# Patient Record
Sex: MALE | Race: WHITE | NOT HISPANIC OR LATINO | Employment: OTHER | URBAN - METROPOLITAN AREA
[De-identification: names, ages, dates, MRNs, and addresses within clinical notes are randomized per-mention and may not be internally consistent; named-entity substitution may affect disease eponyms.]

---

## 2017-01-08 ENCOUNTER — RESULTS ENCOUNTER (OUTPATIENT)
Dept: FAMILY MEDICINE CLINIC | Facility: CLINIC | Age: 82
End: 2017-01-08

## 2017-01-08 DIAGNOSIS — D50.9 IRON DEFICIENCY ANEMIA, UNSPECIFIED IRON DEFICIENCY ANEMIA TYPE: ICD-10-CM

## 2017-01-10 ENCOUNTER — TRANSCRIBE ORDERS (OUTPATIENT)
Dept: ADMINISTRATIVE | Facility: HOSPITAL | Age: 82
End: 2017-01-10

## 2017-01-10 ENCOUNTER — LAB (OUTPATIENT)
Dept: LAB | Facility: HOSPITAL | Age: 82
End: 2017-01-10

## 2017-01-10 DIAGNOSIS — E11.9 TYPE 2 DIABETES MELLITUS WITHOUT COMPLICATION, WITHOUT LONG-TERM CURRENT USE OF INSULIN (HCC): ICD-10-CM

## 2017-01-10 DIAGNOSIS — M1A.00X0 IDIOPATHIC CHRONIC GOUT WITHOUT TOPHUS, UNSPECIFIED SITE: ICD-10-CM

## 2017-01-10 DIAGNOSIS — I11.9 HYPERTENSIVE HEART DISEASE WITHOUT HEART FAILURE: ICD-10-CM

## 2017-01-10 LAB
ALBUMIN SERPL-MCNC: 4 G/DL (ref 3.5–5.2)
ALBUMIN UR-MCNC: 2.5 MG/L
ALBUMIN/GLOB SERPL: 1.3 G/DL
ALP SERPL-CCNC: 81 U/L (ref 39–117)
ALT SERPL W P-5'-P-CCNC: 61 U/L (ref 1–41)
ANION GAP SERPL CALCULATED.3IONS-SCNC: 12.8 MMOL/L
AST SERPL-CCNC: 47 U/L (ref 1–40)
BASOPHILS # BLD AUTO: 0.02 10*3/MM3 (ref 0–0.2)
BASOPHILS NFR BLD AUTO: 0.3 % (ref 0–1.5)
BILIRUB SERPL-MCNC: 0.5 MG/DL (ref 0.1–1.2)
BUN BLD-MCNC: 33 MG/DL (ref 8–23)
BUN/CREAT SERPL: 25.4 (ref 7–25)
CALCIUM SPEC-SCNC: 10 MG/DL (ref 8.6–10.5)
CHLORIDE SERPL-SCNC: 101 MMOL/L (ref 98–107)
CHOLEST SERPL-MCNC: 154 MG/DL (ref 0–200)
CO2 SERPL-SCNC: 28.2 MMOL/L (ref 22–29)
CREAT BLD-MCNC: 1.3 MG/DL (ref 0.76–1.27)
CREAT UR-MCNC: 87.5 MG/DL
DEPRECATED RDW RBC AUTO: 52.4 FL (ref 37–54)
EOSINOPHIL # BLD AUTO: 0.13 10*3/MM3 (ref 0–0.7)
EOSINOPHIL NFR BLD AUTO: 1.6 % (ref 0.3–6.2)
ERYTHROCYTE [DISTWIDTH] IN BLOOD BY AUTOMATED COUNT: 15.9 % (ref 11.5–14.5)
FERRITIN SERPL-MCNC: 90.94 NG/ML (ref 30–400)
GFR SERPL CREATININE-BSD FRML MDRD: 53 ML/MIN/1.73
GLOBULIN UR ELPH-MCNC: 3.2 GM/DL
GLUCOSE BLD-MCNC: 136 MG/DL (ref 65–99)
HBA1C MFR BLD: 6.57 % (ref 4.8–5.6)
HCT VFR BLD AUTO: 42.1 % (ref 40.4–52.2)
HDLC SERPL-MCNC: 45 MG/DL (ref 40–60)
HGB BLD-MCNC: 12.2 G/DL (ref 13.7–17.6)
IMM GRANULOCYTES # BLD: 0.02 10*3/MM3 (ref 0–0.03)
IMM GRANULOCYTES NFR BLD: 0.3 % (ref 0–0.5)
IRON 24H UR-MRATE: 63 MCG/DL (ref 59–158)
IRON SATN MFR SERPL: 13 % (ref 20–50)
LDLC SERPL CALC-MCNC: 75 MG/DL (ref 0–100)
LDLC/HDLC SERPL: 1.66 {RATIO}
LYMPHOCYTES # BLD AUTO: 1.46 10*3/MM3 (ref 0.9–4.8)
LYMPHOCYTES NFR BLD AUTO: 18.3 % (ref 19.6–45.3)
MCH RBC QN AUTO: 26.2 PG (ref 27–32.7)
MCHC RBC AUTO-ENTMCNC: 29 G/DL (ref 32.6–36.4)
MCV RBC AUTO: 90.5 FL (ref 79.8–96.2)
MICROALBUMIN/CREAT UR: 28.6 MG/G
MONOCYTES # BLD AUTO: 0.56 10*3/MM3 (ref 0.2–1.2)
MONOCYTES NFR BLD AUTO: 7 % (ref 5–12)
NEUTROPHILS # BLD AUTO: 5.81 10*3/MM3 (ref 1.9–8.1)
NEUTROPHILS NFR BLD AUTO: 72.5 % (ref 42.7–76)
PLATELET # BLD AUTO: 212 10*3/MM3 (ref 140–500)
PMV BLD AUTO: 11.3 FL (ref 6–12)
POTASSIUM BLD-SCNC: 4.9 MMOL/L (ref 3.5–5.2)
PROT SERPL-MCNC: 7.2 G/DL (ref 6–8.5)
RBC # BLD AUTO: 4.65 10*6/MM3 (ref 4.6–6)
SODIUM BLD-SCNC: 142 MMOL/L (ref 136–145)
TIBC SERPL-MCNC: 489 MCG/DL (ref 298–536)
TRANSFERRIN SERPL-MCNC: 328 MG/DL (ref 200–360)
TRIGL SERPL-MCNC: 172 MG/DL (ref 0–150)
TSH SERPL DL<=0.05 MIU/L-ACNC: 2.42 MIU/ML (ref 0.27–4.2)
URATE SERPL-MCNC: 7.3 MG/DL (ref 3.4–7)
VLDLC SERPL-MCNC: 34.4 MG/DL (ref 5–40)
WBC NRBC COR # BLD: 8 10*3/MM3 (ref 4.5–10.7)

## 2017-01-10 PROCEDURE — 83036 HEMOGLOBIN GLYCOSYLATED A1C: CPT

## 2017-01-10 PROCEDURE — 84466 ASSAY OF TRANSFERRIN: CPT | Performed by: INTERNAL MEDICINE

## 2017-01-10 PROCEDURE — 82570 ASSAY OF URINE CREATININE: CPT

## 2017-01-10 PROCEDURE — 36415 COLL VENOUS BLD VENIPUNCTURE: CPT

## 2017-01-10 PROCEDURE — 82728 ASSAY OF FERRITIN: CPT | Performed by: INTERNAL MEDICINE

## 2017-01-10 PROCEDURE — 84550 ASSAY OF BLOOD/URIC ACID: CPT

## 2017-01-10 PROCEDURE — 82043 UR ALBUMIN QUANTITATIVE: CPT

## 2017-01-10 PROCEDURE — 80053 COMPREHEN METABOLIC PANEL: CPT

## 2017-01-10 PROCEDURE — 80061 LIPID PANEL: CPT

## 2017-01-10 PROCEDURE — 85025 COMPLETE CBC W/AUTO DIFF WBC: CPT | Performed by: INTERNAL MEDICINE

## 2017-01-10 PROCEDURE — 83540 ASSAY OF IRON: CPT | Performed by: INTERNAL MEDICINE

## 2017-01-10 PROCEDURE — 84443 ASSAY THYROID STIM HORMONE: CPT

## 2017-01-19 ENCOUNTER — OFFICE VISIT (OUTPATIENT)
Dept: FAMILY MEDICINE CLINIC | Facility: CLINIC | Age: 82
End: 2017-01-19

## 2017-01-19 VITALS
WEIGHT: 235 LBS | SYSTOLIC BLOOD PRESSURE: 120 MMHG | OXYGEN SATURATION: 98 % | BODY MASS INDEX: 32.9 KG/M2 | HEIGHT: 71 IN | DIASTOLIC BLOOD PRESSURE: 70 MMHG | HEART RATE: 72 BPM

## 2017-01-19 DIAGNOSIS — E11.9 TYPE 2 DIABETES MELLITUS WITHOUT COMPLICATION, WITHOUT LONG-TERM CURRENT USE OF INSULIN (HCC): ICD-10-CM

## 2017-01-19 DIAGNOSIS — D50.9 IRON DEFICIENCY ANEMIA, UNSPECIFIED IRON DEFICIENCY ANEMIA TYPE: Primary | ICD-10-CM

## 2017-01-19 DIAGNOSIS — I11.9 HYPERTENSIVE HEART DISEASE WITHOUT HEART FAILURE: ICD-10-CM

## 2017-01-19 PROCEDURE — 99213 OFFICE O/P EST LOW 20 MIN: CPT | Performed by: INTERNAL MEDICINE

## 2017-01-19 RX ORDER — FERROUS SULFATE 325(65) MG
325 TABLET ORAL 2 TIMES DAILY
COMMUNITY
End: 2017-05-19

## 2017-01-19 RX ORDER — CARVEDILOL 3.12 MG/1
3.12 TABLET ORAL 2 TIMES DAILY
Qty: 180 TABLET | Refills: 1 | Status: SHIPPED | OUTPATIENT
Start: 2017-01-19 | End: 2017-05-22 | Stop reason: SDUPTHER

## 2017-01-19 RX ORDER — PRAVASTATIN SODIUM 40 MG
40 TABLET ORAL DAILY
Qty: 90 TABLET | Refills: 1 | Status: SHIPPED | OUTPATIENT
Start: 2017-01-19 | End: 2017-05-22 | Stop reason: SDUPTHER

## 2017-01-19 RX ORDER — RAMIPRIL 10 MG/1
10 CAPSULE ORAL DAILY
Qty: 90 CAPSULE | Refills: 1 | Status: SHIPPED | OUTPATIENT
Start: 2017-01-19 | End: 2017-05-22 | Stop reason: SDUPTHER

## 2017-01-19 RX ORDER — SPIRONOLACTONE 25 MG/1
25 TABLET ORAL DAILY
Qty: 90 TABLET | Refills: 1 | Status: SHIPPED | OUTPATIENT
Start: 2017-01-19 | End: 2017-05-22 | Stop reason: SDUPTHER

## 2017-01-19 NOTE — PROGRESS NOTES
Subjective   Goran August is a 82 y.o. male who presents today for:    Diabetes (7 week f/u); Hyperlipidemia; and Hypertension    History of Present Illness   He presents today in follow-up for his anemia.  This was discovered within the past year, and in iron saturation of 9% was also discovered.  Additional evaluation was negative.  Hemoccult cards were negative.  No GI endoscopic evaluation has been pursued as of yet.    He was started on ferrous gluconate plus vitamin C twice a day which he tolerates without side effect.  He returns today to recheck his labs.    Gemfibrozil that was being taken with his pravastatin was discontinued and fenofibrate was started.  He has tolerated this medication change without side effect.    He has type 2 diabetes mellitus going back several years.  This is been controlled with lifestyle changes (diet and regular exercise).  His last few A1c's have been below 7%, obviating the need for medication at this point.  His preference is to not take any more medication than he is currently on.    Mr. August  reports that he has quit smoking. His smoking use included Cigarettes. He has a 12.50 pack-year smoking history. He has never used smokeless tobacco. He reports that he drinks alcohol. He reports that he does not use illicit drugs.         Current Outpatient Prescriptions:   •  Ascorbic Acid (VITAMIN C PO), Take 1 tablet by mouth 2 (Two) Times a Day., Disp: , Rfl:   •  aspirin 81 MG EC tablet, Take 81 mg by mouth Daily., Disp: , Rfl:   •  carvedilol (COREG) 3.125 MG tablet, Take 1 tablet by mouth 2 (two) times a day., Disp: , Rfl:   •  esomeprazole (NexIUM) 40 MG capsule, Take 1 capsule by mouth daily., Disp: , Rfl:   •  fenofibrate (TRICOR) 145 MG tablet, Take 1 tablet by mouth daily., Disp: 90 tablet, Rfl: 3  •  ferrous sulfate 325 (65 FE) MG tablet, Take 325 mg by mouth 2 (Two) Times a Day., Disp: , Rfl:   •  fluticasone (FLONASE) 50 MCG/ACT nasal spray, 2 sprays into each  "nostril daily. Administer 2 sprays in each nostril once a day, Disp: 1 bottle, Rfl: 0  •  Multiple Vitamin (MULTI-VITAMIN DAILY PO), Take  by mouth., Disp: , Rfl:   •  pravastatin (PRAVACHOL) 40 MG tablet, Take 1 tablet by mouth daily., Disp: , Rfl:   •  ramipril (ALTACE) 10 MG capsule, Take 10 mg by mouth daily., Disp: , Rfl:   •  spironolactone (ALDACTONE) 25 MG tablet, Take 1 tablet by mouth daily., Disp: , Rfl:       The following portions of the patient's history were reviewed and updated as appropriate: allergies, current medications, past social history and problem list.    Review of Systems   Constitutional: Negative for diaphoresis and fatigue.   Eyes: Negative for visual disturbance.   Respiratory: Negative for cough and chest tightness.    Cardiovascular: Negative for chest pain, palpitations and leg swelling.   Gastrointestinal: Negative for abdominal pain, blood in stool and diarrhea.   Endocrine: Negative for polydipsia and polyuria.   Musculoskeletal: Negative for myalgias.   Skin: Negative for wound.   Neurological: Negative for dizziness, syncope, numbness and headaches.   Hematological: Does not bruise/bleed easily.         Objective   Vitals:    01/19/17 1450   BP: 120/70   BP Location: Left arm   Patient Position: Sitting   Cuff Size: Adult   Pulse: 72   SpO2: 98%   Weight: 235 lb (107 kg)   Height: 71\" (180.3 cm)     Physical Exam   Constitutional: He is oriented to person, place, and time. He appears well-developed and well-nourished. No distress.   Eyes: Conjunctivae are normal. No scleral icterus.   Cardiovascular: Normal rate and regular rhythm.    Abdominal: There is no tenderness.   Neurological: He is alert and oriented to person, place, and time. Coordination normal.   Psychiatric: He has a normal mood and affect. His behavior is normal.       Assessment/Plan   Goran was seen today for diabetes, hyperlipidemia and hypertension.    Diagnoses and all orders for this visit:    Iron " deficiency anemia, unspecified iron deficiency anemia type  -     CBC & Differential; Future  -     Ferritin; Future  -     Iron Profile; Future    Type 2 diabetes mellitus without complication, without long-term current use of insulin  -     Hemoglobin A1c; Future  -     Comprehensive Metabolic Panel; Future    Hypertensive heart disease without heart failure    Other orders  -     spironolactone (ALDACTONE) 25 MG tablet; Take 1 tablet by mouth Daily.  -     carvedilol (COREG) 3.125 MG tablet; Take 1 tablet by mouth 2 (Two) Times a Day.  -     ramipril (ALTACE) 10 MG capsule; Take 1 capsule by mouth Daily.  -     pravastatin (PRAVACHOL) 40 MG tablet; Take 1 tablet by mouth Daily.    Iron levels have started to respond to the iron supplement.  His transferrin saturation is now 13%, up from 9%.  Hemoglobin remained steady at 12.2.  He should continue the iron plus vitamin C twice a day.  We will recheck anemia labs in approximately 4 months, prior to his follow-up in May.    While his A1c has risen slightly, it is still well controlled with diet and exercise.  At 6.57, there is no need to add any further medications.    Recent echocardiogram to the [facility] office showed an ejection fraction of 51%.  This result was reviewed with the patient today.  There is still mild concentric LVH.  He will stay on the same regimen since it has his blood pressure with good control.    There is no evidence of microalbuminuria, but there's been a slight bump in his creatinine since starting him on fenofibrate.  We will recheck his liver enzymes as well since those also bumped.  Otherwise, we will make no changes medications until we see those labs in about 4 months.  He knows to contact us prior to that follow-up visit if there are any further problems.

## 2017-01-19 NOTE — MR AVS SNAPSHOT
Goran August   1/19/2017 2:40 PM   Office Visit    Dept Phone:  [phone]   Encounter #:  [id number]    Provider:  Manny Brice MD   Department:  [facility] INTERNAL MEDICINE                Your Full Care Plan              Where to Get Your Medications      These medications were sent to [facility] - [place], [place] - [address] AT [address] [facility] - [phone] [facility] [phone]   [address] [facility] [address]     Phone:  [phone]     carvedilol 3.125 MG tablet    pravastatin 40 MG tablet    ramipril 10 MG capsule    spironolactone 25 MG tablet            Your Updated Medication List          This list is accurate as of: 1/19/17  3:45 PM.  Always use your most recent med list.                aspirin 81 MG EC tablet       carvedilol 3.125 MG tablet   Commonly known as:  COREG   Take 1 tablet by mouth 2 (Two) Times a Day.       esomeprazole 40 MG capsule   Commonly known as:  nexIUM       fenofibrate 145 MG tablet   Commonly known as:  TRICOR   Take 1 tablet by mouth daily.       ferrous sulfate 325 (65 FE) MG tablet       fluticasone 50 MCG/ACT nasal spray   Commonly known as:  FLONASE   2 sprays into each nostril daily. Administer 2 sprays in each nostril once a day       MULTI-VITAMIN DAILY PO       pravastatin 40 MG tablet   Commonly known as:  PRAVACHOL   Take 1 tablet by mouth Daily.       ramipril 10 MG capsule   Commonly known as:  ALTACE   Take 1 capsule by mouth Daily.       spironolactone 25 MG tablet   Commonly known as:  ALDACTONE   Take 1 tablet by mouth Daily.       VITAMIN C PO               You Were Diagnosed With        Codes Comments    Iron deficiency anemia, unspecified iron deficiency anemia type    -  Primary ICD-10-CM: D50.9  ICD-9-CM: 280.9     Type 2 diabetes mellitus without complication, without long-term current use of insulin     ICD-10-CM: E11.9  ICD-9-CM: 250.00     "Hypertensive heart disease without heart failure     ICD-10-CM: I11.9  ICD-9-CM: 402.90       Instructions     None    Patient Instructions History      Upcoming Appointments     Visit Type Date Time Department    OFFICE VISIT 1/19/2017  2:40 PM [facility]    [facility] 5/12/2017 11:40 AM [facility]    OFFICE VISIT 5/19/2017 11:20 AM [facility]      MyChart Signup     Our records indicate that you have declined [facility] signup. If you would like to sign up for [employer], please email [facility] [email or web address] or call [phone] to obtain an activation code.             Other Info from Your Visit           Your Appointments     May 12, 2017 11:40 AM EDT   [facility] with KIMBERLY GRULLON   [facility] INTERNAL MEDICINE (--)    [address] [place], [place] [address]   [phone]            May 19, 2017 11:20 AM EDT   Office Visit with Manny Brice MD   [facility] INTERNAL MEDICINE (--)    [address] [place], [place] [address]   [phone]           Arrive 15 minutes prior to appointment.              Allergies     Hctz [Hydrochlorothiazide] Allergy Hives      Reason for Visit     Diabetes 7 week f/u    Hyperlipidemia     Hypertension           Vital Signs     Blood Pressure Pulse Height Weight Oxygen Saturation Body Mass Index    120/70 (BP Location: Left arm, Patient Position: Sitting, Cuff Size: Adult) 72 71\" (180.3 cm) 235 lb (107 kg) 98% 32.78 kg/m2    Smoking Status                   Former Smoker           Problems and Diagnoses Noted     Anemia    Heart disease due to high blood pressure    Type 2 diabetes mellitus without complication        "

## 2017-01-20 DIAGNOSIS — H65.03 BILATERAL ACUTE SEROUS OTITIS MEDIA, RECURRENCE NOT SPECIFIED: ICD-10-CM

## 2017-01-20 RX ORDER — FLUTICASONE PROPIONATE 50 MCG
2 SPRAY, SUSPENSION (ML) NASAL DAILY
Qty: 1 BOTTLE | Refills: 12 | Status: SHIPPED | OUTPATIENT
Start: 2017-01-20 | End: 2019-05-01 | Stop reason: SDUPTHER

## 2017-05-07 ENCOUNTER — RESULTS ENCOUNTER (OUTPATIENT)
Dept: FAMILY MEDICINE CLINIC | Facility: CLINIC | Age: 82
End: 2017-05-07

## 2017-05-07 DIAGNOSIS — D50.9 IRON DEFICIENCY ANEMIA, UNSPECIFIED IRON DEFICIENCY ANEMIA TYPE: ICD-10-CM

## 2017-05-07 DIAGNOSIS — E11.9 TYPE 2 DIABETES MELLITUS WITHOUT COMPLICATION, WITHOUT LONG-TERM CURRENT USE OF INSULIN (HCC): ICD-10-CM

## 2017-05-12 ENCOUNTER — APPOINTMENT (OUTPATIENT)
Dept: LAB | Facility: HOSPITAL | Age: 82
End: 2017-05-12

## 2017-05-12 LAB
ALBUMIN SERPL-MCNC: 4 G/DL (ref 3.5–5.2)
ALBUMIN/GLOB SERPL: 1.3 G/DL
ALP SERPL-CCNC: 58 U/L (ref 39–117)
ALT SERPL W P-5'-P-CCNC: 32 U/L (ref 1–41)
ANION GAP SERPL CALCULATED.3IONS-SCNC: 11.6 MMOL/L
AST SERPL-CCNC: 36 U/L (ref 1–40)
BASOPHILS # BLD AUTO: 0.02 10*3/MM3 (ref 0–0.2)
BASOPHILS NFR BLD AUTO: 0.3 % (ref 0–1.5)
BILIRUB SERPL-MCNC: 0.4 MG/DL (ref 0.1–1.2)
BUN BLD-MCNC: 26 MG/DL (ref 8–23)
BUN/CREAT SERPL: 21.8 (ref 7–25)
CALCIUM SPEC-SCNC: 9.7 MG/DL (ref 8.6–10.5)
CHLORIDE SERPL-SCNC: 101 MMOL/L (ref 98–107)
CO2 SERPL-SCNC: 27.4 MMOL/L (ref 22–29)
CREAT BLD-MCNC: 1.19 MG/DL (ref 0.76–1.27)
DEPRECATED RDW RBC AUTO: 49.2 FL (ref 37–54)
EOSINOPHIL # BLD AUTO: 0.11 10*3/MM3 (ref 0–0.7)
EOSINOPHIL NFR BLD AUTO: 1.6 % (ref 0.3–6.2)
ERYTHROCYTE [DISTWIDTH] IN BLOOD BY AUTOMATED COUNT: 14.7 % (ref 11.5–14.5)
FERRITIN SERPL-MCNC: 156.9 NG/ML (ref 30–400)
GFR SERPL CREATININE-BSD FRML MDRD: 59 ML/MIN/1.73
GLOBULIN UR ELPH-MCNC: 3.2 GM/DL
GLUCOSE BLD-MCNC: 96 MG/DL (ref 65–99)
HBA1C MFR BLD: 5.93 % (ref 4.8–5.6)
HCT VFR BLD AUTO: 39.8 % (ref 40.4–52.2)
HGB BLD-MCNC: 12.2 G/DL (ref 13.7–17.6)
IMM GRANULOCYTES # BLD: 0 10*3/MM3 (ref 0–0.03)
IMM GRANULOCYTES NFR BLD: 0 % (ref 0–0.5)
IRON 24H UR-MRATE: 51 MCG/DL (ref 59–158)
IRON SATN MFR SERPL: 12 % (ref 20–50)
LYMPHOCYTES # BLD AUTO: 1.31 10*3/MM3 (ref 0.9–4.8)
LYMPHOCYTES NFR BLD AUTO: 19.2 % (ref 19.6–45.3)
MCH RBC QN AUTO: 27.8 PG (ref 27–32.7)
MCHC RBC AUTO-ENTMCNC: 30.7 G/DL (ref 32.6–36.4)
MCV RBC AUTO: 90.7 FL (ref 79.8–96.2)
MONOCYTES # BLD AUTO: 0.41 10*3/MM3 (ref 0.2–1.2)
MONOCYTES NFR BLD AUTO: 6 % (ref 5–12)
NEUTROPHILS # BLD AUTO: 4.97 10*3/MM3 (ref 1.9–8.1)
NEUTROPHILS NFR BLD AUTO: 72.9 % (ref 42.7–76)
PLATELET # BLD AUTO: 207 10*3/MM3 (ref 140–500)
PMV BLD AUTO: 11.5 FL (ref 6–12)
POTASSIUM BLD-SCNC: 4.5 MMOL/L (ref 3.5–5.2)
PROT SERPL-MCNC: 7.2 G/DL (ref 6–8.5)
RBC # BLD AUTO: 4.39 10*6/MM3 (ref 4.6–6)
SODIUM BLD-SCNC: 140 MMOL/L (ref 136–145)
TIBC SERPL-MCNC: 411 MCG/DL (ref 298–536)
TRANSFERRIN SERPL-MCNC: 276 MG/DL (ref 200–360)
WBC NRBC COR # BLD: 6.82 10*3/MM3 (ref 4.5–10.7)

## 2017-05-12 PROCEDURE — 82728 ASSAY OF FERRITIN: CPT | Performed by: INTERNAL MEDICINE

## 2017-05-12 PROCEDURE — 80053 COMPREHEN METABOLIC PANEL: CPT | Performed by: INTERNAL MEDICINE

## 2017-05-12 PROCEDURE — 85025 COMPLETE CBC W/AUTO DIFF WBC: CPT | Performed by: INTERNAL MEDICINE

## 2017-05-12 PROCEDURE — 83036 HEMOGLOBIN GLYCOSYLATED A1C: CPT | Performed by: INTERNAL MEDICINE

## 2017-05-12 PROCEDURE — 83540 ASSAY OF IRON: CPT | Performed by: INTERNAL MEDICINE

## 2017-05-12 PROCEDURE — 36415 COLL VENOUS BLD VENIPUNCTURE: CPT | Performed by: INTERNAL MEDICINE

## 2017-05-12 PROCEDURE — 84466 ASSAY OF TRANSFERRIN: CPT | Performed by: INTERNAL MEDICINE

## 2017-05-19 ENCOUNTER — OFFICE VISIT (OUTPATIENT)
Dept: FAMILY MEDICINE CLINIC | Facility: CLINIC | Age: 82
End: 2017-05-19

## 2017-05-19 VITALS
HEIGHT: 71 IN | BODY MASS INDEX: 30.51 KG/M2 | HEART RATE: 56 BPM | OXYGEN SATURATION: 94 % | SYSTOLIC BLOOD PRESSURE: 108 MMHG | DIASTOLIC BLOOD PRESSURE: 68 MMHG | WEIGHT: 217.9 LBS

## 2017-05-19 DIAGNOSIS — I11.9 HYPERTENSIVE HEART DISEASE WITHOUT HEART FAILURE: ICD-10-CM

## 2017-05-19 DIAGNOSIS — E11.9 TYPE 2 DIABETES MELLITUS WITHOUT COMPLICATION, WITHOUT LONG-TERM CURRENT USE OF INSULIN (HCC): Primary | ICD-10-CM

## 2017-05-19 DIAGNOSIS — D50.9 IRON DEFICIENCY ANEMIA, UNSPECIFIED IRON DEFICIENCY ANEMIA TYPE: ICD-10-CM

## 2017-05-19 DIAGNOSIS — L20.9 ATOPIC DERMATITIS, UNSPECIFIED TYPE: ICD-10-CM

## 2017-05-19 PROCEDURE — 99214 OFFICE O/P EST MOD 30 MIN: CPT | Performed by: INTERNAL MEDICINE

## 2017-05-19 RX ORDER — QUINIDINE GLUCONATE 324 MG
240 TABLET, EXTENDED RELEASE ORAL 2 TIMES DAILY WITH MEALS
COMMUNITY
End: 2017-05-26 | Stop reason: DRUGHIGH

## 2017-05-19 RX ORDER — ESOMEPRAZOLE MAGNESIUM 40 MG/1
40 CAPSULE, DELAYED RELEASE ORAL DAILY
Qty: 30 CAPSULE | Refills: 6 | Status: CANCELLED | OUTPATIENT
Start: 2017-05-19

## 2017-05-19 RX ORDER — TRIAMCINOLONE ACETONIDE 1 MG/G
CREAM TOPICAL 2 TIMES DAILY
Qty: 454 G | Refills: 0 | Status: SHIPPED | OUTPATIENT
Start: 2017-05-19 | End: 2018-04-02

## 2017-05-22 DIAGNOSIS — E78.5 HYPERLIPIDEMIA, UNSPECIFIED HYPERLIPIDEMIA TYPE: ICD-10-CM

## 2017-05-22 RX ORDER — FENOFIBRATE 145 MG/1
145 TABLET, COATED ORAL DAILY
Qty: 90 TABLET | Refills: 1 | Status: SHIPPED | OUTPATIENT
Start: 2017-05-22 | End: 2017-05-26 | Stop reason: SDUPTHER

## 2017-05-22 RX ORDER — ESOMEPRAZOLE MAGNESIUM 40 MG/1
40 CAPSULE, DELAYED RELEASE ORAL DAILY
Qty: 90 CAPSULE | Refills: 3 | Status: SHIPPED | OUTPATIENT
Start: 2017-05-22 | End: 2017-05-26 | Stop reason: SDUPTHER

## 2017-05-22 RX ORDER — PRAVASTATIN SODIUM 40 MG
40 TABLET ORAL DAILY
Qty: 90 TABLET | Refills: 1 | Status: SHIPPED | OUTPATIENT
Start: 2017-05-22 | End: 2017-05-26 | Stop reason: SDUPTHER

## 2017-05-22 RX ORDER — RAMIPRIL 10 MG/1
10 CAPSULE ORAL DAILY
Qty: 90 CAPSULE | Refills: 1 | Status: SHIPPED | OUTPATIENT
Start: 2017-05-22 | End: 2017-05-26 | Stop reason: SDUPTHER

## 2017-05-22 RX ORDER — SPIRONOLACTONE 25 MG/1
25 TABLET ORAL DAILY
Qty: 90 TABLET | Refills: 1 | Status: SHIPPED | OUTPATIENT
Start: 2017-05-22 | End: 2017-05-26 | Stop reason: SDUPTHER

## 2017-05-22 RX ORDER — CARVEDILOL 3.12 MG/1
3.12 TABLET ORAL 2 TIMES DAILY
Qty: 180 TABLET | Refills: 1 | Status: SHIPPED | OUTPATIENT
Start: 2017-05-22 | End: 2017-05-26 | Stop reason: SDUPTHER

## 2017-05-26 DIAGNOSIS — E78.5 HYPERLIPIDEMIA, UNSPECIFIED HYPERLIPIDEMIA TYPE: ICD-10-CM

## 2017-05-26 RX ORDER — ESOMEPRAZOLE MAGNESIUM 40 MG/1
40 CAPSULE, DELAYED RELEASE ORAL DAILY
Qty: 90 CAPSULE | Refills: 3 | Status: SHIPPED | OUTPATIENT
Start: 2017-05-26 | End: 2018-05-29 | Stop reason: SDUPTHER

## 2017-05-26 RX ORDER — PRAVASTATIN SODIUM 40 MG
40 TABLET ORAL DAILY
Qty: 90 TABLET | Refills: 1 | Status: SHIPPED | OUTPATIENT
Start: 2017-05-26 | End: 2017-11-21 | Stop reason: SDUPTHER

## 2017-05-26 RX ORDER — FENOFIBRATE 145 MG/1
145 TABLET, COATED ORAL DAILY
Qty: 90 TABLET | Refills: 1 | Status: SHIPPED | OUTPATIENT
Start: 2017-05-26 | End: 2018-05-10 | Stop reason: SDUPTHER

## 2017-05-26 RX ORDER — DOXYCYCLINE HYCLATE 50 MG/1
324 CAPSULE, GELATIN COATED ORAL 2 TIMES DAILY
Qty: 100 TABLET | Refills: 0 | Status: SHIPPED | OUTPATIENT
Start: 2017-05-26 | End: 2017-07-24 | Stop reason: SDUPTHER

## 2017-05-26 RX ORDER — RAMIPRIL 10 MG/1
10 CAPSULE ORAL DAILY
Qty: 90 CAPSULE | Refills: 1 | Status: SHIPPED | OUTPATIENT
Start: 2017-05-26 | End: 2017-11-21 | Stop reason: SDUPTHER

## 2017-05-26 RX ORDER — CARVEDILOL 3.12 MG/1
3.12 TABLET ORAL 2 TIMES DAILY
Qty: 180 TABLET | Refills: 1 | Status: SHIPPED | OUTPATIENT
Start: 2017-05-26 | End: 2017-11-21 | Stop reason: SDUPTHER

## 2017-05-26 RX ORDER — SPIRONOLACTONE 25 MG/1
25 TABLET ORAL DAILY
Qty: 90 TABLET | Refills: 1 | Status: SHIPPED | OUTPATIENT
Start: 2017-05-26 | End: 2017-11-21 | Stop reason: SDUPTHER

## 2017-07-25 RX ORDER — DOXYCYCLINE HYCLATE 50 MG/1
CAPSULE, GELATIN COATED ORAL
Qty: 100 TABLET | Refills: 3 | Status: SHIPPED | OUTPATIENT
Start: 2017-07-25 | End: 2017-08-18

## 2017-08-01 DIAGNOSIS — D50.9 IRON DEFICIENCY ANEMIA, UNSPECIFIED IRON DEFICIENCY ANEMIA TYPE: Primary | ICD-10-CM

## 2017-08-01 DIAGNOSIS — E78.5 HYPERLIPIDEMIA, UNSPECIFIED HYPERLIPIDEMIA TYPE: ICD-10-CM

## 2017-08-01 DIAGNOSIS — M1A.00X0 IDIOPATHIC CHRONIC GOUT WITHOUT TOPHUS, UNSPECIFIED SITE: ICD-10-CM

## 2017-08-01 DIAGNOSIS — E11.9 TYPE 2 DIABETES MELLITUS WITHOUT COMPLICATION, WITHOUT LONG-TERM CURRENT USE OF INSULIN (HCC): ICD-10-CM

## 2017-08-06 ENCOUNTER — RESULTS ENCOUNTER (OUTPATIENT)
Dept: FAMILY MEDICINE CLINIC | Facility: CLINIC | Age: 82
End: 2017-08-06

## 2017-08-06 DIAGNOSIS — E11.9 TYPE 2 DIABETES MELLITUS WITHOUT COMPLICATION, WITHOUT LONG-TERM CURRENT USE OF INSULIN (HCC): ICD-10-CM

## 2017-08-06 DIAGNOSIS — M1A.00X0 IDIOPATHIC CHRONIC GOUT WITHOUT TOPHUS, UNSPECIFIED SITE: ICD-10-CM

## 2017-08-06 DIAGNOSIS — D50.9 IRON DEFICIENCY ANEMIA, UNSPECIFIED IRON DEFICIENCY ANEMIA TYPE: ICD-10-CM

## 2017-08-06 DIAGNOSIS — E78.5 HYPERLIPIDEMIA, UNSPECIFIED HYPERLIPIDEMIA TYPE: ICD-10-CM

## 2017-08-11 ENCOUNTER — APPOINTMENT (OUTPATIENT)
Dept: LAB | Facility: HOSPITAL | Age: 82
End: 2017-08-11

## 2017-08-11 LAB
ALBUMIN SERPL-MCNC: 3.8 G/DL (ref 3.5–5.2)
ALBUMIN UR-MCNC: <1.2 MG/L
ALBUMIN/GLOB SERPL: 1.2 G/DL
ALP SERPL-CCNC: 49 U/L (ref 39–117)
ALT SERPL W P-5'-P-CCNC: 26 U/L (ref 1–41)
ANION GAP SERPL CALCULATED.3IONS-SCNC: 10.4 MMOL/L
AST SERPL-CCNC: 27 U/L (ref 1–40)
BILIRUB SERPL-MCNC: 0.2 MG/DL (ref 0.1–1.2)
BUN BLD-MCNC: 33 MG/DL (ref 8–23)
BUN/CREAT SERPL: 29.7 (ref 7–25)
CALCIUM SPEC-SCNC: 9.3 MG/DL (ref 8.6–10.5)
CHLORIDE SERPL-SCNC: 101 MMOL/L (ref 98–107)
CO2 SERPL-SCNC: 28.6 MMOL/L (ref 22–29)
CREAT BLD-MCNC: 1.11 MG/DL (ref 0.76–1.27)
CREAT UR-MCNC: 85.2 MG/DL
DEPRECATED RDW RBC AUTO: 48.8 FL (ref 37–54)
ERYTHROCYTE [DISTWIDTH] IN BLOOD BY AUTOMATED COUNT: 14.5 % (ref 11.5–14.5)
GFR SERPL CREATININE-BSD FRML MDRD: 63 ML/MIN/1.73
GLOBULIN UR ELPH-MCNC: 3.3 GM/DL
GLUCOSE BLD-MCNC: 108 MG/DL (ref 65–99)
HBA1C MFR BLD: 5.82 % (ref 4.8–5.6)
HCT VFR BLD AUTO: 39.6 % (ref 40.4–52.2)
HGB BLD-MCNC: 12.2 G/DL (ref 13.7–17.6)
IRON 24H UR-MRATE: 45 MCG/DL (ref 59–158)
IRON SATN MFR SERPL: 11 % (ref 20–50)
MCH RBC QN AUTO: 28.7 PG (ref 27–32.7)
MCHC RBC AUTO-ENTMCNC: 30.8 G/DL (ref 32.6–36.4)
MCV RBC AUTO: 93.2 FL (ref 79.8–96.2)
MICROALBUMIN/CREAT UR: NORMAL MG/G
PLATELET # BLD AUTO: 219 10*3/MM3 (ref 140–500)
PMV BLD AUTO: 11.7 FL (ref 6–12)
POTASSIUM BLD-SCNC: 4.4 MMOL/L (ref 3.5–5.2)
PROT SERPL-MCNC: 7.1 G/DL (ref 6–8.5)
RBC # BLD AUTO: 4.25 10*6/MM3 (ref 4.6–6)
SODIUM BLD-SCNC: 140 MMOL/L (ref 136–145)
TIBC SERPL-MCNC: 422 MCG/DL
TRANSFERRIN SERPL-MCNC: 283 MG/DL (ref 200–360)
URATE SERPL-MCNC: 7.2 MG/DL (ref 3.4–7)
WBC NRBC COR # BLD: 7.34 10*3/MM3 (ref 4.5–10.7)

## 2017-08-11 PROCEDURE — 36415 COLL VENOUS BLD VENIPUNCTURE: CPT

## 2017-08-11 PROCEDURE — 85027 COMPLETE CBC AUTOMATED: CPT | Performed by: INTERNAL MEDICINE

## 2017-08-11 PROCEDURE — 84550 ASSAY OF BLOOD/URIC ACID: CPT | Performed by: INTERNAL MEDICINE

## 2017-08-11 PROCEDURE — 83540 ASSAY OF IRON: CPT | Performed by: INTERNAL MEDICINE

## 2017-08-11 PROCEDURE — 82570 ASSAY OF URINE CREATININE: CPT | Performed by: INTERNAL MEDICINE

## 2017-08-11 PROCEDURE — 80053 COMPREHEN METABOLIC PANEL: CPT | Performed by: INTERNAL MEDICINE

## 2017-08-11 PROCEDURE — 83036 HEMOGLOBIN GLYCOSYLATED A1C: CPT | Performed by: INTERNAL MEDICINE

## 2017-08-11 PROCEDURE — 84466 ASSAY OF TRANSFERRIN: CPT | Performed by: INTERNAL MEDICINE

## 2017-08-11 PROCEDURE — 82043 UR ALBUMIN QUANTITATIVE: CPT | Performed by: INTERNAL MEDICINE

## 2017-08-18 ENCOUNTER — OFFICE VISIT (OUTPATIENT)
Dept: FAMILY MEDICINE CLINIC | Facility: CLINIC | Age: 82
End: 2017-08-18

## 2017-08-18 VITALS
SYSTOLIC BLOOD PRESSURE: 122 MMHG | BODY MASS INDEX: 30.77 KG/M2 | HEART RATE: 53 BPM | WEIGHT: 219.8 LBS | OXYGEN SATURATION: 97 % | HEIGHT: 71 IN | DIASTOLIC BLOOD PRESSURE: 68 MMHG

## 2017-08-18 DIAGNOSIS — D64.9 ANEMIA, UNSPECIFIED TYPE: Primary | ICD-10-CM

## 2017-08-18 PROCEDURE — 99213 OFFICE O/P EST LOW 20 MIN: CPT | Performed by: INTERNAL MEDICINE

## 2017-08-18 RX ORDER — FERROUS SULFATE 325(65) MG
325 TABLET ORAL 2 TIMES DAILY
Qty: 60 TABLET | Refills: 6 | Status: SHIPPED | OUTPATIENT
Start: 2017-08-18 | End: 2018-03-22 | Stop reason: SDUPTHER

## 2017-08-18 NOTE — PROGRESS NOTES
Subjective   Goran August is a 83 y.o. male who presents today for:    Anemia (f/u and review labs)    History of Present Illness   He presents today in follow-up for his anemia. This was discovered within the past year, and in iron saturation of 9% was also discovered. Additional evaluation was negative. Hemoccult cards were negative. No GI endoscopic evaluation has been pursued as of yet.      He was started on ferrous gluconate plus vitamin C twice a day which he tolerates without side effect.  Iron levels have begun to respond; interestingly, there is only marginal improvement in his iron levels, and there was a hemoglobin drop from 13.2 to 12.2.       Mr. August  reports that he has quit smoking. His smoking use included Cigarettes. He has a 12.50 pack-year smoking history. He has never used smokeless tobacco. He reports that he drinks alcohol. He reports that he does not use illicit drugs.     Allergies   Allergen Reactions   • Hctz [Hydrochlorothiazide] Hives       Current Outpatient Prescriptions:   •  Ascorbic Acid (VITAMIN C PO), Take 1,000 mg by mouth 2 (Two) Times a Day., Disp: , Rfl:   •  aspirin 81 MG EC tablet, Take 81 mg by mouth Daily., Disp: , Rfl:   •  carvedilol (COREG) 3.125 MG tablet, Take 1 tablet by mouth 2 (Two) Times a Day., Disp: 180 tablet, Rfl: 1  •  esomeprazole (nexIUM) 40 MG capsule, Take 1 capsule by mouth Daily., Disp: 90 capsule, Rfl: 3  •  fenofibrate (TRICOR) 145 MG tablet, Take 1 tablet by mouth Daily., Disp: 90 tablet, Rfl: 1  •  ferrous gluconate (FERGON) 324 MG tablet, TAKE 1 TABLET BY MOUTH 2 (TWO) TIMES A DAY., Disp: 100 tablet, Rfl: 3  •  fluticasone (FLONASE) 50 MCG/ACT nasal spray, 2 sprays into each nostril Daily. Administer 2 sprays in each nostril once a day, Disp: 1 bottle, Rfl: 12  •  Multiple Vitamin (MULTI-VITAMIN DAILY PO), Take  by mouth., Disp: , Rfl:   •  pravastatin (PRAVACHOL) 40 MG tablet, Take 1 tablet by mouth Daily., Disp: 90 tablet, Rfl: 1  •   "ramipril (ALTACE) 10 MG capsule, Take 1 capsule by mouth Daily., Disp: 90 capsule, Rfl: 1  •  spironolactone (ALDACTONE) 25 MG tablet, Take 1 tablet by mouth Daily., Disp: 90 tablet, Rfl: 1  •  triamcinolone (KENALOG) 0.1 % cream, Apply  topically 2 (Two) Times a Day., Disp: 454 g, Rfl: 0      Review of Systems   Constitutional: Negative for diaphoresis and fatigue.   Eyes: Negative for visual disturbance.   Respiratory: Negative for cough and chest tightness.    Cardiovascular: Negative for chest pain, palpitations and leg swelling.   Gastrointestinal: Negative for abdominal pain, blood in stool and diarrhea.   Endocrine: Negative for polydipsia and polyuria.   Musculoskeletal: Negative for myalgias.   Skin: Negative for wound.   Neurological: Negative for dizziness, syncope, numbness and headaches.   Hematological: Does not bruise/bleed easily.       Objective   Vitals:    08/18/17 1139   BP: 122/68   BP Location: Left arm   Patient Position: Sitting   Cuff Size: Adult   Pulse: 53   SpO2: 97%   Weight: 219 lb 12.8 oz (99.7 kg)   Height: 71\" (180.3 cm)     Physical Exam   Constitutional: He appears well-developed and well-nourished. No distress.   Eyes: Conjunctivae are normal. Pupils are equal, round, and reactive to light. No scleral icterus.   Cardiovascular: Normal rate and regular rhythm.    Pulmonary/Chest: Effort normal.     Murmur (early systolic, at the LLSB) heard.    Assessment/Plan   Goran was seen today for anemia.    Diagnoses and all orders for this visit:    Anemia, unspecified type  -     CBC & Differential  -     Erythropoietin  -     Reticulocytes  -     Methylmalonic Acid, Serum    Other orders  -     ferrous sulfate 325 (65 FE) MG tablet; Take 1 tablet by mouth 2 (Two) Times a Day.    Interestingly, despite being on ferrous gluconate twice a day, his iron levels have not risen.  His hemoglobin remained stable at 12.2.  We will do some additional lab tests today, increase his iron to ferrous " sulfate twice a day, and go from there.    Depending on the results of the labs, we may refer him to the blood disorders doctors for additional evaluation and recommendations.  With a hemoglobin of 12.2, I feel that watchful waiting may be the best course for him.

## 2017-08-24 LAB
BASOPHILS # BLD AUTO: 0.02 10*3/MM3 (ref 0–0.2)
BASOPHILS NFR BLD AUTO: 0.2 % (ref 0–1.5)
EOSINOPHIL # BLD AUTO: 0.1 10*3/MM3 (ref 0–0.7)
EOSINOPHIL NFR BLD AUTO: 1.2 % (ref 0.3–6.2)
EPO SERPL-ACNC: 18.1 MIU/ML (ref 2.6–18.5)
ERYTHROCYTE [DISTWIDTH] IN BLOOD BY AUTOMATED COUNT: 14.7 % (ref 11.5–14.5)
HCT VFR BLD AUTO: 41.3 % (ref 40.4–52.2)
HGB BLD-MCNC: 12.5 G/DL (ref 13.7–17.6)
IMM GRANULOCYTES # BLD: 0.02 10*3/MM3 (ref 0–0.03)
IMM GRANULOCYTES NFR BLD: 0.2 % (ref 0–0.5)
LYMPHOCYTES # BLD AUTO: 1.42 10*3/MM3 (ref 0.9–4.8)
LYMPHOCYTES NFR BLD AUTO: 16.9 % (ref 19.6–45.3)
MCH RBC QN AUTO: 28.7 PG (ref 27–32.7)
MCHC RBC AUTO-ENTMCNC: 30.3 G/DL (ref 32.6–36.4)
MCV RBC AUTO: 94.9 FL (ref 79.8–96.2)
METHYLMALONATE SERPL-SCNC: 337 NMOL/L (ref 0–378)
MONOCYTES # BLD AUTO: 0.59 10*3/MM3 (ref 0.2–1.2)
MONOCYTES NFR BLD AUTO: 7 % (ref 5–12)
NEUTROPHILS # BLD AUTO: 6.27 10*3/MM3 (ref 1.9–8.1)
NEUTROPHILS NFR BLD AUTO: 74.5 % (ref 42.7–76)
PLATELET # BLD AUTO: 226 10*3/MM3 (ref 140–500)
RBC # BLD AUTO: 4.35 10*6/MM3 (ref 4.6–6)
RETICS/RBC NFR AUTO: 1.83 % (ref 0.5–1.5)
WBC # BLD AUTO: 8.42 10*3/MM3 (ref 4.5–10.7)

## 2017-09-01 ENCOUNTER — TELEPHONE (OUTPATIENT)
Dept: FAMILY MEDICINE CLINIC | Facility: CLINIC | Age: 82
End: 2017-09-01

## 2017-09-01 DIAGNOSIS — D64.9 ANEMIA, UNSPECIFIED TYPE: Primary | ICD-10-CM

## 2017-09-01 NOTE — TELEPHONE ENCOUNTER
S/w patient, he voiced understanding and will go to [facility] one week prior for labs- labs entered in for sign off and routed.       ----- Message from Manny Brice MD sent at 8/31/2017  6:03 PM EDT -----  Regarding: RE: LAB RESULTS  Contact: [phone]  Labs show his body is trying to make red cells like it should  I recommend continuing the iron at the bid dose and RTO in 3 months with labs prior.  CBC w/ diff; Iron/TIBC; Ferritin; retic count; and erythropoietin level for anemia.  Thanks,   TAS  ----- Message -----     From: Kim Bridges MA     Sent: 8/31/2017  10:47 AM       To: Manny Brice MD  Subject: FW: LAB RESULTS                                  Please advise    ----- Message -----     From: Jennie Abhi     Sent: 8/31/2017  10:09 AM       To: Kim Bridges MA  Subject: LAB RESULTS                                      Patient would like lab results. Patient called stating Dr. Brice told him that he needed to wait to see what results where and go from there. Please advise,  Thank you.

## 2017-11-14 ENCOUNTER — LAB (OUTPATIENT)
Dept: LAB | Facility: HOSPITAL | Age: 82
End: 2017-11-14

## 2017-11-14 DIAGNOSIS — D64.9 ANEMIA, UNSPECIFIED TYPE: ICD-10-CM

## 2017-11-14 LAB
BASOPHILS # BLD AUTO: 0.02 10*3/MM3 (ref 0–0.2)
BASOPHILS NFR BLD AUTO: 0.2 % (ref 0–1.5)
DEPRECATED RDW RBC AUTO: 48.4 FL (ref 37–54)
EOSINOPHIL # BLD AUTO: 0.11 10*3/MM3 (ref 0–0.7)
EOSINOPHIL NFR BLD AUTO: 1.3 % (ref 0.3–6.2)
ERYTHROCYTE [DISTWIDTH] IN BLOOD BY AUTOMATED COUNT: 14.1 % (ref 11.5–14.5)
FERRITIN SERPL-MCNC: 317.2 NG/ML (ref 30–400)
HCT VFR BLD AUTO: 40.6 % (ref 40.4–52.2)
HGB BLD-MCNC: 12.7 G/DL (ref 13.7–17.6)
IMM GRANULOCYTES # BLD: 0.02 10*3/MM3 (ref 0–0.03)
IMM GRANULOCYTES NFR BLD: 0.2 % (ref 0–0.5)
IRON 24H UR-MRATE: 101 MCG/DL (ref 59–158)
IRON SATN MFR SERPL: 23 % (ref 20–50)
LYMPHOCYTES # BLD AUTO: 1.66 10*3/MM3 (ref 0.9–4.8)
LYMPHOCYTES NFR BLD AUTO: 20.2 % (ref 19.6–45.3)
MCH RBC QN AUTO: 29.2 PG (ref 27–32.7)
MCHC RBC AUTO-ENTMCNC: 31.3 G/DL (ref 32.6–36.4)
MCV RBC AUTO: 93.3 FL (ref 79.8–96.2)
MONOCYTES # BLD AUTO: 0.54 10*3/MM3 (ref 0.2–1.2)
MONOCYTES NFR BLD AUTO: 6.6 % (ref 5–12)
NEUTROPHILS # BLD AUTO: 5.86 10*3/MM3 (ref 1.9–8.1)
NEUTROPHILS NFR BLD AUTO: 71.5 % (ref 42.7–76)
PLATELET # BLD AUTO: 217 10*3/MM3 (ref 140–500)
PMV BLD AUTO: 11.3 FL (ref 6–12)
RBC # BLD AUTO: 4.35 10*6/MM3 (ref 4.6–6)
RETICS/RBC NFR AUTO: 1.35 % (ref 0.5–1.5)
TIBC SERPL-MCNC: 443 MCG/DL
TRANSFERRIN SERPL-MCNC: 297 MG/DL (ref 200–360)
WBC NRBC COR # BLD: 8.21 10*3/MM3 (ref 4.5–10.7)

## 2017-11-14 PROCEDURE — 85025 COMPLETE CBC W/AUTO DIFF WBC: CPT

## 2017-11-14 PROCEDURE — 82728 ASSAY OF FERRITIN: CPT

## 2017-11-14 PROCEDURE — 85045 AUTOMATED RETICULOCYTE COUNT: CPT

## 2017-11-14 PROCEDURE — 82668 ASSAY OF ERYTHROPOIETIN: CPT

## 2017-11-14 PROCEDURE — 84466 ASSAY OF TRANSFERRIN: CPT

## 2017-11-14 PROCEDURE — 83540 ASSAY OF IRON: CPT

## 2017-11-14 PROCEDURE — 36415 COLL VENOUS BLD VENIPUNCTURE: CPT

## 2017-11-15 LAB — ETHNIC BACKGROUND STATED: 10.6 MIU/ML (ref 2.6–18.5)

## 2017-11-21 ENCOUNTER — OFFICE VISIT (OUTPATIENT)
Dept: FAMILY MEDICINE CLINIC | Facility: CLINIC | Age: 82
End: 2017-11-21

## 2017-11-21 VITALS
SYSTOLIC BLOOD PRESSURE: 122 MMHG | DIASTOLIC BLOOD PRESSURE: 70 MMHG | HEIGHT: 71 IN | WEIGHT: 218.8 LBS | OXYGEN SATURATION: 98 % | BODY MASS INDEX: 30.63 KG/M2 | HEART RATE: 56 BPM

## 2017-11-21 DIAGNOSIS — E11.9 TYPE 2 DIABETES MELLITUS WITHOUT COMPLICATION, WITHOUT LONG-TERM CURRENT USE OF INSULIN (HCC): Primary | ICD-10-CM

## 2017-11-21 DIAGNOSIS — D50.9 IRON DEFICIENCY ANEMIA, UNSPECIFIED IRON DEFICIENCY ANEMIA TYPE: ICD-10-CM

## 2017-11-21 LAB
EXPIRATION DATE: ABNORMAL
HBA1C MFR BLD: 5.7 % (ref 4.8–5.6)
Lab: ABNORMAL

## 2017-11-21 PROCEDURE — 99213 OFFICE O/P EST LOW 20 MIN: CPT | Performed by: INTERNAL MEDICINE

## 2017-11-21 PROCEDURE — 36416 COLLJ CAPILLARY BLOOD SPEC: CPT | Performed by: INTERNAL MEDICINE

## 2017-11-21 PROCEDURE — 83036 HEMOGLOBIN GLYCOSYLATED A1C: CPT | Performed by: INTERNAL MEDICINE

## 2017-11-21 RX ORDER — RAMIPRIL 10 MG/1
10 CAPSULE ORAL DAILY
Qty: 90 CAPSULE | Refills: 3 | Status: SHIPPED | OUTPATIENT
Start: 2017-11-21 | End: 2018-06-26 | Stop reason: SDUPTHER

## 2017-11-21 RX ORDER — CARVEDILOL 3.12 MG/1
3.12 TABLET ORAL 2 TIMES DAILY
Qty: 180 TABLET | Refills: 3 | Status: SHIPPED | OUTPATIENT
Start: 2017-11-21 | End: 2018-11-02 | Stop reason: SDUPTHER

## 2017-11-21 RX ORDER — SPIRONOLACTONE 25 MG/1
25 TABLET ORAL DAILY
Qty: 90 TABLET | Refills: 3 | Status: SHIPPED | OUTPATIENT
Start: 2017-11-21 | End: 2018-06-26 | Stop reason: SDUPTHER

## 2017-11-21 RX ORDER — PRAVASTATIN SODIUM 40 MG
40 TABLET ORAL DAILY
Qty: 90 TABLET | Refills: 3 | Status: SHIPPED | OUTPATIENT
Start: 2017-11-21 | End: 2018-06-26 | Stop reason: SDUPTHER

## 2017-11-22 DIAGNOSIS — D50.9 IRON DEFICIENCY ANEMIA, UNSPECIFIED IRON DEFICIENCY ANEMIA TYPE: ICD-10-CM

## 2017-11-22 DIAGNOSIS — E11.9 TYPE 2 DIABETES MELLITUS WITHOUT COMPLICATION, WITHOUT LONG-TERM CURRENT USE OF INSULIN (HCC): ICD-10-CM

## 2017-11-22 DIAGNOSIS — I11.9 HYPERTENSIVE HEART DISEASE WITHOUT HEART FAILURE: Primary | ICD-10-CM

## 2017-11-22 DIAGNOSIS — E78.5 HYPERLIPIDEMIA, UNSPECIFIED HYPERLIPIDEMIA TYPE: ICD-10-CM

## 2018-03-25 ENCOUNTER — RESULTS ENCOUNTER (OUTPATIENT)
Dept: FAMILY MEDICINE CLINIC | Facility: CLINIC | Age: 83
End: 2018-03-25

## 2018-03-25 DIAGNOSIS — I11.9 HYPERTENSIVE HEART DISEASE WITHOUT HEART FAILURE: ICD-10-CM

## 2018-03-25 DIAGNOSIS — E78.5 HYPERLIPIDEMIA, UNSPECIFIED HYPERLIPIDEMIA TYPE: ICD-10-CM

## 2018-03-25 DIAGNOSIS — D50.9 IRON DEFICIENCY ANEMIA, UNSPECIFIED IRON DEFICIENCY ANEMIA TYPE: ICD-10-CM

## 2018-03-25 DIAGNOSIS — E11.9 TYPE 2 DIABETES MELLITUS WITHOUT COMPLICATION, WITHOUT LONG-TERM CURRENT USE OF INSULIN (HCC): ICD-10-CM

## 2018-03-25 RX ORDER — FERROUS SULFATE 325(65) MG
325 TABLET ORAL
Qty: 60 TABLET | Refills: 6 | Status: SHIPPED | OUTPATIENT
Start: 2018-03-25 | End: 2019-01-07 | Stop reason: SDUPTHER

## 2018-04-02 ENCOUNTER — OFFICE VISIT (OUTPATIENT)
Dept: FAMILY MEDICINE CLINIC | Facility: CLINIC | Age: 83
End: 2018-04-02

## 2018-04-02 VITALS
DIASTOLIC BLOOD PRESSURE: 72 MMHG | SYSTOLIC BLOOD PRESSURE: 138 MMHG | OXYGEN SATURATION: 97 % | HEART RATE: 57 BPM | BODY MASS INDEX: 32.16 KG/M2 | WEIGHT: 229.7 LBS | HEIGHT: 71 IN

## 2018-04-02 DIAGNOSIS — D50.9 IRON DEFICIENCY ANEMIA, UNSPECIFIED IRON DEFICIENCY ANEMIA TYPE: ICD-10-CM

## 2018-04-02 DIAGNOSIS — E78.5 HYPERLIPIDEMIA, UNSPECIFIED HYPERLIPIDEMIA TYPE: ICD-10-CM

## 2018-04-02 DIAGNOSIS — I11.9 HYPERTENSIVE HEART DISEASE WITHOUT HEART FAILURE: ICD-10-CM

## 2018-04-02 DIAGNOSIS — E11.9 TYPE 2 DIABETES MELLITUS WITHOUT COMPLICATION, WITHOUT LONG-TERM CURRENT USE OF INSULIN (HCC): Primary | ICD-10-CM

## 2018-04-02 LAB
ALBUMIN SERPL-MCNC: 4.4 G/DL (ref 3.5–5.2)
ALBUMIN/GLOB SERPL: 1.6 G/DL
ALP SERPL-CCNC: 46 U/L (ref 39–117)
ALT SERPL-CCNC: 27 U/L (ref 1–41)
AST SERPL-CCNC: 28 U/L (ref 1–40)
BILIRUB SERPL-MCNC: 0.4 MG/DL (ref 0.1–1.2)
BUN SERPL-MCNC: 37 MG/DL (ref 8–23)
BUN/CREAT SERPL: 30.1 (ref 7–25)
CALCIUM SERPL-MCNC: 10 MG/DL (ref 8.6–10.5)
CHLORIDE SERPL-SCNC: 99 MMOL/L (ref 98–107)
CHOLEST SERPL-MCNC: 157 MG/DL (ref 0–200)
CO2 SERPL-SCNC: 29 MMOL/L (ref 22–29)
CREAT SERPL-MCNC: 1.23 MG/DL (ref 0.76–1.27)
ERYTHROCYTE [DISTWIDTH] IN BLOOD BY AUTOMATED COUNT: 14.1 % (ref 11.5–14.5)
FERRITIN SERPL-MCNC: 485.6 NG/ML (ref 30–400)
GFR SERPLBLD CREATININE-BSD FMLA CKD-EPI: 56 ML/MIN/1.73
GFR SERPLBLD CREATININE-BSD FMLA CKD-EPI: 68 ML/MIN/1.73
GLOBULIN SER CALC-MCNC: 2.7 GM/DL
GLUCOSE SERPL-MCNC: 96 MG/DL (ref 65–99)
HBA1C MFR BLD: 6.44 % (ref 4.8–5.6)
HCT VFR BLD AUTO: 41.3 % (ref 40.4–52.2)
HDLC SERPL-MCNC: 42 MG/DL (ref 40–60)
HGB BLD-MCNC: 12.9 G/DL (ref 13.7–17.6)
IRON SATN MFR SERPL: 26 % (ref 20–50)
IRON SERPL-MCNC: 111 MCG/DL (ref 59–158)
LDLC SERPL CALC-MCNC: 84 MG/DL (ref 0–100)
MCH RBC QN AUTO: 29.7 PG (ref 27–32.7)
MCHC RBC AUTO-ENTMCNC: 31.2 G/DL (ref 32.6–36.4)
MCV RBC AUTO: 94.9 FL (ref 79.8–96.2)
PLATELET # BLD AUTO: 210 10*3/MM3 (ref 140–500)
POTASSIUM SERPL-SCNC: 5.3 MMOL/L (ref 3.5–5.2)
PROT SERPL-MCNC: 7.1 G/DL (ref 6–8.5)
RBC # BLD AUTO: 4.35 10*6/MM3 (ref 4.6–6)
SODIUM SERPL-SCNC: 140 MMOL/L (ref 136–145)
TIBC SERPL-MCNC: 435 MCG/DL
TRIGL SERPL-MCNC: 155 MG/DL (ref 0–150)
UIBC SERPL-MCNC: 324 MCG/DL
VLDLC SERPL CALC-MCNC: 31 MG/DL (ref 5–40)
WBC # BLD AUTO: 8.78 10*3/MM3 (ref 4.5–10.7)

## 2018-04-02 PROCEDURE — 99214 OFFICE O/P EST MOD 30 MIN: CPT | Performed by: INTERNAL MEDICINE

## 2018-04-02 RX ORDER — ASCORBIC ACID 500 MG
500 TABLET ORAL
COMMUNITY
End: 2018-11-02 | Stop reason: SDUPTHER

## 2018-04-02 RX ORDER — DOXYCYCLINE HYCLATE 50 MG/1
324 CAPSULE, GELATIN COATED ORAL DAILY
COMMUNITY
End: 2018-11-02 | Stop reason: SDUPTHER

## 2018-04-02 NOTE — PROGRESS NOTES
Subjective   Goran August is a 83 y.o. male who presents today for:    Anemia (4 month f/up); Hypertension; Hyperlipidemia; and Diabetes Mellitus    History of Present Illness     We uncovered an iron deficiency anemia in mid 2016.  Hemoccult testing was negative.  The patient preferred to hold off doing a GI evaluation at that time.  Iron supplements were started and his counts have risen from the 9% we initially saw; his most recent transferrin saturation was 23% in 11/2017.  His hemoglobin has remained in the 12-13 range through this time period.  He has remained on the iron at the twice a day dose.    He also has chronic, type 2 diabetes mellitus treated with lifestyle changes.  With his diet and exercise regimen, we have been able to keep him off medication.  His A1c's have consistently been below 7% and was below 6% in 11/2017.    He also has chronic hypertension treated with medications listed below.  He has hypertensive heart disease that is stable.    He has dyslipidemia treated with pravastatin and fenofibrate; he had been on gemfibrozil with his pravastatin but that was stopped.  He tolerates this pravastatin/fenofibrate regimen without side effect.  He denies any cardiovascular symptoms.    Mr. August  reports that he has quit smoking. His smoking use included Cigarettes. He has a 12.50 pack-year smoking history. He has never used smokeless tobacco. He reports that he drinks alcohol. He reports that he does not use drugs.     Allergies   Allergen Reactions   • Hctz [Hydrochlorothiazide] Hives       Current Outpatient Prescriptions:   •  Ascorbic Acid (VITAMIN C PO), Take 500 mg by mouth 2 (Two) Times a Day., Disp: , Rfl:   •  aspirin 81 MG EC tablet, Take 81 mg by mouth Daily., Disp: , Rfl:   •  carvedilol (COREG) 3.125 MG tablet, Take 1 tablet by mouth 2 (Two) Times a Day., Disp: 180 tablet, Rfl: 3  •  esomeprazole (nexIUM) 40 MG capsule, Take 1 capsule by mouth Daily., Disp: 90 capsule, Rfl: 3  •   "fenofibrate (TRICOR) 145 MG tablet, Take 1 tablet by mouth Daily., Disp: 90 tablet, Rfl: 1  •  ferrous gluconate (FERGON) 324 MG tablet, Take 324 mg by mouth., Disp: , Rfl:   •  ferrous sulfate 325 (65 FE) MG tablet, TAKE 1 TABLET BY MOUTH 2 (TWO) TIMES A DAY., Disp: 60 tablet, Rfl: 6  •  fluticasone (FLONASE) 50 MCG/ACT nasal spray, 2 sprays into each nostril Daily. Administer 2 sprays in each nostril once a day, Disp: 1 bottle, Rfl: 12  •  Multiple Vitamin (MULTI-VITAMIN DAILY PO), Take  by mouth., Disp: , Rfl:   •  pravastatin (PRAVACHOL) 40 MG tablet, Take 1 tablet by mouth Daily., Disp: 90 tablet, Rfl: 3  •  ramipril (ALTACE) 10 MG capsule, Take 1 capsule by mouth Daily., Disp: 90 capsule, Rfl: 3  •  spironolactone (ALDACTONE) 25 MG tablet, Take 1 tablet by mouth Daily., Disp: 90 tablet, Rfl: 3  •  vitamin C (ASCORBIC ACID) 500 MG tablet, Take 500 mg by mouth., Disp: , Rfl:       Review of Systems   Constitutional: Negative for diaphoresis and fatigue.   Eyes: Negative for visual disturbance.   Respiratory: Negative for cough and chest tightness.    Cardiovascular: Negative for chest pain, palpitations and leg swelling.   Gastrointestinal: Negative for abdominal pain, blood in stool and diarrhea.   Endocrine: Negative for polydipsia and polyuria.   Musculoskeletal: Negative for myalgias.   Skin: Negative for wound.   Neurological: Negative for dizziness, syncope, numbness and headaches.   Hematological: Does not bruise/bleed easily.     Since starting the iron, he has had an increase in stool frequency.  He denies overt diarrhea.  He denies dyspepsia, dysphagia, and odynophagia.  He takes the PPI to control his heartburn with good benefit and no breakthrough symptoms.    Objective   Vitals:    04/02/18 1046   BP: 138/72   Pulse: 57   SpO2: 97%   Weight: 104 kg (229 lb 11.2 oz)   Height: 180.3 cm (71\")     Physical Exam    Overweight male in no acute distress.  Upbeat; affect is appropriate.  Sclerae are " anicteric and the conjunctivae are pink.  No cervical or supraclavicular lymphadenopathy.  No thyromegaly or mass.  No thyroid tenderness.  No carotid bruit.  Regular rate and rhythm.  There is a 1/6 systolic murmur appreciated at left sternal border.  S1 and S2 normal.  No rub noted.  No lower extremity edema and pedal pulses are full bilaterally.  Station, gait, and coordination are normal.  Monofilament test was deferred.          Goran was seen today for anemia, hypertension, hyperlipidemia and diabetes mellitus.    Diagnoses and all orders for this visit:    Type 2 diabetes mellitus without complication, without long-term current use of insulin    Iron deficiency anemia, unspecified iron deficiency anemia type    Hypertensive heart disease without heart failure    Hyperlipidemia, unspecified hyperlipidemia type    He seems to be doing well in every regard.  We will make no change in his medications until we see the results of the labs that will be drawn today.    If his iron levels are normal, we will cut back to once daily dosing.  Further recommendations will follow after we see those results.    We will make no change in his blood pressure medication unless indicated by the labs.    We will also check his cholesterol today and make additional recommendations thereafter.    If all are in order, we will recheck his labs in 6 months.  He will contact us in the interim if there are any further problems.

## 2018-05-10 DIAGNOSIS — E78.5 HYPERLIPIDEMIA, UNSPECIFIED HYPERLIPIDEMIA TYPE: ICD-10-CM

## 2018-05-10 RX ORDER — FENOFIBRATE 145 MG/1
145 TABLET, COATED ORAL DAILY
Qty: 90 TABLET | Refills: 3 | Status: SHIPPED | OUTPATIENT
Start: 2018-05-10 | End: 2018-06-26 | Stop reason: SDUPTHER

## 2018-05-29 RX ORDER — ESOMEPRAZOLE MAGNESIUM 40 MG/1
CAPSULE, DELAYED RELEASE ORAL
Qty: 90 CAPSULE | Refills: 3 | Status: SHIPPED | OUTPATIENT
Start: 2018-05-29 | End: 2019-07-08 | Stop reason: SDUPTHER

## 2018-06-23 DIAGNOSIS — E78.5 HYPERLIPIDEMIA, UNSPECIFIED HYPERLIPIDEMIA TYPE: ICD-10-CM

## 2018-06-26 RX ORDER — FENOFIBRATE 145 MG/1
TABLET, COATED ORAL
Qty: 90 TABLET | Refills: 1 | Status: SHIPPED | OUTPATIENT
Start: 2018-06-26 | End: 2019-08-07 | Stop reason: SDUPTHER

## 2018-06-26 RX ORDER — SPIRONOLACTONE 25 MG/1
TABLET ORAL
Qty: 90 TABLET | Refills: 1 | Status: SHIPPED | OUTPATIENT
Start: 2018-06-26 | End: 2019-02-04

## 2018-06-26 RX ORDER — PRAVASTATIN SODIUM 40 MG
TABLET ORAL
Qty: 90 TABLET | Refills: 1 | Status: SHIPPED | OUTPATIENT
Start: 2018-06-26 | End: 2019-02-04

## 2018-06-26 RX ORDER — CARVEDILOL 3.12 MG/1
TABLET ORAL
Qty: 180 TABLET | Refills: 1 | Status: SHIPPED | OUTPATIENT
Start: 2018-06-26 | End: 2019-05-01

## 2018-06-26 RX ORDER — RAMIPRIL 10 MG/1
CAPSULE ORAL
Qty: 90 CAPSULE | Refills: 1 | Status: SHIPPED | OUTPATIENT
Start: 2018-06-26 | End: 2019-02-04

## 2018-10-19 DIAGNOSIS — E11.9 TYPE 2 DIABETES MELLITUS WITHOUT COMPLICATION, WITHOUT LONG-TERM CURRENT USE OF INSULIN (HCC): ICD-10-CM

## 2018-10-19 DIAGNOSIS — I11.9 HYPERTENSIVE HEART DISEASE WITHOUT HEART FAILURE: Primary | ICD-10-CM

## 2018-10-19 DIAGNOSIS — E78.5 HYPERLIPIDEMIA, UNSPECIFIED HYPERLIPIDEMIA TYPE: ICD-10-CM

## 2018-10-19 DIAGNOSIS — D50.9 IRON DEFICIENCY ANEMIA, UNSPECIFIED IRON DEFICIENCY ANEMIA TYPE: ICD-10-CM

## 2018-10-21 ENCOUNTER — RESULTS ENCOUNTER (OUTPATIENT)
Dept: FAMILY MEDICINE CLINIC | Facility: CLINIC | Age: 83
End: 2018-10-21

## 2018-10-21 DIAGNOSIS — E11.9 TYPE 2 DIABETES MELLITUS WITHOUT COMPLICATION, WITHOUT LONG-TERM CURRENT USE OF INSULIN (HCC): ICD-10-CM

## 2018-10-21 DIAGNOSIS — I11.9 HYPERTENSIVE HEART DISEASE WITHOUT HEART FAILURE: ICD-10-CM

## 2018-10-21 DIAGNOSIS — D50.9 IRON DEFICIENCY ANEMIA, UNSPECIFIED IRON DEFICIENCY ANEMIA TYPE: ICD-10-CM

## 2018-10-21 DIAGNOSIS — E78.5 HYPERLIPIDEMIA, UNSPECIFIED HYPERLIPIDEMIA TYPE: ICD-10-CM

## 2018-10-26 ENCOUNTER — APPOINTMENT (OUTPATIENT)
Dept: LAB | Facility: HOSPITAL | Age: 83
End: 2018-10-26

## 2018-10-26 LAB
ALBUMIN SERPL-MCNC: 4.1 G/DL (ref 3.5–5.2)
ALBUMIN UR-MCNC: <1.2 MG/L
ALBUMIN/GLOB SERPL: 1.3 G/DL
ALP SERPL-CCNC: 45 U/L (ref 39–117)
ALT SERPL W P-5'-P-CCNC: 32 U/L (ref 1–41)
ANION GAP SERPL CALCULATED.3IONS-SCNC: 12.7 MMOL/L
AST SERPL-CCNC: 28 U/L (ref 1–40)
BILIRUB SERPL-MCNC: 0.4 MG/DL (ref 0.1–1.2)
BUN BLD-MCNC: 35 MG/DL (ref 8–23)
BUN/CREAT SERPL: 28.5 (ref 7–25)
CALCIUM SPEC-SCNC: 9.6 MG/DL (ref 8.6–10.5)
CHLORIDE SERPL-SCNC: 99 MMOL/L (ref 98–107)
CHOLEST SERPL-MCNC: 153 MG/DL (ref 0–200)
CO2 SERPL-SCNC: 27.3 MMOL/L (ref 22–29)
CREAT BLD-MCNC: 1.23 MG/DL (ref 0.76–1.27)
CREAT UR-MCNC: 82.9 MG/DL
DEPRECATED RDW RBC AUTO: 47.1 FL (ref 37–54)
ERYTHROCYTE [DISTWIDTH] IN BLOOD BY AUTOMATED COUNT: 13.8 % (ref 11.5–14.5)
GFR SERPL CREATININE-BSD FRML MDRD: 56 ML/MIN/1.73
GLOBULIN UR ELPH-MCNC: 3.2 GM/DL
GLUCOSE BLD-MCNC: 129 MG/DL (ref 65–99)
HBA1C MFR BLD: 6.56 % (ref 4.8–5.6)
HCT VFR BLD AUTO: 40.3 % (ref 40.4–52.2)
HDLC SERPL-MCNC: 42 MG/DL (ref 40–60)
HGB BLD-MCNC: 12.6 G/DL (ref 13.7–17.6)
IRON 24H UR-MRATE: 83 MCG/DL (ref 59–158)
IRON SATN MFR SERPL: 21 % (ref 20–50)
LDLC SERPL CALC-MCNC: 65 MG/DL (ref 0–100)
LDLC/HDLC SERPL: 1.56 {RATIO}
MCH RBC QN AUTO: 29.1 PG (ref 27–32.7)
MCHC RBC AUTO-ENTMCNC: 31.3 G/DL (ref 32.6–36.4)
MCV RBC AUTO: 93.1 FL (ref 79.8–96.2)
MICROALBUMIN/CREAT UR: NORMAL MG/G
PLATELET # BLD AUTO: 201 10*3/MM3 (ref 140–500)
PMV BLD AUTO: 11.3 FL (ref 6–12)
POTASSIUM BLD-SCNC: 4.8 MMOL/L (ref 3.5–5.2)
PROT SERPL-MCNC: 7.3 G/DL (ref 6–8.5)
RBC # BLD AUTO: 4.33 10*6/MM3 (ref 4.6–6)
SODIUM BLD-SCNC: 139 MMOL/L (ref 136–145)
TIBC SERPL-MCNC: 390 MCG/DL (ref 298–536)
TRANSFERRIN SERPL-MCNC: 262 MG/DL (ref 200–360)
TRIGL SERPL-MCNC: 228 MG/DL (ref 0–150)
VLDLC SERPL-MCNC: 45.6 MG/DL (ref 5–40)
WBC NRBC COR # BLD: 7.61 10*3/MM3 (ref 4.5–10.7)

## 2018-10-26 PROCEDURE — 80061 LIPID PANEL: CPT | Performed by: INTERNAL MEDICINE

## 2018-10-26 PROCEDURE — 82043 UR ALBUMIN QUANTITATIVE: CPT | Performed by: INTERNAL MEDICINE

## 2018-10-26 PROCEDURE — 85027 COMPLETE CBC AUTOMATED: CPT | Performed by: INTERNAL MEDICINE

## 2018-10-26 PROCEDURE — 80053 COMPREHEN METABOLIC PANEL: CPT | Performed by: INTERNAL MEDICINE

## 2018-10-26 PROCEDURE — 83036 HEMOGLOBIN GLYCOSYLATED A1C: CPT | Performed by: INTERNAL MEDICINE

## 2018-10-26 PROCEDURE — 84466 ASSAY OF TRANSFERRIN: CPT | Performed by: INTERNAL MEDICINE

## 2018-10-26 PROCEDURE — 83540 ASSAY OF IRON: CPT | Performed by: INTERNAL MEDICINE

## 2018-10-26 PROCEDURE — 36415 COLL VENOUS BLD VENIPUNCTURE: CPT

## 2018-10-26 PROCEDURE — 82570 ASSAY OF URINE CREATININE: CPT | Performed by: INTERNAL MEDICINE

## 2018-11-02 ENCOUNTER — OFFICE VISIT (OUTPATIENT)
Dept: FAMILY MEDICINE CLINIC | Facility: CLINIC | Age: 83
End: 2018-11-02

## 2018-11-02 VITALS
WEIGHT: 240.3 LBS | BODY MASS INDEX: 33.64 KG/M2 | SYSTOLIC BLOOD PRESSURE: 132 MMHG | HEART RATE: 61 BPM | DIASTOLIC BLOOD PRESSURE: 64 MMHG | HEIGHT: 71 IN | OXYGEN SATURATION: 98 %

## 2018-11-02 DIAGNOSIS — N18.30 TYPE 2 DIABETES MELLITUS WITH STAGE 3 CHRONIC KIDNEY DISEASE, WITHOUT LONG-TERM CURRENT USE OF INSULIN (HCC): Primary | ICD-10-CM

## 2018-11-02 DIAGNOSIS — D50.9 IRON DEFICIENCY ANEMIA, UNSPECIFIED IRON DEFICIENCY ANEMIA TYPE: ICD-10-CM

## 2018-11-02 DIAGNOSIS — N18.30 STAGE 3 CHRONIC KIDNEY DISEASE (HCC): ICD-10-CM

## 2018-11-02 DIAGNOSIS — E11.22 TYPE 2 DIABETES MELLITUS WITH STAGE 3 CHRONIC KIDNEY DISEASE, WITHOUT LONG-TERM CURRENT USE OF INSULIN (HCC): Primary | ICD-10-CM

## 2018-11-02 PROCEDURE — 99214 OFFICE O/P EST MOD 30 MIN: CPT | Performed by: INTERNAL MEDICINE

## 2018-11-02 NOTE — PROGRESS NOTES
Subjective   Goran August is a 84 y.o. male who presents today for:    Diabetes (6 month f/u & review labs); Hypertension; and Hyperlipidemia    History of Present Illness     He has chronic, type 2 diabetes that he has treated with diet and exercise exclusively.  He preferred to avoid taking medication because of the other meds when necessary he takes for his other chronic problems.  A1c's have consistently been below 7%; it was below 6% one year ago.  Reports having had an eye exam this summer by Dr. Munson, and that there was no evidence of retinopathy.    He has chronic hypertension treated with medications noted below.  He has hypertensive heart disease as well, LVH and a decreased ejection fraction according to an echocardiogram done in January, 2017 (results reviewed).    We discovered an iron deficiency anemia in mid 2016.  Hemoccult testing was negative, and because of his comorbidities noted above, he opted to defer GI evaluation.  Initial transferrin saturation was 9%, but that has recovered to levels above 20% with iron supplementation.  Hemoglobin has remained in the 12-13 range as well.  He is now maintained on iron once daily.    Mr. August  reports that he has quit smoking. His smoking use included Cigarettes. He has a 12.50 pack-year smoking history. He has never used smokeless tobacco. He reports that he drinks alcohol. He reports that he does not use drugs.     Allergies   Allergen Reactions   • Hctz [Hydrochlorothiazide] Hives       Current Outpatient Prescriptions:   •  Ascorbic Acid (VITAMIN C PO), Take 500 mg by mouth Daily., Disp: , Rfl:   •  aspirin 81 MG EC tablet, Take 81 mg by mouth Daily., Disp: , Rfl:   •  carvedilol (COREG) 3.125 MG tablet, TAKE 1 TABLET TWICE A DAY, Disp: 180 tablet, Rfl: 1  •  esomeprazole (nexIUM) 40 MG capsule, TAKE 1 CAPSULE DAILY, Disp: 90 capsule, Rfl: 3  •  fenofibrate (TRICOR) 145 MG tablet, TAKE 1 TABLET DAILY, Disp: 90 tablet, Rfl: 1  •  ferrous sulfate  "325 (65 FE) MG tablet, TAKE 1 TABLET BY MOUTH 2 (TWO) TIMES A DAY. (Patient taking differently: Take 325 mg by mouth Daily With Breakfast.), Disp: 60 tablet, Rfl: 6  •  fluticasone (FLONASE) 50 MCG/ACT nasal spray, 2 sprays into each nostril Daily. Administer 2 sprays in each nostril once a day, Disp: 1 bottle, Rfl: 12  •  Multiple Vitamin (MULTI-VITAMIN DAILY PO), Take  by mouth., Disp: , Rfl:   •  pravastatin (PRAVACHOL) 40 MG tablet, TAKE 1 TABLET DAILY, Disp: 90 tablet, Rfl: 1  •  ramipril (ALTACE) 10 MG capsule, TAKE 1 CAPSULE DAILY, Disp: 90 capsule, Rfl: 1  •  spironolactone (ALDACTONE) 25 MG tablet, TAKE 1 TABLET DAILY, Disp: 90 tablet, Rfl: 1      Review of Systems   Constitutional: Negative for diaphoresis and fatigue.   Eyes: Negative for visual disturbance.   Respiratory: Negative for cough and chest tightness.    Cardiovascular: Negative for chest pain, palpitations and leg swelling.   Gastrointestinal: Negative for abdominal pain, blood in stool and diarrhea.   Endocrine: Negative for polydipsia and polyuria.   Musculoskeletal: Negative for myalgias.   Skin: Negative for wound.   Neurological: Negative for dizziness, syncope, numbness and headaches.   Hematological: Does not bruise/bleed easily.         Objective   Vitals:    11/02/18 1036 11/02/18 1056   BP: 148/70 132/64   BP Location: Left arm Right arm   Patient Position: Sitting    Cuff Size: Large Adult Large Adult   Pulse: 61    SpO2: 98%    Weight: 109 kg (240 lb 4.8 oz)    Height: 180.3 cm (71\")      Physical Exam   Constitutional: He is oriented to person, place, and time. No distress.   Obese   Eyes: Conjunctivae are normal.   Neck: Carotid bruit is not present. No thyroid mass and no thyromegaly present.   Cardiovascular: Normal rate, regular rhythm, S1 normal and S2 normal.    Murmur heard.   Systolic (early, RUSB) murmur is present with a grade of 1/6   Pulmonary/Chest: Effort normal and breath sounds normal.   Abdominal: Soft. Bowel " sounds are normal. There is no tenderness.   Neurological: He is alert and oriented to person, place, and time. Coordination normal.             Goran was seen today for diabetes, hypertension and hyperlipidemia.    Diagnoses and all orders for this visit:    Type 2 diabetes mellitus with stage 3 chronic kidney disease, without long-term current use of insulin (CMS/[facility])    Iron deficiency anemia, unspecified iron deficiency anemia type    Stage 3 chronic kidney disease (CMS/[facility])    Labs done prior to today's office visit were reviewed with him.  Again, transferrin saturation remains above 20%, and his hemoglobin remained stable.  continue the iron once daily.  Recheck levels in 6 months.    Blood pressure is adequately controlled on the current regimen.  We will make no changes there.    Triglycerides levels are slightly elevated, and his A1c is trending up.  We discussed the need for diet changes and continued regular exercise.  We will not make any changes in his cholesterol medications.  We will not start diabetes medicine at this time either.    He will continue to follow-up with his cardiologist as scheduled, planned for January.  He knows to contact our office prior to his follow-up in 6 months for any new problems.

## 2019-01-07 RX ORDER — FERROUS SULFATE 325(65) MG
325 TABLET ORAL
Qty: 60 TABLET | Refills: 6 | Status: SHIPPED | OUTPATIENT
Start: 2019-01-07 | End: 2019-06-02

## 2019-02-04 RX ORDER — SPIRONOLACTONE 25 MG/1
TABLET ORAL
Qty: 90 TABLET | Refills: 3 | Status: SHIPPED | OUTPATIENT
Start: 2019-02-04 | End: 2020-06-15 | Stop reason: SDUPTHER

## 2019-02-04 RX ORDER — PRAVASTATIN SODIUM 40 MG
TABLET ORAL
Qty: 90 TABLET | Refills: 3 | Status: SHIPPED | OUTPATIENT
Start: 2019-02-04 | End: 2020-12-01

## 2019-02-04 RX ORDER — RAMIPRIL 10 MG/1
CAPSULE ORAL
Qty: 90 CAPSULE | Refills: 3 | Status: SHIPPED | OUTPATIENT
Start: 2019-02-04 | End: 2020-06-15 | Stop reason: SDUPTHER

## 2019-02-04 RX ORDER — CARVEDILOL 3.12 MG/1
TABLET ORAL
Qty: 180 TABLET | Refills: 3 | Status: SHIPPED | OUTPATIENT
Start: 2019-02-04 | End: 2020-04-09 | Stop reason: SDUPTHER

## 2019-05-01 ENCOUNTER — OFFICE VISIT (OUTPATIENT)
Dept: FAMILY MEDICINE CLINIC | Facility: CLINIC | Age: 84
End: 2019-05-01

## 2019-05-01 VITALS
DIASTOLIC BLOOD PRESSURE: 68 MMHG | OXYGEN SATURATION: 98 % | BODY MASS INDEX: 32.2 KG/M2 | TEMPERATURE: 98.4 F | SYSTOLIC BLOOD PRESSURE: 124 MMHG | WEIGHT: 230 LBS | HEIGHT: 71 IN | HEART RATE: 53 BPM

## 2019-05-01 DIAGNOSIS — E11.22 TYPE 2 DIABETES MELLITUS WITH STAGE 3 CHRONIC KIDNEY DISEASE, WITHOUT LONG-TERM CURRENT USE OF INSULIN (HCC): Primary | ICD-10-CM

## 2019-05-01 DIAGNOSIS — H65.03 BILATERAL ACUTE SEROUS OTITIS MEDIA, RECURRENCE NOT SPECIFIED: ICD-10-CM

## 2019-05-01 DIAGNOSIS — Z91.09 ENVIRONMENTAL ALLERGIES: ICD-10-CM

## 2019-05-01 DIAGNOSIS — N18.30 TYPE 2 DIABETES MELLITUS WITH STAGE 3 CHRONIC KIDNEY DISEASE, WITHOUT LONG-TERM CURRENT USE OF INSULIN (HCC): Primary | ICD-10-CM

## 2019-05-01 DIAGNOSIS — D64.9 ANEMIA, UNSPECIFIED TYPE: ICD-10-CM

## 2019-05-01 PROCEDURE — 99214 OFFICE O/P EST MOD 30 MIN: CPT | Performed by: NURSE PRACTITIONER

## 2019-05-01 RX ORDER — FLUTICASONE PROPIONATE 50 MCG
2 SPRAY, SUSPENSION (ML) NASAL DAILY
Qty: 3 BOTTLE | Refills: 3 | Status: SHIPPED | OUTPATIENT
Start: 2019-05-01 | End: 2020-12-01 | Stop reason: SDUPTHER

## 2019-05-01 NOTE — PROGRESS NOTES
"Subjective   Goran August is a 84 y.o. male.     History of Present Illness   Patient presented to the office today as a new patient to me.  He is transferring to Dr. Combs and states that he is only seeing me this one time to get a refill of his Flonase.  I will go ahead and check his A1c level today because it has not been checked since October 2018.  He is not currently on any medication for this and controls it with diet.      Also has a history of anemia and taking iron daily.  This was last checked in Oct as well.  I will go ahead and run that test today too. Not feeling tired and tolerating iron well.     Otherwise he is doing well and only needing a refill of the Flonase. Which he takes once a day for allergies and is well controlled without side effects.   Well controlled today otherwise when all other medications.    The following portions of the patient's history were reviewed and updated as appropriate: allergies, current medications, past social history and problem list.    Review of Systems   All other systems reviewed and are negative.      Objective   /68 (BP Location: Left arm, Patient Position: Sitting)   Pulse 53   Temp 98.4 °F (36.9 °C)   Ht 180.3 cm (70.98\")   Wt 104 kg (230 lb)   SpO2 98%   BMI 32.09 kg/m²   Physical Exam   Constitutional: He is oriented to person, place, and time. Vital signs are normal. He appears well-developed and well-nourished. No distress.   HENT:   Head: Normocephalic.   Cardiovascular: Normal rate, regular rhythm and normal heart sounds.   Pulmonary/Chest: Effort normal and breath sounds normal.   Neurological: He is alert and oriented to person, place, and time. Gait normal.   Psychiatric: He has a normal mood and affect. His behavior is normal. Judgment and thought content normal.   Vitals reviewed.      Assessment/Plan      Diagnosis Plan   1. Type 2 diabetes mellitus with stage 3 chronic kidney disease, without long-term current use of insulin " Leader rounding was completed on this patient: expectations of the visit were discussed, the plan of care was addressed, and there was validation that the assigned nurse met his needs. All questions\concerns that he had were addressed and the nurse was notified.    (CMS/[facility])  Hemoglobin A1c   2. Environmental allergies     3. Bilateral acute serous otitis media, recurrence not specified  fluticasone (FLONASE) 50 MCG/ACT nasal spray   4. Anemia, unspecified type  CBC & Differential    Iron Profile     Follow up after neil Rios, APRN  5/1/2019

## 2019-05-02 LAB
BASOPHILS # BLD AUTO: (no result) 10*3/UL
BASOPHILS # BLD MANUAL: 0 10*3/MM3 (ref 0–0.2)
BASOPHILS NFR BLD MANUAL: 0 % (ref 0–1.5)
DIFFERENTIAL COMMENT: ABNORMAL
EOSINOPHIL # BLD AUTO: (no result) 10*3/UL
EOSINOPHIL # BLD MANUAL: 0.3 10*3/MM3 (ref 0–0.4)
EOSINOPHIL NFR BLD AUTO: (no result) %
EOSINOPHIL NFR BLD MANUAL: 4 % (ref 0.3–6.2)
ERYTHROCYTE [DISTWIDTH] IN BLOOD BY AUTOMATED COUNT: 13.9 % (ref 12.3–15.4)
FERRITIN SERPL-MCNC: 814 NG/ML (ref 30–400)
HBA1C MFR BLD: NORMAL %
HCT VFR BLD AUTO: 45.7 % (ref 37.5–51)
HGB BLD-MCNC: 13.6 G/DL (ref 13–17.7)
IRON SATN MFR SERPL: 27 % (ref 20–50)
IRON SERPL-MCNC: 113 MCG/DL (ref 59–158)
LYMPHOCYTES # BLD AUTO: (no result) 10*3/UL
LYMPHOCYTES # BLD MANUAL: 1.21 10*3/MM3 (ref 0.7–3.1)
LYMPHOCYTES NFR BLD AUTO: (no result) %
LYMPHOCYTES NFR BLD MANUAL: 16 % (ref 19.6–45.3)
MCH RBC QN AUTO: 29.2 PG (ref 26.6–33)
MCHC RBC AUTO-ENTMCNC: 29.8 G/DL (ref 31.5–35.7)
MCV RBC AUTO: 98.1 FL (ref 79–97)
MONOCYTES # BLD MANUAL: 0.3 10*3/MM3 (ref 0.1–0.9)
MONOCYTES NFR BLD AUTO: (no result) %
MONOCYTES NFR BLD MANUAL: 4 % (ref 5–12)
NEUTROPHILS # BLD MANUAL: 5.76 10*3/MM3 (ref 1.7–7)
NEUTROPHILS NFR BLD AUTO: (no result) %
NEUTROPHILS NFR BLD MANUAL: 76 % (ref 42.7–76)
PLATELET # BLD AUTO: 182 10*3/MM3 (ref 140–450)
PLATELET BLD QL SMEAR: ABNORMAL
RBC # BLD AUTO: 4.66 10*6/MM3 (ref 4.14–5.8)
RBC MORPH BLD: ABNORMAL
SPECIMEN STATUS: NORMAL
TIBC SERPL-MCNC: 416 MCG/DL
UIBC SERPL-MCNC: 303 MCG/DL
WBC # BLD AUTO: 7.58 10*3/MM3 (ref 3.4–10.8)

## 2019-07-08 ENCOUNTER — TELEPHONE (OUTPATIENT)
Dept: FAMILY MEDICINE CLINIC | Facility: CLINIC | Age: 84
End: 2019-07-08

## 2019-07-08 RX ORDER — ESOMEPRAZOLE MAGNESIUM 40 MG/1
40 CAPSULE, DELAYED RELEASE ORAL DAILY
Qty: 90 CAPSULE | Refills: 3 | Status: SHIPPED | OUTPATIENT
Start: 2019-07-08 | End: 2020-06-15 | Stop reason: SDUPTHER

## 2019-07-08 NOTE — TELEPHONE ENCOUNTER
Pt requesting ninety day refill of esomeprazole (nexIUM) 40 MG capsule to [facility] please

## 2019-08-07 DIAGNOSIS — E78.5 HYPERLIPIDEMIA, UNSPECIFIED HYPERLIPIDEMIA TYPE: ICD-10-CM

## 2019-08-07 RX ORDER — FENOFIBRATE 145 MG/1
145 TABLET, COATED ORAL DAILY
Qty: 90 TABLET | Refills: 1 | Status: SHIPPED | OUTPATIENT
Start: 2019-08-07 | End: 2019-08-07 | Stop reason: SDUPTHER

## 2019-08-07 RX ORDER — FENOFIBRATE 145 MG/1
145 TABLET, COATED ORAL DAILY
Qty: 90 TABLET | Refills: 1 | Status: SHIPPED | OUTPATIENT
Start: 2019-08-07 | End: 2019-08-08 | Stop reason: SDUPTHER

## 2019-08-08 ENCOUNTER — TELEPHONE (OUTPATIENT)
Dept: FAMILY MEDICINE CLINIC | Facility: CLINIC | Age: 84
End: 2019-08-08

## 2019-08-08 DIAGNOSIS — E78.5 HYPERLIPIDEMIA, UNSPECIFIED HYPERLIPIDEMIA TYPE: ICD-10-CM

## 2019-08-08 RX ORDER — FENOFIBRATE 145 MG/1
145 TABLET, COATED ORAL DAILY
Qty: 90 TABLET | Refills: 1 | Status: SHIPPED | OUTPATIENT
Start: 2019-08-08 | End: 2020-02-10 | Stop reason: SDUPTHER

## 2019-08-08 NOTE — TELEPHONE ENCOUNTER
Pt left voicemail for Dora stating that he was mistaken yesterday and he is actually needing the fenofibrate sent to [facility], not [facility]. Please resend.

## 2019-11-05 ENCOUNTER — OFFICE VISIT (OUTPATIENT)
Dept: FAMILY MEDICINE CLINIC | Facility: CLINIC | Age: 84
End: 2019-11-05

## 2019-11-05 VITALS
SYSTOLIC BLOOD PRESSURE: 158 MMHG | HEART RATE: 52 BPM | WEIGHT: 236.4 LBS | TEMPERATURE: 97.6 F | HEIGHT: 71 IN | BODY MASS INDEX: 33.1 KG/M2 | OXYGEN SATURATION: 98 % | DIASTOLIC BLOOD PRESSURE: 72 MMHG

## 2019-11-05 DIAGNOSIS — I11.9 HYPERTENSIVE HEART DISEASE WITHOUT HEART FAILURE: Primary | ICD-10-CM

## 2019-11-05 DIAGNOSIS — Z91.09 ENVIRONMENTAL ALLERGIES: ICD-10-CM

## 2019-11-05 DIAGNOSIS — E11.22 TYPE 2 DIABETES MELLITUS WITH STAGE 3 CHRONIC KIDNEY DISEASE, WITHOUT LONG-TERM CURRENT USE OF INSULIN (HCC): ICD-10-CM

## 2019-11-05 DIAGNOSIS — K44.9 HIATAL HERNIA WITH GASTROESOPHAGEAL REFLUX: ICD-10-CM

## 2019-11-05 DIAGNOSIS — N18.30 TYPE 2 DIABETES MELLITUS WITH STAGE 3 CHRONIC KIDNEY DISEASE, WITHOUT LONG-TERM CURRENT USE OF INSULIN (HCC): ICD-10-CM

## 2019-11-05 DIAGNOSIS — E78.5 HYPERLIPIDEMIA, UNSPECIFIED HYPERLIPIDEMIA TYPE: ICD-10-CM

## 2019-11-05 DIAGNOSIS — N18.30 STAGE 3 CHRONIC KIDNEY DISEASE (HCC): ICD-10-CM

## 2019-11-05 DIAGNOSIS — D50.9 IRON DEFICIENCY ANEMIA, UNSPECIFIED IRON DEFICIENCY ANEMIA TYPE: ICD-10-CM

## 2019-11-05 DIAGNOSIS — K21.9 HIATAL HERNIA WITH GASTROESOPHAGEAL REFLUX: ICD-10-CM

## 2019-11-05 PROBLEM — I10 BENIGN ESSENTIAL HTN: Status: ACTIVE | Noted: 2019-11-05

## 2019-11-05 PROBLEM — I44.7 LEFT BUNDLE BRANCH BLOCK: Status: ACTIVE | Noted: 2019-11-05

## 2019-11-05 PROCEDURE — 99214 OFFICE O/P EST MOD 30 MIN: CPT | Performed by: FAMILY MEDICINE

## 2019-11-05 NOTE — PROGRESS NOTES
Goran August is a 85 y.o. male.     Chief Complaint   Patient presents with   • Establish Care     patient needs to establish a care   • Hypertension     pt needs to monitor his bp        HPI     Patient presents the office today for the first time to discuss issues that are all new to me.  Patient has hypertension, hyperlipidemia, reflux secondary to hiatal hernia, diabetes, stage III chronic kidney disease, iron deficiency anemia, and environmental allergies.  Taking and tolerating combination of albuterol inhaler as needed, carvedilol, esomeprazole, fenofibrate, Flonase, pravastatin, ramipril, and spironolactone.  Tolerating these medications well.  Does have a history of smoking.  Has not smoked for roughly 30 years.  Does not adhere to proper diet or exercise.  Cysts significant family history for cardiovascular disease and diabetes.    The following portions of the patient's history were reviewed and updated as appropriate: allergies, current medications, past family history, past medical history, past social history, past surgical history and problem list.    Review of Systems   Constitutional: Negative for activity change.   Respiratory: Negative for shortness of breath.    Cardiovascular: Negative for palpitations.   Endocrine: Negative for cold intolerance.   Allergic/Immunologic: Positive for environmental allergies.   All other systems reviewed and are negative.    I have reviewed the ROS as documented by the MA. Daniel Combs MD    Objective  Vitals:    11/05/19 1235   BP: 158/72   Pulse: 52   Temp: 97.6 °F (36.4 °C)   SpO2: 98%     Body mass index is 32.99 kg/m².    Physical Exam   Constitutional: He is oriented to person, place, and time. He appears well-developed and well-nourished. No distress.   HENT:   Head: Normocephalic and atraumatic.   Right Ear: External ear normal.   Left Ear: External ear normal.   Nose: Nose normal.   Mouth/Throat: Oropharynx is clear and moist.   Eyes:  Conjunctivae and EOM are normal. Pupils are equal, round, and reactive to light. Right eye exhibits no discharge. Left eye exhibits no discharge. No scleral icterus.   Neck: Normal range of motion. Neck supple.   Cardiovascular: Normal rate, regular rhythm and normal heart sounds. Exam reveals no friction rub.   No murmur heard.  Pulmonary/Chest: Effort normal and breath sounds normal. No respiratory distress. He has no wheezes. He has no rales.   Abdominal: Soft. Bowel sounds are normal. He exhibits no distension. There is no tenderness. There is no rebound and no guarding.   Lymphadenopathy:     He has no cervical adenopathy.   Neurological: He is alert and oriented to person, place, and time.   Skin: Skin is warm and dry. He is not diaphoretic.   Nursing note and vitals reviewed.        Current Outpatient Medications:   •  albuterol sulfate  (90 Base) MCG/ACT inhaler, Inhale 2 puffs Every 6 (Six) Hours As Needed for Wheezing or Shortness of Air., Disp: 1 inhaler, Rfl: 0  •  aspirin 81 MG EC tablet, Take 81 mg by mouth Daily., Disp: , Rfl:   •  carvedilol (COREG) 3.125 MG tablet, TAKE 1 TABLET TWICE A DAY, Disp: 180 tablet, Rfl: 3  •  esomeprazole (nexIUM) 40 MG capsule, Take 1 capsule by mouth Daily., Disp: 90 capsule, Rfl: 3  •  fenofibrate (TRICOR) 145 MG tablet, Take 1 tablet by mouth Daily., Disp: 90 tablet, Rfl: 1  •  fluticasone (FLONASE) 50 MCG/ACT nasal spray, 2 sprays into the nostril(s) as directed by provider Daily. Administer 2 sprays in each nostril once a day, Disp: 3 bottle, Rfl: 3  •  Multiple Vitamin (MULTI-VITAMIN DAILY PO), Take  by mouth., Disp: , Rfl:   •  pravastatin (PRAVACHOL) 40 MG tablet, TAKE 1 TABLET DAILY, Disp: 90 tablet, Rfl: 3  •  ramipril (ALTACE) 10 MG capsule, TAKE 1 CAPSULE DAILY, Disp: 90 capsule, Rfl: 3  •  spironolactone (ALDACTONE) 25 MG tablet, TAKE 1 TABLET DAILY, Disp: 90 tablet, Rfl: 3  Current outpatient and discharge medications have been reconciled for the  patient.  Reviewed by: Daniel Combs MD      Procedures    Lab Results (most recent)     None                  Goran was seen today for establish care and hypertension.    Diagnoses and all orders for this visit:    Hypertensive heart disease without heart failure  -     Comprehensive Metabolic Panel  -     Hemoglobin A1c  -     Lipid Panel    Hyperlipidemia, unspecified hyperlipidemia type  -     Comprehensive Metabolic Panel  -     Hemoglobin A1c  -     Lipid Panel    Hiatal hernia with gastroesophageal reflux  -     Comprehensive Metabolic Panel  -     Hemoglobin A1c  -     Lipid Panel    Type 2 diabetes mellitus with stage 3 chronic kidney disease, without long-term current use of insulin (CMS/[facility])  -     Comprehensive Metabolic Panel  -     Hemoglobin A1c  -     Lipid Panel    Stage 3 chronic kidney disease (CMS/HCC)  -     Comprehensive Metabolic Panel  -     Hemoglobin A1c  -     Lipid Panel    Iron deficiency anemia, unspecified iron deficiency anemia type  -     Comprehensive Metabolic Panel  -     Hemoglobin A1c  -     Lipid Panel    Environmental allergies  -     Comprehensive Metabolic Panel  -     Hemoglobin A1c  -     Lipid Panel      Extensive conversation had with the patient regarding his past medical history.  Will get labs as above to evaluate the status of these chronic issues.  Will adjust treatment plan accordingly.  Advised continued use of medications as prescribed for now.  Advised on ways to improve both diet and exercise.    Return in about 6 months (around 5/5/2020) for Recheck.      Daniel Combs MD

## 2019-11-06 LAB
ALBUMIN SERPL-MCNC: 4.4 G/DL (ref 3.5–5.2)
ALBUMIN/GLOB SERPL: 1.9 G/DL
ALP SERPL-CCNC: 36 U/L (ref 39–117)
ALT SERPL-CCNC: 26 U/L (ref 1–41)
AST SERPL-CCNC: 25 U/L (ref 1–40)
BILIRUB SERPL-MCNC: 0.5 MG/DL (ref 0.2–1.2)
BUN SERPL-MCNC: 31 MG/DL (ref 8–23)
BUN/CREAT SERPL: 28.7 (ref 7–25)
CALCIUM SERPL-MCNC: 9.4 MG/DL (ref 8.6–10.5)
CHLORIDE SERPL-SCNC: 100 MMOL/L (ref 98–107)
CHOLEST SERPL-MCNC: 137 MG/DL (ref 0–200)
CO2 SERPL-SCNC: 29.3 MMOL/L (ref 22–29)
CREAT SERPL-MCNC: 1.08 MG/DL (ref 0.76–1.27)
GLOBULIN SER CALC-MCNC: 2.3 GM/DL
GLUCOSE SERPL-MCNC: 87 MG/DL (ref 65–99)
HBA1C MFR BLD: 6.7 % (ref 4.8–5.6)
HDLC SERPL-MCNC: 39 MG/DL (ref 40–60)
LDLC SERPL CALC-MCNC: 64 MG/DL (ref 0–100)
POTASSIUM SERPL-SCNC: 4.9 MMOL/L (ref 3.5–5.2)
PROT SERPL-MCNC: 6.7 G/DL (ref 6–8.5)
SODIUM SERPL-SCNC: 141 MMOL/L (ref 136–145)
TRIGL SERPL-MCNC: 168 MG/DL (ref 0–150)
VLDLC SERPL CALC-MCNC: 33.6 MG/DL

## 2019-11-18 ENCOUNTER — TELEPHONE (OUTPATIENT)
Dept: FAMILY MEDICINE CLINIC | Facility: CLINIC | Age: 84
End: 2019-11-18

## 2019-11-18 NOTE — TELEPHONE ENCOUNTER
Pt called earlier to get his lab results. Called pt back and informed that results are in waiting on Dr figueroa to review.Please advise pt.

## 2020-02-10 DIAGNOSIS — E78.5 HYPERLIPIDEMIA, UNSPECIFIED HYPERLIPIDEMIA TYPE: ICD-10-CM

## 2020-02-10 RX ORDER — FENOFIBRATE 145 MG/1
145 TABLET, COATED ORAL DAILY
Qty: 90 TABLET | Refills: 1 | Status: SHIPPED | OUTPATIENT
Start: 2020-02-10 | End: 2020-07-13 | Stop reason: SDUPTHER

## 2020-04-09 RX ORDER — CARVEDILOL 3.12 MG/1
3.12 TABLET ORAL 2 TIMES DAILY
Qty: 180 TABLET | Refills: 3 | Status: SHIPPED | OUTPATIENT
Start: 2020-04-09 | End: 2021-04-01 | Stop reason: SDUPTHER

## 2020-06-01 ENCOUNTER — OFFICE VISIT (OUTPATIENT)
Dept: FAMILY MEDICINE CLINIC | Facility: CLINIC | Age: 85
End: 2020-06-01

## 2020-06-01 VITALS
DIASTOLIC BLOOD PRESSURE: 70 MMHG | HEART RATE: 76 BPM | SYSTOLIC BLOOD PRESSURE: 138 MMHG | HEIGHT: 71 IN | BODY MASS INDEX: 32.87 KG/M2 | WEIGHT: 234.8 LBS | OXYGEN SATURATION: 98 % | TEMPERATURE: 98.3 F

## 2020-06-01 DIAGNOSIS — L50.9 URTICARIA: ICD-10-CM

## 2020-06-01 DIAGNOSIS — N18.30 TYPE 2 DIABETES MELLITUS WITH STAGE 3 CHRONIC KIDNEY DISEASE, WITHOUT LONG-TERM CURRENT USE OF INSULIN (HCC): Primary | ICD-10-CM

## 2020-06-01 DIAGNOSIS — E11.22 TYPE 2 DIABETES MELLITUS WITH STAGE 3 CHRONIC KIDNEY DISEASE, WITHOUT LONG-TERM CURRENT USE OF INSULIN (HCC): Primary | ICD-10-CM

## 2020-06-01 LAB — HBA1C MFR BLD: 6.8 %

## 2020-06-01 PROCEDURE — 99213 OFFICE O/P EST LOW 20 MIN: CPT | Performed by: FAMILY MEDICINE

## 2020-06-01 PROCEDURE — 83036 HEMOGLOBIN GLYCOSYLATED A1C: CPT | Performed by: FAMILY MEDICINE

## 2020-06-01 RX ORDER — BESIFLOXACIN 6 MG/ML
SUSPENSION OPHTHALMIC
COMMUNITY
Start: 2020-03-13 | End: 2020-12-01

## 2020-06-01 RX ORDER — MOXIFLOXACIN 5 MG/ML
SOLUTION/ DROPS OPHTHALMIC
COMMUNITY
Start: 2020-05-29 | End: 2020-12-01

## 2020-06-01 RX ORDER — DICLOFENAC SODIUM 1 MG/ML
SOLUTION/ DROPS OPHTHALMIC
COMMUNITY
Start: 2020-05-29 | End: 2020-12-01

## 2020-06-01 RX ORDER — PREDNISOLONE ACETATE 10 MG/ML
SUSPENSION/ DROPS OPHTHALMIC
COMMUNITY
Start: 2020-05-29 | End: 2020-12-01

## 2020-06-01 RX ORDER — CHLORAL HYDRATE 500 MG
CAPSULE ORAL
COMMUNITY
End: 2021-08-10

## 2020-06-01 NOTE — PROGRESS NOTES
Goran August is a 85 y.o. male.     Chief Complaint   Patient presents with   • Diabetes     pt is here for 6 month diabetes follow up    • Itching     pt notices that he has bilateral lower arm itching after taking his statin        HPI     Patient presents to the office today to follow-up on diabetes.  Last hemoglobin A1c was 6.7.  Patient is concerned that since quarantine began he has been not quite as adherent to proper diet and exercise as he should be.  Patient also has been experiencing some itching in his lower forearms.  Also suffers from dry skin.    The following portions of the patient's history were reviewed and updated as appropriate: allergies, current medications, past family history, past medical history, past social history, past surgical history and problem list.    Review of Systems   Constitutional: Negative.    HENT: Negative.    Eyes: Negative.    Respiratory: Negative.    Cardiovascular: Negative.    Gastrointestinal: Negative.    Endocrine: Negative.    Genitourinary: Negative.    Musculoskeletal: Negative.    Skin: Positive for rash.   Allergic/Immunologic: Negative.    Neurological: Negative.    Hematological: Negative.    Psychiatric/Behavioral: Negative.    All other systems reviewed and are negative.    I have reviewed the ROS as documented by the MA. Daniel Combs MD    Objective  Vitals:    06/01/20 0958   BP: 138/70   Pulse: 76   Temp: 98.3 °F (36.8 °C)   SpO2: 98%     Body mass index is 32.75 kg/m².    Physical Exam   Constitutional: He is oriented to person, place, and time. He appears well-developed and well-nourished. No distress.   Neurological: He is alert and oriented to person, place, and time.   Skin: He is not diaphoretic.   Psychiatric: He has a normal mood and affect. His behavior is normal.   Nursing note and vitals reviewed.        Current Outpatient Medications:   •  albuterol sulfate  (90 Base) MCG/ACT inhaler, Inhale 2 puffs Every 6 (Six) Hours As  Needed for Wheezing or Shortness of Air., Disp: 1 inhaler, Rfl: 0  •  aspirin 81 MG EC tablet, Take 81 mg by mouth Daily., Disp: , Rfl:   •  carvedilol (COREG) 3.125 MG tablet, Take 1 tablet by mouth 2 (Two) Times a Day., Disp: 180 tablet, Rfl: 3  •  esomeprazole (nexIUM) 40 MG capsule, Take 1 capsule by mouth Daily., Disp: 90 capsule, Rfl: 3  •  fluticasone (FLONASE) 50 MCG/ACT nasal spray, 2 sprays into the nostril(s) as directed by provider Daily. Administer 2 sprays in each nostril once a day, Disp: 3 bottle, Rfl: 3  •  Multiple Vitamin (MULTI-VITAMIN DAILY PO), Take  by mouth., Disp: , Rfl:   •  Omega-3 1000 MG capsule, Take  by mouth., Disp: , Rfl:   •  pravastatin (PRAVACHOL) 40 MG tablet, TAKE 1 TABLET DAILY, Disp: 90 tablet, Rfl: 3  •  ramipril (ALTACE) 10 MG capsule, TAKE 1 CAPSULE DAILY, Disp: 90 capsule, Rfl: 3  •  spironolactone (ALDACTONE) 25 MG tablet, TAKE 1 TABLET DAILY, Disp: 90 tablet, Rfl: 3  •  BESIVANCE 0.6 % suspension ophthalmic suspension, , Disp: , Rfl:   •  diclofenac (VOLTAREN) 0.1 % ophthalmic solution, , Disp: , Rfl:   •  fenofibrate (TRICOR) 145 MG tablet, Take 1 tablet by mouth Daily., Disp: 90 tablet, Rfl: 1  •  moxifloxacin (VIGAMOX) 0.5 % ophthalmic solution, , Disp: , Rfl:   •  prednisoLONE acetate (PRED FORTE) 1 % ophthalmic suspension, , Disp: , Rfl:   Current outpatient and discharge medications have been reconciled for the patient.  Reviewed by: Daniel Combs MD      Procedures    Lab Results (most recent)     None                  Goran was seen today for diabetes and itching.    Diagnoses and all orders for this visit:    Type 2 diabetes mellitus with stage 3 chronic kidney disease, without long-term current use of insulin (CMS/[facility])  -     POC Glycosylated Hemoglobin (Hb A1C)    Urticaria      Hemoglobin A1c today 6.8.  This is stable.  Recommend the use of AmLactin for his dry skin on his upper extremities.    Return in about 6 months (around 12/1/2020) for  Analilia.      Daniel Combs MD

## 2020-06-15 NOTE — TELEPHONE ENCOUNTER
PATIENT CALLED FOR MED REFILL    ramipril (ALTACE) 10 MG capsule    spironolactone (ALDACTONE) 25 MG tablet    esomeprazole (nexIUM) 40 MG capsule    90 DAY SUPPLY ON ALL REFILLS    [facility] - [address] - [phone] [facility] [phone]   [phone]    PATIENT CALL BACK NUMBER [phone]

## 2020-06-16 RX ORDER — SPIRONOLACTONE 25 MG/1
25 TABLET ORAL DAILY
Qty: 90 TABLET | Refills: 3 | Status: SHIPPED | OUTPATIENT
Start: 2020-06-16 | End: 2021-05-24 | Stop reason: SDUPTHER

## 2020-06-16 RX ORDER — ESOMEPRAZOLE MAGNESIUM 40 MG/1
40 CAPSULE, DELAYED RELEASE ORAL DAILY
Qty: 90 CAPSULE | Refills: 3 | Status: SHIPPED | OUTPATIENT
Start: 2020-06-16 | End: 2021-07-26 | Stop reason: SDUPTHER

## 2020-06-16 RX ORDER — RAMIPRIL 10 MG/1
10 CAPSULE ORAL DAILY
Qty: 90 CAPSULE | Refills: 3 | Status: SHIPPED | OUTPATIENT
Start: 2020-06-16 | End: 2021-05-24 | Stop reason: SDUPTHER

## 2020-07-13 DIAGNOSIS — E78.5 HYPERLIPIDEMIA, UNSPECIFIED HYPERLIPIDEMIA TYPE: ICD-10-CM

## 2020-07-13 RX ORDER — FENOFIBRATE 145 MG/1
145 TABLET, COATED ORAL DAILY
Qty: 90 TABLET | Refills: 1 | Status: SHIPPED | OUTPATIENT
Start: 2020-07-13 | End: 2021-03-30

## 2020-12-01 ENCOUNTER — OFFICE VISIT (OUTPATIENT)
Dept: FAMILY MEDICINE CLINIC | Facility: CLINIC | Age: 85
End: 2020-12-01

## 2020-12-01 VITALS
BODY MASS INDEX: 32.9 KG/M2 | OXYGEN SATURATION: 98 % | DIASTOLIC BLOOD PRESSURE: 74 MMHG | SYSTOLIC BLOOD PRESSURE: 128 MMHG | HEIGHT: 71 IN | HEART RATE: 69 BPM | WEIGHT: 235 LBS | TEMPERATURE: 96.9 F

## 2020-12-01 DIAGNOSIS — E88.81 INSULIN RESISTANCE: Primary | ICD-10-CM

## 2020-12-01 DIAGNOSIS — N18.31 STAGE 3A CHRONIC KIDNEY DISEASE (HCC): ICD-10-CM

## 2020-12-01 DIAGNOSIS — J31.0 CHRONIC RHINITIS: ICD-10-CM

## 2020-12-01 DIAGNOSIS — I10 BENIGN ESSENTIAL HTN: ICD-10-CM

## 2020-12-01 DIAGNOSIS — E78.5 HYPERLIPIDEMIA, UNSPECIFIED HYPERLIPIDEMIA TYPE: ICD-10-CM

## 2020-12-01 LAB — HBA1C MFR BLD: 6.4 %

## 2020-12-01 PROCEDURE — 83036 HEMOGLOBIN GLYCOSYLATED A1C: CPT | Performed by: FAMILY MEDICINE

## 2020-12-01 PROCEDURE — 99214 OFFICE O/P EST MOD 30 MIN: CPT | Performed by: FAMILY MEDICINE

## 2020-12-01 RX ORDER — FLUTICASONE PROPIONATE 50 MCG
2 SPRAY, SUSPENSION (ML) NASAL DAILY
Qty: 3 BOTTLE | Refills: 3 | Status: SHIPPED | OUTPATIENT
Start: 2020-12-01

## 2020-12-01 NOTE — PROGRESS NOTES
Goran August is a 86 y.o. male.     Chief Complaint   Patient presents with   • Establish Care     pt is here to establish care   • Diabetes     pt is due for a1c     Masks/face shield/appropriate PPE were worn for the entirety of the visit by the patient, MA, and provider.     HPI     Pt is a pleasant 86 y.o. YO male here for prediabetes and A1c check.  He is a new patient to me and is here today to establish care.  His other chronic medical problems include hypertension, nonischemic cardiomyopathy, CKD, and hyperlipidemia.    Pre-diabetes/Insulin Resistance - Diagnosed several years ago, was on metformin for a time but has been off of it for a couple of years, diet controlled. A1C has never been 7 or above, highest has been ~ 6.8. Given his age I think this represents insulin distant/prediabetes rather than true diabetes.    Hypertension -chronic, blood pressure normal in the office today.  Blood pressure well controlled on a combination of carvedilol, ramipril, and spironolactone which she takes for his blood pressure as well for cardiomyopathy.     Hyperlipidemia - chronic, patient was on pravastatin up until couple months ago.  He had a itchy bilateral rash on both arms and it was thought to possibly be due to this and so he stopped it.  The rash has persisted since then and is also on his lower legs, he has an appointment with dermatology for later this month for evaluation.  There is been no change in the rash since stopping the pravastatin.  He also is on fenofibrate daily and baby aspirin.    Chronic Kidney Disease - has had mildly decreased GFR to 53     Nonischemic cardiomyopathy -patient not really sure if history with this, he follows with [facility] annually, Dr. Lockwood. Last echo was done in 2017, EF was 45-50% at that time.    The following portions of the patient's history were reviewed by me and updated as appropriate: allergies, current medications, past family history, past medical  history, past social history, past surgical history, and problem list.     Review of Systems   Constitutional: Negative.    HENT: Negative.    Eyes: Negative.    Respiratory: Negative.    Cardiovascular: Negative.  Negative for chest pain, palpitations and leg swelling.   Gastrointestinal: Negative.    Endocrine: Negative.    Genitourinary: Negative.    Musculoskeletal: Negative.    Skin: Negative.         Pt is itchy on bilateral lower legs and arms - seeing derm later this month   Allergic/Immunologic: Negative.    Neurological: Negative.    Hematological: Negative.    Psychiatric/Behavioral: Negative.    I have reviewed and confirmed the accuracy of the ROS as documented by the MA/LPN/RN Meka Monroy MD    Objective  Vitals:    12/01/20 1007   BP: 128/74   Pulse: 69   Temp: 96.9 °F (36.1 °C)   SpO2: 98%     Body mass index is 32.78 kg/m².       Physical Exam  Vitals signs reviewed.   Constitutional:       General: He is not in acute distress.     Appearance: He is well-developed.   HENT:      Head: Normocephalic and atraumatic.   Eyes:      General: No scleral icterus.        Right eye: No discharge.         Left eye: No discharge.      Conjunctiva/sclera: Conjunctivae normal.   Pulmonary:      Effort: Pulmonary effort is normal. No respiratory distress.   Skin:     Coloration: Skin is not pale.      Findings: No erythema.   Neurological:      Mental Status: He is alert and oriented to person, place, and time.   Psychiatric:         Behavior: Behavior normal.         Thought Content: Thought content normal.         Judgment: Judgment normal.           Current Outpatient Medications:   •  carvedilol (COREG) 3.125 MG tablet, Take 1 tablet by mouth 2 (Two) Times a Day., Disp: 180 tablet, Rfl: 3  •  esomeprazole (nexIUM) 40 MG capsule, Take 1 capsule by mouth Daily., Disp: 90 capsule, Rfl: 3  •  fenofibrate (TRICOR) 145 MG tablet, Take 1 tablet by mouth Daily., Disp: 90 tablet, Rfl: 1  •  fluticasone (FLONASE)  50 MCG/ACT nasal spray, 2 sprays into the nostril(s) as directed by provider Daily. Administer 2 sprays in each nostril once a day, Disp: 3 bottle, Rfl: 3  •  Multiple Vitamin (MULTI-VITAMIN DAILY PO), Take  by mouth., Disp: , Rfl:   •  Omega-3 1000 MG capsule, Take  by mouth., Disp: , Rfl:   •  ramipril (ALTACE) 10 MG capsule, Take 1 capsule by mouth Daily., Disp: 90 capsule, Rfl: 3  •  spironolactone (ALDACTONE) 25 MG tablet, Take 1 tablet by mouth Daily., Disp: 90 tablet, Rfl: 3    Procedures      Office Visit on 12/01/2020   Component Date Value Ref Range Status   • Hemoglobin A1C 12/01/2020 6.4  % Final             Diagnoses and all orders for this visit:    1. Insulin resistance (Primary)  Chronic, A1c today of 6.4 which is quite good for him.  We will continue to monitor regularly.  -     POC Glycosylated Hemoglobin (Hb A1C)    2. Benign essential HTN  Chronic, well-controlled on current regimen with carvedilol, ramipril, and spironolactone daily.  Continue same.  -     Comprehensive Metabolic Panel    3. Hyperlipidemia, unspecified hyperlipidemia type  Reportedly chronic, discussed with patient that I would recommend he stop the fenofibrate, to my understanding he has not shown to have significant cardiovascular benefit.  Also recommended that he stop the aspirin given that he has no known cardiovascular disease or CVA history.  We will check a lipid panel today and also repeat a fasting lipid panel in about 3 months.  At that time we will determine if he needs to restart statin medication or not.  -     Lipid Panel    4. Stage 3a chronic kidney disease  Chronic, has fluctuated between stage II and stage IIIa.  Will check kidney function today.  -     CBC & Differential  -     Comprehensive Metabolic Panel    5. Chronic rhinitis  -     fluticasone (FLONASE) 50 MCG/ACT nasal spray; 2 sprays into the nostril(s) as directed by provider Daily. Administer 2 sprays in each nostril once a day  Dispense: 3 bottle;  Refill: 3          Return in about 3 months (around 3/1/2021) for Medicare Wellness.      Meka Monroy MD

## 2020-12-02 LAB
ALBUMIN SERPL-MCNC: 4.3 G/DL (ref 3.5–5.2)
ALBUMIN/GLOB SERPL: 1.8 G/DL
ALP SERPL-CCNC: 42 U/L (ref 39–117)
ALT SERPL-CCNC: 24 U/L (ref 1–41)
AST SERPL-CCNC: 27 U/L (ref 1–40)
BASOPHILS # BLD AUTO: 0.04 10*3/MM3 (ref 0–0.2)
BASOPHILS NFR BLD AUTO: 0.5 % (ref 0–1.5)
BILIRUB SERPL-MCNC: 0.4 MG/DL (ref 0–1.2)
BUN SERPL-MCNC: 36 MG/DL (ref 8–23)
BUN/CREAT SERPL: 28.6 (ref 7–25)
CALCIUM SERPL-MCNC: 9.6 MG/DL (ref 8.6–10.5)
CHLORIDE SERPL-SCNC: 101 MMOL/L (ref 98–107)
CHOLEST SERPL-MCNC: 180 MG/DL (ref 0–200)
CO2 SERPL-SCNC: 28.3 MMOL/L (ref 22–29)
CREAT SERPL-MCNC: 1.26 MG/DL (ref 0.76–1.27)
EOSINOPHIL # BLD AUTO: 0.17 10*3/MM3 (ref 0–0.4)
EOSINOPHIL NFR BLD AUTO: 2.1 % (ref 0.3–6.2)
ERYTHROCYTE [DISTWIDTH] IN BLOOD BY AUTOMATED COUNT: 12.9 % (ref 12.3–15.4)
GLOBULIN SER CALC-MCNC: 2.4 GM/DL
GLUCOSE SERPL-MCNC: 97 MG/DL (ref 65–99)
HCT VFR BLD AUTO: 40.5 % (ref 37.5–51)
HDLC SERPL-MCNC: 43 MG/DL (ref 40–60)
HGB BLD-MCNC: 13.1 G/DL (ref 13–17.7)
IMM GRANULOCYTES # BLD AUTO: 0.03 10*3/MM3 (ref 0–0.05)
IMM GRANULOCYTES NFR BLD AUTO: 0.4 % (ref 0–0.5)
LDLC SERPL CALC-MCNC: 102 MG/DL (ref 0–100)
LYMPHOCYTES # BLD AUTO: 1.42 10*3/MM3 (ref 0.7–3.1)
LYMPHOCYTES NFR BLD AUTO: 17.8 % (ref 19.6–45.3)
MCH RBC QN AUTO: 27.8 PG (ref 26.6–33)
MCHC RBC AUTO-ENTMCNC: 32.3 G/DL (ref 31.5–35.7)
MCV RBC AUTO: 86 FL (ref 79–97)
MONOCYTES # BLD AUTO: 0.56 10*3/MM3 (ref 0.1–0.9)
MONOCYTES NFR BLD AUTO: 7 % (ref 5–12)
NEUTROPHILS # BLD AUTO: 5.74 10*3/MM3 (ref 1.7–7)
NEUTROPHILS NFR BLD AUTO: 72.2 % (ref 42.7–76)
NRBC BLD AUTO-RTO: 0 /100 WBC (ref 0–0.2)
PLATELET # BLD AUTO: 216 10*3/MM3 (ref 140–450)
POTASSIUM SERPL-SCNC: 5.3 MMOL/L (ref 3.5–5.2)
PROT SERPL-MCNC: 6.7 G/DL (ref 6–8.5)
RBC # BLD AUTO: 4.71 10*6/MM3 (ref 4.14–5.8)
SODIUM SERPL-SCNC: 141 MMOL/L (ref 136–145)
TRIGL SERPL-MCNC: 205 MG/DL (ref 0–150)
VLDLC SERPL CALC-MCNC: 35 MG/DL (ref 5–40)
WBC # BLD AUTO: 7.96 10*3/MM3 (ref 3.4–10.8)

## 2020-12-03 ENCOUNTER — TELEPHONE (OUTPATIENT)
Dept: FAMILY MEDICINE CLINIC | Facility: CLINIC | Age: 85
End: 2020-12-03

## 2020-12-03 NOTE — TELEPHONE ENCOUNTER
THE PATIENT CALLED IN AND STATED THAT fluticasone (FLONASE) 50 MCG/ACT nasal spray WAS SUPPOSED TO BE CALLED INTO THE PHARMACY BUT HAS NOT BEEN SENT TO THE PHARMACY.    [id number] - [address] AT [place] [phone] [place] [phone]      CALLBACK NUMBER NUMBER [id number]

## 2020-12-07 RX ORDER — PRAVASTATIN SODIUM 10 MG
20 TABLET ORAL DAILY
Qty: 90 TABLET | Refills: 1 | Status: SHIPPED | OUTPATIENT
Start: 2020-12-07 | End: 2021-02-17

## 2020-12-11 ENCOUNTER — TELEPHONE (OUTPATIENT)
Dept: FAMILY MEDICINE CLINIC | Facility: CLINIC | Age: 85
End: 2020-12-11

## 2020-12-11 NOTE — TELEPHONE ENCOUNTER
PT CALLED STATING HE ONLY RECEIVED THE COVID RESULTS IN THE MAIL, HE WANTS HIS RECENT LABS AS WELL.

## 2020-12-11 NOTE — TELEPHONE ENCOUNTER
Informed pt that [facility] is a separate entity - they mailed covid antibody results separately. I mailed his labs on 12/7/20 informed pt that our mail is routed through the hospital and often takes 1-2 weeks to arrive

## 2021-01-31 ENCOUNTER — IMMUNIZATION (OUTPATIENT)
Dept: VACCINE CLINIC | Facility: HOSPITAL | Age: 86
End: 2021-01-31

## 2021-01-31 PROCEDURE — 91300 HC SARSCOV02 VAC 30MCG/0.3ML IM: CPT | Performed by: INTERNAL MEDICINE

## 2021-01-31 PROCEDURE — 0001A: CPT | Performed by: INTERNAL MEDICINE

## 2021-02-17 RX ORDER — PRAVASTATIN SODIUM 20 MG
20 TABLET ORAL DAILY
Qty: 90 TABLET | Refills: 1 | Status: SHIPPED | OUTPATIENT
Start: 2021-02-17 | End: 2021-07-30 | Stop reason: SDUPTHER

## 2021-02-21 ENCOUNTER — IMMUNIZATION (OUTPATIENT)
Dept: VACCINE CLINIC | Facility: HOSPITAL | Age: 86
End: 2021-02-21

## 2021-02-21 PROCEDURE — 91300 HC SARSCOV02 VAC 30MCG/0.3ML IM: CPT | Performed by: INTERNAL MEDICINE

## 2021-02-21 PROCEDURE — 0002A: CPT | Performed by: INTERNAL MEDICINE

## 2021-03-30 ENCOUNTER — OFFICE VISIT (OUTPATIENT)
Dept: FAMILY MEDICINE CLINIC | Facility: CLINIC | Age: 86
End: 2021-03-30

## 2021-03-30 VITALS
HEART RATE: 79 BPM | HEIGHT: 71 IN | OXYGEN SATURATION: 98 % | SYSTOLIC BLOOD PRESSURE: 122 MMHG | DIASTOLIC BLOOD PRESSURE: 76 MMHG | TEMPERATURE: 96.9 F | BODY MASS INDEX: 32.62 KG/M2 | WEIGHT: 233 LBS

## 2021-03-30 DIAGNOSIS — N18.31 STAGE 3A CHRONIC KIDNEY DISEASE (HCC): ICD-10-CM

## 2021-03-30 DIAGNOSIS — Z00.00 MEDICARE ANNUAL WELLNESS VISIT, SUBSEQUENT: Primary | ICD-10-CM

## 2021-03-30 DIAGNOSIS — Z13.0 SCREENING FOR DEFICIENCY ANEMIA: ICD-10-CM

## 2021-03-30 DIAGNOSIS — R73.03 PREDIABETES: ICD-10-CM

## 2021-03-30 DIAGNOSIS — E78.2 MIXED HYPERLIPIDEMIA: ICD-10-CM

## 2021-03-30 PROBLEM — H35.3221 EXUDATIVE AGE-RELATED MACULAR DEGENERATION, LEFT EYE, WITH ACTIVE CHOROIDAL NEOVASCULARIZATION: Status: ACTIVE | Noted: 2021-03-30

## 2021-03-30 PROBLEM — I73.9 PERIPHERAL VASCULAR DISEASE, UNSPECIFIED (HCC): Status: ACTIVE | Noted: 2021-03-30

## 2021-03-30 PROCEDURE — G0439 PPPS, SUBSEQ VISIT: HCPCS | Performed by: FAMILY MEDICINE

## 2021-03-30 RX ORDER — TRIAMCINOLONE ACETONIDE 1 MG/G
CREAM TOPICAL
COMMUNITY
Start: 2021-02-09 | End: 2021-03-30 | Stop reason: SDUPTHER

## 2021-03-30 NOTE — PROGRESS NOTES
The ABCs of the Annual Wellness Visit  Subsequent Medicare Wellness Visit    Chief Complaint   Patient presents with   • Medicare Wellness-subsequent     SMW - fasting     Masks/face shield/appropriate PPE were worn for the entirety of the visit by the patient, MA, and provider.     Subjective   History of Present Illness:  Goran August is a 86 y.o. male who presents for a Subsequent Medicare Wellness Visit.  His chronic medical problems include hypertension, hyperlipidemia, CKD, prediabetes, and nonischemic cardiomyopathy.    Has been having some increased left knee pain, seeing ortho and getting gel shots which seems to be helping.    He has also been having intermittent diffuse skin itching.  He does see dermatology and was prescribed some triamcinolone.  He would like to know if it could be something in his diet or if there is another thing that he needs to change for this problem.  -I discussed with him that I think it is very unlikely to be caused by food allergy.  Would recommend he take a look at the different products used in the household including dryer sheets and detergents and try to eliminate fragrance/fragrance free options to try.  Also discussed possibly trialing a discontinuation of the statin.  This was restarted a couple months ago around the same time the itching began.  If these things do not help may consider patch testing in the future.    HEALTH RISK ASSESSMENT    Recent Hospitalizations:  No hospitalization(s) within the last year.    Current Medical Providers:  Patient Care Team:  Meka Tamayo MD as PCP - General (Family Medicine)    Smoking Status:  Social History     Tobacco Use   Smoking Status Former Smoker   • Packs/day: 0.50   • Years: 25.00   • Pack years: 12.50   • Types: Cigarettes   Smokeless Tobacco Former User       Alcohol Consumption:  Social History     Substance and Sexual Activity   Alcohol Use Yes    Comment: one drink monthly       Depression Screen:    PHQ-2/PHQ-9 Depression Screening 3/30/2021   Little interest or pleasure in doing things 0   Feeling down, depressed, or hopeless 0   Total Score 0       Fall Risk Screen:  SETH Fall Risk Assessment has not been completed.    Health Habits and Functional and Cognitive Screening:  Functional & Cognitive Status 3/30/2021   Do you have difficulty preparing food and eating? No   Do you have difficulty bathing yourself, getting dressed or grooming yourself? No   Do you have difficulty using the toilet? No   Do you have difficulty moving around from place to place? No   Do you have trouble with steps or getting out of a bed or a chair? No   Current Diet Well Balanced Diet   Dental Exam Up to date   Eye Exam Up to date   Exercise (times per week) 1 times per week   Current Exercise Activities Include Walking   Do you need help using the phone?  No   Are you deaf or do you have serious difficulty hearing?  No   Do you need help with transportation? No   Do you need help shopping? No   Do you need help preparing meals?  No   Do you need help with housework?  No   Do you need help with laundry? No   Do you need help taking your medications? No   Do you need help managing money? No   Do you ever drive or ride in a car without wearing a seat belt? No   Have you felt unusual stress, anger or loneliness in the last month? No   Who do you live with? Spouse   If you need help, do you have trouble finding someone available to you? Yes   Have you been bothered in the last four weeks by sexual problems? No   Do you have difficulty concentrating, remembering or making decisions? No         Does the patient have evidence of cognitive impairment? No    Asprin use counseling:Does not need ASA (and currently is not on it)    Age-appropriate Screening Schedule:  Refer to the list below for future screening recommendations based on patient's age, sex and/or medical conditions. Orders for these recommended tests are listed in the plan  section. The patient has been provided with a written plan.    Health Maintenance   Topic Date Due   • TDAP/TD VACCINES (1 - Tdap) Never done   • URINE MICROALBUMIN  10/26/2019   • HEMOGLOBIN A1C  06/01/2021   • DIABETIC EYE EXAM  08/28/2021   • LIPID PANEL  12/01/2021   • INFLUENZA VACCINE  Completed   • ZOSTER VACCINE  Completed          The following portions of the patient's history were reviewed and updated as appropriate: allergies, current medications, past family history, past medical history, past social history, past surgical history and problem list.    Outpatient Medications Prior to Visit   Medication Sig Dispense Refill   • carvedilol (COREG) 3.125 MG tablet Take 1 tablet by mouth 2 (Two) Times a Day. 180 tablet 3   • esomeprazole (nexIUM) 40 MG capsule Take 1 capsule by mouth Daily. 90 capsule 3   • fluticasone (FLONASE) 50 MCG/ACT nasal spray 2 sprays into the nostril(s) as directed by provider Daily. Administer 2 sprays in each nostril once a day 3 bottle 3   • Multiple Vitamin (MULTI-VITAMIN DAILY PO) Take  by mouth.     • pravastatin (PRAVACHOL) 20 MG tablet Take 1 tablet by mouth Daily. 90 tablet 1   • ramipril (ALTACE) 10 MG capsule Take 1 capsule by mouth Daily. 90 capsule 3   • spironolactone (ALDACTONE) 25 MG tablet Take 1 tablet by mouth Daily. 90 tablet 3   • triamcinolone (KENALOG) 0.1 % cream APPLY SPARINGLY TO THE AFFECTED AREA 3 TIMES EACH WEEK     • Omega-3 1000 MG capsule Take  by mouth.     • fenofibrate (TRICOR) 145 MG tablet Take 1 tablet by mouth Daily. 90 tablet 1     No facility-administered medications prior to visit.       Patient Active Problem List   Diagnosis   • Insulin resistance   • Hypertensive heart disease without heart failure   • Hyperlipidemia   • Iron-deficiency anemia   • Idiopathic chronic gout without tophus   • Hiatal hernia with gastroesophageal reflux   • Stage 3 chronic kidney disease (CMS/HCC)   • Environmental allergies   • Benign essential HTN   • Left  "bundle branch block   • Nonischemic cardiomyopathy (CMS/HCC)   • Exudative age-related macular degeneration, left eye, with active choroidal neovascularization (CMS/HCC)   • Peripheral vascular disease, unspecified (CMS/HCC)       Advanced Care Planning:  ACP discussion was held with the patient during this visit. Patient has an advance directive in EMR which is still valid.     Review of Systems   Constitutional: Negative.    HENT: Negative.    Eyes: Negative.    Respiratory: Negative.    Cardiovascular: Negative.    Gastrointestinal: Negative.    Endocrine: Negative.    Genitourinary: Negative.    Musculoskeletal: Positive for arthralgias and joint swelling.   Skin: Negative.    Allergic/Immunologic: Negative.    Neurological: Negative.    Hematological: Negative.    Psychiatric/Behavioral: Negative.        Compared to one year ago, the patient feels his physical health is the same.  Compared to one year ago, the patient feels his mental health is the same.    Reviewed chart for potential of high risk medication in the elderly: yes  Reviewed chart for potential of harmful drug interactions in the elderly:yes    Objective         Vitals:    03/30/21 0900   BP: 122/76   BP Location: Right arm   Patient Position: Sitting   Pulse: 79   Temp: 96.9 °F (36.1 °C)   SpO2: 98%   Weight: 106 kg (233 lb)   Height: 180.3 cm (71\")       Body mass index is 32.5 kg/m².  Discussed the patient's BMI with him. The BMI is above average; no BMI management plan is appropriate..    Physical Exam  Vitals reviewed.   Constitutional:       General: He is not in acute distress.     Appearance: He is well-developed.   HENT:      Head: Normocephalic and atraumatic.      Right Ear: Tympanic membrane, ear canal and external ear normal.      Left Ear: Tympanic membrane, ear canal and external ear normal.      Nose: Nose normal.   Eyes:      General: No scleral icterus.        Right eye: No discharge.         Left eye: No discharge.      " Conjunctiva/sclera: Conjunctivae normal.   Neck:      Thyroid: No thyromegaly.      Vascular: No carotid bruit.   Cardiovascular:      Rate and Rhythm: Normal rate and regular rhythm.      Heart sounds: No murmur heard.   No friction rub. No gallop.    Pulmonary:      Effort: Pulmonary effort is normal. No respiratory distress.      Breath sounds: Normal breath sounds. No wheezing or rales.   Abdominal:      General: Abdomen is flat. Bowel sounds are normal. There is no distension.      Palpations: Abdomen is soft. There is no mass.   Musculoskeletal:      Cervical back: Neck supple.      Right lower leg: No edema.      Left lower leg: No edema.   Lymphadenopathy:      Cervical: No cervical adenopathy.   Skin:     General: Skin is warm and dry.   Neurological:      Mental Status: He is alert and oriented to person, place, and time.   Psychiatric:         Behavior: Behavior normal.         Thought Content: Thought content normal.         Judgment: Judgment normal.               Assessment/Plan   Medicare Risks and Personalized Health Plan  CMS Preventative Services Quick Reference  Advance Directive Discussion -patient has an advance care directive, scanned under media in chart.  Colon Cancer Screening -we discussed colon cancer screening, I discussed that I do not feel it is indicated at his age unless he has symptoms develop such as change in bowel movements or blood in stool, patient is in agreement.  Immunizations Discussed/Encouraged (specific immunizations; Td ) - holding off for the time being, recommended to come in for it if injury occurs   Prostate Cancer Screening -discussed prostate cancer screening, patient and I are in agreement to not pursue.  We discussed that the majority of prostate cancers are slow-growing and often do not lead to problems or death whereas biopsy and interventions can be more harmful than the cancer itself.  Patient is not having any concerning symptoms.    The above risks/problems  have been discussed with the patient.  Pertinent information has been shared with the patient in the After Visit Summary.  Follow up plans and orders are seen below in the Assessment/Plan Section.    Diagnoses and all orders for this visit:    1. Medicare annual wellness visit, subsequent (Primary)  Very pleasant 86-year-old man here today for Medicare wellness visit, see discussed topics above.  We will perform routine blood work today to assess his kidney function, cholesterol levels, blood cell counts, and his hemoglobin A1c which has been in the prediabetic range in the past.  No medication changes indicated at this time.    2. Mixed hyperlipidemia  -     Lipid Panel    3. Prediabetes  -     Hemoglobin A1c    4. Stage 3a chronic kidney disease (CMS/HCC)  -     Comprehensive Metabolic Panel    5. Screening for deficiency anemia  -     CBC & Differential    Follow Up:  Return in about 6 months (around 9/30/2021) for Medication Checkup.     An After Visit Summary and PPPS were given to the patient.

## 2021-03-31 LAB
ALBUMIN SERPL-MCNC: 4.4 G/DL (ref 3.5–5.2)
ALBUMIN/GLOB SERPL: 1.6 G/DL
ALP SERPL-CCNC: 71 U/L (ref 39–117)
ALT SERPL-CCNC: 19 U/L (ref 1–41)
AST SERPL-CCNC: 20 U/L (ref 1–40)
BASOPHILS # BLD AUTO: 0.03 10*3/MM3 (ref 0–0.2)
BASOPHILS NFR BLD AUTO: 0.4 % (ref 0–1.5)
BILIRUB SERPL-MCNC: 0.7 MG/DL (ref 0–1.2)
BUN SERPL-MCNC: 24 MG/DL (ref 8–23)
BUN/CREAT SERPL: 25.8 (ref 7–25)
CALCIUM SERPL-MCNC: 10 MG/DL (ref 8.6–10.5)
CHLORIDE SERPL-SCNC: 98 MMOL/L (ref 98–107)
CHOLEST SERPL-MCNC: 169 MG/DL (ref 0–200)
CO2 SERPL-SCNC: 32.4 MMOL/L (ref 22–29)
CREAT SERPL-MCNC: 0.93 MG/DL (ref 0.76–1.27)
EOSINOPHIL # BLD AUTO: 0.1 10*3/MM3 (ref 0–0.4)
EOSINOPHIL NFR BLD AUTO: 1.2 % (ref 0.3–6.2)
ERYTHROCYTE [DISTWIDTH] IN BLOOD BY AUTOMATED COUNT: 13.5 % (ref 12.3–15.4)
GLOBULIN SER CALC-MCNC: 2.7 GM/DL
GLUCOSE SERPL-MCNC: 110 MG/DL (ref 65–99)
HBA1C MFR BLD: 6.6 % (ref 4.8–5.6)
HCT VFR BLD AUTO: 43.5 % (ref 37.5–51)
HDLC SERPL-MCNC: 35 MG/DL (ref 40–60)
HGB BLD-MCNC: 14.1 G/DL (ref 13–17.7)
IMM GRANULOCYTES # BLD AUTO: 0.03 10*3/MM3 (ref 0–0.05)
IMM GRANULOCYTES NFR BLD AUTO: 0.4 % (ref 0–0.5)
LDLC SERPL CALC-MCNC: 81 MG/DL (ref 0–100)
LYMPHOCYTES # BLD AUTO: 1.16 10*3/MM3 (ref 0.7–3.1)
LYMPHOCYTES NFR BLD AUTO: 13.6 % (ref 19.6–45.3)
MCH RBC QN AUTO: 28.2 PG (ref 26.6–33)
MCHC RBC AUTO-ENTMCNC: 32.4 G/DL (ref 31.5–35.7)
MCV RBC AUTO: 87 FL (ref 79–97)
MONOCYTES # BLD AUTO: 0.52 10*3/MM3 (ref 0.1–0.9)
MONOCYTES NFR BLD AUTO: 6.1 % (ref 5–12)
NEUTROPHILS # BLD AUTO: 6.68 10*3/MM3 (ref 1.7–7)
NEUTROPHILS NFR BLD AUTO: 78.3 % (ref 42.7–76)
NRBC BLD AUTO-RTO: 0 /100 WBC (ref 0–0.2)
PLATELET # BLD AUTO: 211 10*3/MM3 (ref 140–450)
POTASSIUM SERPL-SCNC: 5.1 MMOL/L (ref 3.5–5.2)
PROT SERPL-MCNC: 7.1 G/DL (ref 6–8.5)
RBC # BLD AUTO: 5 10*6/MM3 (ref 4.14–5.8)
SODIUM SERPL-SCNC: 139 MMOL/L (ref 136–145)
TRIGL SERPL-MCNC: 325 MG/DL (ref 0–150)
VLDLC SERPL CALC-MCNC: 53 MG/DL (ref 5–40)
WBC # BLD AUTO: 8.52 10*3/MM3 (ref 3.4–10.8)

## 2021-04-01 RX ORDER — TRIAMCINOLONE ACETONIDE 1 MG/G
CREAM TOPICAL 2 TIMES DAILY
Qty: 45 G | Refills: 1 | Status: SHIPPED | OUTPATIENT
Start: 2021-04-01

## 2021-04-01 RX ORDER — CARVEDILOL 3.12 MG/1
3.12 TABLET ORAL 2 TIMES DAILY
Qty: 180 TABLET | Refills: 1 | Status: SHIPPED | OUTPATIENT
Start: 2021-04-01 | End: 2021-09-15 | Stop reason: SDUPTHER

## 2021-04-01 NOTE — TELEPHONE ENCOUNTER
Caller: Goran August    Relationship: Self    Best call back number: [phone]    Medication needed:   Requested Prescriptions     Pending Prescriptions Disp Refills   • carvedilol (COREG) 3.125 MG tablet 180 tablet 3     Sig: Take 1 tablet by mouth 2 (Two) Times a Day.       When do you need the refill by: SOON    Does the patient have less than a 3 day supply:  [] Yes  [x] No    What is the patient's preferred pharmacy: [facility] - [address] [phone] [facility] [phone]

## 2021-05-24 NOTE — TELEPHONE ENCOUNTER
Caller: Goran August    Relationship: Self    Best call back number: [phone]    Medication needed:   Requested Prescriptions     Pending Prescriptions Disp Refills   • spironolactone (ALDACTONE) 25 MG tablet 90 tablet 3     Sig: Take 1 tablet by mouth Daily.   • ramipril (ALTACE) 10 MG capsule 90 capsule 3     Sig: Take 1 capsule by mouth Daily.       When do you need the refill by: 5/24/21    Does the patient have less than a 3 day supply:  [] Yes  [x] No    What is the patient's preferred pharmacy: [facility] - [address] [phone] [facility] [phone]

## 2021-05-25 RX ORDER — RAMIPRIL 10 MG/1
10 CAPSULE ORAL DAILY
Qty: 90 CAPSULE | Refills: 0 | Status: SHIPPED | OUTPATIENT
Start: 2021-05-25 | End: 2021-05-26 | Stop reason: SDUPTHER

## 2021-05-25 RX ORDER — SPIRONOLACTONE 25 MG/1
25 TABLET ORAL DAILY
Qty: 90 TABLET | Refills: 0 | Status: SHIPPED | OUTPATIENT
Start: 2021-05-25 | End: 2021-05-26 | Stop reason: SDUPTHER

## 2021-05-26 ENCOUNTER — TELEPHONE (OUTPATIENT)
Dept: FAMILY MEDICINE CLINIC | Facility: CLINIC | Age: 86
End: 2021-05-26

## 2021-05-26 RX ORDER — SPIRONOLACTONE 25 MG/1
25 TABLET ORAL DAILY
Qty: 90 TABLET | Refills: 3 | Status: SHIPPED | OUTPATIENT
Start: 2021-05-26 | End: 2022-08-15 | Stop reason: SDUPTHER

## 2021-05-26 RX ORDER — RAMIPRIL 10 MG/1
10 CAPSULE ORAL DAILY
Qty: 90 CAPSULE | Refills: 3 | Status: SHIPPED | OUTPATIENT
Start: 2021-05-26 | End: 2022-08-15 | Stop reason: SDUPTHER

## 2021-07-26 RX ORDER — ESOMEPRAZOLE MAGNESIUM 40 MG/1
40 CAPSULE, DELAYED RELEASE ORAL DAILY
Qty: 90 CAPSULE | Refills: 0 | Status: SHIPPED | OUTPATIENT
Start: 2021-07-26 | End: 2021-10-06

## 2021-07-30 RX ORDER — PRAVASTATIN SODIUM 20 MG
20 TABLET ORAL DAILY
Qty: 90 TABLET | Refills: 1 | Status: SHIPPED | OUTPATIENT
Start: 2021-07-30 | End: 2022-01-24

## 2021-08-10 ENCOUNTER — OFFICE VISIT (OUTPATIENT)
Dept: FAMILY MEDICINE CLINIC | Facility: CLINIC | Age: 86
End: 2021-08-10

## 2021-08-10 VITALS
DIASTOLIC BLOOD PRESSURE: 68 MMHG | BODY MASS INDEX: 31.78 KG/M2 | SYSTOLIC BLOOD PRESSURE: 124 MMHG | WEIGHT: 227 LBS | HEIGHT: 71 IN | OXYGEN SATURATION: 99 % | HEART RATE: 53 BPM | TEMPERATURE: 98.1 F
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
## 2021-08-10 DIAGNOSIS — I10 BENIGN ESSENTIAL HTN: ICD-10-CM

## 2021-08-10 DIAGNOSIS — E11.22 TYPE 2 DIABETES MELLITUS WITH STAGE 3A CHRONIC KIDNEY DISEASE, WITHOUT LONG-TERM CURRENT USE OF INSULIN (HCC): Primary | ICD-10-CM

## 2021-08-10 DIAGNOSIS — N18.31 TYPE 2 DIABETES MELLITUS WITH STAGE 3A CHRONIC KIDNEY DISEASE, WITHOUT LONG-TERM CURRENT USE OF INSULIN (HCC): Primary | ICD-10-CM

## 2021-08-10 LAB — HBA1C MFR BLD: 6.3 %

## 2021-08-10 PROCEDURE — 3044F HG A1C LEVEL LT 7.0%: CPT | Performed by: FAMILY MEDICINE

## 2021-08-10 PROCEDURE — 83036 HEMOGLOBIN GLYCOSYLATED A1C: CPT | Performed by: FAMILY MEDICINE

## 2021-08-10 PROCEDURE — 99214 OFFICE O/P EST MOD 30 MIN: CPT | Performed by: FAMILY MEDICINE

## 2021-08-10 NOTE — PROGRESS NOTES
"Chief Complaint  Establish Care (new pt establishing today with dr henry ) and Diabetes Mellitus (follow up no complains doing well )    Subjective          Goran August presents to [facility]  History of Present Illness  Pleasant 87-year-old male here for diabetes which is well controlled. Hemoglobin A1c at last appointment 6.6 and today is 6.3. He currently is not on any medications, lifestyle managed. He does have history of nonischemic cardiomyopathy on ramipril and spironolactone. Borderline bradycardia, carvediolol with heart rate 53 today.  No symptoms-sees Dr Donnell vines cardiology.  He is also on pravastatin for hyperlipidemia that is well controlled, chronic kidney disease that is stable.    He fell at home missed a step, he did hit his back and head and alternated heat and ice, doing well, no pain or issues todya.     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 Finished that about a week ago. He does have a pool and walks every day for 50 min.     Objective   Vital Signs:   /68   Pulse 53   Temp 98.1 °F (36.7 °C)   Ht 180.3 cm (71\")   Wt 103 kg (227 lb)   SpO2 99%   BMI 31.66 kg/m²     Physical Exam  Vitals and nursing note reviewed.   Constitutional:       General: He is not in acute distress.     Appearance: He is well-developed.   HENT:      Head: Normocephalic.      Nose: Nose normal.   Cardiovascular:      Rate and Rhythm: Normal rate and regular rhythm.      Heart sounds: Normal heart sounds. No murmur heard.     Pulmonary:      Effort: Pulmonary effort is normal. No respiratory distress.      Breath sounds: Normal breath sounds.   Musculoskeletal:         General: Normal range of motion.   Skin:     General: Skin is warm and dry.      Findings: No rash.      Comments: Small 2 cm bruise on right forearm, otherwise no injuries.   Neurological:      Mental Status: He " is alert and oriented to person, place, and time.   Psychiatric:         Behavior: Behavior normal.         Thought Content: Thought content normal.         Judgment: Judgment normal.        Result Review :   The following data was reviewed by: Tanya Dhaliwal MD on 08/10/2021:  CMP    CMP 12/1/20 3/30/21   Glucose 97 110 (A)   BUN 36 (A) 24 (A)   Creatinine 1.26 0.93   eGFR Non  Am 54 (A) 77   eGFR  Am 66 93   Sodium 141 139   Potassium 5.3 (A) 5.1   Chloride 101 98   Calcium 9.6 10.0   Total Protein 6.7 7.1   Albumin 4.30 4.40   Globulin 2.4 2.7   Total Bilirubin 0.4 0.7   Alkaline Phosphatase 42 71   AST (SGOT) 27 20   ALT (SGPT) 24 19   (A) Abnormal value       Comments are available for some flowsheets but are not being displayed.           CBC    CBC 12/1/20 3/30/21   WBC 7.96 8.52   RBC 4.71 5.00   Hemoglobin 13.1 14.1   Hematocrit 40.5 43.5   MCV 86.0 87.0   MCH 27.8 28.2   MCHC 32.3 32.4   RDW 12.9 13.5   Platelets 216 211           Lipid Panel    Lipid Panel 12/1/20 3/30/21   Total Cholesterol 180 169   Triglycerides 205 (A) 325 (A)   HDL Cholesterol 43 35 (A)   VLDL Cholesterol 35 53 (A)   LDL Cholesterol  102 (A) 81   (A) Abnormal value       Comments are available for some flowsheets but are not being displayed.           A1C Last 3 Results    HGBA1C Last 3 Results 12/1/20 3/30/21 8/10/21   Hemoglobin A1C 6.4 6.60 (A) 6.3   (A) Abnormal value       Comments are available for some flowsheets but are not being displayed.                     Assessment and Plan    Diagnoses and all orders for this visit:    1. Type 2 diabetes mellitus with stage 3a chronic kidney disease, without long-term current use of insulin (CMS/[facility]) (Primary)  -     POC Glycosylated Hemoglobin (Hb A1C)  -     Comprehensive Metabolic Panel  -     CBC & Differential  -     Lipid Panel  -     TSH Rfx On Abnormal To Free T4  -     Microalbumin / Creatinine Urine Ratio - Urine, Clean Catch    2. Benign essential HTN  -      Comprehensive Metabolic Panel  -     CBC & Differential  -     TSH Rfx On Abnormal To Free T4    3. Fall (on) (from) other stairs and steps, initial encounter    Pleasant 87-year-old male here for diabetes and hypertension follow-up which are both well controlled.  He does have known left bundle branch block which has been stable, followed by Dr. Hollingsworth cardiology.  He does have some bilateral knee OA, does pool walking daily which I think is helping maintain his strength substantially.  He does have some difficulty with it from a seated position.  He did do physical therapy without substantial difference.  I do think maintaining his activity really will be essential to his quality of life.  He did have an accidental fall yesterday when missing a step, no cardiac symptoms at that time, I think likely related to bilateral knee OA.  Continue follow-up with Ortho.  As he has done physical therapy and that last week, I would recommend he continue with pool walking, good healthy protein intake and regular sleep.      Follow Up   Return in 6 months (on 2/10/2022), or if symptoms worsen or fail to improve, for Annual Exam with fasting labs prior (after March 30).  Patient was given instructions and counseling regarding his condition or for health maintenance advice. Please see specific information pulled into the AVS if appropriate. Medical assistant and I wore mask and eyewear protection during entire encounter.  Patient wore mask.    Tanya Dhaliwal MD

## 2021-08-11 LAB
ALBUMIN SERPL-MCNC: 4.2 G/DL (ref 3.5–5.2)
ALBUMIN/CREAT UR: 132 MG/G CREAT (ref 0–29)
ALBUMIN/GLOB SERPL: 1.6 G/DL
ALP SERPL-CCNC: 81 U/L (ref 39–117)
ALT SERPL-CCNC: 19 U/L (ref 1–41)
AST SERPL-CCNC: 21 U/L (ref 1–40)
BASOPHILS # BLD AUTO: 0.04 10*3/MM3 (ref 0–0.2)
BASOPHILS NFR BLD AUTO: 0.5 % (ref 0–1.5)
BILIRUB SERPL-MCNC: 0.6 MG/DL (ref 0–1.2)
BUN SERPL-MCNC: 18 MG/DL (ref 8–23)
BUN/CREAT SERPL: 22.2 (ref 7–25)
CALCIUM SERPL-MCNC: 9.9 MG/DL (ref 8.6–10.5)
CHLORIDE SERPL-SCNC: 98 MMOL/L (ref 98–107)
CHOLEST SERPL-MCNC: 193 MG/DL (ref 0–200)
CO2 SERPL-SCNC: 30.6 MMOL/L (ref 22–29)
CREAT SERPL-MCNC: 0.81 MG/DL (ref 0.76–1.27)
CREAT UR-MCNC: 54.8 MG/DL
EOSINOPHIL # BLD AUTO: 0.14 10*3/MM3 (ref 0–0.4)
EOSINOPHIL NFR BLD AUTO: 1.8 % (ref 0.3–6.2)
ERYTHROCYTE [DISTWIDTH] IN BLOOD BY AUTOMATED COUNT: 13.8 % (ref 12.3–15.4)
GLOBULIN SER CALC-MCNC: 2.7 GM/DL
GLUCOSE SERPL-MCNC: 85 MG/DL (ref 65–99)
HCT VFR BLD AUTO: 41.9 % (ref 37.5–51)
HDLC SERPL-MCNC: 34 MG/DL (ref 40–60)
HGB BLD-MCNC: 13.4 G/DL (ref 13–17.7)
IMM GRANULOCYTES # BLD AUTO: 0.03 10*3/MM3 (ref 0–0.05)
IMM GRANULOCYTES NFR BLD AUTO: 0.4 % (ref 0–0.5)
LDLC SERPL CALC-MCNC: 86 MG/DL (ref 0–100)
LYMPHOCYTES # BLD AUTO: 1.44 10*3/MM3 (ref 0.7–3.1)
LYMPHOCYTES NFR BLD AUTO: 18.2 % (ref 19.6–45.3)
MCH RBC QN AUTO: 28.1 PG (ref 26.6–33)
MCHC RBC AUTO-ENTMCNC: 32 G/DL (ref 31.5–35.7)
MCV RBC AUTO: 87.8 FL (ref 79–97)
MICROALBUMIN UR-MCNC: 72.3 UG/ML
MONOCYTES # BLD AUTO: 0.5 10*3/MM3 (ref 0.1–0.9)
MONOCYTES NFR BLD AUTO: 6.3 % (ref 5–12)
NEUTROPHILS # BLD AUTO: 5.78 10*3/MM3 (ref 1.7–7)
NEUTROPHILS NFR BLD AUTO: 72.8 % (ref 42.7–76)
NRBC BLD AUTO-RTO: 0 /100 WBC (ref 0–0.2)
PLATELET # BLD AUTO: 195 10*3/MM3 (ref 140–450)
POTASSIUM SERPL-SCNC: 5.3 MMOL/L (ref 3.5–5.2)
PROT SERPL-MCNC: 6.9 G/DL (ref 6–8.5)
RBC # BLD AUTO: 4.77 10*6/MM3 (ref 4.14–5.8)
SODIUM SERPL-SCNC: 139 MMOL/L (ref 136–145)
TRIGL SERPL-MCNC: 448 MG/DL (ref 0–150)
TSH SERPL DL<=0.005 MIU/L-ACNC: 2.33 UIU/ML (ref 0.27–4.2)
VLDLC SERPL CALC-MCNC: 73 MG/DL (ref 5–40)
WBC # BLD AUTO: 7.93 10*3/MM3 (ref 3.4–10.8)

## 2021-08-23 ENCOUNTER — TELEPHONE (OUTPATIENT)
Dept: FAMILY MEDICINE CLINIC | Facility: CLINIC | Age: 86
End: 2021-08-23

## 2021-08-23 NOTE — TELEPHONE ENCOUNTER
Patient recently was evaluated by a chiropractor, which took x-ray images of patient's back. A spot was discovered on the xray film, and patient was advised to contact his primary care for evaluation and review. The chiropractor gave patient the actual films. An appointment is probably required? Can we schedule patient for tomorrow?

## 2021-08-24 ENCOUNTER — OFFICE VISIT (OUTPATIENT)
Dept: FAMILY MEDICINE CLINIC | Facility: CLINIC | Age: 86
End: 2021-08-24

## 2021-08-24 VITALS
TEMPERATURE: 98 F | HEIGHT: 71 IN | BODY MASS INDEX: 31.5 KG/M2 | WEIGHT: 225 LBS | OXYGEN SATURATION: 97 % | DIASTOLIC BLOOD PRESSURE: 68 MMHG | HEART RATE: 74 BPM | SYSTOLIC BLOOD PRESSURE: 110 MMHG

## 2021-08-24 DIAGNOSIS — R19.03 RIGHT LOWER QUADRANT ABDOMINAL MASS: Primary | ICD-10-CM

## 2021-08-24 PROCEDURE — 99213 OFFICE O/P EST LOW 20 MIN: CPT | Performed by: FAMILY MEDICINE

## 2021-08-24 NOTE — PROGRESS NOTES
"Chief Complaint  Abnormal Chest X-ray (pt fell 8/9/21  and went to chiropractor they took xray  and he want to follow up with dr henry )    Subjective          Goran August presents to [facility]  History of Present Illness  Eliseo 87-year-old male here for findings of a right sided mass that is slightly above the pelvic brim almost in line with the umbilicus seen on x-ray.  He went to his chiropractor after a fall for back pain.  X-ray was performed showing a new mass that was not present for years ago.  He has not noticed any changes to his bowels.  No blood.  He has not noticed any abdominal pain except for some back pain after the fall, especially when turning in bed.    Objective   Vital Signs:   /68   Pulse 74   Temp 98 °F (36.7 °C)   Ht 180.3 cm (71\")   Wt 102 kg (225 lb)   SpO2 97%   BMI 31.38 kg/m²     Physical Exam  Vitals and nursing note reviewed.   Constitutional:       General: He is not in acute distress.     Appearance: He is well-developed.   HENT:      Head: Normocephalic.      Nose: Nose normal.   Cardiovascular:      Heart sounds: No murmur heard.     Pulmonary:      Effort: Pulmonary effort is normal. No respiratory distress.   Abdominal:      Comments: No palpable mass on exam, no mid pain reproduced.  Patient with morbid obesity.   Musculoskeletal:         General: Normal range of motion.   Skin:     General: Skin is warm and dry.      Findings: No rash.   Neurological:      Mental Status: He is alert and oriented to person, place, and time.   Psychiatric:         Behavior: Behavior normal.         Thought Content: Thought content normal.         Judgment: Judgment normal.        Result Review :                 Assessment and Plan    Diagnoses and all orders for this visit:    1. Right lower quadrant abdominal mass (Primary)  -     CT Abdomen Pelvis With & Without Contrast; Future    Eliseo 87-year-old male here for an abnormal x-ray with a 3 " cm mass that is calcified and circular on the right side.  He provided hard copies of x-ray but can send them electronically if needed.  Exam is normal, no pain.  However due to the size and newness of the mass (not seen on x-rays 4 years ago) I do think further evaluation with CT abdomen is recommended.      Follow Up   Return if symptoms worsen or fail to improve.  Patient was given instructions and counseling regarding his condition or for health maintenance advice. Please see specific information pulled into the AVS if appropriate. Medical assistant and I wore mask and eyewear protection during entire encounter.  Patient wore mask.    Tanya Dhaliwal MD

## 2021-08-24 NOTE — PROGRESS NOTES
Please call patient back with results.  The labs has resulted as normal kidney and liver function, normal blood counts, cholesterol is higher when compared to the last blood work done 4 months ago, thyroid function is good.  There is also some protein spilling into the urine so I think we should continue with the ramipril.  In terms of cholesterol, decreasing saturated fats and simple sugars.  Please let us know if you have further questions.     Thank you

## 2021-09-02 ENCOUNTER — HOSPITAL ENCOUNTER (OUTPATIENT)
Dept: CT IMAGING | Facility: HOSPITAL | Age: 86
Discharge: HOME | End: 2021-09-02
Admitting: FAMILY MEDICINE

## 2021-09-02 DIAGNOSIS — R19.03 RIGHT LOWER QUADRANT ABDOMINAL MASS: ICD-10-CM

## 2021-09-02 LAB — CREAT BLDA-MCNC: 1 MG/DL (ref 0.6–1.3)

## 2021-09-02 PROCEDURE — 74177 CT ABD & PELVIS W/CONTRAST: CPT

## 2021-09-02 PROCEDURE — 25010000002 IOPAMIDOL 61 % SOLUTION: Performed by: FAMILY MEDICINE

## 2021-09-02 PROCEDURE — 0 DIATRIZOATE MEGLUMINE & SODIUM PER 1 ML: Performed by: FAMILY MEDICINE

## 2021-09-02 PROCEDURE — 82565 ASSAY OF CREATININE: CPT

## 2021-09-02 RX ADMIN — IOPAMIDOL 85 ML: 612 INJECTION, SOLUTION INTRAVENOUS at 10:47

## 2021-09-02 RX ADMIN — DIATRIZOATE MEGLUMINE AND DIATRIZOATE SODIUM 30 ML: 660; 100 LIQUID ORAL; RECTAL at 09:15

## 2021-09-15 RX ORDER — CARVEDILOL 3.12 MG/1
3.12 TABLET ORAL 2 TIMES DAILY
Qty: 180 TABLET | Refills: 1 | Status: SHIPPED | OUTPATIENT
Start: 2021-09-15 | End: 2022-03-09

## 2021-09-20 NOTE — PROGRESS NOTES
Please call patient back with results.  The CT scan of the abdomen has resulted as negative for any findings on the right side of the abdomen where the abnormal x-ray was.  There is a cyst on the left kidney, there also is some plaque buildup within the aorta but does not look like it would cause the x-ray findings.  My assumption is that it is a ball of fecal matter that was quite dense.  If you are asymptomatic I would not worry about further follow-up but if you are having other abdominal issues we could send you to a vascular doctor for other evaluation as it does seem that that was the most significant finding on CT scan.  Please let us know if you have further questions.     Thank you

## 2021-10-06 RX ORDER — ESOMEPRAZOLE MAGNESIUM 40 MG/1
CAPSULE, DELAYED RELEASE ORAL
Qty: 90 CAPSULE | Refills: 1 | Status: SHIPPED | OUTPATIENT
Start: 2021-10-06 | End: 2022-04-04

## 2022-01-24 RX ORDER — PRAVASTATIN SODIUM 20 MG
TABLET ORAL
Qty: 90 TABLET | Refills: 3 | Status: SHIPPED | OUTPATIENT
Start: 2022-01-24 | End: 2022-01-28

## 2022-01-27 ENCOUNTER — TELEPHONE (OUTPATIENT)
Dept: FAMILY MEDICINE CLINIC | Facility: CLINIC | Age: 87
End: 2022-01-27

## 2022-01-27 NOTE — TELEPHONE ENCOUNTER
PT CALLED TO GET ANOTHER AUTHORIZATION FOR MEDICATION IT LOOKS LIKE   THE TRANSMISSION FAILED FOR  pravastatin (PRAVACHOL) 20 MG tablet

## 2022-01-28 RX ORDER — PRAVASTATIN SODIUM 20 MG
TABLET ORAL
Qty: 90 TABLET | Refills: 3 | Status: SHIPPED | OUTPATIENT
Start: 2022-01-28 | End: 2023-01-18

## 2022-03-09 RX ORDER — CARVEDILOL 3.12 MG/1
TABLET ORAL
Qty: 180 TABLET | Refills: 1 | Status: SHIPPED | OUTPATIENT
Start: 2022-03-09 | End: 2022-05-13 | Stop reason: HOSPADM

## 2022-03-16 DIAGNOSIS — N18.31 STAGE 3A CHRONIC KIDNEY DISEASE: ICD-10-CM

## 2022-03-16 DIAGNOSIS — E78.5 HYPERLIPIDEMIA, UNSPECIFIED HYPERLIPIDEMIA TYPE: ICD-10-CM

## 2022-03-16 DIAGNOSIS — E78.2 MIXED HYPERLIPIDEMIA: ICD-10-CM

## 2022-03-16 DIAGNOSIS — E55.9 AVITAMINOSIS D: ICD-10-CM

## 2022-03-16 DIAGNOSIS — E11.22 TYPE 2 DIABETES MELLITUS WITH STAGE 3A CHRONIC KIDNEY DISEASE, WITHOUT LONG-TERM CURRENT USE OF INSULIN: Primary | ICD-10-CM

## 2022-03-16 DIAGNOSIS — Z13.0 SCREENING FOR DEFICIENCY ANEMIA: ICD-10-CM

## 2022-03-16 DIAGNOSIS — I10 BENIGN ESSENTIAL HTN: ICD-10-CM

## 2022-03-16 DIAGNOSIS — N18.31 TYPE 2 DIABETES MELLITUS WITH STAGE 3A CHRONIC KIDNEY DISEASE, WITHOUT LONG-TERM CURRENT USE OF INSULIN: Primary | ICD-10-CM

## 2022-03-16 DIAGNOSIS — Z00.00 MEDICARE ANNUAL WELLNESS VISIT, SUBSEQUENT: ICD-10-CM

## 2022-03-26 LAB
25(OH)D3+25(OH)D2 SERPL-MCNC: 28.4 NG/ML (ref 30–100)
ALBUMIN SERPL-MCNC: 4.1 G/DL (ref 3.6–4.6)
ALBUMIN/GLOB SERPL: 1.4 {RATIO} (ref 1.2–2.2)
ALP SERPL-CCNC: 68 IU/L (ref 44–121)
ALT SERPL-CCNC: 14 IU/L (ref 0–44)
AST SERPL-CCNC: 18 IU/L (ref 0–40)
BASOPHILS # BLD AUTO: 0 X10E3/UL (ref 0–0.2)
BASOPHILS NFR BLD AUTO: 0 %
BILIRUB SERPL-MCNC: 0.7 MG/DL (ref 0–1.2)
BUN SERPL-MCNC: 23 MG/DL (ref 8–27)
BUN/CREAT SERPL: 23 (ref 10–24)
CALCIUM SERPL-MCNC: 9.9 MG/DL (ref 8.6–10.2)
CHLORIDE SERPL-SCNC: 99 MMOL/L (ref 96–106)
CHOLEST SERPL-MCNC: 160 MG/DL (ref 100–199)
CO2 SERPL-SCNC: 26 MMOL/L (ref 20–29)
CREAT SERPL-MCNC: 1.02 MG/DL (ref 0.76–1.27)
EGFRCR SERPLBLD CKD-EPI 2021: 71 ML/MIN/1.73
EOSINOPHIL # BLD AUTO: 0.1 X10E3/UL (ref 0–0.4)
EOSINOPHIL NFR BLD AUTO: 1 %
ERYTHROCYTE [DISTWIDTH] IN BLOOD BY AUTOMATED COUNT: 13.8 % (ref 11.6–15.4)
FT4I SERPL CALC-MCNC: 2.2 (ref 1.2–4.9)
GLOBULIN SER CALC-MCNC: 2.9 G/DL (ref 1.5–4.5)
GLUCOSE SERPL-MCNC: 107 MG/DL (ref 65–99)
HBA1C MFR BLD: 6.7 % (ref 4.8–5.6)
HCT VFR BLD AUTO: 42.5 % (ref 37.5–51)
HDLC SERPL-MCNC: 35 MG/DL
HGB BLD-MCNC: 13.4 G/DL (ref 13–17.7)
IMM GRANULOCYTES # BLD AUTO: 0 X10E3/UL (ref 0–0.1)
IMM GRANULOCYTES NFR BLD AUTO: 0 %
LDLC SERPL CALC-MCNC: 83 MG/DL (ref 0–99)
LDLC/HDLC SERPL: 2.4 RATIO (ref 0–3.6)
LYMPHOCYTES # BLD AUTO: 1.3 X10E3/UL (ref 0.7–3.1)
LYMPHOCYTES NFR BLD AUTO: 17 %
MCH RBC QN AUTO: 27.2 PG (ref 26.6–33)
MCHC RBC AUTO-ENTMCNC: 31.5 G/DL (ref 31.5–35.7)
MCV RBC AUTO: 86 FL (ref 79–97)
MONOCYTES # BLD AUTO: 0.5 X10E3/UL (ref 0.1–0.9)
MONOCYTES NFR BLD AUTO: 8 %
NEUTROPHILS # BLD AUTO: 5.3 X10E3/UL (ref 1.4–7)
NEUTROPHILS NFR BLD AUTO: 74 %
PLATELET # BLD AUTO: 207 X10E3/UL (ref 150–450)
POTASSIUM SERPL-SCNC: 5.3 MMOL/L (ref 3.5–5.2)
PROT SERPL-MCNC: 7 G/DL (ref 6–8.5)
RBC # BLD AUTO: 4.92 X10E6/UL (ref 4.14–5.8)
SODIUM SERPL-SCNC: 141 MMOL/L (ref 134–144)
T3RU NFR SERPL: 27 % (ref 24–39)
T4 SERPL-MCNC: 8 UG/DL (ref 4.5–12)
TRIGL SERPL-MCNC: 256 MG/DL (ref 0–149)
TSH SERPL DL<=0.005 MIU/L-ACNC: 2.47 UIU/ML (ref 0.45–4.5)
VLDLC SERPL CALC-MCNC: 42 MG/DL (ref 5–40)
WBC # BLD AUTO: 7.2 X10E3/UL (ref 3.4–10.8)

## 2022-04-01 ENCOUNTER — OFFICE VISIT (OUTPATIENT)
Dept: FAMILY MEDICINE CLINIC | Facility: CLINIC | Age: 87
End: 2022-04-01

## 2022-04-01 VITALS
SYSTOLIC BLOOD PRESSURE: 124 MMHG | BODY MASS INDEX: 31.5 KG/M2 | OXYGEN SATURATION: 98 % | DIASTOLIC BLOOD PRESSURE: 70 MMHG | TEMPERATURE: 98.6 F | WEIGHT: 225 LBS | HEIGHT: 71 IN | HEART RATE: 78 BPM

## 2022-04-01 DIAGNOSIS — H61.23 CERUMEN DEBRIS ON TYMPANIC MEMBRANE OF BOTH EARS: ICD-10-CM

## 2022-04-01 DIAGNOSIS — Z00.00 MEDICARE ANNUAL WELLNESS VISIT, SUBSEQUENT: Primary | ICD-10-CM

## 2022-04-01 PROCEDURE — 1170F FXNL STATUS ASSESSED: CPT | Performed by: FAMILY MEDICINE

## 2022-04-01 PROCEDURE — G0439 PPPS, SUBSEQ VISIT: HCPCS | Performed by: FAMILY MEDICINE

## 2022-04-01 PROCEDURE — 1159F MED LIST DOCD IN RCRD: CPT | Performed by: FAMILY MEDICINE

## 2022-04-01 PROCEDURE — 69210 REMOVE IMPACTED EAR WAX UNI: CPT | Performed by: FAMILY MEDICINE

## 2022-04-01 PROCEDURE — 1126F AMNT PAIN NOTED NONE PRSNT: CPT | Performed by: FAMILY MEDICINE

## 2022-04-01 NOTE — PROGRESS NOTES
The ABCs of the Annual Wellness Visit  Subsequent Medicare Wellness Visit    Chief Complaint   Patient presents with   • Medicare Wellness-subsequent     No complains doing well ,want to check about silver snikers       Subjective    History of Present Illness:  Goran August is a 87 y.o. male who presents for a Subsequent Medicare Wellness Visit.    The following portions of the patient's history were reviewed and   updated as appropriate: allergies, current medications, past family history, past medical history, past social history, past surgical history and problem list.    Compared to one year ago, the patient feels his physical   health is the same.    Compared to one year ago, the patient feels his mental   health is the same.    Recent Hospitalizations:  He was not admitted to the hospital during the last year.       Current Medical Providers:  Patient Care Team:  Tanya Dhaliwal MD as PCP - General (Family Medicine)    Outpatient Medications Prior to Visit   Medication Sig Dispense Refill   • carvedilol (COREG) 3.125 MG tablet TAKE 1 TABLET TWICE A  tablet 1   • esomeprazole (nexIUM) 40 MG capsule TAKE 1 CAPSULE DAILY 90 capsule 1   • fluticasone (FLONASE) 50 MCG/ACT nasal spray 2 sprays into the nostril(s) as directed by provider Daily. Administer 2 sprays in each nostril once a day 3 bottle 3   • Multiple Vitamin (MULTI-VITAMIN DAILY PO) Take  by mouth.     • pravastatin (PRAVACHOL) 20 MG tablet TAKE 1 TABLET DAILY 90 tablet 3   • ramipril (ALTACE) 10 MG capsule Take 1 capsule by mouth Daily. 90 capsule 3   • spironolactone (ALDACTONE) 25 MG tablet Take 1 tablet by mouth Daily. 90 tablet 3   • triamcinolone (KENALOG) 0.1 % cream Apply  topically to the appropriate area as directed 2 (Two) Times a Day. 45 g 1     No facility-administered medications prior to visit.       No opioid medication identified on active medication list. I have reviewed chart for other potential  high risk medication/s and  "harmful drug interactions in the elderly.          Aspirin is not on active medication list.  Aspirin use is not indicated based on review of current medical condition/s. Risk of harm outweighs potential benefits.  .    Patient Active Problem List   Diagnosis   • Insulin resistance   • Hypertensive heart disease without heart failure   • Hyperlipidemia   • Iron-deficiency anemia   • Idiopathic chronic gout without tophus   • Hiatal hernia with gastroesophageal reflux   • Stage 3 chronic kidney disease (HCC)   • Environmental allergies   • Benign essential HTN   • Left bundle branch block   • Nonischemic cardiomyopathy (HCC)   • Exudative age-related macular degeneration, left eye, with active choroidal neovascularization (HCC)   • Peripheral vascular disease, unspecified (HCC)     Advance Care Planning  Advance Directive is on file.  ACP discussion was held with the patient during this visit. Patient has an advance directive in EMR which is still valid.           Objective    Vitals:    04/01/22 1031   BP: 124/70   Pulse: 78   Temp: 98.6 °F (37 °C)   SpO2: 98%   Weight: 102 kg (225 lb)   Height: 180.3 cm (71\")   PainSc: 0-No pain     BMI Readings from Last 1 Encounters:   04/01/22 31.38 kg/m²   BMI is above normal parameters. Recommendations include: exercise counseling and nutrition counseling    Does the patient have evidence of cognitive impairment? Yes, some slower name recall     Physical Exam  Vitals and nursing note reviewed.   Constitutional:       General: He is not in acute distress.     Appearance: He is well-developed.   HENT:      Head: Normocephalic.      Right Ear: Tympanic membrane and ear canal normal. There is impacted cerumen.      Left Ear: Tympanic membrane and ear canal normal. There is impacted cerumen.      Ears:      Comments: Impacted cerumen bilaterally.      Nose: Nose normal.   Cardiovascular:      Rate and Rhythm: Normal rate and regular rhythm.      Heart sounds: Normal heart sounds. " No murmur heard.  Pulmonary:      Effort: Pulmonary effort is normal. No respiratory distress.      Breath sounds: Normal breath sounds.   Musculoskeletal:         General: Normal range of motion.      Right lower leg: No edema.      Left lower leg: No edema.   Skin:     General: Skin is warm and dry.      Findings: No rash.   Neurological:      Mental Status: He is alert and oriented to person, place, and time.   Psychiatric:         Behavior: Behavior normal.         Thought Content: Thought content normal.         Judgment: Judgment normal.     Cerumen removal: Patient verbally agreed to procedure, cerumen removed with a curette by me.    Normal exam after removal of cerumen.  Patient tolerated procedure well.  No adverse effects.  No complications.      Lab Results   Component Value Date    CHLPL 160 2022    TRIG 256 (H) 2022    HDL 35 (L) 2022    LDL 83 2022    VLDL 42 (H) 2022    HGBA1C 6.7 (H) 2022            HEALTH RISK ASSESSMENT    Smoking Status:  Social History     Tobacco Use   Smoking Status Former Smoker   • Packs/day: 0.50   • Years: 25.00   • Pack years: 12.50   • Types: Cigarettes   • Quit date:    • Years since quittin.2   Smokeless Tobacco Former User     Alcohol Consumption:  Social History     Substance and Sexual Activity   Alcohol Use Yes    Comment: one drink monthly     Fall Risk Screen:    STEADI Fall Risk Assessment was completed, and patient is at MODERATE risk for falls. Assessment completed on:2022    Depression Screening:  PHQ-2/PHQ-9 Depression Screening 2022   Retired PHQ-9 Total Score -   Retired Total Score -   Little Interest or Pleasure in Doing Things 0-->not at all   Feeling Down, Depressed or Hopeless 0-->not at all   PHQ-9: Brief Depression Severity Measure Score 0       Health Habits and Functional and Cognitive Screening:  Functional & Cognitive Status 2022   Do you have difficulty preparing food and eating? No   Do  you have difficulty bathing yourself, getting dressed or grooming yourself? No   Do you have difficulty using the toilet? No   Do you have difficulty moving around from place to place? No   Do you have trouble with steps or getting out of a bed or a chair? No   Current Diet Well Balanced Diet   Dental Exam Up to date   Eye Exam Up to date   Exercise (times per week) 3 times per week   Current Exercises Include No Regular Exercise;Walking        Exercise Comment water walking    Current Exercise Activities Include -   Do you need help using the phone?  No   Are you deaf or do you have serious difficulty hearing?  No   Do you need help with transportation? No   Do you need help shopping? No   Do you need help preparing meals?  No   Do you need help with housework?  No   Do you need help with laundry? No   Do you need help taking your medications? No   Do you need help managing money? No   Do you ever drive or ride in a car without wearing a seat belt? No   Have you felt unusual stress, anger or loneliness in the last month? No   Who do you live with? Spouse   If you need help, do you have trouble finding someone available to you? No   Have you been bothered in the last four weeks by sexual problems? No   Do you have difficulty concentrating, remembering or making decisions? No       Age-appropriate Screening Schedule:  Refer to the list below for future screening recommendations based on patient's age, sex and/or medical conditions. Orders for these recommended tests are listed in the plan section. The patient has been provided with a written plan.    Health Maintenance   Topic Date Due   • TDAP/TD VACCINES (1 - Tdap) Never done   • INFLUENZA VACCINE  08/01/2022   • URINE MICROALBUMIN  08/10/2022   • HEMOGLOBIN A1C  09/25/2022   • DIABETIC EYE EXAM  01/25/2023   • LIPID PANEL  03/25/2023   • ZOSTER VACCINE  Completed              Assessment/Plan   CMS Preventative Services Quick Reference  Risk Factors Identified  During Encounter  Cardiovascular Disease  Obesity/Overweight   The above risks/problems have been discussed with the patient.  Follow up actions/plans if indicated are seen below in the Assessment/Plan Section.  Pertinent information has been shared with the patient in the After Visit Summary.    Diagnoses and all orders for this visit:    1. Medicare annual wellness visit, subsequent (Primary)    2. Cerumen debris on tympanic membrane of both ears    Pleasant 87-year-old male who is doing remarkably well here for Medicare wellness visit.  We discussed continuing with pool walking exercise, good water intake and vegetables.  He has no complaints today.  He does have bilateral cerumen gnosis.  Remove mainly by curette with no complications.  He is up-to-date with specialist follow-up, no shortness of breath or orthopnea, no pedal edema on exam today.    We discussed his recent labs, he has had an upward trend of the hemoglobin A1c, I would like to make sure he does not continue to worsen.  We will follow-up closely, discussed limiting carbohydrate intake and maintaining exercise as above.    Follow Up:   Return in about 3 months (around 7/1/2022), or if symptoms worsen or fail to improve, for Recheck Diabetes .     An After Visit Summary and PPPS were made available to the patient.  Medical assistant and I wore mask and eyewear protection during entire encounter.  Patient wore mask.    Tanya Dhaliwal MD

## 2022-04-04 RX ORDER — ESOMEPRAZOLE MAGNESIUM 40 MG/1
CAPSULE, DELAYED RELEASE ORAL
Qty: 90 CAPSULE | Refills: 1 | Status: SHIPPED | OUTPATIENT
Start: 2022-04-04 | End: 2022-10-03

## 2022-05-04 ENCOUNTER — TELEPHONE (OUTPATIENT)
Dept: FAMILY MEDICINE CLINIC | Facility: CLINIC | Age: 87
End: 2022-05-04

## 2022-05-04 NOTE — TELEPHONE ENCOUNTER
Pt called because he was seen at  on 4/29/22 for acute bronchitis. He has finished his ZPAK but he is still coughing. Coughing is worse when laying down and he has had to sleep upright the last few nights.  He has cough medication still but would any other recommendations about what he should do.

## 2022-05-05 RX ORDER — ALBUTEROL SULFATE 90 UG/1
2 AEROSOL, METERED RESPIRATORY (INHALATION) EVERY 4 HOURS PRN
Qty: 18 G | Refills: 6 | Status: SHIPPED | OUTPATIENT
Start: 2022-05-05 | End: 2022-07-11

## 2022-05-05 RX ORDER — FUROSEMIDE 20 MG/1
20 TABLET ORAL DAILY PRN
Qty: 30 TABLET | Refills: 2 | Status: SHIPPED | OUTPATIENT
Start: 2022-05-05 | End: 2022-05-13 | Stop reason: HOSPADM

## 2022-05-05 NOTE — TELEPHONE ENCOUNTER
Called pt, speaking in full sentences - bad cough pro when laying down. In the last day his ankles have swollen as well. Started Tuesday a week ago.  Thankfully he does sound stable currently, however ever as his wheezing and cough gets worse when he lays on his back I am concerned about orthopnea/fluid overload.  Prescription for albuterol and furosemide sent to [facility], recommendations to follow-up with us in the office tomorrow.      I am glad to work him in on to my schedule or to see one of our nurse practitioners.  I counseled him and his wife that if he has any acute worsening to go to the ER.

## 2022-05-05 NOTE — TELEPHONE ENCOUNTER
Does he have any symptoms of shortness of breath?  Does he have any fever?  Does he have difficulty breathing or wheezing?

## 2022-05-05 NOTE — TELEPHONE ENCOUNTER
Spoke to patient still having persistent cough, only when he is laying flat on his back. No SOB, fever, or difficulty breathing. He does have some wheezing. Is having some water retention both ankles are swollen

## 2022-05-06 ENCOUNTER — TELEPHONE (OUTPATIENT)
Dept: FAMILY MEDICINE CLINIC | Facility: CLINIC | Age: 87
End: 2022-05-06

## 2022-05-06 ENCOUNTER — OFFICE VISIT (OUTPATIENT)
Dept: FAMILY MEDICINE CLINIC | Facility: CLINIC | Age: 87
End: 2022-05-06

## 2022-05-06 VITALS
HEIGHT: 71 IN | DIASTOLIC BLOOD PRESSURE: 62 MMHG | BODY MASS INDEX: 31.36 KG/M2 | WEIGHT: 224 LBS | TEMPERATURE: 97.6 F | HEART RATE: 73 BPM | OXYGEN SATURATION: 95 % | SYSTOLIC BLOOD PRESSURE: 122 MMHG

## 2022-05-06 DIAGNOSIS — R06.01 ORTHOPNEA: ICD-10-CM

## 2022-05-06 DIAGNOSIS — J18.9 COMMUNITY ACQUIRED PNEUMONIA OF LEFT LOWER LOBE OF LUNG: ICD-10-CM

## 2022-05-06 DIAGNOSIS — R05.9 COUGH: Primary | ICD-10-CM

## 2022-05-06 DIAGNOSIS — J06.9 VIRAL UPPER RESPIRATORY TRACT INFECTION: ICD-10-CM

## 2022-05-06 PROCEDURE — 71046 X-RAY EXAM CHEST 2 VIEWS: CPT | Performed by: FAMILY MEDICINE

## 2022-05-06 PROCEDURE — 99214 OFFICE O/P EST MOD 30 MIN: CPT | Performed by: FAMILY MEDICINE

## 2022-05-06 RX ORDER — DOXYCYCLINE HYCLATE 100 MG/1
100 CAPSULE ORAL 2 TIMES DAILY
Qty: 20 CAPSULE | Refills: 0 | Status: SHIPPED | OUTPATIENT
Start: 2022-05-06 | End: 2022-05-13 | Stop reason: HOSPADM

## 2022-05-06 RX ORDER — AMOXICILLIN AND CLAVULANATE POTASSIUM 875; 125 MG/1; MG/1
1 TABLET, FILM COATED ORAL 2 TIMES DAILY
Qty: 20 TABLET | Refills: 0 | Status: SHIPPED | OUTPATIENT
Start: 2022-05-06 | End: 2022-05-13 | Stop reason: HOSPADM

## 2022-05-06 NOTE — TELEPHONE ENCOUNTER
Both APRNS are out until the end of the next week. You have 18 patients fri, Monday, and Tuesday. Would you like him worked in any of those days or would he be okay to wait until Thursday or Friday of next week?

## 2022-05-06 NOTE — TELEPHONE ENCOUNTER
Please see prior telephone message, I did approve him getting worked in today due to his age and some of his risk factors.  It seems that he is already been scheduled.  Thank you

## 2022-05-06 NOTE — TELEPHONE ENCOUNTER
Due to his age and symptoms I do think he needs to be seen today.  I am glad to recommend to my schedule.  Thank you

## 2022-05-06 NOTE — TELEPHONE ENCOUNTER
Patient called stating he was told to call this morning for a same day appointment with Dr Dhaliwal or an available NP. Advised patient there were no time slots available and that he should visit the Urgent Care.     Please advise.

## 2022-05-06 NOTE — PROGRESS NOTES
"Chief Complaint  Cough (Went to  twice with bronquitis and cough getting worse and at night when lay down is very bad  also feel the edema in legs are bad ,yesterday  [facility]  pharmacy was closed )    Subjective          Goran August presents to [facility]IMARY [facility]  History of Present Illness  Pleasant 87-year-old male here for 2 weeks of cough, especially when laying down in bed.  He was treated twice in the immediate care with started April 29- azithromycin and prednisone which he completed a couple days ago.  His symptoms are worst when laying down.  On Tuesday he started to have new symptoms of pedal edema which is not typical for him.  His cough was significant enough that he felt that he was going to vomit.  He did not start the albuterol inhaler or Lasix that was called in yesterday as there was a misunderstanding about which pharmacy prescriptions were sent to.    Objective   Vital Signs:  /62   Pulse 73   Temp 97.6 °F (36.4 °C)   Ht 180.3 cm (71\")   Wt 102 kg (224 lb)   SpO2 95%   BMI 31.24 kg/m²           Physical Exam  Vitals and nursing note reviewed.   Constitutional:       General: He is not in acute distress.     Appearance: He is well-developed.   HENT:      Head: Normocephalic.      Nose: Nose normal.   Cardiovascular:      Rate and Rhythm: Normal rate and regular rhythm.      Heart sounds: Normal heart sounds. No murmur heard.  Pulmonary:      Effort: Pulmonary effort is normal. No respiratory distress.      Breath sounds: Stridor present. Wheezing and rhonchi present.      Comments: Diffuse Rales and mild wheeze starting at the pulmonary bases up to about two thirds of the lung.  Significant congestion/fluid  Musculoskeletal:         General: Normal range of motion.   Skin:     General: Skin is warm and dry.      Findings: No rash.   Neurological:      Mental Status: He is alert and oriented to person, place, and time.   Psychiatric:         Behavior: " Behavior normal.         Thought Content: Thought content normal.         Judgment: Judgment normal.        Result Review :        A PA and lateral Chest X-Ray was ordered. My reading of this film is diffuse pulmonary markings but on the left lower lobe there is consolidation of scaring the border of the heart.  No pleural effusions, some scattered enlarged lymph nodes but no mass. (No comparison films available: pending review by Radiologist.)              Assessment and Plan    Diagnoses and all orders for this visit:    1. Cough (Primary)  -     XR Chest PA & Lateral  -     Comprehensive Metabolic Panel  -     CBC & Differential  -     COVID-19,[facility] ROUTINE, NP/OP SWAB IN TRANSPORT MEDIA OR ESWAB 72 HR TAT - Swab, Nasopharynx    2. Orthopnea  -     BNP    3. Viral upper respiratory tract infection  -     COVID-19,[facility] ROUTINE, NP/OP SWAB IN TRANSPORT MEDIA OR ESWAB 72 HR TAT - Swab, Nasopharynx    4. Community acquired pneumonia of left lower lobe of lung  -     amoxicillin-clavulanate (AUGMENTIN) 875-125 MG per tablet; Take 1 tablet by mouth 2 (Two) Times a Day for 10 days.  Dispense: 20 tablet; Refill: 0  -     doxycycline (VIBRAMYCIN) 100 MG capsule; Take 1 capsule by mouth 2 (Two) Times a Day for 10 days.  Dispense: 20 capsule; Refill: 0    Pleasant 87-year-old male here for 2 weeks of URI symptoms, already treated with prednisone and azithromycin.  About 3 days ago he started having some pedal edema.  I am very concerned about his symptoms, his lungs sound quite congested.  I am most concerned that his symptoms are worse when laying down on his back, his lungs and pedal edema would support third spacing with some volume overload although his weight is stable with no change since his Medicare wellness visit at April.  Chest x-ray performed today showing pneumonia, I do think he was likely resistant to azithromycin.  As he is older in age and already not responded to an antibiotic dual treatment as  above.  I did warn him that if his symptoms worsen to go to the emergency room.       Follow Up   Return in 1 year (on 5/6/2023), or if symptoms worsen or fail to improve, for Recheck pneumonia .  Patient was given instructions and counseling regarding his condition or for health maintenance advice. Please see specific information pulled into the AVS if appropriate.Medical assistant and I wore mask and eyewear protection during entire encounter.  Patient wore mask.    Tanya Dhaliwal MD

## 2022-05-07 LAB
ALBUMIN SERPL-MCNC: 4.1 G/DL (ref 3.6–4.6)
ALBUMIN/GLOB SERPL: 1.3 {RATIO} (ref 1.2–2.2)
ALP SERPL-CCNC: 85 IU/L (ref 44–121)
ALT SERPL-CCNC: 18 IU/L (ref 0–44)
AST SERPL-CCNC: 23 IU/L (ref 0–40)
BASOPHILS # BLD AUTO: 0 X10E3/UL (ref 0–0.2)
BASOPHILS NFR BLD AUTO: 0 %
BILIRUB SERPL-MCNC: 0.7 MG/DL (ref 0–1.2)
BNP SERPL-MCNC: 113 PG/ML (ref 0–100)
BUN SERPL-MCNC: 24 MG/DL (ref 8–27)
BUN/CREAT SERPL: 25 (ref 10–24)
CALCIUM SERPL-MCNC: 9.7 MG/DL (ref 8.6–10.2)
CHLORIDE SERPL-SCNC: 96 MMOL/L (ref 96–106)
CO2 SERPL-SCNC: 25 MMOL/L (ref 20–29)
CREAT SERPL-MCNC: 0.96 MG/DL (ref 0.76–1.27)
EGFRCR SERPLBLD CKD-EPI 2021: 77 ML/MIN/1.73
EOSINOPHIL # BLD AUTO: 0 X10E3/UL (ref 0–0.4)
EOSINOPHIL NFR BLD AUTO: 0 %
ERYTHROCYTE [DISTWIDTH] IN BLOOD BY AUTOMATED COUNT: 13.7 % (ref 11.6–15.4)
GLOBULIN SER CALC-MCNC: 3.1 G/DL (ref 1.5–4.5)
GLUCOSE SERPL-MCNC: 137 MG/DL (ref 65–99)
HCT VFR BLD AUTO: 42 % (ref 37.5–51)
HGB BLD-MCNC: 13.2 G/DL (ref 13–17.7)
IMM GRANULOCYTES # BLD AUTO: 0 X10E3/UL (ref 0–0.1)
IMM GRANULOCYTES NFR BLD AUTO: 0 %
LABCORP SARS-COV-2, NAA 2 DAY TAT: NORMAL
LYMPHOCYTES # BLD AUTO: 0.8 X10E3/UL (ref 0.7–3.1)
LYMPHOCYTES NFR BLD AUTO: 6 %
MCH RBC QN AUTO: 26.7 PG (ref 26.6–33)
MCHC RBC AUTO-ENTMCNC: 31.4 G/DL (ref 31.5–35.7)
MCV RBC AUTO: 85 FL (ref 79–97)
MONOCYTES # BLD AUTO: 0.8 X10E3/UL (ref 0.1–0.9)
MONOCYTES NFR BLD AUTO: 7 %
NEUTROPHILS # BLD AUTO: 10.8 X10E3/UL (ref 1.4–7)
NEUTROPHILS NFR BLD AUTO: 87 %
PLATELET # BLD AUTO: 247 X10E3/UL (ref 150–450)
POTASSIUM SERPL-SCNC: 4.8 MMOL/L (ref 3.5–5.2)
PROT SERPL-MCNC: 7.2 G/DL (ref 6–8.5)
RBC # BLD AUTO: 4.95 X10E6/UL (ref 4.14–5.8)
SARS-COV-2 RNA RESP QL NAA+PROBE: NOT DETECTED
SODIUM SERPL-SCNC: 139 MMOL/L (ref 134–144)
WBC # BLD AUTO: 12.5 X10E3/UL (ref 3.4–10.8)

## 2022-05-08 ENCOUNTER — APPOINTMENT (OUTPATIENT)
Dept: CT IMAGING | Facility: HOSPITAL | Age: 87
End: 2022-05-08

## 2022-05-08 ENCOUNTER — APPOINTMENT (OUTPATIENT)
Dept: GENERAL RADIOLOGY | Facility: HOSPITAL | Age: 87
End: 2022-05-08

## 2022-05-08 ENCOUNTER — HOSPITAL ENCOUNTER (INPATIENT)
Facility: HOSPITAL | Age: 87
LOS: 5 days | Discharge: HOME | End: 2022-05-13
Attending: EMERGENCY MEDICINE | Admitting: INTERNAL MEDICINE

## 2022-05-08 ENCOUNTER — APPOINTMENT (OUTPATIENT)
Dept: CARDIOLOGY | Facility: HOSPITAL | Age: 87
End: 2022-05-08

## 2022-05-08 DIAGNOSIS — U07.1 COVID-19 VIRUS INFECTION: ICD-10-CM

## 2022-05-08 DIAGNOSIS — I42.8 NONISCHEMIC CARDIOMYOPATHY: ICD-10-CM

## 2022-05-08 DIAGNOSIS — R60.0 BILATERAL LOWER EXTREMITY EDEMA: Primary | ICD-10-CM

## 2022-05-08 DIAGNOSIS — L03.119 CELLULITIS OF LOWER EXTREMITY, UNSPECIFIED LATERALITY: ICD-10-CM

## 2022-05-08 PROBLEM — I50.40 COMBINED SYSTOLIC AND DIASTOLIC CONGESTIVE HEART FAILURE (HCC): Status: ACTIVE | Noted: 2022-05-08

## 2022-05-08 PROBLEM — R09.02 HYPOXIA: Status: ACTIVE | Noted: 2022-05-08

## 2022-05-08 PROBLEM — R06.00 DYSPNEA: Status: ACTIVE | Noted: 2022-05-08

## 2022-05-08 PROBLEM — R79.89 POSITIVE D DIMER: Status: ACTIVE | Noted: 2022-05-08

## 2022-05-08 LAB
ALBUMIN SERPL-MCNC: 3.2 G/DL (ref 3.5–5.2)
ALBUMIN/GLOB SERPL: 0.8 G/DL
ALP SERPL-CCNC: 80 U/L (ref 39–117)
ALT SERPL W P-5'-P-CCNC: 16 U/L (ref 1–41)
ANION GAP SERPL CALCULATED.3IONS-SCNC: 9.5 MMOL/L (ref 5–15)
AST SERPL-CCNC: 17 U/L (ref 1–40)
BASOPHILS # BLD AUTO: 0.03 10*3/MM3 (ref 0–0.2)
BASOPHILS NFR BLD AUTO: 0.2 % (ref 0–1.5)
BILIRUB SERPL-MCNC: 0.5 MG/DL (ref 0–1.2)
BUN SERPL-MCNC: 25 MG/DL (ref 8–23)
BUN/CREAT SERPL: 20.5 (ref 7–25)
CALCIUM SPEC-SCNC: 9.4 MG/DL (ref 8.6–10.5)
CHLORIDE SERPL-SCNC: 102 MMOL/L (ref 98–107)
CO2 SERPL-SCNC: 26.5 MMOL/L (ref 22–29)
CREAT SERPL-MCNC: 1.22 MG/DL (ref 0.76–1.27)
D DIMER PPP FEU-MCNC: 1.59 MCGFEU/ML (ref 0–0.49)
D-LACTATE SERPL-SCNC: 1.7 MMOL/L (ref 0.5–2)
DEPRECATED RDW RBC AUTO: 43.1 FL (ref 37–54)
EGFRCR SERPLBLD CKD-EPI 2021: 57.4 ML/MIN/1.73
EOSINOPHIL # BLD AUTO: 0.13 10*3/MM3 (ref 0–0.4)
EOSINOPHIL NFR BLD AUTO: 1.1 % (ref 0.3–6.2)
ERYTHROCYTE [DISTWIDTH] IN BLOOD BY AUTOMATED COUNT: 13.5 % (ref 12.3–15.4)
GLOBULIN UR ELPH-MCNC: 3.9 GM/DL
GLUCOSE BLDC GLUCOMTR-MCNC: 133 MG/DL (ref 70–130)
GLUCOSE SERPL-MCNC: 106 MG/DL (ref 65–99)
HCT VFR BLD AUTO: 38.5 % (ref 37.5–51)
HGB BLD-MCNC: 12.1 G/DL (ref 13–17.7)
IMM GRANULOCYTES # BLD AUTO: 0.03 10*3/MM3 (ref 0–0.05)
IMM GRANULOCYTES NFR BLD AUTO: 0.2 % (ref 0–0.5)
INR PPP: 1.15 (ref 0.9–1.1)
LYMPHOCYTES # BLD AUTO: 0.96 10*3/MM3 (ref 0.7–3.1)
LYMPHOCYTES NFR BLD AUTO: 7.9 % (ref 19.6–45.3)
MCH RBC QN AUTO: 27.4 PG (ref 26.6–33)
MCHC RBC AUTO-ENTMCNC: 31.4 G/DL (ref 31.5–35.7)
MCV RBC AUTO: 87.1 FL (ref 79–97)
MONOCYTES # BLD AUTO: 0.88 10*3/MM3 (ref 0.1–0.9)
MONOCYTES NFR BLD AUTO: 7.3 % (ref 5–12)
NEUTROPHILS NFR BLD AUTO: 10.07 10*3/MM3 (ref 1.7–7)
NEUTROPHILS NFR BLD AUTO: 83.3 % (ref 42.7–76)
NRBC BLD AUTO-RTO: 0 /100 WBC (ref 0–0.2)
PLATELET # BLD AUTO: 236 10*3/MM3 (ref 140–450)
PMV BLD AUTO: 9.9 FL (ref 6–12)
POTASSIUM SERPL-SCNC: 4.6 MMOL/L (ref 3.5–5.2)
PROCALCITONIN SERPL-MCNC: 0.12 NG/ML (ref 0–0.25)
PROT SERPL-MCNC: 7.1 G/DL (ref 6–8.5)
PROTHROMBIN TIME: 14.6 SECONDS (ref 11.7–14.2)
RBC # BLD AUTO: 4.42 10*6/MM3 (ref 4.14–5.8)
SARS-COV-2 RNA RESP QL NAA+PROBE: DETECTED
SODIUM SERPL-SCNC: 138 MMOL/L (ref 136–145)
TROPONIN T SERPL-MCNC: <0.01 NG/ML (ref 0–0.03)
WBC NRBC COR # BLD: 12.1 10*3/MM3 (ref 3.4–10.8)

## 2022-05-08 PROCEDURE — U0005 INFEC AGEN DETEC AMPLI PROBE: HCPCS | Performed by: EMERGENCY MEDICINE

## 2022-05-08 PROCEDURE — 71275 CT ANGIOGRAPHY CHEST: CPT

## 2022-05-08 PROCEDURE — 93970 EXTREMITY STUDY: CPT

## 2022-05-08 PROCEDURE — 85610 PROTHROMBIN TIME: CPT | Performed by: EMERGENCY MEDICINE

## 2022-05-08 PROCEDURE — 82962 GLUCOSE BLOOD TEST: CPT

## 2022-05-08 PROCEDURE — 71045 X-RAY EXAM CHEST 1 VIEW: CPT

## 2022-05-08 PROCEDURE — 85379 FIBRIN DEGRADATION QUANT: CPT | Performed by: EMERGENCY MEDICINE

## 2022-05-08 PROCEDURE — 87040 BLOOD CULTURE FOR BACTERIA: CPT | Performed by: EMERGENCY MEDICINE

## 2022-05-08 PROCEDURE — 84145 PROCALCITONIN (PCT): CPT | Performed by: EMERGENCY MEDICINE

## 2022-05-08 PROCEDURE — 84484 ASSAY OF TROPONIN QUANT: CPT | Performed by: EMERGENCY MEDICINE

## 2022-05-08 PROCEDURE — 93005 ELECTROCARDIOGRAM TRACING: CPT | Performed by: EMERGENCY MEDICINE

## 2022-05-08 PROCEDURE — 80053 COMPREHEN METABOLIC PANEL: CPT | Performed by: EMERGENCY MEDICINE

## 2022-05-08 PROCEDURE — U0003 INFECTIOUS AGENT DETECTION BY NUCLEIC ACID (DNA OR RNA); SEVERE ACUTE RESPIRATORY SYNDROME CORONAVIRUS 2 (SARS-COV-2) (CORONAVIRUS DISEASE [COVID-19]), AMPLIFIED PROBE TECHNIQUE, MAKING USE OF HIGH THROUGHPUT TECHNOLOGIES AS DESCRIBED BY CMS-2020-01-R: HCPCS | Performed by: EMERGENCY MEDICINE

## 2022-05-08 PROCEDURE — 99284 EMERGENCY DEPT VISIT MOD MDM: CPT

## 2022-05-08 PROCEDURE — 93010 ELECTROCARDIOGRAM REPORT: CPT | Performed by: INTERNAL MEDICINE

## 2022-05-08 PROCEDURE — 85025 COMPLETE CBC W/AUTO DIFF WBC: CPT | Performed by: EMERGENCY MEDICINE

## 2022-05-08 PROCEDURE — 25010000002 FUROSEMIDE PER 20 MG: Performed by: EMERGENCY MEDICINE

## 2022-05-08 PROCEDURE — 83605 ASSAY OF LACTIC ACID: CPT | Performed by: EMERGENCY MEDICINE

## 2022-05-08 RX ORDER — ENOXAPARIN SODIUM 100 MG/ML
40 INJECTION SUBCUTANEOUS EVERY 24 HOURS
Status: DISCONTINUED | OUTPATIENT
Start: 2022-05-08 | End: 2022-05-08

## 2022-05-08 RX ORDER — PANTOPRAZOLE SODIUM 40 MG/1
40 TABLET, DELAYED RELEASE ORAL EVERY MORNING
Status: DISCONTINUED | OUTPATIENT
Start: 2022-05-09 | End: 2022-05-13 | Stop reason: HOSPADM

## 2022-05-08 RX ORDER — FLUTICASONE PROPIONATE 50 MCG
2 SPRAY, SUSPENSION (ML) NASAL DAILY
Status: DISCONTINUED | OUTPATIENT
Start: 2022-05-09 | End: 2022-05-13 | Stop reason: HOSPADM

## 2022-05-08 RX ORDER — SODIUM CHLORIDE 0.9 % (FLUSH) 0.9 %
10 SYRINGE (ML) INJECTION EVERY 12 HOURS SCHEDULED
Status: DISCONTINUED | OUTPATIENT
Start: 2022-05-08 | End: 2022-05-13 | Stop reason: HOSPADM

## 2022-05-08 RX ORDER — PRAVASTATIN SODIUM 20 MG
20 TABLET ORAL DAILY
Status: DISCONTINUED | OUTPATIENT
Start: 2022-05-09 | End: 2022-05-13 | Stop reason: HOSPADM

## 2022-05-08 RX ORDER — ALBUTEROL SULFATE 90 UG/1
2 AEROSOL, METERED RESPIRATORY (INHALATION) EVERY 4 HOURS PRN
Status: DISCONTINUED | OUTPATIENT
Start: 2022-05-09 | End: 2022-05-13 | Stop reason: HOSPADM

## 2022-05-08 RX ORDER — SPIRONOLACTONE 25 MG/1
25 TABLET ORAL DAILY
Status: DISCONTINUED | OUTPATIENT
Start: 2022-05-09 | End: 2022-05-13 | Stop reason: HOSPADM

## 2022-05-08 RX ORDER — FUROSEMIDE 10 MG/ML
40 INJECTION INTRAMUSCULAR; INTRAVENOUS EVERY 12 HOURS
Status: DISCONTINUED | OUTPATIENT
Start: 2022-05-08 | End: 2022-05-10

## 2022-05-08 RX ORDER — ACETAMINOPHEN 160 MG/5ML
650 SOLUTION ORAL EVERY 4 HOURS PRN
Status: DISCONTINUED | OUTPATIENT
Start: 2022-05-08 | End: 2022-05-13 | Stop reason: HOSPADM

## 2022-05-08 RX ORDER — RAMIPRIL 10 MG/1
10 CAPSULE ORAL DAILY
Status: DISCONTINUED | OUTPATIENT
Start: 2022-05-09 | End: 2022-05-13 | Stop reason: HOSPADM

## 2022-05-08 RX ORDER — NITROGLYCERIN 0.4 MG/1
0.4 TABLET SUBLINGUAL
Status: DISCONTINUED | OUTPATIENT
Start: 2022-05-08 | End: 2022-05-13 | Stop reason: HOSPADM

## 2022-05-08 RX ORDER — DEXAMETHASONE SODIUM PHOSPHATE 4 MG/ML
4 INJECTION, SOLUTION INTRA-ARTICULAR; INTRALESIONAL; INTRAMUSCULAR; INTRAVENOUS; SOFT TISSUE DAILY
Status: DISCONTINUED | OUTPATIENT
Start: 2022-05-09 | End: 2022-05-09

## 2022-05-08 RX ORDER — ACETAMINOPHEN 650 MG/1
650 SUPPOSITORY RECTAL EVERY 4 HOURS PRN
Status: DISCONTINUED | OUTPATIENT
Start: 2022-05-08 | End: 2022-05-13 | Stop reason: HOSPADM

## 2022-05-08 RX ORDER — FUROSEMIDE 10 MG/ML
80 INJECTION INTRAMUSCULAR; INTRAVENOUS ONCE
Status: COMPLETED | OUTPATIENT
Start: 2022-05-08 | End: 2022-05-08

## 2022-05-08 RX ORDER — CARVEDILOL 3.12 MG/1
3.12 TABLET ORAL 2 TIMES DAILY
Status: DISCONTINUED | OUTPATIENT
Start: 2022-05-09 | End: 2022-05-10

## 2022-05-08 RX ORDER — ACETAMINOPHEN 325 MG/1
650 TABLET ORAL EVERY 4 HOURS PRN
Status: DISCONTINUED | OUTPATIENT
Start: 2022-05-08 | End: 2022-05-13 | Stop reason: HOSPADM

## 2022-05-08 RX ORDER — ENOXAPARIN SODIUM 100 MG/ML
1 INJECTION SUBCUTANEOUS EVERY 12 HOURS
Status: DISCONTINUED | OUTPATIENT
Start: 2022-05-08 | End: 2022-05-09

## 2022-05-08 RX ORDER — ONDANSETRON 2 MG/ML
4 INJECTION INTRAMUSCULAR; INTRAVENOUS EVERY 6 HOURS PRN
Status: DISCONTINUED | OUTPATIENT
Start: 2022-05-08 | End: 2022-05-13 | Stop reason: HOSPADM

## 2022-05-08 RX ORDER — SODIUM CHLORIDE 0.9 % (FLUSH) 0.9 %
10 SYRINGE (ML) INJECTION AS NEEDED
Status: DISCONTINUED | OUTPATIENT
Start: 2022-05-08 | End: 2022-05-13 | Stop reason: HOSPADM

## 2022-05-08 RX ADMIN — FUROSEMIDE 80 MG: 10 INJECTION, SOLUTION INTRAMUSCULAR; INTRAVENOUS at 19:30

## 2022-05-08 NOTE — PROGRESS NOTES
Bilateral lower extremity venous duplex complete. Preliminary acute right calf DVT finding called to Dr. Newberry. Dr Newberry said he does not take care of the patient but he will let them know.

## 2022-05-08 NOTE — ED PROVIDER NOTES
EMERGENCY DEPARTMENT ENCOUNTER    Room Number:  20/20  Date of encounter:  5/8/2022  PCP: aTnya Dhaliwal MD  Historian: Patient, wife and daughter at bedside    I used full protective equipment while examining this patient.  This includes face mask, gloves and protective eyewear.  I washed my hands before entering the room and immediately upon leaving the room      HPI:  Chief Complaint: Leg swelling  A complete HPI/ROS/PMH/PSH/SH/FH are unobtainable due to: None    Context: Goran August is a 87 y.o. male who presents to the ED c/o leg swelling.  Patient has had significant swelling of his legs worsening over the last 3 to 4 days.  He was told by Dr. Dhaliwal to come to the ED if it got worse.  Patient has had increasing redness in the legs.  He denies any significant pain.  Patient has had recent bronchitis over the last roughly 10 days and was treated initially with Zithromax and treatment was changed to Augmentin on Friday by his primary care provider, Dr. Tanya Dhaliwal.  Patient does have a history of ischemic cardiomyopathy and has been seen in the past by Dr. Herman Jacobo.  Patient denies any significant weight gain.  He states that his cough is somewhat improving on the Augmentin.  He denies significant shortness of breath.      MEDICAL RECORD REVIEW  I reviewed prior medical records including recent office visit with primary care provider, Dr. Tanya Dhaliwal.  Patient has had cough and was diagnosed with bronchitis.  He was started on Augmentin.  Patient is numerous medical problems including but not limited to diabetes, heart disease, renal failure and hypertension.  Last cardiology office visit with Dr. Jacobo reviewed.  Patient has a history of nonischemic cardiomyopathy.  Last noted ejection fraction 48%.    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   • Hiatal hernia with gastroesophageal reflux 2016   • Stage 3 chronic kidney disease (HCC) 2018   • Environmental allergies 2019   • Benign essential HTN 2019   • Left bundle branch block 2019   • Nonischemic cardiomyopathy (HCC) 2013   • Exudative age-related macular degeneration, left eye, with active choroidal neovascularization (HCC) 2021   • Peripheral vascular disease, unspecified ([facility]) 2021     Resolved Ambulatory Problems     Diagnosis Date Noted   • No Resolved Ambulatory Problems     Past Medical History:   Diagnosis Date   • Anemia    • Benign hypertensive heart disease    • Cardiomyopathy (HCC)    • Cataract 2020   • GERD (gastroesophageal reflux disease)    • Heart murmur    • Hx of colonic polyps    • Hypertension    • LBBB (left bundle branch block)    • Mitral regurgitation    • Osteoarthritis    • Type 2 diabetes mellitus ([facility])          PAST SURGICAL HISTORY  Past Surgical History:   Procedure Laterality Date   • CATARACT EXTRACTION, BILATERAL Bilateral 2020   • INGUINAL HERNIA REPAIR      X4   • REPLACEMENT TOTAL KNEE Right          FAMILY HISTORY  Family History   Problem Relation Age of Onset   • Aneurysm Mother         Colonic AVM in 90's   • Hypertension Mother    • Parkinsonism Father         90's   • Aortic aneurysm Father         AAA   • Tuberculosis Father    • Tuberculosis Paternal Aunt    • Cancer Maternal Grandmother    • No Known Problems Daughter    • No Known Problems Son          SOCIAL HISTORY  Social History     Socioeconomic History   • Marital status:    Tobacco Use   • Smoking status: Former Smoker     Packs/day: 0.50     Years: 25.00     Pack years: 12.50     Types: Cigarettes     Quit date:      Years since quittin.3   • Smokeless tobacco: Former User   Substance and Sexual Activity   • Alcohol use: Yes      Comment: one drink monthly   • Drug use: No   • Sexual activity: Defer         ALLERGIES  Hctz [hydrochlorothiazide]       REVIEW OF SYSTEMS  Review of Systems   Constitutional: Negative.  Negative for fever.   HENT: Negative.  Negative for sore throat.    Eyes: Negative.    Respiratory: Positive for cough (Improving on antibiotics).    Cardiovascular: Positive for leg swelling. Negative for chest pain.   Gastrointestinal: Negative.    Genitourinary: Negative.  Negative for dysuria.   Musculoskeletal: Negative.  Negative for back pain.   Skin: Negative.  Negative for rash.   Neurological: Negative.  Negative for headaches.   All other systems reviewed and are negative.          PHYSICAL EXAM    I have reviewed the triage vital signs and nursing notes.    ED Triage Vitals [05/08/22 1534]   Temp Heart Rate Resp BP SpO2   98.6 °F (37 °C) 84 16 140/76 96 %      Temp src Heart Rate Source Patient Position BP Location FiO2 (%)   -- -- -- -- --       Physical Exam  GENERAL: Alert male who is well-appearing and looks younger than stated age.  Triage vitals reviewed and are fairly unremarkable.  HENT: nares patent  EYES: no scleral icterus  CV: regular rhythm, regular rate  RESPIRATORY: Mild expiratory wheezing, O2 sats upper 90s on room air  ABDOMEN: soft, nontender to palpation  MUSCULOSKELETAL: Moderate bilateral lower extremity edema, left greater than right  NEURO: Strength sensation and coordination are grossly intact.  Speech and mentation are unremarkable  SKIN: Moderate erythema of both lower extremities with warmth consistent with likely cellulitis.  No obvious abscess      LAB RESULTS  Recent Results (from the past 24 hour(s))   Comprehensive Metabolic Panel    Collection Time: 05/08/22  4:39 PM    Specimen: Blood   Result Value Ref Range    Glucose 106 (H) 65 - 99 mg/dL    BUN 25 (H) 8 - 23 mg/dL    Creatinine 1.22 0.76 - 1.27 mg/dL    Sodium 138 136 - 145 mmol/L    Potassium 4.6 3.5 - 5.2 mmol/L    Chloride 102  98 - 107 mmol/L    CO2 26.5 22.0 - 29.0 mmol/L    Calcium 9.4 8.6 - 10.5 mg/dL    Total Protein 7.1 6.0 - 8.5 g/dL    Albumin 3.20 (L) 3.50 - 5.20 g/dL    ALT (SGPT) 16 1 - 41 U/L    AST (SGOT) 17 1 - 40 U/L    Alkaline Phosphatase 80 39 - 117 U/L    Total Bilirubin 0.5 0.0 - 1.2 mg/dL    Globulin 3.9 gm/dL    A/G Ratio 0.8 g/dL    BUN/Creatinine Ratio 20.5 7.0 - 25.0    Anion Gap 9.5 5.0 - 15.0 mmol/L    eGFR 57.4 (L) >60.0 mL/min/1.73   Protime-INR    Collection Time: 05/08/22  4:39 PM    Specimen: Blood   Result Value Ref Range    Protime 14.6 (H) 11.7 - 14.2 Seconds    INR 1.15 (H) 0.90 - 1.10   D-dimer, Quantitative    Collection Time: 05/08/22  4:39 PM    Specimen: Blood   Result Value Ref Range    D-Dimer, Quantitative 1.59 (H) 0.00 - 0.49 MCGFEU/mL   Troponin    Collection Time: 05/08/22  4:39 PM    Specimen: Blood   Result Value Ref Range    Troponin T <0.010 0.000 - 0.030 ng/mL   Procalcitonin    Collection Time: 05/08/22  4:39 PM    Specimen: Blood   Result Value Ref Range    Procalcitonin 0.12 0.00 - 0.25 ng/mL   Lactic Acid, Plasma    Collection Time: 05/08/22  4:39 PM    Specimen: Blood   Result Value Ref Range    Lactate 1.7 0.5 - 2.0 mmol/L   CBC Auto Differential    Collection Time: 05/08/22  4:39 PM    Specimen: Blood   Result Value Ref Range    WBC 12.10 (H) 3.40 - 10.80 10*3/mm3    RBC 4.42 4.14 - 5.80 10*6/mm3    Hemoglobin 12.1 (L) 13.0 - 17.7 g/dL    Hematocrit 38.5 37.5 - 51.0 %    MCV 87.1 79.0 - 97.0 fL    MCH 27.4 26.6 - 33.0 pg    MCHC 31.4 (L) 31.5 - 35.7 g/dL    RDW 13.5 12.3 - 15.4 %    RDW-SD 43.1 37.0 - 54.0 fl    MPV 9.9 6.0 - 12.0 fL    Platelets 236 140 - 450 10*3/mm3    Neutrophil % 83.3 (H) 42.7 - 76.0 %    Lymphocyte % 7.9 (L) 19.6 - 45.3 %    Monocyte % 7.3 5.0 - 12.0 %    Eosinophil % 1.1 0.3 - 6.2 %    Basophil % 0.2 0.0 - 1.5 %    Immature Grans % 0.2 0.0 - 0.5 %    Neutrophils, Absolute 10.07 (H) 1.70 - 7.00 10*3/mm3    Lymphocytes, Absolute 0.96 0.70 - 3.10 10*3/mm3     Monocytes, Absolute 0.88 0.10 - 0.90 10*3/mm3    Eosinophils, Absolute 0.13 0.00 - 0.40 10*3/mm3    Basophils, Absolute 0.03 0.00 - 0.20 10*3/mm3    Immature Grans, Absolute 0.03 0.00 - 0.05 10*3/mm3    nRBC 0.0 0.0 - 0.2 /100 WBC   ECG 12 Lead    Collection Time: 05/08/22  4:49 PM   Result Value Ref Range    QT Interval 420 ms   COVID-19,[facility] NP SWAB IN TRANSPORT MEDIA 8-12 HR TAT - Swab, Nasopharynx    Collection Time: 05/08/22  4:52 PM    Specimen: Nasopharynx; Swab   Result Value Ref Range    COVID19 Detected (C) Not Detected - Ref. Range   Duplex Venous Lower Extremity - Bilateral    Collection Time: 05/08/22  7:07 PM   Result Value Ref Range    Target HR (85%) 113 bpm    Max. Pred. HR (100%) 133 bpm       Ordered the above labs and independently reviewed the results.      RADIOLOGY  XR Chest 1 View    Result Date: 5/8/2022  PORTABLE CHEST  HISTORY: Lower extremity swelling.  COMPARISON: None.  FINDINGS:  A single view of the chest demonstrates mild cardiomegaly. There is no evidence of infiltrate, effusion or of congestive failure.          I ordered the above noted radiological studies. Reviewed by me and discussed with radiologist.  See dictation for official radiology interpretation.      PROCEDURES  Procedures      MEDICATIONS GIVEN IN ER    Medications   furosemide (LASIX) injection 80 mg (has no administration in time range)         PROGRESS, DATA ANALYSIS, CONSULTS, AND MEDICAL DECISION MAKING    All labs have been independently reviewed by me.  All radiology studies have been reviewed by me and discussed with radiologist dictating the report.   EKG's independently viewed and interpreted by me.  Discussion below represents my analysis of pertinent findings related to patient's condition, differential diagnosis, treatment plan and final disposition.      ED Course as of 05/08/22 1909   Sun May 08, 2022   1609 [id number]-piyq-old male with history of ischemic cardiomyopathy and recent  "\"bronchitis\" currently on Augmentin presents with increasing swelling of the bilateral lower extremities.  On exam he does tend to have moderate peripheral edema.  There is significant warmth erythema and swelling to suggest some mild to moderate cellulitis.  O2 sats are benign but patient does have some expiratory wheezes.    Differential diagnosis would include but is not limited to the following:    Congestive heart failure  Peripheral edema  Cellulitis  Pneumonia  DVT (unlikely as it is bilateral) [DB]   1712 X-ray reviewed with radiologist shows cardiomegaly but no obvious pneumonia or congestive heart failure. [DB]   1712 Patient did transiently drop O2 saturations here in the ED and was placed on 2 L of oxygen by nasal cannula by ED nursing. [DB]   1712 Labs reviewed notable for elevated white count of 12.1 suggestive of some degree of infection.  Patient does have apparent cellulitis and we will go ahead and give some IV Kefzol.  Will contact hospitalist about admission. [DB]   1747 Chemistries are reviewed and are fairly unremarkable.  There is normal renal function with a creatinine 1.22.  Troponin is normal which would go against acute coronary syndrome.  Normal procalcitonin would go against serious bacterial infection. [DB]   1748 I suspect patient is having peripheral edema and would like to give some IV Lasix to diurese some fluid off.  I suspect he will need cardiac echo with history of nonischemic cardiomyopathy.  Although I think DVT is unlikely he does have mildly elevated D-dimer of 1.59 and will get Doppler bilateral lower extremities.  We will contact A about admission.    Given that procalcitonin and lactic acid are normal we will hold off on antibiotics at this time.  I suspect redness and warmth will improve as swelling reduces. [DB]   1753 EKG          EKG time: 1649  Rhythm/Rate: Sinus 73  P waves and CA: Normal P waves and CA intervals  QRS, axis: Left axis deviation, left bundle branch " block  ST and T waves: Nonspecific ST and T wave change    Interpreted Contemporaneously by me, independently viewed  No prior to compare   [DB]   1759 I discussed treatment and evaluation of this patient with Dr. Alejandre who will admit on behalf of [facility]. [DB]   1909 Patient's COVID testing did come back positive.  That certainly could explain some of his upper respiratory symptoms.  We will change patient's bed to a COVID isolation unit. [DB]      ED Course User Index  [DB] Daniel Kim MD       AS OF 19:09 EDT VITALS:    BP - 134/58  HR - 71  TEMP - 99.1 °F (37.3 °C) (Oral)  O2 SATS - 92%      DIAGNOSIS  Final diagnoses:   Bilateral lower extremity edema   Cellulitis of lower extremity, unspecified laterality   Nonischemic cardiomyopathy (HCC)   COVID-19 virus infection         DISPOSITION  Admission         Daniel Kim MD  05/08/22 9629

## 2022-05-08 NOTE — H&P
Internal medicine history and physical  INTERNAL MEDICINE   [facility]       Patient Identification:  Name: Goran August  Age: 87 y.o.  Sex: male  :  1934  MRN: [id number]                   Primary Care Physician: Tanya Dhaliwal MD                               Date of admission:2022    Chief Complaint: Brought to the emergency room by family members for worsening edema of the lower extremities and issues with breathing and cough despite antibiotic therapy.    History of Present Illness:   Patient is a 87-year-old male who has complicated past medical history including history of type 2 diabetes, hypertension, cardiomyopathy with ejection fraction of 45 to 50% has been battling with cough congestion and shortness of breath for the last couple weeks.  He was initially seen at the [facility] on 2020 with complaints of cough and shortness of breath getting worse at night when he lays down and was diagnosed with acute bronchitis and was prescribed azithromycin and again on 2022.  Because of his persistent symptoms he was referred to his primary care physician and was evaluated on 2022.  In the interim between his visits to the urgent care he also noticed his legs were swollen.  Suspicion for volume overload because of the swelling of his legs and orthopnea and cough upon assuming laying posture was raised but also it was considered that he could very well have evolving community-acquired pneumonia of the left lower lobe.  Augmentin was added to his regimen.  Viral upper respiratory tract infection was suspected and was tested for COVID-19 infection which was ruled out.  Patient's cough and shortness of breath did improve but his legs continue to remain swollen and becoming more red.  According to the patient his family members were very concerned about his swelling of his legs and brought him to the emergency room.  Work-up in the emergency room did reveal  elevated D-dimer of 1.5 along with positive  COVID-19 assay.      Past Medical History:  Past Medical History:   Diagnosis Date   • Anemia    • Benign hypertensive heart disease    • Cardiomyopathy (HCC)     EF 45-50% by echo 3/11   • Cataract 08/01/2020 9/1/20   • GERD (gastroesophageal reflux disease)    • Heart murmur    • Hx of colonic polyps    • Hyperlipidemia    • Hypertension    • LBBB (left bundle branch block)    • Mitral regurgitation    • Osteoarthritis    • Type 2 diabetes mellitus (HCC)      Past Surgical History:  Past Surgical History:   Procedure Laterality Date   • CATARACT EXTRACTION, BILATERAL Bilateral 08/01/2020   • INGUINAL HERNIA REPAIR      X4   • REPLACEMENT TOTAL KNEE Right       Home Meds:  (Not in a hospital admission)    Current Meds:   No current facility-administered medications for this encounter.    Current Outpatient Medications:   •  albuterol sulfate  (90 Base) MCG/ACT inhaler, Inhale 2 puffs Every 4 (Four) Hours As Needed for Wheezing or Shortness of Air., Disp: 18 g, Rfl: 6  •  amoxicillin-clavulanate (AUGMENTIN) 875-125 MG per tablet, Take 1 tablet by mouth 2 (Two) Times a Day for 10 days., Disp: 20 tablet, Rfl: 0  •  carvedilol (COREG) 3.125 MG tablet, TAKE 1 TABLET TWICE A DAY, Disp: 180 tablet, Rfl: 1  •  doxycycline (VIBRAMYCIN) 100 MG capsule, Take 1 capsule by mouth 2 (Two) Times a Day for 10 days., Disp: 20 capsule, Rfl: 0  •  esomeprazole (nexIUM) 40 MG capsule, TAKE 1 CAPSULE DAILY, Disp: 90 capsule, Rfl: 1  •  fluticasone (FLONASE) 50 MCG/ACT nasal spray, 2 sprays into the nostril(s) as directed by provider Daily. Administer 2 sprays in each nostril once a day, Disp: 3 bottle, Rfl: 3  •  furosemide (Lasix) 20 MG tablet, Take 1 tablet by mouth Daily As Needed (swelling)., Disp: 30 tablet, Rfl: 2  •  Multiple Vitamin (MULTI-VITAMIN DAILY PO), Take  by mouth., Disp: , Rfl:   •  pravastatin (PRAVACHOL) 20 MG tablet, TAKE 1 TABLET DAILY, Disp: 90 tablet, Rfl:  3  •  ramipril (ALTACE) 10 MG capsule, Take 1 capsule by mouth Daily., Disp: 90 capsule, Rfl: 3  •  spironolactone (ALDACTONE) 25 MG tablet, Take 1 tablet by mouth Daily., Disp: 90 tablet, Rfl: 3  •  triamcinolone (KENALOG) 0.1 % cream, Apply  topically to the appropriate area as directed 2 (Two) Times a Day., Disp: 45 g, Rfl: 1  Allergies:  Allergies   Allergen Reactions   • Hctz [Hydrochlorothiazide] Hives     Social History:   Social History     Tobacco Use   • Smoking status: Former Smoker     Packs/day: 0.50     Years: 25.00     Pack years: 12.50     Types: Cigarettes     Quit date:      Years since quittin.3   • Smokeless tobacco: Former User   Substance Use Topics   • Alcohol use: Yes     Comment: one drink monthly      Family History:  Family History   Problem Relation Age of Onset   • Aneurysm Mother         Colonic AVM in 90's   • Hypertension Mother    • Parkinsonism Father         90's   • Aortic aneurysm Father         AAA   • Tuberculosis Father    • Tuberculosis Paternal Aunt    • Cancer Maternal Grandmother    • No Known Problems Daughter    • No Known Problems Son           Review of Systems  See history of present illness and past medical history.    Constitutional: Negative.  Negative for fever.   HENT: Negative.  Negative for sore throat.    Eyes: Negative.    Respiratory: Positive for cough (Improving on antibiotics).    Cardiovascular: Positive for leg swelling. Negative for chest pain.   Gastrointestinal: Negative.    Genitourinary: Negative.  Negative for dysuria.   Musculoskeletal: Negative.  Negative for back pain.   Skin: Negative.  Negative for rash.   Neurological: Negative.  Negative for headaches.   All other systems reviewed and are negative.   Remainder of ROS is negative.      Vitals:   /71   Pulse 64   Temp 99.1 °F (37.3 °C) (Oral)   Resp 16   SpO2 95%   I/O: No intake or output data in the 24 hours ending 22  Exam:  Patient is examined using the  personal protective equipment as per guidelines from infection control for this particular patient as enacted.  Hand washing was performed before and after patient interaction.  General Appearance:   Alert cooperative chronically ill comfortable nontoxic-appearing male who does not appear to be in any acute distress complains that he is comfortable with his breathing.   Head:    Normocephalic, without obvious abnormality, atraumatic   Eyes:    PERRL, conjunctiva/corneas clear, EOM's intact, both eyes   Ears:    Normal external ear canals, both ears   Nose:   Nares normal, septum midline, mucosa normal, no drainage    or sinus tenderness   Throat:   Lips, tongue, gums normal; oral mucosa pink and moist   Neck:   Supple, symmetrical, trachea midline, no adenopathy;     thyroid:  no enlargement/tenderness/nodules; no carotid    bruit or JVD   Back:     Symmetric, no curvature, ROM normal, no CVA tenderness   Lungs:    Decreased breath sounds at the bases   Chest Wall:    No tenderness or deformity    Heart:   S1-S2 regular   Abdomen:    Soft nontender   Extremities:  Bilateral lower extremity edema with chronic erythematous changes tightness not warm.  It appears more like chronic venous stasis changes with superficial dermatitis.   Pulses:   Pulses palpable in all extremities; symmetric all extremities   Skin:  As described in the lower extremity examination.   Neurologic:   CNII-XII intact, motor strength grossly intact, sensation grossly intact to light touch, no focal deficits noted       Data Review:      I reviewed the patient's new clinical results.  Results from last 7 days   Lab Units 05/08/22  1639 05/06/22  1042   WBC 10*3/mm3 12.10* 12.5*   HEMOGLOBIN g/dL 12.1* 13.2   PLATELETS 10*3/mm3 236 247     Results from last 7 days   Lab Units 05/08/22  1639 05/06/22  1042   SODIUM mmol/L 138 139   POTASSIUM mmol/L 4.6 4.8   CHLORIDE mmol/L 102 96   TOTAL CO2 mmol/L  --  25   CO2 mmol/L 26.5  --    BUN mg/dL 25* 24    CREATININE mg/dL 1.22 0.96   CALCIUM mg/dL 9.4 9.7   GLUCOSE mg/dL 106* 137*         XR Chest PA & Lateral    Result Date: 5/6/2022  .[email or web address]       ECG 12 Lead   Preliminary Result   HEART RATE= 73  bpm   RR Interval= 816  ms   VT Interval= 205  ms   P Horizontal Axis= -25  deg   P Front Axis= 54  deg   QRSD Interval= 154  ms   QT Interval= 420  ms   QRS Axis= -11  deg   T Wave Axis= 166  deg   - ABNORMAL ECG -   Sinus rhythm   Left bundle branch block   ST elevation secondary to IVCD   Electronically Signed By:    Date and Time of Study: 2022-05-08 16:49:06        Microbiology Results (last 10 days)     Procedure Component Value - Date/Time    COVID PRE-OP / PRE-PROCEDURE SCREENING ORDER (NO ISOLATION) - Swab, Nasopharynx [[id number]]  (Abnormal) Collected: 05/08/22 1652    Lab Status: Final result Specimen: Swab from Nasopharynx Updated: 05/08/22 1857    Narrative:      The following orders were created for panel order COVID PRE-OP / PRE-PROCEDURE SCREENING ORDER (NO ISOLATION) - Swab, Nasopharynx.  Procedure                               Abnormality         Status                     ---------                               -----------         ------                     COVID-19,[facility]HOUSE...[[id number]]  Abnormal            Final result                 Please view results for these tests on the individual orders.    COVID-19,[facility] CEPHEID/PRESLEY NP SWAB IN TRANSPORT MEDIA 8-12 HR TAT - Swab, Nasopharynx [[id number]]  (Abnormal) Collected: 05/08/22 1652    Lab Status: Final result Specimen: Swab from Nasopharynx Updated: 05/08/22 1857     COVID19 Detected    Narrative:      Fact sheet for providers: https://www.fda.gov/media/684095/download     Fact sheet for patients: https://www.fda.gov/media/225122/download    COVID-19,[facility] ROUTINE, NP/OP SWAB IN TRANSPORT MEDIA OR ESWAB 72 HR TAT - Swab, Nasopharynx [[id number]] Collected: 05/06/22 1111    Lab Status: Final result Specimen: Swab from  Nasopharynx Updated: 05/07/22 1609     SARS-CoV-2, [facility]UM[facility]ERTO Not Detected     Comment: This nucleic acid amplification test was developed and its performance  characteristics determined by [employer]. Nucleic acid  amplification tests include RT-PCR and TMA. This test has not been  FDA cleared or approved. This test has been authorized by FDA under  an Emergency Use Authorization (EUA). This test is only authorized  for the duration of time the declaration that circumstances exist  justifying the authorization of the emergency use of in vitro  diagnostic tests for detection of SARS-CoV-2 virus and/or diagnosis  of COVID-19 infection under section 564(b)(1) of the Act, 21 U.S.C.  360bbb-3(b) (1), unless the authorization is terminated or revoked  sooner.  When diagnostic testing is negative, the possibility of a false  negative result should be considered in the context of a patient's  recent exposures and the presence of clinical signs and symptoms  consistent with COVID-19. An individual without symptoms of COVID-19  and who is not shedding SARS-CoV-2 virus would expect to have a  negative (not detected) result in this assay.         Narrative:      Performed at:  [address]  [id number]  : Jung Cardona PhD, Phone:  [phone]    SARS-CoV-2, HUMBERTO 2 DAY TAT - , [[id number]] Collected: 05/06/22 1111    Lab Status: Final result Updated: 05/07/22 1609     [facility] SARS-COV-2, HUMBERTO 2 DAY TAT Performed    Narrative:      Performed at:  [address]  [id number]  : Jung Cardona PhD, Phone:  [phone]            Assessment:  Active Hospital Problems    Diagnosis  POA   • Bilateral lower extremity edema [R60.0]  Yes   • Hypoxia [R09.02]  Unknown   • Dyspnea [R06.00]  Unknown   • COVID-19 virus infection [U07.1]  Unknown   • 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]  Unknown   • Type 2  diabetes mellitus (HCC) [E11.9]  Unknown   • Peripheral vascular disease, unspecified (HCC) [I73.9]  Yes   • Benign essential HTN [I10]  Yes   • Stage 3 chronic kidney disease (HCC) [N18.30]  Yes   • 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  · Patient has received significant course of antibiotics initially Zithromax and then subsequently Augmentin and has currently additional information for his lung infiltrate and hypoxia and is clinically overall denies any symptoms of fever and chills.  · Continue with IV diuretics given his orthopnea and swelling of his legs.      Edwin Alejandre MD   5/8/2022  19:54 EDT  Much of this encounter note is an electronic transcription/translation of spoken language to printed text. The electronic translation of spoken language may permit erroneous, or at times, nonsensical words or phrases to be inadvertently transcribed; Although I have reviewed the note for such errors, some may still exist

## 2022-05-09 ENCOUNTER — APPOINTMENT (OUTPATIENT)
Dept: CARDIOLOGY | Facility: HOSPITAL | Age: 87
End: 2022-05-09

## 2022-05-09 PROBLEM — I82.409 ACUTE DVT (DEEP VENOUS THROMBOSIS): Status: ACTIVE | Noted: 2022-05-09

## 2022-05-09 PROBLEM — J96.01 ACUTE RESPIRATORY FAILURE WITH HYPOXIA (HCC): Status: ACTIVE | Noted: 2022-05-09

## 2022-05-09 LAB
ALBUMIN SERPL-MCNC: 3.7 G/DL (ref 3.5–5.2)
ALBUMIN/GLOB SERPL: 0.9 G/DL
ALP SERPL-CCNC: 86 U/L (ref 39–117)
ALT SERPL W P-5'-P-CCNC: 19 U/L (ref 1–41)
ANION GAP SERPL CALCULATED.3IONS-SCNC: 10.2 MMOL/L (ref 5–15)
AST SERPL-CCNC: 18 U/L (ref 1–40)
BASOPHILS # BLD AUTO: 0.02 10*3/MM3 (ref 0–0.2)
BASOPHILS NFR BLD AUTO: 0.2 % (ref 0–1.5)
BH CV ECHO MEAS - AO MAX PG: 10 MMHG
BH CV ECHO MEAS - AO MEAN PG: 5.7 MMHG
BH CV ECHO MEAS - AO V2 MAX: 158 CM/SEC
BH CV ECHO MEAS - AO V2 VTI: 29.2 CM
BH CV ECHO MEAS - EDV(CUBED): 137.5 ML
BH CV ECHO MEAS - EDV(MOD-SP2): 113 ML
BH CV ECHO MEAS - EDV(MOD-SP4): 157 ML
BH CV ECHO MEAS - EF(MOD-BP): 45.6 %
BH CV ECHO MEAS - EF(MOD-SP2): 54 %
BH CV ECHO MEAS - EF(MOD-SP4): 40.8 %
BH CV ECHO MEAS - ESV(CUBED): 66.1 ML
BH CV ECHO MEAS - ESV(MOD-SP2): 52 ML
BH CV ECHO MEAS - ESV(MOD-SP4): 93 ML
BH CV ECHO MEAS - FS: 21.7 %
BH CV ECHO MEAS - IVS/LVPW: 1.19 CM
BH CV ECHO MEAS - IVSD: 1.45 CM
BH CV ECHO MEAS - LV MASS(C)D: 285.9 GRAMS
BH CV ECHO MEAS - LV MAX PG: 4.4 MMHG
BH CV ECHO MEAS - LV MEAN PG: 2.07 MMHG
BH CV ECHO MEAS - LV V1 MAX: 104.3 CM/SEC
BH CV ECHO MEAS - LV V1 VTI: 16.2 CM
BH CV ECHO MEAS - LVIDD: 5.2 CM
BH CV ECHO MEAS - LVIDS: 4 CM
BH CV ECHO MEAS - LVOT DIAM: 2 CM
BH CV ECHO MEAS - LVPWD: 1.22 CM
BH CV ECHO MEAS - MV A MAX VEL: 89.7 CM/SEC
BH CV ECHO MEAS - MV DEC SLOPE: 209.8 CM/SEC2
BH CV ECHO MEAS - MV DEC TIME: 0.2 MSEC
BH CV ECHO MEAS - MV E MAX VEL: 43.2 CM/SEC
BH CV ECHO MEAS - MV E/A: 0.48
BH CV ECHO MEAS - MV MAX PG: 3.9 MMHG
BH CV ECHO MEAS - MV MEAN PG: 1.18 MMHG
BH CV ECHO MEAS - MV P1/2T: 84.3 MSEC
BH CV ECHO MEAS - MV V2 VTI: 23.8 CM
BH CV ECHO MEAS - MVA(P1/2T): 2.6 CM2
BH CV ECHO MEAS - SV(MOD-SP2): 61 ML
BH CV ECHO MEAS - SV(MOD-SP4): 64 ML
BH CV LOW VAS RIGHT PERONEAL VESSEL: 1
BH CV LOWER VASCULAR LEFT COMMON FEMORAL AUGMENT: NORMAL
BH CV LOWER VASCULAR LEFT COMMON FEMORAL COMPETENT: NORMAL
BH CV LOWER VASCULAR LEFT COMMON FEMORAL COMPRESS: NORMAL
BH CV LOWER VASCULAR LEFT COMMON FEMORAL PHASIC: NORMAL
BH CV LOWER VASCULAR LEFT COMMON FEMORAL SPONT: NORMAL
BH CV LOWER VASCULAR LEFT DISTAL FEMORAL COMPRESS: NORMAL
BH CV LOWER VASCULAR LEFT GASTRONEMIUS COMPRESS: NORMAL
BH CV LOWER VASCULAR LEFT GREATER SAPH AK COMPRESS: NORMAL
BH CV LOWER VASCULAR LEFT GREATER SAPH BK COMPRESS: NORMAL
BH CV LOWER VASCULAR LEFT LESSER SAPH COMPRESS: NORMAL
BH CV LOWER VASCULAR LEFT MID FEMORAL AUGMENT: NORMAL
BH CV LOWER VASCULAR LEFT MID FEMORAL COMPETENT: NORMAL
BH CV LOWER VASCULAR LEFT MID FEMORAL COMPRESS: NORMAL
BH CV LOWER VASCULAR LEFT MID FEMORAL PHASIC: NORMAL
BH CV LOWER VASCULAR LEFT MID FEMORAL SPONT: NORMAL
BH CV LOWER VASCULAR LEFT PERONEAL COMPRESS: NORMAL
BH CV LOWER VASCULAR LEFT POPLITEAL AUGMENT: NORMAL
BH CV LOWER VASCULAR LEFT POPLITEAL COMPETENT: NORMAL
BH CV LOWER VASCULAR LEFT POPLITEAL COMPRESS: NORMAL
BH CV LOWER VASCULAR LEFT POPLITEAL PHASIC: NORMAL
BH CV LOWER VASCULAR LEFT POPLITEAL SPONT: NORMAL
BH CV LOWER VASCULAR LEFT POSTERIOR TIBIAL COMPRESS: NORMAL
BH CV LOWER VASCULAR LEFT PROFUNDA FEMORAL COMPRESS: NORMAL
BH CV LOWER VASCULAR LEFT PROXIMAL FEMORAL COMPRESS: NORMAL
BH CV LOWER VASCULAR LEFT SAPHENOFEMORAL JUNCTION COMPRESS: NORMAL
BH CV LOWER VASCULAR RIGHT COMMON FEMORAL AUGMENT: NORMAL
BH CV LOWER VASCULAR RIGHT COMMON FEMORAL COMPETENT: NORMAL
BH CV LOWER VASCULAR RIGHT COMMON FEMORAL COMPRESS: NORMAL
BH CV LOWER VASCULAR RIGHT COMMON FEMORAL PHASIC: NORMAL
BH CV LOWER VASCULAR RIGHT COMMON FEMORAL SPONT: NORMAL
BH CV LOWER VASCULAR RIGHT DISTAL FEMORAL COMPRESS: NORMAL
BH CV LOWER VASCULAR RIGHT GASTRONEMIUS COMPRESS: NORMAL
BH CV LOWER VASCULAR RIGHT GREATER SAPH AK COMPRESS: NORMAL
BH CV LOWER VASCULAR RIGHT GREATER SAPH BK COMPRESS: NORMAL
BH CV LOWER VASCULAR RIGHT LESSER SAPH COMPRESS: NORMAL
BH CV LOWER VASCULAR RIGHT MID FEMORAL AUGMENT: NORMAL
BH CV LOWER VASCULAR RIGHT MID FEMORAL COMPETENT: NORMAL
BH CV LOWER VASCULAR RIGHT MID FEMORAL COMPRESS: NORMAL
BH CV LOWER VASCULAR RIGHT MID FEMORAL PHASIC: NORMAL
BH CV LOWER VASCULAR RIGHT MID FEMORAL SPONT: NORMAL
BH CV LOWER VASCULAR RIGHT PERONEAL COMPRESS: NORMAL
BH CV LOWER VASCULAR RIGHT PERONEAL THROMBUS: NORMAL
BH CV LOWER VASCULAR RIGHT POPLITEAL AUGMENT: NORMAL
BH CV LOWER VASCULAR RIGHT POPLITEAL COMPETENT: NORMAL
BH CV LOWER VASCULAR RIGHT POPLITEAL COMPRESS: NORMAL
BH CV LOWER VASCULAR RIGHT POPLITEAL PHASIC: NORMAL
BH CV LOWER VASCULAR RIGHT POPLITEAL SPONT: NORMAL
BH CV LOWER VASCULAR RIGHT POSTERIOR TIBIAL COMPRESS: NORMAL
BH CV LOWER VASCULAR RIGHT PROFUNDA FEMORAL COMPRESS: NORMAL
BH CV LOWER VASCULAR RIGHT PROXIMAL FEMORAL COMPRESS: NORMAL
BH CV LOWER VASCULAR RIGHT SAPHENOFEMORAL JUNCTION COMPRESS: NORMAL
BILIRUB SERPL-MCNC: 0.5 MG/DL (ref 0–1.2)
BUN SERPL-MCNC: 27 MG/DL (ref 8–23)
BUN/CREAT SERPL: 24.3 (ref 7–25)
CALCIUM SPEC-SCNC: 9.6 MG/DL (ref 8.6–10.5)
CHLORIDE SERPL-SCNC: 96 MMOL/L (ref 98–107)
CO2 SERPL-SCNC: 32.8 MMOL/L (ref 22–29)
CREAT SERPL-MCNC: 1.11 MG/DL (ref 0.76–1.27)
DEPRECATED RDW RBC AUTO: 42.8 FL (ref 37–54)
EGFRCR SERPLBLD CKD-EPI 2021: 64.3 ML/MIN/1.73
EOSINOPHIL # BLD AUTO: 0.09 10*3/MM3 (ref 0–0.4)
EOSINOPHIL NFR BLD AUTO: 0.7 % (ref 0.3–6.2)
ERYTHROCYTE [DISTWIDTH] IN BLOOD BY AUTOMATED COUNT: 13.6 % (ref 12.3–15.4)
GLOBULIN UR ELPH-MCNC: 3.9 GM/DL
GLUCOSE SERPL-MCNC: 125 MG/DL (ref 65–99)
HBA1C MFR BLD: 6.1 % (ref 4.8–5.6)
HCT VFR BLD AUTO: 38.3 % (ref 37.5–51)
HGB BLD-MCNC: 12.4 G/DL (ref 13–17.7)
IMM GRANULOCYTES # BLD AUTO: 0.04 10*3/MM3 (ref 0–0.05)
IMM GRANULOCYTES NFR BLD AUTO: 0.3 % (ref 0–0.5)
L PNEUMO1 AG UR QL IA: NEGATIVE
LV EF 2D ECHO EST: 46 %
LYMPHOCYTES # BLD AUTO: 1.6 10*3/MM3 (ref 0.7–3.1)
LYMPHOCYTES NFR BLD AUTO: 13 % (ref 19.6–45.3)
MAXIMAL PREDICTED HEART RATE: 133 BPM
MAXIMAL PREDICTED HEART RATE: 133 BPM
MCH RBC QN AUTO: 27.7 PG (ref 26.6–33)
MCHC RBC AUTO-ENTMCNC: 32.4 G/DL (ref 31.5–35.7)
MCV RBC AUTO: 85.7 FL (ref 79–97)
MONOCYTES # BLD AUTO: 0.92 10*3/MM3 (ref 0.1–0.9)
MONOCYTES NFR BLD AUTO: 7.5 % (ref 5–12)
NEUTROPHILS NFR BLD AUTO: 78.3 % (ref 42.7–76)
NEUTROPHILS NFR BLD AUTO: 9.66 10*3/MM3 (ref 1.7–7)
NRBC BLD AUTO-RTO: 0 /100 WBC (ref 0–0.2)
PLATELET # BLD AUTO: 266 10*3/MM3 (ref 140–450)
PMV BLD AUTO: 10.4 FL (ref 6–12)
POTASSIUM SERPL-SCNC: 4.4 MMOL/L (ref 3.5–5.2)
PROT SERPL-MCNC: 7.6 G/DL (ref 6–8.5)
QT INTERVAL: 420 MS
RBC # BLD AUTO: 4.47 10*6/MM3 (ref 4.14–5.8)
S PNEUM AG SPEC QL LA: NEGATIVE
SODIUM SERPL-SCNC: 139 MMOL/L (ref 136–145)
STRESS TARGET HR: 113 BPM
STRESS TARGET HR: 113 BPM
WBC NRBC COR # BLD: 12.33 10*3/MM3 (ref 3.4–10.8)

## 2022-05-09 PROCEDURE — 87899 AGENT NOS ASSAY W/OPTIC: CPT | Performed by: INTERNAL MEDICINE

## 2022-05-09 PROCEDURE — 93321 DOPPLER ECHO F-UP/LMTD STD: CPT

## 2022-05-09 PROCEDURE — 93308 TTE F-UP OR LMTD: CPT | Performed by: INTERNAL MEDICINE

## 2022-05-09 PROCEDURE — 93325 DOPPLER ECHO COLOR FLOW MAPG: CPT | Performed by: INTERNAL MEDICINE

## 2022-05-09 PROCEDURE — 93308 TTE F-UP OR LMTD: CPT

## 2022-05-09 PROCEDURE — 80053 COMPREHEN METABOLIC PANEL: CPT | Performed by: INTERNAL MEDICINE

## 2022-05-09 PROCEDURE — 0 IOPAMIDOL PER 1 ML: Performed by: INTERNAL MEDICINE

## 2022-05-09 PROCEDURE — 93325 DOPPLER ECHO COLOR FLOW MAPG: CPT

## 2022-05-09 PROCEDURE — 94799 UNLISTED PULMONARY SVC/PX: CPT

## 2022-05-09 PROCEDURE — 83036 HEMOGLOBIN GLYCOSYLATED A1C: CPT | Performed by: INTERNAL MEDICINE

## 2022-05-09 PROCEDURE — 93321 DOPPLER ECHO F-UP/LMTD STD: CPT | Performed by: INTERNAL MEDICINE

## 2022-05-09 PROCEDURE — 94664 DEMO&/EVAL PT USE INHALER: CPT

## 2022-05-09 PROCEDURE — 25010000002 ENOXAPARIN PER 10 MG: Performed by: INTERNAL MEDICINE

## 2022-05-09 PROCEDURE — 25010000002 FUROSEMIDE PER 20 MG: Performed by: INTERNAL MEDICINE

## 2022-05-09 PROCEDURE — 25010000002 DEXAMETHASONE PER 1 MG: Performed by: HOSPITALIST

## 2022-05-09 PROCEDURE — 94640 AIRWAY INHALATION TREATMENT: CPT

## 2022-05-09 PROCEDURE — 85025 COMPLETE CBC W/AUTO DIFF WBC: CPT | Performed by: INTERNAL MEDICINE

## 2022-05-09 RX ORDER — ARFORMOTEROL TARTRATE 15 UG/2ML
15 SOLUTION RESPIRATORY (INHALATION)
Status: DISCONTINUED | OUTPATIENT
Start: 2022-05-09 | End: 2022-05-13 | Stop reason: HOSPADM

## 2022-05-09 RX ORDER — CETIRIZINE HYDROCHLORIDE 10 MG/1
5 TABLET ORAL DAILY
Status: DISCONTINUED | OUTPATIENT
Start: 2022-05-09 | End: 2022-05-13 | Stop reason: HOSPADM

## 2022-05-09 RX ORDER — DEXAMETHASONE SODIUM PHOSPHATE 10 MG/ML
6 INJECTION INTRAMUSCULAR; INTRAVENOUS DAILY
Status: DISCONTINUED | OUTPATIENT
Start: 2022-05-09 | End: 2022-05-10

## 2022-05-09 RX ORDER — HYDROCODONE BITARTRATE AND HOMATROPINE METHYLBROMIDE ORAL SOLUTION 5; 1.5 MG/5ML; MG/5ML
5 LIQUID ORAL NIGHTLY
Status: DISCONTINUED | OUTPATIENT
Start: 2022-05-09 | End: 2022-05-13 | Stop reason: HOSPADM

## 2022-05-09 RX ADMIN — ENOXAPARIN SODIUM 100 MG: 100 INJECTION SUBCUTANEOUS at 11:15

## 2022-05-09 RX ADMIN — PANTOPRAZOLE SODIUM 40 MG: 40 TABLET, DELAYED RELEASE ORAL at 05:57

## 2022-05-09 RX ADMIN — HYDROCODONE BITARTRATE AND HOMATROPINE METHYLBROMIDE 5 ML: 5; 1.5 SOLUTION ORAL at 23:01

## 2022-05-09 RX ADMIN — SPIRONOLACTONE 25 MG: 25 TABLET, FILM COATED ORAL at 08:26

## 2022-05-09 RX ADMIN — FUROSEMIDE 40 MG: 10 INJECTION, SOLUTION INTRAMUSCULAR; INTRAVENOUS at 23:00

## 2022-05-09 RX ADMIN — PRAVASTATIN SODIUM 20 MG: 20 TABLET ORAL at 08:26

## 2022-05-09 RX ADMIN — FUROSEMIDE 40 MG: 10 INJECTION, SOLUTION INTRAMUSCULAR; INTRAVENOUS at 11:15

## 2022-05-09 RX ADMIN — Medication 10 ML: at 08:27

## 2022-05-09 RX ADMIN — CARVEDILOL 3.12 MG: 3.12 TABLET, FILM COATED ORAL at 00:41

## 2022-05-09 RX ADMIN — DEXAMETHASONE SODIUM PHOSPHATE 6 MG: 10 INJECTION INTRAMUSCULAR; INTRAVENOUS at 08:33

## 2022-05-09 RX ADMIN — Medication 10 ML: at 00:42

## 2022-05-09 RX ADMIN — CETIRIZINE HYDROCHLORIDE 5 MG: 10 TABLET ORAL at 15:21

## 2022-05-09 RX ADMIN — Medication 10 ML: at 20:13

## 2022-05-09 RX ADMIN — APIXABAN 10 MG: 5 TABLET, FILM COATED ORAL at 20:13

## 2022-05-09 RX ADMIN — FUROSEMIDE 40 MG: 10 INJECTION, SOLUTION INTRAMUSCULAR; INTRAVENOUS at 00:41

## 2022-05-09 RX ADMIN — CARVEDILOL 3.12 MG: 3.12 TABLET, FILM COATED ORAL at 20:13

## 2022-05-09 RX ADMIN — ENOXAPARIN SODIUM 100 MG: 100 INJECTION SUBCUTANEOUS at 01:59

## 2022-05-09 RX ADMIN — FLUTICASONE PROPIONATE 2 SPRAY: 50 SPRAY, METERED NASAL at 08:26

## 2022-05-09 RX ADMIN — CARVEDILOL 3.12 MG: 3.12 TABLET, FILM COATED ORAL at 01:59

## 2022-05-09 RX ADMIN — CARVEDILOL 3.12 MG: 3.12 TABLET, FILM COATED ORAL at 08:26

## 2022-05-09 RX ADMIN — RAMIPRIL 10 MG: 10 CAPSULE ORAL at 08:26

## 2022-05-09 RX ADMIN — ARFORMOTEROL TARTRATE 15 MCG: 15 SOLUTION RESPIRATORY (INHALATION) at 19:13

## 2022-05-09 RX ADMIN — IOPAMIDOL 95 ML: 755 INJECTION, SOLUTION INTRAVENOUS at 00:17

## 2022-05-09 NOTE — PROGRESS NOTES
Name: Goran August ADMIT: 2022   : 1934  PCP: Tanya Dhaliwal MD    MRN: [id number] LOS: 1 days   AGE/SEX: 87 y.o. male  ROOM: [facility]     Subjective   Subjective   Denies much shortness of breath currently. Having cough. He states leg swelling is improved.    Review of Systems   Constitutional: Negative for fever.   Respiratory: Positive for cough. Negative for shortness of breath.    Cardiovascular: Positive for leg swelling. Negative for chest pain.   Gastrointestinal: Negative for abdominal pain, diarrhea, nausea and vomiting.   Genitourinary: Negative for dysuria.   Neurological: Negative for headaches.      Objective   Objective   Vital Signs  Temp:  [96 °F (35.6 °C)-99.1 °F (37.3 °C)] 98.4 °F (36.9 °C)  Heart Rate:  [64-84] 64  Resp:  [16] 16  BP: (130-161)/(57-78) 157/69  SpO2:  [62 %-97 %] 93 %  on  Flow (L/min):  [2-4] 2;   Device (Oxygen Therapy): nasal cannula  Body mass index is 31.52 kg/m².    Physical Exam  Constitutional:       General: He is not in acute distress.     Appearance: Normal appearance. He is not toxic-appearing.   HENT:      Head: Normocephalic and atraumatic.   Cardiovascular:      Rate and Rhythm: Normal rate and regular rhythm.   Pulmonary:      Effort: Pulmonary effort is normal. No respiratory distress.      Breath sounds: Normal breath sounds. No wheezing, rhonchi or rales.   Abdominal:      General: Bowel sounds are normal.      Palpations: Abdomen is soft.      Tenderness: There is no abdominal tenderness. There is no guarding or rebound.   Musculoskeletal:         General: Swelling present.      Right lower le+ Edema present.      Left lower le+ Edema present.   Skin:     General: Skin is warm and dry.   Neurological:      General: No focal deficit present.      Mental Status: He is alert and oriented to person, place, and time.   Psychiatric:         Mood and Affect: Mood normal.         Behavior: Behavior normal.         Thought Content: Thought  content normal.     Results Review  I reviewed the patient's new clinical results.  Results from last 7 days   Lab Units 05/09/22  0628 05/08/22  1639 05/06/22  1042   WBC 10*3/mm3 12.33* 12.10* 12.5*   HEMOGLOBIN g/dL 12.4* 12.1* 13.2   PLATELETS 10*3/mm3 266 236 247     Results from last 7 days   Lab Units 05/09/22  0628 05/08/22  1639 05/06/22  1042   SODIUM mmol/L 139 138 139   POTASSIUM mmol/L 4.4 4.6 4.8   CHLORIDE mmol/L 96* 102 96   TOTAL CO2 mmol/L  --   --  25   CO2 mmol/L 32.8* 26.5  --    BUN mg/dL 27* 25* 24   CREATININE mg/dL 1.11 1.22 0.96   GLUCOSE mg/dL 125* 106* 137*     Lab Results   Component Value Date    ANIONGAP 10.2 05/09/2022     Estimated Creatinine Clearance: 57.3 mL/min (by C-G formula based on SCr of 1.11 mg/dL).    Results from last 7 days   Lab Units 05/09/22  0628 05/08/22  1639 05/06/22  1042   ALBUMIN g/dL 3.70 3.20* 4.1   BILIRUBIN mg/dL 0.5 0.5 0.7   ALK PHOS U/L 86 80 85   AST (SGOT) U/L 18 17 23   ALT (SGPT) U/L 19 16 18     Results from last 7 days   Lab Units 05/09/22  0628 05/08/22  1639 05/06/22  1042   CALCIUM mg/dL 9.6 9.4 9.7   ALBUMIN g/dL 3.70 3.20* 4.1     Results from last 7 days   Lab Units 05/08/22  1639   PROCALCITONIN ng/mL 0.12   LACTATE mmol/L 1.7     Hemoglobin A1C   Date/Time Value Ref Range Status   05/09/2022 0628 6.10 (H) 4.80 - 5.60 % Final     Glucose   Date/Time Value Ref Range Status   05/08/2022 2146 133 (H) 70 - 130 mg/dL Final     Comment:     Meter: [id number] : [id number] Johnnie Dalton RN       Scheduled Meds  carvedilol, 3.125 mg, Oral, BID  dexamethasone, 6 mg, Intravenous, Daily  enoxaparin, 1 mg/kg, Subcutaneous, Q12H  fluticasone, 2 spray, Nasal, Daily  furosemide, 40 mg, Intravenous, Q12H  pantoprazole, 40 mg, Oral, QAM  pravastatin, 20 mg, Oral, Daily  ramipril, 10 mg, Oral, Daily  sodium chloride, 10 mL, Intravenous, Q12H  spironolactone, 25 mg, Oral, Daily    Continuous Infusions   PRN Meds  •  acetaminophen **OR** acetaminophen **OR**  acetaminophen  •  albuterol sulfate HFA  •  nitroglycerin  •  ondansetron  •  sodium chloride     Diet  Diet Regular; Cardiac, Consistent Carbohydrate, Renal    I have personally reviewed:  [x]  Laboratory   [x]  Microbiology   [x]  Radiology   [x]  EKG/Telemetry   []  Cardiology/Vascular   []  Pathology   []  Records     Assessment/Plan     Active Hospital Problems    Diagnosis  POA   • **COVID-19 virus infection [U07.1]  Unknown   • Acute DVT (deep venous thrombosis) (HCC) [I82.409]  Unknown   • Acute respiratory failure with hypoxia (HCC) [J96.01]  Unknown   • 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     87 y.o. male admitted with COVID-19 virus infection. He has had cough congestion and shortness of breath for the last couple weeks. Tested negative for COVID as an outpatient and was treated with 2 rounds of antibiotics. He developed lower extremity swelling and was sent to the ED. Most of his respiratory symptoms have improved except for cough.    COVID and acute hypoxic respiratory failure. Currently Flow (L/min):  [2-4] 2. Dexamethasone. Too late for remdesivir (2 weeks of symptoms). CTA shows left lower lobe consolidation aspiration pneumonitis versus pneumonia. Will consult pulmonology. For now hold off on any additional antibiotics. He is already received 2 rounds as an outpatient. Procalcitonin not elevated. Lactate okay. No fever and symptoms have mostly improved    Acute right lower extremity DVT. CTA chest negative for PE. Currently on Lovenox plan to transition to oral anticoagulant    Echo pending. States he was supposed to get it outpatient by his cardiologist (sorin)    • Discussed with patient, nursing staff, CCP and care team on multidisciplinary  rounds  • Discharge: Home couple days    Tacos Rodriguez MD  [facility]  05/09/22  12:35 EDT

## 2022-05-09 NOTE — PLAN OF CARE
Problem: Adult Inpatient Plan of Care  Goal: Plan of Care Review  Outcome: Ongoing, Progressing  Flowsheets (Taken 5/9/2022 1556)  Plan of Care Reviewed With: patient  Outcome Evaluation: VSS with O2 titrated down to 2L NC with sats 92-93%, pt does c/o SOA with activity. PT ordered. Assist x1 . SR BBB on the monitor. Purewick patency maintained. ECHO EF 46%. Awaiting urine sample  Goal: Patient-Specific Goal (Individualized)  Outcome: Ongoing, Progressing  Goal: Absence of Hospital-Acquired Illness or Injury  Outcome: Ongoing, Progressing  Intervention: Identify and Manage Fall Risk  Recent Flowsheet Documentation  Taken 5/9/2022 1400 by Jeet Johnson, RN  Safety Promotion/Fall Prevention:  • activity supervised  • assistive device/personal items within reach  • clutter free environment maintained  • fall prevention program maintained  • nonskid shoes/slippers when out of bed  • room organization consistent  • safety round/check completed  • lighting adjusted  Taken 5/9/2022 1226 by Jeet Johnson, RN  Safety Promotion/Fall Prevention:  • activity supervised  • assistive device/personal items within reach  • clutter free environment maintained  • fall prevention program maintained  • nonskid shoes/slippers when out of bed  • room organization consistent  • safety round/check completed  • lighting adjusted  Taken 5/9/2022 1003 by Jeet Johnson, RN  Safety Promotion/Fall Prevention:  • activity supervised  • assistive device/personal items within reach  • clutter free environment maintained  • fall prevention program maintained  • nonskid shoes/slippers when out of bed  • room organization consistent  • safety round/check completed  Taken 5/9/2022 0826 by Jeet Johnson, RN  Safety Promotion/Fall Prevention:  • activity supervised  • assistive device/personal items within reach  • clutter free environment maintained  • fall prevention program maintained  • nonskid shoes/slippers when  out of bed  • room organization consistent  • safety round/check completed  Intervention: Prevent Skin Injury  Recent Flowsheet Documentation  Taken 5/9/2022 1400 by Jeet Johnson RN  Body Position: position changed independently  Skin Protection:  • adhesive use limited  • transparent dressing maintained  • tubing/devices free from skin contact  Taken 5/9/2022 1226 by Jeet Johnson RN  Body Position: position changed independently  Taken 5/9/2022 1003 by Jeet Johnson RN  Body Position: position changed independently  Taken 5/9/2022 0826 by Jeet Johnson RN  Body Position: position changed independently  Skin Protection:  • adhesive use limited  • transparent dressing maintained  • tubing/devices free from skin contact  Intervention: Prevent and Manage VTE (Venous Thromboembolism) Risk  Recent Flowsheet Documentation  Taken 5/9/2022 1400 by Jeet Johnson RN  Activity Management:  • activity adjusted per tolerance  • activity encouraged  VTE Prevention/Management: dorsiflexion/plantar flexion performed  Range of Motion:  • ROM (range of motion) performed  • active ROM (range of motion) encouraged  Taken 5/9/2022 1226 by Jeet Johnson RN  Activity Management:  • activity adjusted per tolerance  • activity encouraged  Taken 5/9/2022 1003 by Jeet Johnson RN  Activity Management:  • activity encouraged  • activity adjusted per tolerance  Taken 5/9/2022 0826 by Jeet Johnson RN  Activity Management:  • activity encouraged  • activity adjusted per tolerance  VTE Prevention/Management:  • bilateral  • dorsiflexion/plantar flexion performed  Intervention: Prevent Infection  Recent Flowsheet Documentation  Taken 5/9/2022 1400 by Jeet Johnson RN  Infection Prevention:  • single patient room provided  • rest/sleep promoted  • personal protective equipment utilized  • hand hygiene promoted  Taken 5/9/2022 1226 by Jeet Johnson  RN  Infection Prevention: personal protective equipment utilized  Taken 5/9/2022 0826 by Jeet Johnson RN  Infection Prevention:  • visitors restricted/screened  • personal protective equipment utilized  • hand hygiene promoted  Goal: Optimal Comfort and Wellbeing  Outcome: Ongoing, Progressing  Goal: Readiness for Transition of Care  Outcome: Ongoing, Progressing     Problem: Fall Injury Risk  Goal: Absence of Fall and Fall-Related Injury  Outcome: Ongoing, Progressing  Intervention: Identify and Manage Contributors  Recent Flowsheet Documentation  Taken 5/9/2022 1400 by Jeet Johnson RN  Medication Review/Management: medications reviewed  Taken 5/9/2022 1226 by Jeet Johnson RN  Medication Review/Management: medications reviewed  Taken 5/9/2022 1003 by Jeet Johnson RN  Medication Review/Management: medications reviewed  Taken 5/9/2022 0826 by Jeet Johnson RN  Medication Review/Management: medications reviewed  Intervention: Promote Injury-Free Environment  Recent Flowsheet Documentation  Taken 5/9/2022 1400 by Jeet Johnson RN  Safety Promotion/Fall Prevention:  • activity supervised  • assistive device/personal items within reach  • clutter free environment maintained  • fall prevention program maintained  • nonskid shoes/slippers when out of bed  • room organization consistent  • safety round/check completed  • lighting adjusted  Taken 5/9/2022 1226 by Jeet Johnson RN  Safety Promotion/Fall Prevention:  • activity supervised  • assistive device/personal items within reach  • clutter free environment maintained  • fall prevention program maintained  • nonskid shoes/slippers when out of bed  • room organization consistent  • safety round/check completed  • lighting adjusted  Taken 5/9/2022 1003 by Jeet Johnson RN  Safety Promotion/Fall Prevention:  • activity supervised  • assistive device/personal items within reach  • clutter free  environment maintained  • fall prevention program maintained  • nonskid shoes/slippers when out of bed  • room organization consistent  • safety round/check completed  Taken 5/9/2022 0826 by Jeet Johnson RN  Safety Promotion/Fall Prevention:  • activity supervised  • assistive device/personal items within reach  • clutter free environment maintained  • fall prevention program maintained  • nonskid shoes/slippers when out of bed  • room organization consistent  • safety round/check completed     Problem: Skin Injury Risk Increased  Goal: Skin Health and Integrity  Outcome: Ongoing, Progressing  Intervention: Optimize Skin Protection  Recent Flowsheet Documentation  Taken 5/9/2022 1400 by Jeet Johnson, RN  Pressure Reduction Techniques: frequent weight shift encouraged  Head of Bed (HOB) Positioning: HOB elevated  Pressure Reduction Devices: positioning supports utilized  Skin Protection:  • adhesive use limited  • transparent dressing maintained  • tubing/devices free from skin contact  Taken 5/9/2022 1226 by Jeet Johnson RN  Head of Bed (HOB) Positioning: HOB elevated  Taken 5/9/2022 1003 by Jeet Johnson RN  Head of Bed (HOB) Positioning: HOB elevated  Taken 5/9/2022 0826 by Jeet Johnson RN  Pressure Reduction Techniques:  • pressure points protected  • frequent weight shift encouraged  Head of Bed (HOB) Positioning: HOB elevated  Pressure Reduction Devices: positioning supports utilized  Skin Protection:  • adhesive use limited  • transparent dressing maintained  • tubing/devices free from skin contact   Goal Outcome Evaluation:  Plan of Care Reviewed With: patient           Outcome Evaluation: VSS with O2 titrated down to 2L NC with sats 92-93%, pt does c/o SOA with activity. PT ordered. Assist x1 . SR BBB on the monitor. Purewick patency maintained. Awaiting ECHO and urine sample

## 2022-05-09 NOTE — CONSULTS
[facility]  [phone]  Dr. Noe Caro      Subjective   LOS: 1 day     Thank you for this consultation.  Interesting story of 87-year-old male who developed bronchitis symptoms 10 days ago.  Treated symptomatically and with antibacterials.  5 days ago noticed some leg swelling especially in the left lower extremity.  Got diuretics but did not improve in the left lower extremity.  Due to persistent swelling he came to the ER and was discovered to have an acute DVT in the left lower extremity.  A CTA of the chest shows a weblike appearance in the right lower lobe subsegmental pulmonary artery suggesting chronic embolism.  He is now on full dose anticoagulation and we were consulted.  Patient states that he continues to have a cough especially when he lays down.  He denies shortness of breath as such when he lies down.  He does not have any history of lung problems and is not on oxygen or inhalers at home.  He quit smoking 40 years ago and smoked 6 to 8 cigarettes a day for about 30 years prior to that.  He had an outpatient COVID test that was negative on 5/6/2022.  However his in-hospital test on 5/8/2022 was positive for COVID.  He denies any previous thromboembolic disease.  Denies heart disease or kidney disease.    Goran August  reports current alcohol use.,  reports that he quit smoking about 42 years ago. His smoking use included cigarettes. He has a 12.50 pack-year smoking history. He has quit using smokeless tobacco.     Past Hx:  has a past medical history of Anemia, Benign hypertensive heart disease, Cardiomyopathy (HCC), Cataract (08/01/2020), GERD (gastroesophageal reflux disease), Heart murmur, colonic polyps, Hyperlipidemia, Hypertension, LBBB (left bundle branch block), Mitral regurgitation, Osteoarthritis, and Type 2 diabetes mellitus (HCC).  Surg Hx:  has a past surgical history that includes Replacement total knee (Right); Cataract extraction, bilateral (Bilateral,  08/01/2020); and Inguinal hernia repair.  FH: family history includes Aneurysm in his mother; Aortic aneurysm in his father; Cancer in his maternal grandmother; Hypertension in his mother; No Known Problems in his daughter and son; Parkinsonism in his father; Tuberculosis in his father and paternal aunt.  SH:  reports that he quit smoking about 42 years ago. His smoking use included cigarettes. He has a 12.50 pack-year smoking history. He has quit using smokeless tobacco. He reports current alcohol use. He reports that he does not use drugs.    Medications Prior to Admission   Medication Sig Dispense Refill Last Dose   • albuterol sulfate  (90 Base) MCG/ACT inhaler Inhale 2 puffs Every 4 (Four) Hours As Needed for Wheezing or Shortness of Air. 18 g 6    • amoxicillin-clavulanate (AUGMENTIN) 875-125 MG per tablet Take 1 tablet by mouth 2 (Two) Times a Day for 10 days. 20 tablet 0    • carvedilol (COREG) 3.125 MG tablet TAKE 1 TABLET TWICE A  tablet 1    • doxycycline (VIBRAMYCIN) 100 MG capsule Take 1 capsule by mouth 2 (Two) Times a Day for 10 days. 20 capsule 0    • esomeprazole (nexIUM) 40 MG capsule TAKE 1 CAPSULE DAILY 90 capsule 1    • fluticasone (FLONASE) 50 MCG/ACT nasal spray 2 sprays into the nostril(s) as directed by provider Daily. Administer 2 sprays in each nostril once a day 3 bottle 3    • Multiple Vitamin (MULTI-VITAMIN DAILY PO) Take  by mouth.      • pravastatin (PRAVACHOL) 20 MG tablet TAKE 1 TABLET DAILY 90 tablet 3    • ramipril (ALTACE) 10 MG capsule Take 1 capsule by mouth Daily. 90 capsule 3    • spironolactone (ALDACTONE) 25 MG tablet Take 1 tablet by mouth Daily. 90 tablet 3    • triamcinolone (KENALOG) 0.1 % cream Apply  topically to the appropriate area as directed 2 (Two) Times a Day. 45 g 1    • furosemide (Lasix) 20 MG tablet Take 1 tablet by mouth Daily As Needed (swelling). 30 tablet 2      Allergies   Allergen Reactions   • Hctz [Hydrochlorothiazide] Hives        Review of Systems   Constitutional: Positive for fatigue. Negative for chills and fever.   HENT: Positive for congestion and sore throat.    Respiratory: Positive for cough and shortness of breath. Negative for wheezing.    Cardiovascular: Positive for leg swelling. Negative for chest pain and palpitations.   Gastrointestinal: Negative for abdominal pain, nausea and vomiting.   Genitourinary: Negative for dysuria and hematuria.   Musculoskeletal: Negative for arthralgias and back pain.   Skin: Negative for pallor and rash.   Neurological: Negative for dizziness and headaches.   Psychiatric/Behavioral: Negative for agitation and confusion.     Vital Signs past 24hrs  BP range: BP: (130-161)/(57-78) 157/69  Pulse range: Heart Rate:  [64-84] 64  Resp rate range: Resp:  [16] 16  Temp range: Temp (24hrs), Av.1 °F (36.7 °C), Min:96 °F (35.6 °C), Max:99.1 °F (37.3 °C)    Oxygen range: SpO2:  [62 %-97 %] 93 %; Flow (L/min):  [2-4] 2;   Device (Oxygen Therapy): nasal cannula  103 kg (226 lb); Body mass index is 31.52 kg/m².  I/O this shift:  In: 360 [P.O.:360]  Out: 250 [Urine:250]    Adult male who is sitting up in bed.  Looks comfortable and is on low-flow oxygen.  Pupils equal and react light.  Oropharynx moist.  Class III Mallampati airway.  No posterior pharyngeal discharge.  JVP not elevated trachea midline thyroid not enlarged.  Lungs reveal bilateral air entry.  Somewhat coarse breath sounds and equal air entry.  No obvious wheeze.  No obvious rales.  Percussion note resonant chest expansion equal no chest wall deformity or tenderness.  Heart examination S1-S2 present rhythm regular no murmurs.  Edema noted at least 1+ in the left lower extremity.  Right lower extremity looks pretty normal.  Abdomen is obese, soft, nontender.  Bowel sounds present no liver spleen enlargement.  No peripheral cyanosis clubbing.  Moves all 4 extremities sensorimotor intact.  No cervical, axillary, inguinal  adenopathy.    Results Review:    I have reviewed the laboratory and imaging data from current admission. My annotations are as noted in assessment and plan.    Medication Review:  I have reviewed the current MAR. My annotations are as noted in assessment and plan.    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    Acute DVT/possible chronic PE on CTA chest.  Agree with full anticoagulation.  Covid-19 infection may be the precipitant for thromboembolic disease.  Recommend 6 to 9 months of treatment with anticoagulation.    SARS-CoV-2 infection and on steroids.  Likely outside the window for other treatment modalities.  May still be a candidate for IL-6 inhibitor if lack of improvement or worsening.  Will monitor COVID labs.    Add nebulizer treatments for symptomatic treatment.  Will also add Hycodan nightly to help him sleep at night as cough is persistently severe when he lays down.  Add Zyrtec also.      Electronically signed by Noe Caro MD, 05/09/22, 1:11 PM EDT.      Part of this note may be an electronic transcription/translation of spoken language to printed text using the Dragon Dictation System.

## 2022-05-09 NOTE — CASE MANAGEMENT/SOCIAL WORK
Discharge Planning Assessment  [facility]     Patient Name: Goran August  MRN: [id number]  Today's Date: 5/9/2022    Admit Date: 5/8/2022     Discharge Needs Assessment     Row Name 05/09/22 1332       Living Environment    People in Home spouse    Name(s) of People in Home Melinda August/Wife - [phone]    Current Living Arrangements home    Primary Care Provided by self    Provides Primary Care For no one    Family Caregiver if Needed spouse    Family Caregiver Names Melinda August/Wife - [phone]    Able to Return to Prior Arrangements yes       Resource/Environmental Concerns    Resource/Environmental Concerns none       Transition Planning    Patient/Family Anticipates Transition to home with family    Patient/Family Anticipated Services at Transition none    Transportation Anticipated family or friend will provide       Discharge Needs Assessment    Equipment Currently Used at Home walker, standard;cane, straight    Concerns to be Addressed no discharge needs identified;denies needs/concerns at this time    Anticipated Changes Related to Illness none    Equipment Needed After Discharge none    Provided Post Acute Provider List? N/A    N/A Provider List Comment Patient plans to return home    Provided Post Acute Provider Quality & Resource List? N/A    N/A Quality & Resource List Comment Patient plans to return home               Discharge Plan     Row Name 05/09/22 1334       Plan    Plan Home    Patient/Family in Agreement with Plan yes    Plan Comments CCP made an outbound call to patient’s room due to patient testing positive for COVID. CCP introduced self and explained role via telephone. Patient confirmed the demographics on his face sheet are accurate. Patient states that he lives at home with his wife Melinda August, [phone]. Patient confirmed his PCP is Dr. Tanya Dhaliwal. Patient states he had HH after having a knee replacement 9 years ago but cannot remember the name of the [facility].  Patient states he has been to [place] for rehab after his knee surgery 9 years ago. Patient states that he was using a standard walker and a single tip cane this past weekend for ambulation but states he is normally independent with his ADL’s including driving. Patient is currently on 4 liters of O2 and does not have it at home. Patient agreeable to [facility] if home O2 is needed at discharge. Patient states that he has 2 stairs with a banister to enter his home from the garage. Patient denies any needs. Patient states his wife or his daughter, Anel Pelayo, [phone]. CCP to follow for O2 needs at discharge.              Continued Care and Services - Admitted Since 5/8/2022    Coordination has not been started for this encounter.          Demographic Summary     Row Name 05/09/22 1332       General Information    Admission Type inpatient    Arrived From home    Reason for Consult discharge planning    Preferred Language English               Functional Status     Row Name 05/09/22 1332       Functional Status    Usual Activity Tolerance good    Current Activity Tolerance moderate       Functional Status, IADL    Medications independent    Meal Preparation independent    Housekeeping independent    Laundry independent    Shopping independent       Mental Status    General Appearance WDL WDL       Mental Status Summary    Recent Changes in Mental Status/Cognitive Functioning no changes       Employment/    Employment Status retired               Psychosocial    No documentation.                Abuse/Neglect    No documentation.                Legal    No documentation.                Substance Abuse    No documentation.                Patient Forms    No documentation.

## 2022-05-09 NOTE — PLAN OF CARE
Goal Outcome Evaluation:  Plan of Care Reviewed With: patient        Progress: no change  Outcome Evaluation:     A&O x4; VSS; SR with BBB on telemetry; 4L via n/c.     External catheter in use.     Accu-checks and daily weights.     CT performed (see results tab)    Echo ordered.       No complaints of pain; no signs of distress. will continue to monitor.

## 2022-05-10 LAB
CRP SERPL-MCNC: 14.6 MG/DL (ref 0–0.5)
NT-PROBNP SERPL-MCNC: 499 PG/ML (ref 0–1800)
PROCALCITONIN SERPL-MCNC: 0.06 NG/ML (ref 0–0.25)

## 2022-05-10 PROCEDURE — 84145 PROCALCITONIN (PCT): CPT | Performed by: INTERNAL MEDICINE

## 2022-05-10 PROCEDURE — 94799 UNLISTED PULMONARY SVC/PX: CPT

## 2022-05-10 PROCEDURE — 83880 ASSAY OF NATRIURETIC PEPTIDE: CPT | Performed by: STUDENT IN AN ORGANIZED HEALTH CARE EDUCATION/TRAINING PROGRAM

## 2022-05-10 PROCEDURE — 97530 THERAPEUTIC ACTIVITIES: CPT

## 2022-05-10 PROCEDURE — 94664 DEMO&/EVAL PT USE INHALER: CPT

## 2022-05-10 PROCEDURE — 25010000002 DEXAMETHASONE PER 1 MG: Performed by: HOSPITALIST

## 2022-05-10 PROCEDURE — 25010000002 FUROSEMIDE PER 20 MG: Performed by: INTERNAL MEDICINE

## 2022-05-10 PROCEDURE — 86140 C-REACTIVE PROTEIN: CPT | Performed by: INTERNAL MEDICINE

## 2022-05-10 PROCEDURE — 99221 1ST HOSP IP/OBS SF/LOW 40: CPT | Performed by: INTERNAL MEDICINE

## 2022-05-10 PROCEDURE — 97162 PT EVAL MOD COMPLEX 30 MIN: CPT

## 2022-05-10 RX ORDER — GUAIFENESIN 600 MG/1
600 TABLET, EXTENDED RELEASE ORAL EVERY 12 HOURS SCHEDULED
Status: DISCONTINUED | OUTPATIENT
Start: 2022-05-10 | End: 2022-05-13 | Stop reason: HOSPADM

## 2022-05-10 RX ORDER — FUROSEMIDE 40 MG/1
40 TABLET ORAL DAILY
Status: DISCONTINUED | OUTPATIENT
Start: 2022-05-11 | End: 2022-05-13 | Stop reason: HOSPADM

## 2022-05-10 RX ADMIN — RAMIPRIL 10 MG: 10 CAPSULE ORAL at 09:16

## 2022-05-10 RX ADMIN — APIXABAN 10 MG: 5 TABLET, FILM COATED ORAL at 20:47

## 2022-05-10 RX ADMIN — APIXABAN 10 MG: 5 TABLET, FILM COATED ORAL at 09:16

## 2022-05-10 RX ADMIN — CETIRIZINE HYDROCHLORIDE 5 MG: 10 TABLET ORAL at 09:15

## 2022-05-10 RX ADMIN — CARVEDILOL 3.12 MG: 3.12 TABLET, FILM COATED ORAL at 09:15

## 2022-05-10 RX ADMIN — FLUTICASONE PROPIONATE 2 SPRAY: 50 SPRAY, METERED NASAL at 09:16

## 2022-05-10 RX ADMIN — FUROSEMIDE 40 MG: 10 INJECTION, SOLUTION INTRAMUSCULAR; INTRAVENOUS at 12:49

## 2022-05-10 RX ADMIN — Medication 10 ML: at 09:16

## 2022-05-10 RX ADMIN — SPIRONOLACTONE 25 MG: 25 TABLET, FILM COATED ORAL at 09:15

## 2022-05-10 RX ADMIN — Medication 10 ML: at 20:49

## 2022-05-10 RX ADMIN — GUAIFENESIN 600 MG: 600 TABLET, EXTENDED RELEASE ORAL at 18:08

## 2022-05-10 RX ADMIN — PANTOPRAZOLE SODIUM 40 MG: 40 TABLET, DELAYED RELEASE ORAL at 05:38

## 2022-05-10 RX ADMIN — PRAVASTATIN SODIUM 20 MG: 20 TABLET ORAL at 09:15

## 2022-05-10 RX ADMIN — GUAIFENESIN 600 MG: 600 TABLET, EXTENDED RELEASE ORAL at 20:47

## 2022-05-10 RX ADMIN — ARFORMOTEROL TARTRATE 15 MCG: 15 SOLUTION RESPIRATORY (INHALATION) at 19:37

## 2022-05-10 RX ADMIN — HYDROCODONE BITARTRATE AND HOMATROPINE METHYLBROMIDE 5 ML: 5; 1.5 SOLUTION ORAL at 20:47

## 2022-05-10 RX ADMIN — DEXAMETHASONE SODIUM PHOSPHATE 6 MG: 10 INJECTION INTRAMUSCULAR; INTRAVENOUS at 09:15

## 2022-05-10 RX ADMIN — ARFORMOTEROL TARTRATE 15 MCG: 15 SOLUTION RESPIRATORY (INHALATION) at 07:44

## 2022-05-10 NOTE — PLAN OF CARE
Goal Outcome Evaluation:  Plan of Care Reviewed With: patient        Progress: no change  Outcome Evaluation:     A&O x4; SB / SR on telemetry with BBB. Runs of [place] this shift with HR dropping into the high 30's at times. 4L via n/c while sleeping.     external catheter in use.     Accu-checks and daily weights.       no complaints of pain. no signs of distress. Will continue to monitor.

## 2022-05-10 NOTE — CONSULTS
[facility]  Consult Note                                                                              5/10/2022  Edwin Alejandre MD    Patient Identification:  Goran August:   87 y.o.  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  Echo on 8/2021 showed mildly dilated left ventricle 60%.  With mild to moderate mitral valve regurgitation, RV systolic pressure 51 mmHg consistent with moderate to severe pulmonary hypertension.  He was doing well.    He presented to ER with complaints of progressive lower extremity edema and erythema over the past 3 days.  He was recently diagnosed with bronchitis and was initially treated with Zithromax and now on Augmentin.  With progressive symptoms he came to the emergency room in ER, BUN/CR 25/1.22, D dimer 1.59, troponin negative, INR 1.15, procal 0.12, Lactate 1.7, WBC 12.10, HGB 12.1, CXR showed mild cardiomegaly and no evidence of  infiltrate, effusion or of congestive failure, EKG showed SR 73 with no acute changes, COVID positive.  On telemetry he was noted to be bradycardic with rates in the 40s.  He is on beta-blocker therapy at home.  He had an echocardiogram that showed an ejection fraction of 46% with septal hypokinesis.  It was limited study.  CT angiogram of the chest showed right lower lobe lab suspicious for sequela of pulmonary emboli.  Patchy consolidation at both bases noted.  Coronary artery calcification cardiomegaly was noted    Since admission he is noted to have bradycardia with rates in the 20s and 30s.  He was asymptomatic.  He was started on Lasix though no heart failure evident on x-ray or CT.  Carvedilol has been held.  He started on Eliquis, steroids, Brovana              Past Medical History:  Past Medical History:   Diagnosis Date   •  Anemia    • Benign hypertensive heart disease    • Cardiomyopathy (HCC)     EF 45-50% by echo 3/11   • Cataract 08/01/2020 9/1/20   • GERD (gastroesophageal reflux disease)    • Heart murmur    • Hx of colonic polyps    • Hyperlipidemia    • Hypertension    • LBBB (left bundle branch block)    • Mitral regurgitation    • Osteoarthritis    • Type 2 diabetes mellitus (HCC)        Past Surgical History:  Past Surgical History:   Procedure Laterality Date   • CATARACT EXTRACTION, BILATERAL Bilateral 08/01/2020   • INGUINAL HERNIA REPAIR      X4   • REPLACEMENT TOTAL KNEE Right        Allergies:  Allergies   Allergen Reactions   • Hctz [Hydrochlorothiazide] Hives       Home Meds:  Medications Prior to Admission   Medication Sig Dispense Refill Last Dose   • albuterol sulfate  (90 Base) MCG/ACT inhaler Inhale 2 puffs Every 4 (Four) Hours As Needed for Wheezing or Shortness of Air. 18 g 6    • amoxicillin-clavulanate (AUGMENTIN) 875-125 MG per tablet Take 1 tablet by mouth 2 (Two) Times a Day for 10 days. 20 tablet 0    • carvedilol (COREG) 3.125 MG tablet TAKE 1 TABLET TWICE A  tablet 1    • doxycycline (VIBRAMYCIN) 100 MG capsule Take 1 capsule by mouth 2 (Two) Times a Day for 10 days. 20 capsule 0    • esomeprazole (nexIUM) 40 MG capsule TAKE 1 CAPSULE DAILY 90 capsule 1    • fluticasone (FLONASE) 50 MCG/ACT nasal spray 2 sprays into the nostril(s) as directed by provider Daily. Administer 2 sprays in each nostril once a day 3 bottle 3    • Multiple Vitamin (MULTI-VITAMIN DAILY PO) Take  by mouth.      • pravastatin (PRAVACHOL) 20 MG tablet TAKE 1 TABLET DAILY 90 tablet 3    • ramipril (ALTACE) 10 MG capsule Take 1 capsule by mouth Daily. 90 capsule 3    • spironolactone (ALDACTONE) 25 MG tablet Take 1 tablet by mouth Daily. 90 tablet 3    • triamcinolone (KENALOG) 0.1 % cream Apply  topically to the appropriate area as directed 2 (Two) Times a Day. 45 g 1    • furosemide (Lasix) 20 MG tablet Take 1  tablet by mouth Daily As Needed (swelling). 30 tablet 2        Current Meds  Scheduled Meds:apixaban, 10 mg, Oral, Q12H   Followed by  [START ON 2022] apixaban, 5 mg, Oral, Q12H  arformoterol, 15 mcg, Nebulization, BID - RT  cetirizine, 5 mg, Oral, Daily  [START ON 2022] dexamethasone, 6 mg, Oral, Daily With Breakfast  fluticasone, 2 spray, Nasal, Daily  [START ON 2022] furosemide, 40 mg, Oral, Daily  guaiFENesin, 600 mg, Oral, Q12H  HYDROcodone Bit-Homatrop MBr, 5 mL, Oral, Nightly  pantoprazole, 40 mg, Oral, QAM  pravastatin, 20 mg, Oral, Daily  ramipril, 10 mg, Oral, Daily  sodium chloride, 10 mL, Intravenous, Q12H  spironolactone, 25 mg, Oral, Daily      Social History:   Social History     Socioeconomic History   • Marital status:    Tobacco Use   • Smoking status: Former Smoker     Packs/day: 0.50     Years: 25.00     Pack years: 12.50     Types: Cigarettes     Quit date:      Years since quittin.3   • Smokeless tobacco: Former User   Substance and Sexual Activity   • Alcohol use: Yes     Comment: one drink monthly   • Drug use: No   • Sexual activity: Defer       Family History:  Family History   Problem Relation Age of Onset   • Aneurysm Mother         Colonic AVM in 90's   • Hypertension Mother    • Parkinsonism Father         90's   • Aortic aneurysm Father         AAA   • Tuberculosis Father    • Tuberculosis Paternal Aunt    • Cancer Maternal Grandmother    • No Known Problems Daughter    • No Known Problems Son        REVIEW OF SYSTEMS: Obtained from chart and patient.  CONSTITUTIONAL: No weight loss,  HEENT: Eyes: No visual loss, blurred vision, double vision or yellow sclerae. Ears, Nose, Throat: No hearing loss, sneezing, congestion, runny nose or sore throat.   SKIN: No rash or itching.     RESPIRATORY: No hemoptysis, cough or sputum.   GASTROINTESTINAL: No anorexia, nausea, vomiting or diarrhea. No abdominal pain, bright red blood per rectum or melena.  GENITOURINARY:  "No burning on urination, hematuria or increased frequency.  NEUROLOGICAL: No headache, dizziness, syncope, paralysis, ataxia, numbness or tingling in the extremities. No change in bowel or bladder control.   MUSCULOSKELETAL: No muscle, back pain, joint pain or stiffness.   ENDOCRINOLOGIC: No reports of sweating, cold or heat intolerance. No polyuria or polydipsia.     Physical Exam    /68 (BP Location: Right arm, Patient Position: Sitting)   Pulse 72   Temp 98.1 °F (36.7 °C) (Oral)   Resp 18   Ht 180 cm (70.87\")   Wt 103 kg (227 lb 1.2 oz)   SpO2 96%   BMI 31.79 kg/m²     Not performed to limit disease diseamination    Results from last 7 days   Lab Units 05/09/22  0628   SODIUM mmol/L 139   POTASSIUM mmol/L 4.4   CHLORIDE mmol/L 96*   CO2 mmol/L 32.8*   BUN mg/dL 27*   CREATININE mg/dL 1.11   CALCIUM mg/dL 9.6   BILIRUBIN mg/dL 0.5   ALK PHOS U/L 86   ALT (SGPT) U/L 19   AST (SGOT) U/L 18   GLUCOSE mg/dL 125*     Results from last 7 days   Lab Units 05/08/22  1639   TROPONIN T ng/mL <0.010     Results from last 7 days   Lab Units 05/09/22  0628 05/08/22  1639 05/06/22  1042   WBC 10*3/mm3 12.33* 12.10* 12.5*   HEMOGLOBIN g/dL 12.4* 12.1* 13.2   HEMATOCRIT % 38.3 38.5 42.0   PLATELETS 10*3/mm3 266 236 247     Results from last 7 days   Lab Units 05/08/22  1639   INR  1.15*           I personally viewed and interpreted the patient's EKG/Telemetry data  I have reviewed HPI and ROS above.    Assessment and Plan  1.  Bradycardia.  Agree with holding carvedilol and observe for now.  Likely start Lopressor 6.25 mg twice daily tomorrow depending on heart rate off carvedilol.  2.  History of cardiomyopathy.  Likely nonischemic.  Now with mild reduction in ejection fraction.  Possibly COVID-related though may be due to underlying bundle branch block.  Continue with lisinopril spironolactone.  As above regarding beta-blocker use.  3.  Coronary artery disease noted by coronary calcium on CT scan.  No anginal " symptoms but no coronary evaluation since 2014.  We will follow-up with Dr. Lang in 3 to 4 weeks and consider further follow-up.  4.  Pulmonary hypertension.  Not appreciated on the limited study performed with this COVID infection.  Would consider repeat imaging in 6 to 8 weeks.  4.  Hypertension.  Elevated slightly today we will track and li address further according to her response  5.  Hyperlipidemia  6.  Left branch block, chronic    Mini Jimenez  5/10/2022  18:08 EDT    45min spent in reviewing records, discussion and examination of the patient and discussion with other members of the patient's medical team.     Dictated utilizing Dragon dictation

## 2022-05-10 NOTE — PLAN OF CARE
Goal Outcome Evaluation:  Plan of Care Reviewed With: patient           Outcome Evaluation: Pt is a 86 yo M who was admitted with cough, SOA, LE swelling and COVID19. Pt presents to PT with impaired functional mobilty and gait secondary to generalized weakness, impaired balance, and decreased activity tolerance. Pt may benefit from skilled PT to address strength, mobility, and gait.

## 2022-05-10 NOTE — PROGRESS NOTES
[facility]  [phone]  Dr. Noe Caro    Subjective:  LOS: 2    Chief Complaint: Covid-19    Still has significant cough especially when he is trying to talk or take deep breaths.  Denies wheezing.  No cough at rest.  Does not feel short of breath.  No chest pain.    Objective   Vital Signs past 24hrs    Temp range: Temp (24hrs), Av °F (36.7 °C), Min:97.2 °F (36.2 °C), Max:98.8 °F (37.1 °C)    BP range: BP: (116-145)/(69-90) 145/78  Pulse range: Heart Rate:  [48-78] 63  Resp rate range: Resp:  [16-20] 18    Device (Oxygen Therapy): nasal cannulaFlow (L/min):  [2-4] 4  Oxygen range:SpO2:  [90 %-98 %] 96 %      103 kg (227 lb 1.2 oz); Body mass index is 31.79 kg/m².    Intake/Output Summary (Last 24 hours) at 5/10/2022 1124  Last data filed at 5/10/2022 0409  Gross per 24 hour   Intake 420 ml   Output 1160 ml   Net -740 ml       Physical Exam  Constitutional:       Appearance: He is obese.   Eyes:      Pupils: Pupils are equal, round, and reactive to light.   Cardiovascular:      Rate and Rhythm: Normal rate and regular rhythm.      Heart sounds: No murmur heard.  Pulmonary:      Effort: Pulmonary effort is normal.      Comments: Mild coarse bilateral breath sounds  Abdominal:      General: Bowel sounds are normal.      Palpations: Abdomen is soft. There is no mass.      Tenderness: There is no abdominal tenderness.   Musculoskeletal:         General: Swelling (chronic appearing with trace only) present.   Neurological:      Mental Status: He is alert.       Results Review:    I have reviewed the laboratory and imaging data since the last note by [facility] physician.  My annotations are noted in assessment and plan.    Medication Review:  I have reviewed the current MAR.  My annotations are noted in assessment and plan.       Plan   PCCM Problems  Acute hypoxic respiratory failure  Acute DVT left lower extremity  Suspected chronic PE on CT chest  Basilar atelectasis/consolidation especially left  lower lobe  SARS-CoV-2 infection  Viral bronchitis from above  Ex-smoker  Relevant Medical Diagnoses  Hypertension  Obesity  Left bundle branch block  Acute diastolic congestive heart failure    Plan of Treatment    Continue supplemental oxygen.  Wean as tolerated.    Now on full anticoagulation for acute DVT and suspected chronic PE.  Recommendation would be to treat for a full 6 to 9 months.    Basilar atelectasis/consolidation especially in the left lower lobe is not very impressive for an acute infection.  Also patient was treated with 2 courses of antibiotics as an outpatient.  His procalcitonin is normal.  Defer any additional antibiotics for now.    Supportive treatment for SARS-CoV-2 with dexamethasone IV.  Can switch to p.o. from at any point from my standpoint.  Needs to complete 10 days.  CRP is 14 which is very high.    Ex-smoker and therefore I started Brovana twice daily.  Does not have a history of COPD as an outpatient.    Acute diastolic congestive heart failure and treated with diuretic on admission.  His leg swelling is chronic appearing but significantly improved now with only chronic skin changes.    Note bradycardia last night.  Defer to primary team for addressing further.    Noe Caro MD  05/10/22  11:24 EDT    While in the room and during my examination of the patient I wore gloves, gown, mask, eye protection and followed enhanced droplet/contact isolation protocol and precautions.  I washed my hands before and after this patient encounter.    Part of this note may be an electronic transcription/translation of spoken language to printed text using the Dragon Dictation System.

## 2022-05-10 NOTE — THERAPY EVALUATION
Patient Name: Goran August  : 1934    MRN: [id number]                              Today's Date: 5/10/2022       Admit Date: 2022    Visit Dx:     ICD-10-CM ICD-9-CM   1. Bilateral lower extremity edema  R60.0 782.3   2. Cellulitis of lower extremity, unspecified laterality  L03.119 682.6   3. Nonischemic cardiomyopathy (HCC)  I42.8 425.4   4. COVID-19 virus infection  U07.1 079.89     Patient Active Problem List   Diagnosis   • Insulin resistance   • Hypertensive heart disease without heart failure   • Hyperlipidemia   • Iron-deficiency anemia   • Idiopathic chronic gout without tophus   • Hiatal hernia with gastroesophageal reflux   • Stage 3 chronic kidney disease (HCC)   • Environmental allergies   • Benign essential HTN   • Left bundle branch block   • Nonischemic cardiomyopathy (HCC)   • Exudative age-related macular degeneration, left eye, with active choroidal neovascularization (HCC)   • Peripheral vascular disease, unspecified (HCC)   • Bilateral lower extremity edema   • Hypoxia   • Dyspnea   • COVID-19 virus infection   • Combined systolic and diastolic congestive heart failure (HCC)   • Type 2 diabetes mellitus (HCC)   • Positive D dimer   • Acute DVT (deep venous thrombosis) (HCC)   • Acute respiratory failure with hypoxia (HCC)     Past Medical History:   Diagnosis Date   • Anemia    • Benign hypertensive heart disease    • Cardiomyopathy (HCC)     EF 45-50% by echo 3/11   • Cataract 2020   • GERD (gastroesophageal reflux disease)    • Heart murmur    • Hx of colonic polyps    • Hyperlipidemia    • Hypertension    • LBBB (left bundle branch block)    • Mitral regurgitation    • Osteoarthritis    • Type 2 diabetes mellitus (HCC)      Past Surgical History:   Procedure Laterality Date   • CATARACT EXTRACTION, BILATERAL Bilateral 2020   • INGUINAL HERNIA REPAIR      X4   • REPLACEMENT TOTAL KNEE Right       General Information     Row Name 05/10/22 1324           Physical Therapy Time and Intention    Document Type evaluation  -     Mode of Treatment individual therapy;physical therapy  -     Row Name 05/10/22 1324          General Information    Prior Level of Function independent:;gait;transfer;bed mobility  pt reports he only started using walker recently 2 to current illness  -     Existing Precautions/Restrictions fall;oxygen therapy device and L/min  -     Barriers to Rehab medically complex  -     Row Name 05/10/22 1324          Living Environment    People in Home spouse  -     Row Name 05/10/22 1324          Cognition    Orientation Status (Cognition) oriented x 3  -     Row Name 05/10/22 1324          Safety Issues, Functional Mobility    Impairments Affecting Function (Mobility) endurance/activity tolerance;strength  -           User Key  (r) = Recorded By, (t) = Taken By, (c) = Cosigned By    Initials Name Provider Type     Divya Ruelas, PT Physical Therapist               Mobility     Row Name 05/10/22 1327          Bed Mobility    Bed Mobility supine-sit;sit-supine  -     Supine-Sit [place] (Bed Mobility) verbal cues;nonverbal cues (demo/gesture);contact guard  -     Sit-Supine [place] (Bed Mobility) verbal cues;standby assist  -     Assistive Device (Bed Mobility) bed rails;head of bed elevated  -     Row Name 05/10/22 1327          Sit-Stand Transfer    Sit-Stand [place] (Transfers) verbal cues;nonverbal cues (demo/gesture);contact guard  -     Assistive Device (Sit-Stand Transfers) walker, front-wheeled  -     Row Name 05/10/22 1327          Gait/Stairs (Locomotion)    [place] Level (Gait) verbal cues;nonverbal cues (demo/gesture);contact guard  -     Assistive Device (Gait) walker, front-wheeled  -     Distance in Feet (Gait) 60  -     Deviations/Abnormal Patterns (Gait) shannen decreased;gait speed decreased;stride length decreased  [facility]     Bilateral Gait Deviations forward flexed posture  -      Comment, (Gait/Stairs) pt reports no SOA with activity, O2 sats 88-89% upon returning to bed  -           User Key  (r) = Recorded By, (t) = Taken By, (c) = Cosigned By    Initials Name Provider Type    Divya Mcfarland, PT Physical Therapist               Obj/Interventions     Row Name 05/10/22 1329          Range of Motion Comprehensive    General Range of Motion no range of motion deficits identified  -CH     Row Name 05/10/22 1329          Strength Comprehensive (MMT)    Comment, General Manual Muscle Testing (MMT) Assessment generalized weakness noted with functional mobility, B LE grossly >/=4/5  -[facility] Name 05/10/22 1329          Motor Skills    Therapeutic Exercise --  10 reps B LE AP, LAQ, and seated marches  -[facility] Name 05/10/22 1329          Balance    Balance Assessment standing static balance;standing dynamic balance  -     Static Standing Balance contact guard  -     Dynamic Standing Balance contact guard  -     Position/Device Used, Standing Balance walker, front-wheeled  -           User Key  (r) = Recorded By, (t) = Taken By, (c) = Cosigned By    Initials Name Provider Type    Divya Mcfarland, PT Physical Therapist               Goals/Plan     [facility] Name 05/10/22 1336          Bed Mobility Goal 1 (PT)    Activity/Assistive Device (Bed Mobility Goal 1, PT) bed mobility activities, all  -     [place] Level/Cues Needed (Bed Mobility Goal 1, PT) supervision required  -     Time Frame (Bed Mobility Goal 1, PT) 1 week  -[facility] Name 05/10/22 1336          Transfer Goal 1 (PT)    Activity/Assistive Device (Transfer Goal 1, PT) transfers, all;walker, rolling  -     [place] Level/Cues Needed (Transfer Goal 1, PT) supervision required  -     Time Frame (Transfer Goal 1, PT) 1 week  -[facility] Name 05/10/22 1336          Gait Training Goal 1 (PT)    Activity/Assistive Device (Gait Training Goal 1, PT) gait (walking locomotion);walker, rolling  -      [place] Level (Gait Training Goal 1, PT) supervision required  -     Distance (Gait Training Goal 1, PT) 150  -CH     Time Frame (Gait Training Goal 1, PT) 1 week  -     Row Name 05/10/22 1336          Therapy Assessment/Plan (PT)    Planned Therapy Interventions (PT) balance training;bed mobility training;gait training;home exercise program;patient/family education;strengthening;transfer training  -           User Key  (r) = Recorded By, (t) = Taken By, (c) = Cosigned By    Initials Name Provider Type     Divya Ruelas, PT Physical Therapist               Clinical Impression     Row Name 05/10/22 1332          Pain    Pretreatment Pain Rating 0/10 - no pain  -     Posttreatment Pain Rating 0/10 - no pain  -     Row Name 05/10/22 1332          Plan of Care Review    Plan of Care Reviewed With patient  -     Outcome Evaluation Pt is a 86 yo M who was admitted with cough, SOA, LE swelling and COVID19. Pt presents to PT with impaired functional mobilty and gait secondary to generalized weakness, impaired balance, and decreased activity tolerance. Pt may benefit from skilled PT to address strength, mobility, and gait.  -     Row Name 05/10/22 1332          Therapy Assessment/Plan (PT)    Patient/Family Therapy Goals Statement (PT) to return to OF  -     Rehab Potential (PT) good, to achieve stated therapy goals  -     Criteria for Skilled Interventions Met (PT) skilled treatment is necessary  -     Therapy Frequency (PT) 3 times/wk  -     Row Name 05/10/22 1332          Positioning and Restraints    Pre-Treatment Position in bed  -     Post Treatment Position bed  -     In Bed supine;call light within reach;encouraged to call for assist;exit alarm on  -           User Key  (r) = Recorded By, (t) = Taken By, (c) = Cosigned By    Initials Name Provider Type     Divya Ruelas, PT Physical Therapist               Outcome Measures     Row Name 05/10/22 1331          How much  help from another person do you currently need...    Turning from your back to your side while in flat bed without using bedrails? 3  -CH     Moving from lying on back to sitting on the side of a flat bed without bedrails? 3  -CH     Moving to and from a bed to a chair (including a wheelchair)? 3  -CH     Standing up from a chair using your arms (e.g., wheelchair, bedside chair)? 3  -CH     Climbing 3-5 steps with a railing? 2  -CH     To walk in hospital room? 3  -CH     AM-PAC 6 Clicks Score (PT) 17  -CH     Highest level of mobility 5 --> Static standing  -     Row Name 05/10/22 1336          Functional Assessment    Outcome Measure Options AM-PAC 6 Clicks Basic Mobility (PT)  -           User Key  (r) = Recorded By, (t) = Taken By, (c) = Cosigned By    Initials Name Provider Type     Divya Ruelas, PT Physical Therapist                             Physical Therapy Education                 Title: PT OT SLP Therapies (Done)     Topic: Physical Therapy (Done)     Point: Mobility training (Done)     Learning Progress Summary           Patient Acceptance, E,TB,D, VU,NR by  at 5/10/2022 1337                   Point: Home exercise program (Done)     Learning Progress Summary           Patient Acceptance, E,TB,D, VU,NR by  at 5/10/2022 1337                   Point: Body mechanics (Done)     Learning Progress Summary           Patient Acceptance, E,TB,D, VU,NR by  at 5/10/2022 1337                   Point: Precautions (Done)     Learning Progress Summary           Patient Acceptance, E,TB,D, VU,NR by  at 5/10/2022 1337                               User Key     Initials Effective Dates Name Provider Type [facility] 06/16/21 -  Divya Ruelas, PT Physical Therapist PT              PT Recommendation and Plan  Planned Therapy Interventions (PT): balance training, bed mobility training, gait training, home exercise program, patient/family education, strengthening, transfer training  Plan of Care  Reviewed With: patient  Outcome Evaluation: Pt is a 88 yo M who was admitted with cough, SOA, LE swelling and COVID19. Pt presents to PT with impaired functional mobilty and gait secondary to generalized weakness, impaired balance, and decreased activity tolerance. Pt may benefit from skilled PT to address strength, mobility, and gait.     Time Calculation:    PT Charges     Row Name 05/10/22 1337             Time Calculation    Start Time 1052  -CH      Stop Time 1109  -CH      Time Calculation (min) 17 min  -CH      PT Received On 05/10/22  -      PT - Next Appointment 05/11/22  -      PT Goal Re-Cert Due Date 05/17/22  -              Time Calculation- PT    Total Timed Code Minutes- PT 12 minute(s)  -CH              Timed Charges    35430 - PT Therapeutic Activity Minutes 12  -CH              Untimed Charges    PT Eval/Re-eval Minutes 10  -CH              Total Minutes    Timed Charges Total Minutes 12  -CH      Untimed Charges Total Minutes 10  -CH       Total Minutes 22  -CH            User Key  (r) = Recorded By, (t) = Taken By, (c) = Cosigned By    Initials Name Provider Type     Divya Ruelas, PT Physical Therapist              Therapy Charges for Today     Code Description Service Date Service Provider Modifiers Qty    17132773131 HC PT THERAPEUTIC ACT EA 15 MIN 5/10/2022 Divya Ruelas, PT GP 1    43169213360 HC PT EVAL MOD COMPLEXITY 2 5/10/2022 Divya Ruelas, PT GP 1          PT G-Codes  Outcome Measure Options: AM-PAC 6 Clicks Basic Mobility (PT)  AM-PAC 6 Clicks Score (PT): 17    Divya Ruelas PT  5/10/2022

## 2022-05-10 NOTE — PROGRESS NOTES
Name: Goran August ADMIT: 2022   : 1934  PCP: Tanya Dhaliwal MD    MRN: [id number] LOS: 2 days   AGE/SEX: 87 y.o. male  ROOM: [facility]     Subjective   Subjective   Had a couple of episodes of bradycardia last night down into the 20s and 30s reportedly. Cardiology has been consulted. Asymptomatic. Pt without complaint today. He does endorse continued chest congestion. Currently requiring 4L of oxygen. Echocardiogram reviewed. EF down a little to 46%       Objective   Objective   Vital Signs  Temp:  [97.2 °F (36.2 °C)-98.8 °F (37.1 °C)] 97.2 °F (36.2 °C)  Heart Rate:  [48-78] 63  Resp:  [16-20] 18  BP: (116-145)/(69-90) 145/78  SpO2:  [90 %-98 %] 96 %  on  Flow (L/min):  [2-4] 4;   Device (Oxygen Therapy): nasal cannula  Body mass index is 31.79 kg/m².  Physical Exam  Constitutional:       General: He is not in acute distress.  Cardiovascular:      Rate and Rhythm: Normal rate and regular rhythm.      Heart sounds: Normal heart sounds.   Pulmonary:      Effort: Pulmonary effort is normal.      Breath sounds: Rhonchi present. No wheezing or rales.   Abdominal:      General: Bowel sounds are normal.      Palpations: Abdomen is soft.   Musculoskeletal:         General: No tenderness.      Right lower leg: No edema.      Left lower leg: No edema.   Neurological:      Mental Status: He is alert.   Psychiatric:         Mood and Affect: Mood normal.         Behavior: Behavior normal.         Results Review     I reviewed the patient's new clinical results.  Results from last 7 days   Lab Units 22  0628 22  1639 22  1042   WBC 10*3/mm3 12.33* 12.10* 12.5*   HEMOGLOBIN g/dL 12.4* 12.1* 13.2   PLATELETS 10*3/mm3 266 236 247     Results from last 7 days   Lab Units 22  0628 22  1639 22  1042   SODIUM mmol/L 139 138 139   POTASSIUM mmol/L 4.4 4.6 4.8   CHLORIDE mmol/L 96* 102 96   TOTAL CO2 mmol/L  --   --  25   CO2 mmol/L 32.8* 26.5  --    BUN mg/dL 27* 25* 24   CREATININE  mg/dL 1.11 1.22 0.96   GLUCOSE mg/dL 125* 106* 137*   Estimated Creatinine Clearance: 57.2 mL/min (by C-G formula based on SCr of 1.11 mg/dL).  Results from last 7 days   Lab Units 05/09/22  0628 05/08/22  1639 05/06/22  1042   ALBUMIN g/dL 3.70 3.20* 4.1   BILIRUBIN mg/dL 0.5 0.5 0.7   ALK PHOS U/L 86 80 85   AST (SGOT) U/L 18 17 23   ALT (SGPT) U/L 19 16 18     Results from last 7 days   Lab Units 05/09/22  0628 05/08/22  1639 05/06/22  1042   CALCIUM mg/dL 9.6 9.4 9.7   ALBUMIN g/dL 3.70 3.20* 4.1     Results from last 7 days   Lab Units 05/10/22  0857 05/08/22  1639   PROCALCITONIN ng/mL 0.06 0.12   LACTATE mmol/L  --  1.7     COVID19   Date Value Ref Range Status   05/08/2022 Detected (C) Not Detected - Ref. Range Final     SARS-CoV-2, HUMBERTO   Date Value Ref Range Status   05/06/2022 Not Detected Not Detected Final     Comment:     This nucleic acid amplification test was developed and its performance  characteristics determined by [employer]. Nucleic acid  amplification tests include RT-PCR and TMA. This test has not been  FDA cleared or approved. This test has been authorized by FDA under  an Emergency Use Authorization (EUA). This test is only authorized  for the duration of time the declaration that circumstances exist  justifying the authorization of the emergency use of in vitro  diagnostic tests for detection of SARS-CoV-2 virus and/or diagnosis  of COVID-19 infection under section 564(b)(1) of the Act, 21 U.S.C.  360bbb-3(b) (1), unless the authorization is terminated or revoked  sooner.  When diagnostic testing is negative, the possibility of a false  negative result should be considered in the context of a patient's  recent exposures and the presence of clinical signs and symptoms  consistent with COVID-19. An individual without symptoms of COVID-19  and who is not shedding SARS-CoV-2 virus would expect to have a  negative (not detected) result in this assay.       Hemoglobin A1C   Date/Time  Value Ref Range Status   05/09/2022 0628 6.10 (H) 4.80 - 5.60 % Final     Glucose   Date/Time Value Ref Range Status   05/08/2022 2146 133 (H) 70 - 130 mg/dL Final     Comment:     Meter: [id number] : [id number] Johnnie Dalton RN       Adult Transthoracic Echo Limited W/ Cont if Necessary Per Protocol  · Left ventricular wall thickness is consistent with mild concentric   hypertrophy.  · Estimated left ventricular EF = 46% Left ventricular systolic function   is low normal.  · Septal wall motion is abnormal, consistent with a bundle branch block.  · Left ventricular diastolic function was not assessed.  · This is a limited study.     XR Chest PA & Lateral  .xrayexamcomplete        Duplex Venous Lower Extremity - Bilateral  · Acute right lower extremity deep vein thrombosis noted in the peroneal.  · All other veins appeared normal bilaterally.     XR Chest 1 View  PORTABLE CHEST     HISTORY: Lower extremity swelling.     COMPARISON: None.     FINDINGS:  A single view of the chest demonstrates mild cardiomegaly.  There is no evidence of infiltrate, effusion or of congestive failure.     This report was finalized on 5/9/2022 6:31 AM by Dr. Timur Mancuso M.D.     CT Angiogram Chest With & Without Contrast  Narrative: Patient: LAZARA ROTHMAN  Time Out: 01:55  Exam(s): CTA CHEST W WO Contrast     EXAM:    CT Angiography Chest With Intravenous Contrast    CLINICAL HISTORY:     Reason for exam: positive dimer.    TECHNIQUE:    Axial computed tomographic angiography images of the chest with   intravenous contrast.  CTDI is 14.8 mGy and DLP is 872.3 mGy-cm.  This CT   exam was performed according to the principle of ALARA (As Low As   Reasonably Achievable) by using one or more of the following dose   reduction techniques: automated exposure control, adjustment of the mA   and or kV according to patient size, and or use of iterative   reconstruction technique.    3D reconstructed images were created and  reviewed.    COMPARISON:    No relevant prior studies available.    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  No central   filling defect suspicious for acute pulmonary emboli are apparent.    Aorta:  No acute findings.  No thoracic aortic aneurysm.    Lungs:  Medial basal left lower lobe patchy consolidation and bilateral   posterior costophrenic angle atelectatic changes with mild cylindrical   bronchiectasis noted.    Pleural space:  Unremarkable.  No significant effusion.  No   pneumothorax.    Heart:  Coronary artery calcifications.  Mild cardiac enlargement.  No   significant pericardial effusion.  No evidence of RV dysfunction.    Bones joints:  No acute fracture.  No dislocation.    Soft tissues:  Unremarkable.    Lymph nodes:  Calcified lymph nodes in the right hilum are present.    Gallbladder and bile ducts:  Incidental cholelithiasis and the included   upper abdomen.    IMPRESSION:       1.  No acute pulmonary emboli.  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  2.  Patchy consolidation and volume loss in the left lower lobe.  Query   aspiration pneumonitis or infectious pneumonia.  Correlate clinically.  Impression: Electronically signed by Tala Mcrae MD on 05-09-22 at 0155    Scheduled Medications  apixaban, 10 mg, Oral, Q12H   Followed by  [START ON 5/16/2022] apixaban, 5 mg, Oral, Q12H  arformoterol, 15 mcg, Nebulization, BID - RT  cetirizine, 5 mg, Oral, Daily  dexamethasone, 6 mg, Intravenous, Daily  fluticasone, 2 spray, Nasal, Daily  furosemide, 40 mg, Intravenous, Q12H  HYDROcodone Bit-Homatrop MBr, 5 mL, Oral, Nightly  pantoprazole, 40 mg, Oral, QAM  pravastatin, 20 mg, Oral, Daily  ramipril, 10 mg, Oral, Daily  sodium chloride, 10 mL, Intravenous, Q12H  spironolactone, 25 mg, Oral, Daily    Infusions   Diet  Diet Regular; Cardiac,  Consistent Carbohydrate, Renal       Assessment/Plan     Active Hospital Problems    Diagnosis  POA   • **COVID-19 virus infection [U07.1]  Unknown   • Acute DVT (deep venous thrombosis) (HCC) [I82.409]  Unknown   • Acute respiratory failure with hypoxia (HCC) [J96.01]  Unknown   • 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       87 y.o. male admitted with COVID-19 virus infection.    87 y.o. male admitted with COVID-19 virus infection. He has had cough congestion and shortness of breath for the last couple weeks. Tested negative for COVID as an outpatient and was treated with 2 rounds of antibiotics. He developed lower extremity swelling and was sent to the ED    · COVID-19 pneumonia causing acute hypoxic respiratory failure-change dexamethasone to oral. He was out of the window for remdesivir. His CRP remains high and he is currently on 4L of oxygen. Add flutter valve and mucolytic for chest congestion.  · Acute on chronic combined systolic (with recovery) and diastolic heart failure-EF down a little to 46% on echo yesterday. Currently on IV lasix. He appears euvolemic. No edema seen on chest xray/cta chest. Weight is about the same as previous hospitalizations. I will check a BNP and switch to oral diuretics.   · Chronic PE and acute DVT-has been started on eliquis. His co-pay is $20/month. He will need 3-6 months of therapy.  · Bradycardia-appears to be asymptomatic. Hold coreg for now. Cardiology has been consulted.  · Eliquis (home med) for DVT prophylaxis.  · Full code.  · Discussed with patient and nursing staff.  · Anticipate discharge TBD timing yet to be determined.      Gus Waldrop MD  [facility]  05/10/22  11:54 EDT    I wore  protective equipment throughout this patient encounter including a face mask, gloves and protective eyewear.  Hand hygiene was performed before donning protective equipment and after removal when leaving the room.

## 2022-05-11 ENCOUNTER — TELEPHONE (OUTPATIENT)
Dept: FAMILY MEDICINE CLINIC | Facility: CLINIC | Age: 87
End: 2022-05-11

## 2022-05-11 LAB
ALBUMIN SERPL-MCNC: 3.6 G/DL (ref 3.5–5.2)
ALBUMIN/GLOB SERPL: 1 G/DL
ALP SERPL-CCNC: 79 U/L (ref 39–117)
ALT SERPL W P-5'-P-CCNC: 22 U/L (ref 1–41)
ANION GAP SERPL CALCULATED.3IONS-SCNC: 12.2 MMOL/L (ref 5–15)
AST SERPL-CCNC: 23 U/L (ref 1–40)
BILIRUB SERPL-MCNC: 0.4 MG/DL (ref 0–1.2)
BUN SERPL-MCNC: 40 MG/DL (ref 8–23)
BUN/CREAT SERPL: 44.9 (ref 7–25)
CALCIUM SPEC-SCNC: 9.8 MG/DL (ref 8.6–10.5)
CHLORIDE SERPL-SCNC: 95 MMOL/L (ref 98–107)
CO2 SERPL-SCNC: 30.8 MMOL/L (ref 22–29)
CREAT SERPL-MCNC: 0.89 MG/DL (ref 0.76–1.27)
CRP SERPL-MCNC: 7.38 MG/DL (ref 0–0.5)
DEPRECATED RDW RBC AUTO: 43.1 FL (ref 37–54)
EGFRCR SERPLBLD CKD-EPI 2021: 82.9 ML/MIN/1.73
ERYTHROCYTE [DISTWIDTH] IN BLOOD BY AUTOMATED COUNT: 13.5 % (ref 12.3–15.4)
GLOBULIN UR ELPH-MCNC: 3.7 GM/DL
GLUCOSE SERPL-MCNC: 175 MG/DL (ref 65–99)
HCT VFR BLD AUTO: 42.2 % (ref 37.5–51)
HGB BLD-MCNC: 13.4 G/DL (ref 13–17.7)
MCH RBC QN AUTO: 27.4 PG (ref 26.6–33)
MCHC RBC AUTO-ENTMCNC: 31.8 G/DL (ref 31.5–35.7)
MCV RBC AUTO: 86.3 FL (ref 79–97)
PLATELET # BLD AUTO: 317 10*3/MM3 (ref 140–450)
PMV BLD AUTO: 10.1 FL (ref 6–12)
POTASSIUM SERPL-SCNC: 4.1 MMOL/L (ref 3.5–5.2)
PROT SERPL-MCNC: 7.3 G/DL (ref 6–8.5)
RBC # BLD AUTO: 4.89 10*6/MM3 (ref 4.14–5.8)
SODIUM SERPL-SCNC: 138 MMOL/L (ref 136–145)
WBC NRBC COR # BLD: 11.22 10*3/MM3 (ref 3.4–10.8)

## 2022-05-11 PROCEDURE — 63710000001 DEXAMETHASONE PER 0.25 MG: Performed by: STUDENT IN AN ORGANIZED HEALTH CARE EDUCATION/TRAINING PROGRAM

## 2022-05-11 PROCEDURE — 94799 UNLISTED PULMONARY SVC/PX: CPT

## 2022-05-11 PROCEDURE — 94761 N-INVAS EAR/PLS OXIMETRY MLT: CPT

## 2022-05-11 PROCEDURE — 80053 COMPREHEN METABOLIC PANEL: CPT | Performed by: STUDENT IN AN ORGANIZED HEALTH CARE EDUCATION/TRAINING PROGRAM

## 2022-05-11 PROCEDURE — 86140 C-REACTIVE PROTEIN: CPT | Performed by: INTERNAL MEDICINE

## 2022-05-11 PROCEDURE — 99231 SBSQ HOSP IP/OBS SF/LOW 25: CPT | Performed by: INTERNAL MEDICINE

## 2022-05-11 PROCEDURE — 85027 COMPLETE CBC AUTOMATED: CPT | Performed by: STUDENT IN AN ORGANIZED HEALTH CARE EDUCATION/TRAINING PROGRAM

## 2022-05-11 PROCEDURE — 94664 DEMO&/EVAL PT USE INHALER: CPT

## 2022-05-11 RX ADMIN — CETIRIZINE HYDROCHLORIDE 5 MG: 10 TABLET ORAL at 09:30

## 2022-05-11 RX ADMIN — PANTOPRAZOLE SODIUM 40 MG: 40 TABLET, DELAYED RELEASE ORAL at 06:05

## 2022-05-11 RX ADMIN — SPIRONOLACTONE 25 MG: 25 TABLET, FILM COATED ORAL at 09:30

## 2022-05-11 RX ADMIN — ARFORMOTEROL TARTRATE 15 MCG: 15 SOLUTION RESPIRATORY (INHALATION) at 08:17

## 2022-05-11 RX ADMIN — HYDROCODONE BITARTRATE AND HOMATROPINE METHYLBROMIDE 5 ML: 5; 1.5 SOLUTION ORAL at 20:50

## 2022-05-11 RX ADMIN — Medication 10 ML: at 20:50

## 2022-05-11 RX ADMIN — ARFORMOTEROL TARTRATE 15 MCG: 15 SOLUTION RESPIRATORY (INHALATION) at 20:09

## 2022-05-11 RX ADMIN — GUAIFENESIN 600 MG: 600 TABLET, EXTENDED RELEASE ORAL at 20:50

## 2022-05-11 RX ADMIN — FLUTICASONE PROPIONATE 2 SPRAY: 50 SPRAY, METERED NASAL at 09:30

## 2022-05-11 RX ADMIN — FUROSEMIDE 40 MG: 40 TABLET ORAL at 09:30

## 2022-05-11 RX ADMIN — GUAIFENESIN 600 MG: 600 TABLET, EXTENDED RELEASE ORAL at 09:30

## 2022-05-11 RX ADMIN — RAMIPRIL 10 MG: 10 CAPSULE ORAL at 09:30

## 2022-05-11 RX ADMIN — DEXAMETHASONE 6 MG: 4 TABLET ORAL at 09:30

## 2022-05-11 RX ADMIN — APIXABAN 10 MG: 5 TABLET, FILM COATED ORAL at 20:50

## 2022-05-11 RX ADMIN — APIXABAN 10 MG: 5 TABLET, FILM COATED ORAL at 09:30

## 2022-05-11 NOTE — PROGRESS NOTES
Name: Goran August ADMIT: 2022   : 1934  PCP: Tanya Dhaliwal MD    MRN: [id number] LOS: 3 days   AGE/SEX: 87 y.o. male  ROOM: [facility]     Subjective   Subjective     No events overnight. Down to 2L when I saw him. He reports that the flutter valve is helping him mobilize his secretions well, but he still feels fairly congested. His RN reports that he had a few asymptomatic episodes of bradycardia overnight down into the 30s.       Objective   Objective   Vital Signs  Temp:  [96.4 °F (35.8 °C)-98.9 °F (37.2 °C)] 96.5 °F (35.8 °C)  Heart Rate:  [52-77] 64  Resp:  [16-18] 18  BP: (128-147)/(65-87) 128/65  SpO2:  [94 %-100 %] 98 %  on  Flow (L/min):  [3.5] 3.5;   Device (Oxygen Therapy): nasal cannula  Body mass index is 31.79 kg/m².  Physical Exam  Constitutional:       General: He is not in acute distress.  Cardiovascular:      Rate and Rhythm: Normal rate and regular rhythm.      Heart sounds: Normal heart sounds.   Pulmonary:      Effort: Pulmonary effort is normal.      Breath sounds: Rhonchi present. No wheezing or rales.      Comments: Poor air movement  Abdominal:      General: Bowel sounds are normal.      Palpations: Abdomen is soft.   Musculoskeletal:         General: No tenderness.      Right lower leg: No edema.      Left lower leg: No edema.   Neurological:      Mental Status: He is alert.   Psychiatric:         Mood and Affect: Mood normal.         Behavior: Behavior normal.         Results Review     I reviewed the patient's new clinical results.  Results from last 7 days   Lab Units 22  1001 22  0628 22  1639 22  1042   WBC 10*3/mm3 11.22* 12.33* 12.10* 12.5*   HEMOGLOBIN g/dL 13.4 12.4* 12.1* 13.2   PLATELETS 10*3/mm3 317 266 236 247     Results from last 7 days   Lab Units 22  1001 22  0628 22  1639 22  1042   SODIUM mmol/L 138 139 138 139   POTASSIUM mmol/L 4.1 4.4 4.6 4.8   CHLORIDE mmol/L 95* 96* 102 96   TOTAL CO2 mmol/L  --   --    --  25   CO2 mmol/L 30.8* 32.8* 26.5  --    BUN mg/dL 40* 27* 25* 24   CREATININE mg/dL 0.89 1.11 1.22 0.96   GLUCOSE mg/dL 175* 125* 106* 137*   Estimated Creatinine Clearance: 71.3 mL/min (by C-G formula based on SCr of 0.89 mg/dL).  Results from last 7 days   Lab Units 05/11/22  1001 05/09/22  0628 05/08/22  1639 05/06/22  1042   ALBUMIN g/dL 3.60 3.70 3.20* 4.1   BILIRUBIN mg/dL 0.4 0.5 0.5 0.7   ALK PHOS U/L 79 86 80 85   AST (SGOT) U/L 23 18 17 23   ALT (SGPT) U/L 22 19 16 18     Results from last 7 days   Lab Units 05/11/22  1001 05/09/22  0628 05/08/22  1639 05/06/22  1042   CALCIUM mg/dL 9.8 9.6 9.4 9.7   ALBUMIN g/dL 3.60 3.70 3.20* 4.1     Results from last 7 days   Lab Units 05/10/22  0857 05/08/22  1639   PROCALCITONIN ng/mL 0.06 0.12   LACTATE mmol/L  --  1.7     COVID19   Date Value Ref Range Status   05/08/2022 Detected (C) Not Detected - Ref. Range Final     SARS-CoV-2, HUMBERTO   Date Value Ref Range Status   05/06/2022 Not Detected Not Detected Final     Comment:     This nucleic acid amplification test was developed and its performance  characteristics determined by [employer]. Nucleic acid  amplification tests include RT-PCR and TMA. This test has not been  FDA cleared or approved. This test has been authorized by FDA under  an Emergency Use Authorization (EUA). This test is only authorized  for the duration of time the declaration that circumstances exist  justifying the authorization of the emergency use of in vitro  diagnostic tests for detection of SARS-CoV-2 virus and/or diagnosis  of COVID-19 infection under section 564(b)(1) of the Act, 21 U.S.C.  360bbb-3(b) (1), unless the authorization is terminated or revoked  sooner.  When diagnostic testing is negative, the possibility of a false  negative result should be considered in the context of a patient's  recent exposures and the presence of clinical signs and symptoms  consistent with COVID-19. An individual without symptoms of  COVID-19  and who is not shedding SARS-CoV-2 virus would expect to have a  negative (not detected) result in this assay.       Hemoglobin A1C   Date/Time Value Ref Range Status   05/09/2022 0628 6.10 (H) 4.80 - 5.60 % Final     Glucose   Date/Time Value Ref Range Status   05/08/2022 2146 133 (H) 70 - 130 mg/dL Final     Comment:     Meter: [id number] : [id number] Johnnie Dalton RN       Adult Transthoracic Echo Limited W/ Cont if Necessary Per Protocol  · Left ventricular wall thickness is consistent with mild concentric   hypertrophy.  · Estimated left ventricular EF = 46% Left ventricular systolic function   is low normal.  · Septal wall motion is abnormal, consistent with a bundle branch block.  · Left ventricular diastolic function was not assessed.  · This is a limited study.     XR Chest PA & Lateral  .xrayexamcomplete        Duplex Venous Lower Extremity - Bilateral  · Acute right lower extremity deep vein thrombosis noted in the peroneal.  · All other veins appeared normal bilaterally.     XR Chest 1 View  PORTABLE CHEST     HISTORY: Lower extremity swelling.     COMPARISON: None.     FINDINGS:  A single view of the chest demonstrates mild cardiomegaly.  There is no evidence of infiltrate, effusion or of congestive failure.     This report was finalized on 5/9/2022 6:31 AM by Dr. Timur Mancuso M.D.     CT Angiogram Chest With & Without Contrast  Narrative: Patient: LAZARA ROTHMAN  Time Out: 01:55  Exam(s): CTA CHEST W WO Contrast     EXAM:    CT Angiography Chest With Intravenous Contrast    CLINICAL HISTORY:     Reason for exam: positive dimer.    TECHNIQUE:    Axial computed tomographic angiography images of the chest with   intravenous contrast.  CTDI is 14.8 mGy and DLP is 872.3 mGy-cm.  This CT   exam was performed according to the principle of ALARA (As Low As   Reasonably Achievable) by using one or more of the following dose   reduction techniques: automated exposure control, adjustment of the mA    and or kV according to patient size, and or use of iterative   reconstruction technique.    3D reconstructed images were created and reviewed.    COMPARISON:    No relevant prior studies available.    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  No central   filling defect suspicious for acute pulmonary emboli are apparent.    Aorta:  No acute findings.  No thoracic aortic aneurysm.    Lungs:  Medial basal left lower lobe patchy consolidation and bilateral   posterior costophrenic angle atelectatic changes with mild cylindrical   bronchiectasis noted.    Pleural space:  Unremarkable.  No significant effusion.  No   pneumothorax.    Heart:  Coronary artery calcifications.  Mild cardiac enlargement.  No   significant pericardial effusion.  No evidence of RV dysfunction.    Bones joints:  No acute fracture.  No dislocation.    Soft tissues:  Unremarkable.    Lymph nodes:  Calcified lymph nodes in the right hilum are present.    Gallbladder and bile ducts:  Incidental cholelithiasis and the included   upper abdomen.    IMPRESSION:       1.  No acute pulmonary emboli.  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  2.  Patchy consolidation and volume loss in the left lower lobe.  Query   aspiration pneumonitis or infectious pneumonia.  Correlate clinically.  Impression: Electronically signed by Tala Mcrae MD on 05-09-22 at 0155    Scheduled Medications  apixaban, 10 mg, Oral, Q12H   Followed by  [START ON 5/16/2022] apixaban, 5 mg, Oral, Q12H  arformoterol, 15 mcg, Nebulization, BID - RT  cetirizine, 5 mg, Oral, Daily  dexamethasone, 6 mg, Oral, Daily With Breakfast  fluticasone, 2 spray, Nasal, Daily  furosemide, 40 mg, Oral, Daily  guaiFENesin, 600 mg, Oral, Q12H  HYDROcodone Bit-Homatrop MBr, 5 mL, Oral, Nightly  pantoprazole, 40 mg, Oral, QAM  pravastatin, 20 mg,  Oral, Daily  ramipril, 10 mg, Oral, Daily  sodium chloride, 10 mL, Intravenous, Q12H  spironolactone, 25 mg, Oral, Daily    Infusions   Diet  Diet Regular; Cardiac, Consistent Carbohydrate, Renal       Assessment/Plan     Active Hospital Problems    Diagnosis  POA   • **COVID-19 virus infection [U07.1]  Unknown   • Acute DVT (deep venous thrombosis) ([facility]) [I82.409]  Unknown   • Acute respiratory failure with hypoxia ([facility]) [J96.01]  Unknown   • Combined systolic and diastolic congestive heart failure ([facility]) [I50.40]  Unknown   • Type 2 diabetes mellitus ([facility]) [E11.9]  Unknown   • Benign essential HTN [I10]  Yes   • Stage 3 chronic kidney disease ([facility]) [N18.30]  Yes      Resolved Hospital Problems   No resolved problems to display.       87 y.o. male admitted with COVID-19 virus infection.    87 y.o. male admitted with COVID-19 virus infection. He has had cough congestion and shortness of breath for the last couple weeks. Tested negative for COVID as an outpatient and was treated with 2 rounds of antibiotics. He developed lower extremity swelling and was sent to the ED    · COVID-19 pneumonia causing acute hypoxic respiratory failure-oxygen requirement is improving. CRP is coming down. Continue oral dexamethasone for 10 days or until discharge. Out of the window for remdesivir. Continue flutter valve, nebs, mucolytics.  · Acute on chronic combined systolic (with recovery) and diastolic heart failure-EF down a little to 46% on echo. S/p IV diuretics and now on oral lasix.   · Chronic PE and acute DVT-has been started on eliquis. His co-pay is $20/month. He will need 3-6 months of therapy.  · Bradycardia-appears to be asymptomatic. Coreg is on hold. Cardiology is following.   · Eliquis (home med) for DVT prophylaxis.  · Full code.  · Discussed with patient and nursing staff.  · Anticipate discharge home in 1-2 days.      Gus Waldrop MD  [facility]  05/11/22  14:20 EDT    I wore protective  equipment throughout this patient encounter including a face mask, gloves and protective eyewear.  Hand hygiene was performed before donning protective equipment and after removal when leaving the room.

## 2022-05-11 NOTE — PROGRESS NOTES
[facility]  Progress note: 2022    Patient Identification:  Name:Goran August  Age:87 y.o.  Sex: male  :  1934  MRN: [id number]           CC:  Bradycardia    Interval history:  Heart rate to 30s but asymptomatic at night.  Today improved.  Blood pressure stable and he feels somewhat better.    Vital Signs:   Temp:  [96.4 °F (35.8 °C)-98.9 °F (37.2 °C)] 97.7 °F (36.5 °C)  Heart Rate:  [52-77] 68  Resp:  [16-18] 18  BP: (122-147)/(65-99) 122/99    Intake/Output Summary (Last 24 hours) at 2022 1756  Last data filed at 2022 0606  Gross per 24 hour   Intake 120 ml   Output 400 ml   Net -280 ml       Physical Examination:  Not performed to limit disease dissemination    Lab Review:  Personally reviewed the labs, radiology imaging and other cardiac procedures.   Results from last 7 days   Lab Units 22  1001   SODIUM mmol/L 138   POTASSIUM mmol/L 4.1   CHLORIDE mmol/L 95*   CO2 mmol/L 30.8*   BUN mg/dL 40*   CREATININE mg/dL 0.89   CALCIUM mg/dL 9.8   BILIRUBIN mg/dL 0.4   ALK PHOS U/L 79   ALT (SGPT) U/L 22   AST (SGOT) U/L 23   GLUCOSE mg/dL 175*     Results from last 7 days   Lab Units 22  1639   TROPONIN T ng/mL <0.010     Results from last 7 days   Lab Units 22  1001 22  0628 22  1639   WBC 10*3/mm3 11.22* 12.33* 12.10*   HEMOGLOBIN g/dL 13.4 12.4* 12.1*   HEMATOCRIT % 42.2 38.3 38.5   PLATELETS 10*3/mm3 317 266 236     Results from last 7 days   Lab Units 22  1639   INR  1.15*     Medication Review:   Meds reviewed  Scheduled Meds:apixaban, 10 mg, Oral, Q12H   Followed by  [START ON 2022] apixaban, 5 mg, Oral, Q12H  arformoterol, 15 mcg, Nebulization, BID - RT  cetirizine, 5 mg, Oral, Daily  dexamethasone, 6 mg, Oral, Daily With Breakfast  fluticasone, 2 spray, Nasal, Daily  furosemide, 40 mg, Oral, Daily  guaiFENesin, 600 mg, Oral, Q12H  HYDROcodone Bit-Homatrop MBr, 5 mL, Oral, Nightly  pantoprazole, 40 mg, Oral, QAM  pravastatin, 20 mg,  Oral, Daily  ramipril, 10 mg, Oral, Daily  sodium chloride, 10 mL, Intravenous, Q12H  spironolactone, 25 mg, Oral, Daily      I personally viewed and interpreted the patient's EKG/Telemetry data    Assessment and Plan    1.  Bradycardia.  Continue to hold off AV vargas blocker therapy for now if his heart rate improves to above 90 bpm can add low-dose Lopressor.  2.  History of cardiomyopathy.  Likely nonischemic.  Now with mild reduction in ejection fraction.  Possibly COVID-related though may be due to underlying bundle branch block.  Continue with lisinopril spironolactone.  As above regarding beta-blocker use.  3.  Coronary artery disease noted by coronary calcium on CT scan.  No anginal symptoms but no coronary evaluation since 2014.  We will follow-up with Dr. Lang in 3 to 4 weeks and consider further follow-up.  4.  Pulmonary hypertension.  Not appreciated on the limited study performed with this COVID infection.  Would consider repeat imaging in 6 to 8 weeks.  5.  Hypertension.  Elevated slightly today we will track and li address further according to her response  6.  Hyperlipidemia  7.  Left branch block, chronic     Mini Jimenez  5/11/202217:56 EDT  15min spent in reviewing records, discussion and examination of the patient and discussion with other members of the patient's medical team.     Dictated utilizing Dragon dictation

## 2022-05-11 NOTE — TELEPHONE ENCOUNTER
Pt is currently in the hospital and wanted to inform us. He is still scheduled for this Friday and will keep us updated about his appointment

## 2022-05-11 NOTE — PLAN OF CARE
Problem: Adult Inpatient Plan of Care  Goal: Plan of Care Review  Outcome: Ongoing, Progressing  Flowsheets (Taken 5/11/2022 0341)  Progress: improving  Plan of Care Reviewed With: patient  Outcome Evaluation:   No s/sx of distress noted at this time. Rested well throughout night. Vital signs WDL. 02@3.5L NC   tolerated. Fall precautions maintained. Will cont to monitor.  Goal: Patient-Specific Goal (Individualized)  Outcome: Ongoing, Progressing  Goal: Absence of Hospital-Acquired Illness or Injury  Outcome: Ongoing, Progressing  Intervention: Identify and Manage Fall Risk  Recent Flowsheet Documentation  Taken 5/11/2022 0209 by María Sinha, RN  Safety Promotion/Fall Prevention:   activity supervised   clutter free environment maintained   assistive device/personal items within reach   fall prevention program maintained   lighting adjusted   nonskid shoes/slippers when out of bed   room organization consistent   safety round/check completed  Taken 5/11/2022 0043 by María Sinha, RN  Safety Promotion/Fall Prevention:   activity supervised   clutter free environment maintained   assistive device/personal items within reach   fall prevention program maintained   lighting adjusted   nonskid shoes/slippers when out of bed   room organization consistent   safety round/check completed  Taken 5/10/2022 2227 by María Sinha, RN  Safety Promotion/Fall Prevention:   activity supervised   assistive device/personal items within reach   clutter free environment maintained   fall prevention program maintained   lighting adjusted   nonskid shoes/slippers when out of bed   room organization consistent   safety round/check completed  Taken 5/10/2022 2007 by María Sinha, RN  Safety Promotion/Fall Prevention:   activity supervised   assistive device/personal items within reach   clutter free environment maintained   fall prevention program maintained   lighting adjusted   nonskid shoes/slippers when out of bed   room organization  consistent   safety round/check completed  Intervention: Prevent Skin Injury  Recent Flowsheet Documentation  Taken 5/11/2022 0209 by María Sinha RN  Body Position: position changed independently  Taken 5/11/2022 0043 by María Sinha RN  Body Position:   position changed independently   supine  Skin Protection:   adhesive use limited   incontinence pads utilized   transparent dressing maintained   tubing/devices free from skin contact  Taken 5/10/2022 2007 by María Sinha RN  Body Position:   weight shifting   position changed independently  Skin Protection:   adhesive use limited   incontinence pads utilized   transparent dressing maintained   tubing/devices free from skin contact  Intervention: Prevent and Manage VTE (Venous Thromboembolism) Risk  Recent Flowsheet Documentation  Taken 5/11/2022 0209 by María Sinha RN  Activity Management: activity adjusted per tolerance  Taken 5/11/2022 0043 by María Sinha RN  Activity Management: activity adjusted per tolerance  VTE Prevention/Management: (Eliquis) other (see comments)  Taken 5/10/2022 2227 by María Sinha RN  Activity Management: activity adjusted per tolerance  Taken 5/10/2022 2007 by María Sinha RN  Activity Management: activity adjusted per tolerance  VTE Prevention/Management: (Eliquis) other (see comments)  Intervention: Prevent Infection  Recent Flowsheet Documentation  Taken 5/11/2022 0209 by María Sinha RN  Infection Prevention:   hand hygiene promoted   personal protective equipment utilized   rest/sleep promoted   single patient room provided  Taken 5/11/2022 0043 by María Sihna RN  Infection Prevention:   hand hygiene promoted   personal protective equipment utilized   rest/sleep promoted   single patient room provided  Taken 5/10/2022 2227 by María Sinha RN  Infection Prevention:   hand hygiene promoted   personal protective equipment utilized   rest/sleep promoted   single patient room provided  Taken 5/10/2022 2007 by María Sinha  RN  Infection Prevention:   hand hygiene promoted   personal protective equipment utilized   rest/sleep promoted   single patient room provided   visitors restricted/screened  Goal: Optimal Comfort and Wellbeing  Outcome: Ongoing, Progressing  Intervention: Provide Person-Centered Care  Recent Flowsheet Documentation  Taken 5/10/2022 2007 by María Sinha RN  Trust Relationship/Rapport:   care explained   reassurance provided   thoughts/feelings acknowledged  Goal: Readiness for Transition of Care  Outcome: Ongoing, Progressing     Problem: Fall Injury Risk  Goal: Absence of Fall and Fall-Related Injury  Outcome: Ongoing, Progressing  Intervention: Identify and Manage Contributors  Recent Flowsheet Documentation  Taken 5/11/2022 0209 by María Sinha RN  Medication Review/Management: medications reviewed  Taken 5/11/2022 0043 by María Sinha RN  Medication Review/Management: medications reviewed  Taken 5/10/2022 2227 by María Sinha RN  Medication Review/Management: medications reviewed  Taken 5/10/2022 2007 by María Sinha RN  Medication Review/Management: medications reviewed  Intervention: Promote Injury-Free Environment  Recent Flowsheet Documentation  Taken 5/11/2022 0209 by María Sinah RN  Safety Promotion/Fall Prevention:   activity supervised   clutter free environment maintained   assistive device/personal items within reach   fall prevention program maintained   lighting adjusted   nonskid shoes/slippers when out of bed   room organization consistent   safety round/check completed  Taken 5/11/2022 0043 by María Sinha RN  Safety Promotion/Fall Prevention:   activity supervised   clutter free environment maintained   assistive device/personal items within reach   fall prevention program maintained   lighting adjusted   nonskid shoes/slippers when out of bed   room organization consistent   safety round/check completed  Taken 5/10/2022 2227 by María Sinha RN  Safety Promotion/Fall Prevention:    activity supervised   assistive device/personal items within reach   clutter free environment maintained   fall prevention program maintained   lighting adjusted   nonskid shoes/slippers when out of bed   room organization consistent   safety round/check completed  Taken 5/10/2022 2007 by María Sinha RN  Safety Promotion/Fall Prevention:   activity supervised   assistive device/personal items within reach   clutter free environment maintained   fall prevention program maintained   lighting adjusted   nonskid shoes/slippers when out of bed   room organization consistent   safety round/check completed     Problem: Skin Injury Risk Increased  Goal: Skin Health and Integrity  Outcome: Ongoing, Progressing  Intervention: Optimize Skin Protection  Recent Flowsheet Documentation  Taken 5/11/2022 0043 by María Sinha RN  Pressure Reduction Techniques:   frequent weight shift encouraged   heels elevated off bed   weight shift assistance provided   pressure points protected  Head of Bed (HOB) Positioning: HOB at 15 degrees  Pressure Reduction Devices:   alternating pressure pump (ADD)   pressure-redistributing mattress utilized   specialty bed utilized   heel offloading device utilized  Skin Protection:   adhesive use limited   incontinence pads utilized   transparent dressing maintained   tubing/devices free from skin contact  Taken 5/10/2022 2007 by María Sinha RN  Pressure Reduction Techniques:   frequent weight shift encouraged   heels elevated off bed   weight shift assistance provided  Head of Bed (HOB) Positioning: HOB at 30-45 degrees  Pressure Reduction Devices:   alternating pressure pump (ADD)   pressure-redistributing mattress utilized   positioning supports utilized   heel offloading device utilized  Skin Protection:   adhesive use limited   incontinence pads utilized   transparent dressing maintained   tubing/devices free from skin contact   Goal Outcome Evaluation:  Plan of Care Reviewed With: patient         Progress: improving  Outcome Evaluation: No s/sx of distress noted at this time. Rested well throughout night. Vital signs WDL. 02@3.5L NC; tolerated. Fall precautions maintained. Will cont to monitor.

## 2022-05-11 NOTE — PROGRESS NOTES
[facility]  [phone]  Dr. Noe Caro    Subjective:  LOS: 3    Chief Complaint: Covid-19    Still with cough.  His oxygen requirements are improving daily.  Expectorate some when he uses the flutter valve and nebulizer treatments.    Objective   Vital Signs past 24hrs    Temp range: Temp (24hrs), Av.5 °F (36.4 °C), Min:96.4 °F (35.8 °C), Max:98.9 °F (37.2 °C)    BP range: BP: (106-147)/(65-87) 128/65  Pulse range: Heart Rate:  [52-77] 64  Resp rate range: Resp:  [16-18] 18    Device (Oxygen Therapy): nasal cannulaFlow (L/min):  [3.5-4] 3.5  Oxygen range:SpO2:  [94 %-100 %] 98 %      103 kg (227 lb 1.2 oz); Body mass index is 31.79 kg/m².    Intake/Output Summary (Last 24 hours) at 2022 1115  Last data filed at 2022 0606  Gross per 24 hour   Intake 120 ml   Output 400 ml   Net -280 ml       Physical Exam  Constitutional:       Appearance: He is obese.   Eyes:      Pupils: Pupils are equal, round, and reactive to light.   Cardiovascular:      Rate and Rhythm: Normal rate and regular rhythm.      Heart sounds: No murmur heard.  Pulmonary:      Effort: Pulmonary effort is normal.      Comments: Mild coarse bilateral breath sounds  Abdominal:      General: Bowel sounds are normal.      Palpations: Abdomen is soft. There is no mass.      Tenderness: There is no abdominal tenderness.   Musculoskeletal:         General: Swelling (chronic appearing with trace only) present.   Neurological:      Mental Status: He is alert.       Results Review:    I have reviewed the laboratory and imaging data since the last note by [facility] physician.  My annotations are noted in assessment and plan.    Medication Review:  I have reviewed the current MAR.  My annotations are noted in assessment and plan.       Plan   PCCM Problems  Acute hypoxic respiratory failure  Acute DVT left lower extremity  Suspected chronic PE on CT chest  Basilar atelectasis/consolidation especially left lower lobe  SARS-CoV-2  infection  Viral bronchitis from above  Ex-smoker  Relevant Medical Diagnoses  Hypertension  Obesity  Left bundle branch block  Acute diastolic congestive heart failure    Plan of Treatment    Continue supplemental oxygen.  Wean as tolerated.  We will assess walking oximetry to see if he needs oxygen on discharge.    Now on full anticoagulation for acute DVT and suspected chronic PE.  Recommend treatment for a full 6 to 9 months.    Basilar atelectasis/consolidation especially in the left lower lobe is not very impressive for an acute infection.  Also patient was treated with 2 courses of antibiotics as an outpatient.  His procalcitonin is normal.  Defer any additional antibiotics for now.    Supportive treatment for SARS-CoV-2 with dexamethasone IV.  Can switch to p.o. from at any point from my standpoint.  Needs to complete 10 days.  CRP is improving.    Ex-smoker and therefore I started Brovana twice daily.  Does not have a history of COPD as an outpatient.    Acute diastolic congestive heart failure and treated with diuretic on admission.  His leg swelling is chronic appearing but significantly improved now with only chronic skin changes.    Note cardiology interventions for bradycardia.    Hopefully well enough to go home soon.    Noe Caro MD  05/11/22  11:15 EDT    While in the room and during my examination of the patient I wore gloves, gown, mask, eye protection and followed enhanced droplet/contact isolation protocol and precautions.  I washed my hands before and after this patient encounter.    Part of this note may be an electronic transcription/translation of spoken language to printed text using the Dragon Dictation System.

## 2022-05-11 NOTE — PROGRESS NOTES
[facility] Clinical Pharmacy Services: Pharmacy Education - Direct Oral Anticoagulant - Eliquis    Goran August has been ordered Eliquis for DVT.     Counseling points included the followin. Eliquis's indication, patient's need for the medication, and dosing/frequency of this medication.  2. Enforced the importance of taking their medication as instructed every day and the reason why the medication is dosed that way.  3. Explained possible side effects of anticoagulation therapy, including increased risk of bleeding, and s/sx of bleeding. Also talked about ways to control bleeding for minor cuts and scrapes.  4. Emphasized the importance of going to the emergency room if any of the following occur: Falling and hitting your head; noticing bright red blood in urine or dark/tarry stools; vomiting up blood or vomit has a coffee-ground like texture; coughing up blood.  5. Discussed the importance of informing any physician or dentist that they have been started on a DOAC, in case they need to be taken off for a procedure.  6. Discussed all important drug interactions, including over-the-counter medications and supplements.  7. Instructed the patient not to begin or discontinue any medications without informing his/her physician/pharmacist.     I also explained to the patient that this medication may have a high copay associated with it and to let the provider know if it is unaffordable. Test claim shows this is $20 per month through the patient's insurance.      Patient expressed understanding and had no further questions.      Raghav Mckinney, PharmD  Clinical Pharmacist

## 2022-05-12 LAB
ALBUMIN SERPL-MCNC: 3.6 G/DL (ref 3.5–5.2)
ALBUMIN/GLOB SERPL: 1 G/DL
ALP SERPL-CCNC: 76 U/L (ref 39–117)
ALT SERPL W P-5'-P-CCNC: 33 U/L (ref 1–41)
ANION GAP SERPL CALCULATED.3IONS-SCNC: 13 MMOL/L (ref 5–15)
AST SERPL-CCNC: 29 U/L (ref 1–40)
BILIRUB SERPL-MCNC: 0.5 MG/DL (ref 0–1.2)
BUN SERPL-MCNC: 44 MG/DL (ref 8–23)
BUN/CREAT SERPL: 41.5 (ref 7–25)
CALCIUM SPEC-SCNC: 9.5 MG/DL (ref 8.6–10.5)
CHLORIDE SERPL-SCNC: 96 MMOL/L (ref 98–107)
CO2 SERPL-SCNC: 30 MMOL/L (ref 22–29)
CREAT SERPL-MCNC: 1.06 MG/DL (ref 0.76–1.27)
CRP SERPL-MCNC: 4.49 MG/DL (ref 0–0.5)
DEPRECATED RDW RBC AUTO: 42.3 FL (ref 37–54)
EGFRCR SERPLBLD CKD-EPI 2021: 67.9 ML/MIN/1.73
ERYTHROCYTE [DISTWIDTH] IN BLOOD BY AUTOMATED COUNT: 13.6 % (ref 12.3–15.4)
GLOBULIN UR ELPH-MCNC: 3.7 GM/DL
GLUCOSE SERPL-MCNC: 140 MG/DL (ref 65–99)
HCT VFR BLD AUTO: 43.1 % (ref 37.5–51)
HGB BLD-MCNC: 13.8 G/DL (ref 13–17.7)
MCH RBC QN AUTO: 27.4 PG (ref 26.6–33)
MCHC RBC AUTO-ENTMCNC: 32 G/DL (ref 31.5–35.7)
MCV RBC AUTO: 85.5 FL (ref 79–97)
PLATELET # BLD AUTO: 322 10*3/MM3 (ref 140–450)
PMV BLD AUTO: 9.7 FL (ref 6–12)
POTASSIUM SERPL-SCNC: 4.1 MMOL/L (ref 3.5–5.2)
PROCALCITONIN SERPL-MCNC: 0.05 NG/ML (ref 0–0.25)
PROT SERPL-MCNC: 7.3 G/DL (ref 6–8.5)
RBC # BLD AUTO: 5.04 10*6/MM3 (ref 4.14–5.8)
SODIUM SERPL-SCNC: 139 MMOL/L (ref 136–145)
WBC NRBC COR # BLD: 11.32 10*3/MM3 (ref 3.4–10.8)

## 2022-05-12 PROCEDURE — 63710000001 DEXAMETHASONE PER 0.25 MG: Performed by: STUDENT IN AN ORGANIZED HEALTH CARE EDUCATION/TRAINING PROGRAM

## 2022-05-12 PROCEDURE — 80053 COMPREHEN METABOLIC PANEL: CPT | Performed by: STUDENT IN AN ORGANIZED HEALTH CARE EDUCATION/TRAINING PROGRAM

## 2022-05-12 PROCEDURE — 94799 UNLISTED PULMONARY SVC/PX: CPT

## 2022-05-12 PROCEDURE — 84145 PROCALCITONIN (PCT): CPT | Performed by: INTERNAL MEDICINE

## 2022-05-12 PROCEDURE — 99231 SBSQ HOSP IP/OBS SF/LOW 25: CPT | Performed by: INTERNAL MEDICINE

## 2022-05-12 PROCEDURE — 86140 C-REACTIVE PROTEIN: CPT | Performed by: INTERNAL MEDICINE

## 2022-05-12 PROCEDURE — 94664 DEMO&/EVAL PT USE INHALER: CPT

## 2022-05-12 PROCEDURE — 94760 N-INVAS EAR/PLS OXIMETRY 1: CPT

## 2022-05-12 PROCEDURE — 85027 COMPLETE CBC AUTOMATED: CPT | Performed by: STUDENT IN AN ORGANIZED HEALTH CARE EDUCATION/TRAINING PROGRAM

## 2022-05-12 PROCEDURE — 94761 N-INVAS EAR/PLS OXIMETRY MLT: CPT

## 2022-05-12 RX ADMIN — FLUTICASONE PROPIONATE 2 SPRAY: 50 SPRAY, METERED NASAL at 08:22

## 2022-05-12 RX ADMIN — SPIRONOLACTONE 25 MG: 25 TABLET, FILM COATED ORAL at 08:21

## 2022-05-12 RX ADMIN — PANTOPRAZOLE SODIUM 40 MG: 40 TABLET, DELAYED RELEASE ORAL at 06:27

## 2022-05-12 RX ADMIN — Medication 10 ML: at 08:22

## 2022-05-12 RX ADMIN — RAMIPRIL 10 MG: 10 CAPSULE ORAL at 08:21

## 2022-05-12 RX ADMIN — ARFORMOTEROL TARTRATE 15 MCG: 15 SOLUTION RESPIRATORY (INHALATION) at 08:26

## 2022-05-12 RX ADMIN — PRAVASTATIN SODIUM 20 MG: 20 TABLET ORAL at 08:21

## 2022-05-12 RX ADMIN — HYDROCODONE BITARTRATE AND HOMATROPINE METHYLBROMIDE 5 ML: 5; 1.5 SOLUTION ORAL at 21:04

## 2022-05-12 RX ADMIN — ARFORMOTEROL TARTRATE 15 MCG: 15 SOLUTION RESPIRATORY (INHALATION) at 23:23

## 2022-05-12 RX ADMIN — Medication 10 ML: at 21:05

## 2022-05-12 RX ADMIN — APIXABAN 10 MG: 5 TABLET, FILM COATED ORAL at 21:04

## 2022-05-12 RX ADMIN — APIXABAN 10 MG: 5 TABLET, FILM COATED ORAL at 08:21

## 2022-05-12 RX ADMIN — CETIRIZINE HYDROCHLORIDE 5 MG: 10 TABLET ORAL at 08:21

## 2022-05-12 RX ADMIN — GUAIFENESIN 600 MG: 600 TABLET, EXTENDED RELEASE ORAL at 08:22

## 2022-05-12 RX ADMIN — GUAIFENESIN 600 MG: 600 TABLET, EXTENDED RELEASE ORAL at 21:04

## 2022-05-12 RX ADMIN — DEXAMETHASONE 6 MG: 4 TABLET ORAL at 08:21

## 2022-05-12 RX ADMIN — FUROSEMIDE 40 MG: 40 TABLET ORAL at 08:22

## 2022-05-12 NOTE — PROGRESS NOTES
Name: Goran August ADMIT: 2022   : 1934  PCP: Tanya Dhaliwal MD    MRN: [id number] LOS: 4 days   AGE/SEX: 87 y.o. male  ROOM: [facility]     Subjective   Subjective     No events overnight. Down to 2L when I saw him. He reports feeling a bit better today.   No fevers, chills,  N/v/d, chest pain. + for cough, chest congestion, malaise.       Objective   Objective   Vital Signs  Temp:  [96.8 °F (36 °C)-98.3 °F (36.8 °C)] 97.2 °F (36.2 °C)  Heart Rate:  [50-80] 67  Resp:  [16-20] 20  BP: (128-147)/(69-99) 137/69  SpO2:  [92 %-100 %] 94 %  on  Flow (L/min):  [1.5-3.5] 2;   Device (Oxygen Therapy): nasal cannula  Body mass index is 31.79 kg/m².  Physical Exam  Constitutional:       General: He is not in acute distress.  Cardiovascular:      Rate and Rhythm: Normal rate and regular rhythm.      Heart sounds: Normal heart sounds.   Pulmonary:      Effort: Pulmonary effort is normal.      Breath sounds: Rhonchi present. No wheezing or rales.   Abdominal:      General: Bowel sounds are normal.      Palpations: Abdomen is soft.   Musculoskeletal:         General: No tenderness.      Right lower leg: No edema.      Left lower leg: No edema.   Neurological:      Mental Status: He is alert.   Psychiatric:         Mood and Affect: Mood normal.         Behavior: Behavior normal.         Results Review     I reviewed the patient's new clinical results.  Results from last 7 days   Lab Units 22  0931 22  1001 2228 22  1639   WBC 10*3/mm3 11.32* 11.22* 12.33* 12.10*   HEMOGLOBIN g/dL 13.8 13.4 12.4* 12.1*   PLATELETS 10*3/mm3 322 317 266 236     Results from last 7 days   Lab Units 22  0931 22  1001 22  0628 22  1639   SODIUM mmol/L 139 138 139 138   POTASSIUM mmol/L 4.1 4.1 4.4 4.6   CHLORIDE mmol/L 96* 95* 96* 102   CO2 mmol/L 30.0* 30.8* 32.8* 26.5   BUN mg/dL 44* 40* 27* 25*   CREATININE mg/dL 1.06 0.89 1.11 1.22   GLUCOSE mg/dL 140* 175* 125* 106*   Estimated  Creatinine Clearance: 59.9 mL/min (by C-G formula based on SCr of 1.06 mg/dL).  Results from last 7 days   Lab Units 05/12/22  0931 05/11/22  1001 05/09/22  0628 05/08/22  1639   ALBUMIN g/dL 3.60 3.60 3.70 3.20*   BILIRUBIN mg/dL 0.5 0.4 0.5 0.5   ALK PHOS U/L 76 79 86 80   AST (SGOT) U/L 29 23 18 17   ALT (SGPT) U/L 33 22 19 16     Results from last 7 days   Lab Units 05/12/22  0931 05/11/22  1001 05/09/22  0628 05/08/22  1639   CALCIUM mg/dL 9.5 9.8 9.6 9.4   ALBUMIN g/dL 3.60 3.60 3.70 3.20*     Results from last 7 days   Lab Units 05/12/22  0931 05/10/22  0857 05/08/22  1639   PROCALCITONIN ng/mL 0.05 0.06 0.12   LACTATE mmol/L  --   --  1.7     COVID19   Date Value Ref Range Status   05/08/2022 Detected (C) Not Detected - Ref. Range Final     SARS-CoV-2, HUMBERTO   Date Value Ref Range Status   05/06/2022 Not Detected Not Detected Final     Comment:     This nucleic acid amplification test was developed and its performance  characteristics determined by [facility]. Nucleic acid  amplification tests include RT-PCR and TMA. This test has not been  FDA cleared or approved. This test has been authorized by FDA under  an Emergency Use Authorization (EUA). This test is only authorized  for the duration of time the declaration that circumstances exist  justifying the authorization of the emergency use of in vitro  diagnostic tests for detection of SARS-CoV-2 virus and/or diagnosis  of COVID-19 infection under section 564(b)(1) of the Act, 21 U.S.C.  360bbb-3(b) (1), unless the authorization is terminated or revoked  sooner.  When diagnostic testing is negative, the possibility of a false  negative result should be considered in the context of a patient's  recent exposures and the presence of clinical signs and symptoms  consistent with COVID-19. An individual without symptoms of COVID-19  and who is not shedding SARS-CoV-2 virus would expect to have a  negative (not detected) result in this assay.       No  results found for: HGBA1C, POCGLU    Adult Transthoracic Echo Limited W/ Cont if Necessary Per Protocol  · Left ventricular wall thickness is consistent with mild concentric   hypertrophy.  · Estimated left ventricular EF = 46% Left ventricular systolic function   is low normal.  · Septal wall motion is abnormal, consistent with a bundle branch block.  · Left ventricular diastolic function was not assessed.  · This is a limited study.     XR Chest PA & Lateral  .[email or web address]        Duplex Venous Lower Extremity - Bilateral  · Acute right lower extremity deep vein thrombosis noted in the peroneal.  · All other veins appeared normal bilaterally.     XR Chest 1 View  PORTABLE CHEST     HISTORY: Lower extremity swelling.     COMPARISON: None.     FINDINGS:  A single view of the chest demonstrates mild cardiomegaly.  There is no evidence of infiltrate, effusion or of congestive failure.     This report was finalized on 5/9/2022 6:31 AM by Dr. Timur Mancuso M.D.     CT Angiogram Chest With & Without Contrast  Narrative: Patient: LAZARA ROTHMAN  Time Out: 01:55  Exam(s): CTA CHEST W WO Contrast     EXAM:    CT Angiography Chest With Intravenous Contrast    CLINICAL HISTORY:     Reason for exam: positive dimer.    TECHNIQUE:    Axial computed tomographic angiography images of the chest with   intravenous contrast.  CTDI is 14.8 mGy and DLP is 872.3 mGy-cm.  This CT   exam was performed according to the principle of ALARA (As Low As   Reasonably Achievable) by using one or more of the following dose   reduction techniques: automated exposure control, adjustment of the mA   and or kV according to patient size, and or use of iterative   reconstruction technique.    3D reconstructed images were created and reviewed.    COMPARISON:    No relevant prior studies available.    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  No  central   filling defect suspicious for acute pulmonary emboli are apparent.    Aorta:  No acute findings.  No thoracic aortic aneurysm.    Lungs:  Medial basal left lower lobe patchy consolidation and bilateral   posterior costophrenic angle atelectatic changes with mild cylindrical   bronchiectasis noted.    Pleural space:  Unremarkable.  No significant effusion.  No   pneumothorax.    Heart:  Coronary artery calcifications.  Mild cardiac enlargement.  No   significant pericardial effusion.  No evidence of RV dysfunction.    Bones joints:  No acute fracture.  No dislocation.    Soft tissues:  Unremarkable.    Lymph nodes:  Calcified lymph nodes in the right hilum are present.    Gallbladder and bile ducts:  Incidental cholelithiasis and the included   upper abdomen.    IMPRESSION:       1.  No acute pulmonary emboli.  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  2.  Patchy consolidation and volume loss in the left lower lobe.  Query   aspiration pneumonitis or infectious pneumonia.  Correlate clinically.  Impression: Electronically signed by Tala Mcrae MD on 05-09-22 at 0155    Scheduled Medications  apixaban, 10 mg, Oral, Q12H   Followed by  [START ON 5/16/2022] apixaban, 5 mg, Oral, Q12H  arformoterol, 15 mcg, Nebulization, BID - RT  cetirizine, 5 mg, Oral, Daily  dexamethasone, 6 mg, Oral, Daily With Breakfast  fluticasone, 2 spray, Nasal, Daily  furosemide, 40 mg, Oral, Daily  guaiFENesin, 600 mg, Oral, Q12H  HYDROcodone Bit-Homatrop MBr, 5 mL, Oral, Nightly  pantoprazole, 40 mg, Oral, QAM  pravastatin, 20 mg, Oral, Daily  ramipril, 10 mg, Oral, Daily  sodium chloride, 10 mL, Intravenous, Q12H  spironolactone, 25 mg, Oral, Daily    Infusions   Diet  Diet Regular; Cardiac, Consistent Carbohydrate, Renal       Assessment/Plan     Active Hospital Problems    Diagnosis  POA   • **COVID-19 virus infection [U07.1]  Unknown   • Acute DVT (deep venous thrombosis)  ([facility]) [I82.409]  Unknown   • Acute respiratory failure with hypoxia ([facility]) [J96.01]  Unknown   • Combined systolic and diastolic congestive heart failure ([facility]) [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     87 y.o. male admitted with COVID-19 virus infection. He has had cough congestion and shortness of breath for the last couple weeks. Tested negative for COVID as an outpatient and was treated with 2 rounds of antibiotics. He developed lower extremity swelling and was sent to the ED    · COVID-19 pneumonia causing acute hypoxic respiratory failure-oxygen requirement is improving. CRP is coming down. Continue oral dexamethasone for 10 days or until discharge. Out of the window for remdesivir. Continue flutter valve, nebs, mucolytics.  · Acute on chronic combined systolic (with recovery) and diastolic heart failure-EF down a little to 46% on echo. S/p IV diuretics and now on oral lasix.   · Chronic PE and acute DVT-has been started on eliquis. His co-pay is $20/month. He will need 3-6 months of therapy.  · Bradycardia-appears to be asymptomatic. Coreg is on hold. Cardiology is following.   · Eliquis (home med) for DVT prophylaxis.  · Full code.  · Discussed with patient and nursing staff.  · Anticipate discharge home in 1-2 days.      Nancy Dasilva MD  [facility]  05/12/22  13:34 EDT    I wore protective equipment throughout this patient encounter including a face mask, gloves and protective eyewear.  Hand hygiene was performed before donning protective equipment and after removal when leaving the room.

## 2022-05-12 NOTE — PROGRESS NOTES
[facility]  [phone]  Dr. Noe Caro    Subjective:  LOS: 4    Chief Complaint: Covid-19    His cough is much better.  Oxygen requirements improving.  Now probably can tolerate room air.  Sats were 98% on 2 L when he was sitting by side of bed.    Objective   Vital Signs past 24hrs    Temp range: Temp (24hrs), Av.4 °F (36.3 °C), Min:96.8 °F (36 °C), Max:98.3 °F (36.8 °C)    BP range: BP: (122-147)/(69-99) 137/69  Pulse range: Heart Rate:  [50-80] 67  Resp rate range: Resp:  [16-20] 20    Device (Oxygen Therapy): nasal cannulaFlow (L/min):  [1.5-3.5] 2  Oxygen range:SpO2:  [92 %-100 %] 94 %      103 kg (227 lb 1.2 oz); Body mass index is 31.79 kg/m².    Intake/Output Summary (Last 24 hours) at 2022 1237  Last data filed at 2022 0633  Gross per 24 hour   Intake 210 ml   Output 1175 ml   Net -965 ml       Physical Exam  Constitutional:       Appearance: He is obese.   Eyes:      Pupils: Pupils are equal, round, and reactive to light.   Cardiovascular:      Rate and Rhythm: Normal rate and regular rhythm.      Heart sounds: No murmur heard.  Pulmonary:      Effort: Pulmonary effort is normal.      Comments: Minimal coarse bilateral breath sounds  Abdominal:      General: Bowel sounds are normal.      Palpations: Abdomen is soft. There is no mass.      Tenderness: There is no abdominal tenderness.   Musculoskeletal:         General: No swelling.   Neurological:      Mental Status: He is alert.       Results Review:    I have reviewed the laboratory and imaging data since the last note by [facility] physician.  My annotations are noted in assessment and plan.    Medication Review:  I have reviewed the current MAR.  My annotations are noted in assessment and plan.       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  Will trial same.  Have requested walking oximetry.    Now on full anticoagulation for acute DVT and suspected chronic PE.  Recommend treatment for a full 6 to 9 months.    Basilar atelectasis/consolidation especially in the left lower lobe is not very impressive for an acute infection.  Also patient was treated with 2 courses of antibiotics as an outpatient.  His procalcitonin is normal.  Defer any additional antibiotics for now.    Supportive treatment for SARS-CoV-2 with dexamethasone.  Needs to complete 10 days.  CRP is improving.    Ex-smoker and therefore I started Brovana twice daily.  Does not have a history of COPD as an outpatient. Consider outpatient PFT's.    Acute diastolic congestive heart failure and treated with diuretic on admission.  His leg swelling is chronic appearing but significantly improved now with only chronic skin changes.    Note cardiology interventions for bradycardia.    OK per us to discharge home tomorrow if continues to improve.    Noe Caro MD  05/12/22  12:37 EDT    While in the room and during my examination of the patient I wore gloves, gown, mask, eye protection and followed enhanced droplet/contact isolation protocol and precautions.  I washed my hands before and after this patient encounter.    Part of this note may be an electronic transcription/translation of spoken language to printed text using the Dragon Dictation System.

## 2022-05-12 NOTE — CASE MANAGEMENT/SOCIAL WORK
Continued Stay Note  [facility]     Patient Name: Goran August  MRN: [id number]  Today's Date: 5/12/2022    Admit Date: 5/8/2022     Discharge Plan     Row Name 05/12/22 1419       Plan    Plan Home w/ family. follow for O2 needs    Plan Comments Per MD, patient will likely be here for a few more days. He is currently on 2L O2. He was able to walk 60ft w/ PT on 5/10. If patient needs oxygen, they are agreeable to [facility]. Family can transport home. LAURITA GONZALEZ               Discharge Codes    No documentation.               Expected Discharge Date and Time     Expected Discharge Date Expected Discharge Time    May 13, 2022             LAURITA ANDREA

## 2022-05-12 NOTE — PLAN OF CARE
Problem: Adult Inpatient Plan of Care  Goal: Plan of Care Review  Outcome: Ongoing, Progressing  Flowsheets (Taken 5/12/2022 0521)  Progress: improving  Plan of Care Reviewed With: patient  Outcome Evaluation:   No s/sx of distress noted at this time. Rested well throughout night.Vital signs WDL. 02@2-3L NC   tolerated. Fall precautions maintained. Will cont to monitor,  Goal: Patient-Specific Goal (Individualized)  Outcome: Ongoing, Progressing  Goal: Absence of Hospital-Acquired Illness or Injury  Outcome: Ongoing, Progressing  Intervention: Identify and Manage Fall Risk  Recent Flowsheet Documentation  Taken 5/12/2022 0406 by María Sinha, RN  Safety Promotion/Fall Prevention:   activity supervised   assistive device/personal items within reach   clutter free environment maintained   fall prevention program maintained   lighting adjusted   nonskid shoes/slippers when out of bed   room organization consistent  Taken 5/12/2022 0206 by María Sinha, CATHY  Safety Promotion/Fall Prevention:   activity supervised   assistive device/personal items within reach   clutter free environment maintained   fall prevention program maintained   lighting adjusted   nonskid shoes/slippers when out of bed   room organization consistent   safety round/check completed  Taken 5/12/2022 0019 by María Sinha, RN  Safety Promotion/Fall Prevention:   activity supervised   assistive device/personal items within reach   fall prevention program maintained   clutter free environment maintained   lighting adjusted   nonskid shoes/slippers when out of bed   safety round/check completed   room organization consistent  Taken 5/11/2022 2200 by María Sinha, RN  Safety Promotion/Fall Prevention:   activity supervised   assistive device/personal items within reach   clutter free environment maintained   fall prevention program maintained   lighting adjusted   nonskid shoes/slippers when out of bed   room organization consistent   safety round/check  completed  Taken 5/11/2022 2008 by María Sinah RN  Safety Promotion/Fall Prevention:   activity supervised   assistive device/personal items within reach   clutter free environment maintained   fall prevention program maintained   lighting adjusted   nonskid shoes/slippers when out of bed   room organization consistent   safety round/check completed  Intervention: Prevent Skin Injury  Recent Flowsheet Documentation  Taken 5/12/2022 0406 by María Sinha RN  Body Position: supine  Taken 5/12/2022 0206 by María Sinha RN  Body Position: supine  Taken 5/12/2022 0019 by María Sinha RN  Body Position:   position changed independently   left   side-lying  Skin Protection:   adhesive use limited   incontinence pads utilized   transparent dressing maintained   tubing/devices free from skin contact  Taken 5/11/2022 2200 by María Sinha RN  Body Position: supine  Taken 5/11/2022 2008 by María Sinha RN  Body Position:   position changed independently   sitting up in bed  Skin Protection:   adhesive use limited   incontinence pads utilized   transparent dressing maintained   tubing/devices free from skin contact  Intervention: Prevent and Manage VTE (Venous Thromboembolism) Risk  Recent Flowsheet Documentation  Taken 5/12/2022 0406 by María Sinha RN  Activity Management: activity adjusted per tolerance  Taken 5/12/2022 0206 by María Sinha RN  Activity Management: activity adjusted per tolerance  Taken 5/12/2022 0019 by María Sinha RN  Activity Management: activity adjusted per tolerance  Taken 5/11/2022 2200 by María Sinha RN  Activity Management: activity adjusted per tolerance  Taken 5/11/2022 2008 by María Sinha RN  Activity Management: activity adjusted per tolerance  VTE Prevention/Management: (Eliquis) other (see comments)  Intervention: Prevent Infection  Recent Flowsheet Documentation  Taken 5/12/2022 0406 by María Sinha RN  Infection Prevention:   personal protective equipment utilized   rest/sleep  promoted   hand hygiene promoted   single patient room provided  Taken 5/12/2022 0206 by María Sinha RN  Infection Prevention:   hand hygiene promoted   rest/sleep promoted   single patient room provided   personal protective equipment utilized  Taken 5/12/2022 0019 by María Sinha RN  Infection Prevention:   personal protective equipment utilized   rest/sleep promoted   single patient room provided  Taken 5/11/2022 2200 by María Sinha RN  Infection Prevention:   personal protective equipment utilized   rest/sleep promoted   single patient room provided  Taken 5/11/2022 2008 by María Sinha RN  Infection Prevention:   hand hygiene promoted   personal protective equipment utilized   rest/sleep promoted   single patient room provided  Goal: Optimal Comfort and Wellbeing  Outcome: Ongoing, Progressing  Intervention: Provide Person-Centered Care  Recent Flowsheet Documentation  Taken 5/11/2022 2008 by María Sinha RN  Trust Relationship/Rapport:   care explained   reassurance provided   thoughts/feelings acknowledged  Goal: Readiness for Transition of Care  Outcome: Ongoing, Progressing     Problem: Fall Injury Risk  Goal: Absence of Fall and Fall-Related Injury  Outcome: Ongoing, Progressing  Intervention: Identify and Manage Contributors  Recent Flowsheet Documentation  Taken 5/12/2022 0406 by María Sinha RN  Medication Review/Management: medications reviewed  Taken 5/12/2022 0206 by María Sinha RN  Medication Review/Management: medications reviewed  Taken 5/12/2022 0019 by María Sinha RN  Medication Review/Management: medications reviewed  Taken 5/11/2022 2200 by María Sinha RN  Medication Review/Management: medications reviewed  Taken 5/11/2022 2008 by María Sinha RN  Medication Review/Management: medications reviewed  Intervention: Promote Injury-Free Environment  Recent Flowsheet Documentation  Taken 5/12/2022 0406 by María Sinha RN  Safety Promotion/Fall Prevention:   activity supervised    assistive device/personal items within reach   clutter free environment maintained   fall prevention program maintained   lighting adjusted   nonskid shoes/slippers when out of bed   room organization consistent  Taken 5/12/2022 0206 by María Sinha RN  Safety Promotion/Fall Prevention:   activity supervised   assistive device/personal items within reach   clutter free environment maintained   fall prevention program maintained   lighting adjusted   nonskid shoes/slippers when out of bed   room organization consistent   safety round/check completed  Taken 5/12/2022 0019 by María Sinha RN  Safety Promotion/Fall Prevention:   activity supervised   assistive device/personal items within reach   fall prevention program maintained   clutter free environment maintained   lighting adjusted   nonskid shoes/slippers when out of bed   safety round/check completed   room organization consistent  Taken 5/11/2022 2200 by María Sinha RN  Safety Promotion/Fall Prevention:   activity supervised   assistive device/personal items within reach   clutter free environment maintained   fall prevention program maintained   lighting adjusted   nonskid shoes/slippers when out of bed   room organization consistent   safety round/check completed  Taken 5/11/2022 2008 by María Sinha RN  Safety Promotion/Fall Prevention:   activity supervised   assistive device/personal items within reach   clutter free environment maintained   fall prevention program maintained   lighting adjusted   nonskid shoes/slippers when out of bed   room organization consistent   safety round/check completed     Problem: Skin Injury Risk Increased  Goal: Skin Health and Integrity  Outcome: Ongoing, Progressing  Intervention: Optimize Skin Protection  Recent Flowsheet Documentation  Taken 5/12/2022 0406 by María Sinha, RN  Head of Bed (HOB) Positioning: HOB at 30 degrees  Taken 5/12/2022 0019 by María Sinha, CATHY  Pressure Reduction Techniques:   frequent weight  shift encouraged   weight shift assistance provided   heels elevated off bed  Head of Bed (HOB) Positioning: HOB at 30-45 degrees  Pressure Reduction Devices:   alternating pressure pump (ADD)   pressure-redistributing mattress utilized  Skin Protection:   adhesive use limited   incontinence pads utilized   transparent dressing maintained   tubing/devices free from skin contact  Taken 5/11/2022 2200 by María Sinha RN  Head of Bed (HOB) Positioning: HOB at 30-45 degrees  Taken 5/11/2022 2008 by María Sinha RN  Pressure Reduction Techniques:   weight shift assistance provided   frequent weight shift encouraged   heels elevated off bed  Head of Bed (HOB) Positioning: HOB elevated  Pressure Reduction Devices:   alternating pressure pump (ADD)   pressure-redistributing mattress utilized  Skin Protection:   adhesive use limited   incontinence pads utilized   transparent dressing maintained   tubing/devices free from skin contact   Goal Outcome Evaluation:  Plan of Care Reviewed With: patient        Progress: improving  Outcome Evaluation: No s/sx of distress noted at this time. Rested well throughout night.Vital signs WDL. 02@2-3L NC; tolerated. Fall precautions maintained. Will cont to monitor,

## 2022-05-12 NOTE — PLAN OF CARE
Goal Outcome Evaluation:               VSS. Pt up to chair for most of the afternoon. On room air this evening. Pt walked with assistance in room for about 5 minutes without fatiguing on 1 liter. Will need walking oximetry tomorrow. Possible discharge tomorrow

## 2022-05-12 NOTE — PROGRESS NOTES
[facility]  Progress note: 2022    Patient Identification:  Name:Goran August  Age:87 y.o.  Sex: male  :  1934  MRN: [id number]           CC:  Bradycardia, cardiomyopathy    Interval history:  Modest diuresis overnight on nasal cannula 2 L. No chest pain and dyspnea is better    Vital Signs:   Temp:  [96.4 °F (35.8 °C)-98.3 °F (36.8 °C)] 96.4 °F (35.8 °C)  Heart Rate:  [50-80] 60  Resp:  [16-20] 20  BP: (128-147)/(65-99) 146/65    Intake/Output Summary (Last 24 hours) at 2022 1700  Last data filed at 2022 1358  Gross per 24 hour   Intake 210 ml   Output 2175 ml   Net -1965 ml       Physical Examination:  Not performed to minimize disease dissemination    Lab Review:  Personally reviewed the labs, radiology imaging and other cardiac procedures.   Results from last 7 days   Lab Units 22  0931   SODIUM mmol/L 139   POTASSIUM mmol/L 4.1   CHLORIDE mmol/L 96*   CO2 mmol/L 30.0*   BUN mg/dL 44*   CREATININE mg/dL 1.06   CALCIUM mg/dL 9.5   BILIRUBIN mg/dL 0.5   ALK PHOS U/L 76   ALT (SGPT) U/L 33   AST (SGOT) U/L 29   GLUCOSE mg/dL 140*     Results from last 7 days   Lab Units 22  1639   TROPONIN T ng/mL <0.010     Results from last 7 days   Lab Units 22  0931 22  1001 22  0628   WBC 10*3/mm3 11.32* 11.22* 12.33*   HEMOGLOBIN g/dL 13.8 13.4 12.4*   HEMATOCRIT % 43.1 42.2 38.3   PLATELETS 10*3/mm3 322 317 266     Results from last 7 days   Lab Units 22  1639   INR  1.15*     Medication Review:   Meds reviewed  Scheduled Meds:apixaban, 10 mg, Oral, Q12H   Followed by  [START ON 2022] apixaban, 5 mg, Oral, Q12H  arformoterol, 15 mcg, Nebulization, BID - RT  cetirizine, 5 mg, Oral, Daily  dexamethasone, 6 mg, Oral, Daily With Breakfast  fluticasone, 2 spray, Nasal, Daily  furosemide, 40 mg, Oral, Daily  guaiFENesin, 600 mg, Oral, Q12H  HYDROcodone Bit-Homatrop MBr, 5 mL, Oral, Nightly  pantoprazole, 40 mg, Oral, QAM  pravastatin, 20 mg, Oral,  Daily  ramipril, 10 mg, Oral, Daily  sodium chloride, 10 mL, Intravenous, Q12H  spironolactone, 25 mg, Oral, Daily      I personally viewed and interpreted the patient's EKG/Telemetry data    Assessment and Plan  1.  Bradycardia.    No significant bradycardia but will hold off on additional AV vargas blocker therapy and can reassess as an outpatient.  Given nonischemic cardiomyopathy low-dose beta-blocker can at that time be reconsidered.  Would recommend he see cardiology in approximately 3 weeks post dismissal.  He will call to try to arrange follow-up with Dr. Lang at [facility]  2.  History of cardiomyopathy.  Likely nonischemic.  Now with mild reduction in ejection fraction.  Possibly COVID-related though may be due to underlying bundle branch block.  Continue with lisinopril spironolactone.  Beta-blocker use as above.  3.  Coronary artery disease noted by coronary calcium on CT scan.  No anginal symptoms but no coronary evaluation since 2014.  We will follow-up with Dr. Lang in 3 to 4 weeks and consider further follow-up.  4.  Pulmonary hypertension.  Not appreciated on the limited study performed with this COVID infection.  Would consider repeat imaging in 6 to 8 weeks.  5.  Hypertension.  Elevated slightly today and likely due to steroid use.  He will follow closely at home.  6.  Hyperlipidemia  7.  Left branch block, chronic  8.  History of chronic pulmonary emboli, now with acute DVT.  On Eliquis.      Mini Jimenez  5/12/202217:00 EDT  25min spent in reviewing records, discussion and examination of the patient and discussion with other members of the patient's medical team.     Dictated utilizing Dragon dictation

## 2022-05-13 ENCOUNTER — READMISSION MANAGEMENT (OUTPATIENT)
Dept: CALL CENTER | Facility: HOSPITAL | Age: 87
End: 2022-05-13

## 2022-05-13 VITALS
BODY MASS INDEX: 31.79 KG/M2 | TEMPERATURE: 97.7 F | HEIGHT: 71 IN | WEIGHT: 227.07 LBS | OXYGEN SATURATION: 100 % | HEART RATE: 71 BPM | DIASTOLIC BLOOD PRESSURE: 77 MMHG | SYSTOLIC BLOOD PRESSURE: 131 MMHG | RESPIRATION RATE: 16 BRPM

## 2022-05-13 LAB
ALBUMIN SERPL-MCNC: 3.7 G/DL (ref 3.5–5.2)
ALBUMIN/GLOB SERPL: 1.1 G/DL
ALP SERPL-CCNC: 70 U/L (ref 39–117)
ALT SERPL W P-5'-P-CCNC: 31 U/L (ref 1–41)
ANION GAP SERPL CALCULATED.3IONS-SCNC: 11 MMOL/L (ref 5–15)
AST SERPL-CCNC: 23 U/L (ref 1–40)
BACTERIA SPEC AEROBE CULT: NORMAL
BACTERIA SPEC AEROBE CULT: NORMAL
BILIRUB SERPL-MCNC: 0.3 MG/DL (ref 0–1.2)
BUN SERPL-MCNC: 44 MG/DL (ref 8–23)
BUN/CREAT SERPL: 49.4 (ref 7–25)
CALCIUM SPEC-SCNC: 9.4 MG/DL (ref 8.6–10.5)
CHLORIDE SERPL-SCNC: 99 MMOL/L (ref 98–107)
CO2 SERPL-SCNC: 30 MMOL/L (ref 22–29)
CREAT SERPL-MCNC: 0.89 MG/DL (ref 0.76–1.27)
CRP SERPL-MCNC: 2.74 MG/DL (ref 0–0.5)
DEPRECATED RDW RBC AUTO: 41.4 FL (ref 37–54)
EGFRCR SERPLBLD CKD-EPI 2021: 82.9 ML/MIN/1.73
ERYTHROCYTE [DISTWIDTH] IN BLOOD BY AUTOMATED COUNT: 13.6 % (ref 12.3–15.4)
GLOBULIN UR ELPH-MCNC: 3.4 GM/DL
GLUCOSE SERPL-MCNC: 100 MG/DL (ref 65–99)
HCT VFR BLD AUTO: 41.3 % (ref 37.5–51)
HGB BLD-MCNC: 13.2 G/DL (ref 13–17.7)
MCH RBC QN AUTO: 27 PG (ref 26.6–33)
MCHC RBC AUTO-ENTMCNC: 32 G/DL (ref 31.5–35.7)
MCV RBC AUTO: 84.6 FL (ref 79–97)
PLATELET # BLD AUTO: 331 10*3/MM3 (ref 140–450)
PMV BLD AUTO: 9.7 FL (ref 6–12)
POTASSIUM SERPL-SCNC: 4 MMOL/L (ref 3.5–5.2)
PROT SERPL-MCNC: 7.1 G/DL (ref 6–8.5)
RBC # BLD AUTO: 4.88 10*6/MM3 (ref 4.14–5.8)
SODIUM SERPL-SCNC: 140 MMOL/L (ref 136–145)
WBC NRBC COR # BLD: 11.93 10*3/MM3 (ref 3.4–10.8)

## 2022-05-13 PROCEDURE — 86140 C-REACTIVE PROTEIN: CPT | Performed by: INTERNAL MEDICINE

## 2022-05-13 PROCEDURE — 94799 UNLISTED PULMONARY SVC/PX: CPT

## 2022-05-13 PROCEDURE — 94762 N-INVAS EAR/PLS OXIMTRY CONT: CPT

## 2022-05-13 PROCEDURE — 80053 COMPREHEN METABOLIC PANEL: CPT | Performed by: STUDENT IN AN ORGANIZED HEALTH CARE EDUCATION/TRAINING PROGRAM

## 2022-05-13 PROCEDURE — 97116 GAIT TRAINING THERAPY: CPT

## 2022-05-13 PROCEDURE — 85027 COMPLETE CBC AUTOMATED: CPT | Performed by: STUDENT IN AN ORGANIZED HEALTH CARE EDUCATION/TRAINING PROGRAM

## 2022-05-13 RX ORDER — CETIRIZINE HYDROCHLORIDE 5 MG/1
5 TABLET ORAL DAILY
Qty: 30 TABLET | Refills: 0 | Status: SHIPPED | OUTPATIENT
Start: 2022-05-14

## 2022-05-13 RX ORDER — GUAIFENESIN 600 MG/1
600 TABLET, EXTENDED RELEASE ORAL EVERY 12 HOURS SCHEDULED
Qty: 28 TABLET | Refills: 0 | Status: SHIPPED | OUTPATIENT
Start: 2022-05-13 | End: 2022-07-11

## 2022-05-13 RX ORDER — FUROSEMIDE 40 MG/1
40 TABLET ORAL DAILY
Qty: 30 TABLET | Refills: 0 | Status: SHIPPED | OUTPATIENT
Start: 2022-05-14 | End: 2022-07-11 | Stop reason: DRUGHIGH

## 2022-05-13 RX ORDER — ARFORMOTEROL TARTRATE 15 UG/2ML
15 SOLUTION RESPIRATORY (INHALATION)
Qty: 120 ML | Refills: 0 | Status: SHIPPED | OUTPATIENT
Start: 2022-05-13 | End: 2022-05-17 | Stop reason: SDUPTHER

## 2022-05-13 RX ORDER — DEXAMETHASONE 6 MG/1
6 TABLET ORAL
Qty: 5 TABLET | Refills: 0 | Status: SHIPPED | OUTPATIENT
Start: 2022-05-14 | End: 2022-07-11

## 2022-05-13 RX ADMIN — PANTOPRAZOLE SODIUM 40 MG: 40 TABLET, DELAYED RELEASE ORAL at 05:55

## 2022-05-13 RX ADMIN — SPIRONOLACTONE 25 MG: 25 TABLET, FILM COATED ORAL at 09:20

## 2022-05-13 RX ADMIN — ARFORMOTEROL TARTRATE 15 MCG: 15 SOLUTION RESPIRATORY (INHALATION) at 06:56

## 2022-05-13 RX ADMIN — Medication 10 ML: at 09:22

## 2022-05-13 RX ADMIN — FUROSEMIDE 40 MG: 40 TABLET ORAL at 09:21

## 2022-05-13 RX ADMIN — FLUTICASONE PROPIONATE 2 SPRAY: 50 SPRAY, METERED NASAL at 09:22

## 2022-05-13 RX ADMIN — CETIRIZINE HYDROCHLORIDE 5 MG: 10 TABLET ORAL at 09:21

## 2022-05-13 RX ADMIN — PRAVASTATIN SODIUM 20 MG: 20 TABLET ORAL at 09:20

## 2022-05-13 RX ADMIN — APIXABAN 10 MG: 5 TABLET, FILM COATED ORAL at 09:20

## 2022-05-13 RX ADMIN — DEXAMETHASONE 6 MG: 4 TABLET ORAL at 09:20

## 2022-05-13 RX ADMIN — RAMIPRIL 10 MG: 10 CAPSULE ORAL at 09:20

## 2022-05-13 RX ADMIN — GUAIFENESIN 600 MG: 600 TABLET, EXTENDED RELEASE ORAL at 09:21

## 2022-05-13 NOTE — OUTREACH NOTE
Prep Survey    Flowsheet Row Responses   [facility] facility patient discharged from? [facility]   Is LACE score < 7 ? No   Emergency Room discharge w/ pulse ox? No   Eligibility [facility]   Date of Admission 05/08/22   Date of Discharge 05/13/22   Discharge Disposition Home or Self Care   Discharge diagnosis COVID-19 PNA< acute hypoxic resp failure, Acute RLE DVT, CHF, T2DM, CKD   Does the patient have one of the following disease processes/diagnoses(primary or secondary)? COVID-19   Does the patient have Home health ordered? No   Is there a DME ordered? Yes   What DME was ordered? O2 from [facility]   Prep survey completed? Yes          CONRADO BELLO - Registered Nurse

## 2022-05-13 NOTE — PLAN OF CARE
Goal Outcome Evaluation:           Progress: improving  Outcome Evaluation: Patient ambulated 100' in room today with stand by assist. Patient at baseline function. Skilled intervention no longer need. Continue ambulating with nursing.

## 2022-05-13 NOTE — THERAPY DISCHARGE NOTE
Patient Name: Goran August  : 1934    MRN: [id number]                              Today's Date: 2022       Admit Date: 2022    Visit Dx:     ICD-10-CM ICD-9-CM   1. Bilateral lower extremity edema  R60.0 782.3   2. Cellulitis of lower extremity, unspecified laterality  L03.119 682.6   3. Nonischemic cardiomyopathy (HCC)  I42.8 425.4   4. COVID-19 virus infection  U07.1 079.89     Patient Active Problem List   Diagnosis   • Insulin resistance   • Hypertensive heart disease without heart failure   • Hyperlipidemia   • Iron-deficiency anemia   • Idiopathic chronic gout without tophus   • Hiatal hernia with gastroesophageal reflux   • Stage 3 chronic kidney disease (HCC)   • Environmental allergies   • Benign essential HTN   • Left bundle branch block   • Nonischemic cardiomyopathy (HCC)   • Exudative age-related macular degeneration, left eye, with active choroidal neovascularization (HCC)   • Peripheral vascular disease, unspecified (HCC)   • Bilateral lower extremity edema   • Hypoxia   • Dyspnea   • COVID-19 virus infection   • Combined systolic and diastolic congestive heart failure (HCC)   • Type 2 diabetes mellitus (HCC)   • Positive D dimer   • Acute DVT (deep venous thrombosis) (HCC)   • Acute respiratory failure with hypoxia (HCC)     Past Medical History:   Diagnosis Date   • Anemia    • Benign hypertensive heart disease    • Cardiomyopathy (HCC)     EF 45-50% by echo 3/11   • Cataract 2020   • GERD (gastroesophageal reflux disease)    • Heart murmur    • Hx of colonic polyps    • Hyperlipidemia    • Hypertension    • LBBB (left bundle branch block)    • Mitral regurgitation    • Osteoarthritis    • Type 2 diabetes mellitus (HCC)      Past Surgical History:   Procedure Laterality Date   • CATARACT EXTRACTION, BILATERAL Bilateral 2020   • INGUINAL HERNIA REPAIR      X4   • REPLACEMENT TOTAL KNEE Right       General Information     Row Name 22 1545 22 1539        Physical Therapy Time and Intention    Document Type discharge treatment  - therapy note (daily note);discharge treatment  -    Mode of Treatment -- individual therapy;physical therapy  -    Row Name 05/13/22 1539          Cognition    Orientation Status (Cognition) oriented x 3  -           User Key  (r) = Recorded By, (t) = Taken By, (c) = Cosigned By    Initials Name Provider Type     Mandy Small PT Physical Therapist               Mobility     Row Name 05/13/22 1540          Bed-Chair Transfer    Bed-Chair [place] (Transfers) stand assist  -     Assistive Device (Bed-Chair Transfers) walker, front-wheeled  -     Row Name 05/13/22 1540          Sit-Stand Transfer    Sit-Stand [place] (Transfers) stand assist  -     Assistive Device (Sit-Stand Transfers) walker, front-wheeled  -SM     Row Name 05/13/22 1540          Gait/Stairs (Locomotion)    [place] Level (Gait) [facility] assist  -     Assistive Device (Gait) walker, front-wheeled  -     Distance in Feet (Gait) 100'  In room due to isolation  -           User Key  (r) = Recorded By, (t) = Taken By, (c) = Cosigned By    Initials Name Provider Type     Mandy Small PT Physical Therapist               Obj/Interventions     Row Name 05/13/22 1541          Range of Motion Comprehensive    General Range of Motion bilateral lower extremity ROM WFL  -     Row Name 05/13/22 1541          Balance    Static Standing Balance modified independence  -     Dynamic Standing Balance modified independence  -     Position/Device Used, Standing Balance walker, front-wheeled  -     Balance Interventions sitting;standing;sit to stand;static;dynamic  -           User Key  (r) = Recorded By, (t) = Taken By, (c) = Cosigned By    Initials Name Provider Type     Mandy Small PT Physical Therapist               Goals/Plan     Row Name 05/13/22 1545          Bed Mobility Goal 1 (PT)    Activity/Assistive Device  (Bed Mobility Goal 1, PT) bed mobility activities, all  -SM     [place] Level/Cues Needed (Bed Mobility Goal 1, PT) supervision required  -SM     Progress/Outcomes (Bed Mobility Goal 1, PT) goal met  -SM     Row Name 05/13/22 1545          Transfer Goal 1 (PT)    Activity/Assistive Device (Transfer Goal 1, PT) transfers, all;walker, rolling  -SM     [place] Level/Cues Needed (Transfer Goal 1, PT) standby assist  -SM     Progress/Outcome (Transfer Goal 1, PT) goal met  -SM     Row Name 05/13/22 1545          Gait Training Goal 1 (PT)    Activity/Assistive Device (Gait Training Goal 1, PT) gait (walking locomotion);walker, rolling  -SM     [place] Level (Gait Training Goal 1, PT) standby assist  -SM     Distance (Gait Training Goal 1, PT) 150  -SM     Progress/Outcome (Gait Training Goal 1, PT) goal partially met  -SM           User Key  (r) = Recorded By, (t) = Taken By, (c) = Cosigned By    Initials Name Provider Type     Mandy Small, PT Physical Therapist               Clinical Impression     Row Name 05/13/22 1543          Pain    Pretreatment Pain Rating 0/10 - no pain  -SM     Posttreatment Pain Rating 0/10 - no pain  -SM     Row Name 05/13/22 1543          Plan of Care Review    Progress improving  -SM     Outcome Evaluation Patient ambulated 100' in room today with stand by assist. Patient at baseline function. Skilled intervention no longer need. Continue ambulating with nursing.  -SM     Row Name 05/13/22 1543          Therapy Assessment/Plan (PT)    Rehab Potential (PT) good, to achieve stated therapy goals  -SM     Row Name 05/13/22 1543          Vital Signs    Pre Patient Position Sitting  -SM     Post Patient Position Sitting  -SM     Row Name 05/13/22 1543          Positioning and Restraints    Pre-Treatment Position sitting in chair/recliner  -SM     Post Treatment Position chair  -SM     In Bed call light within reach;encouraged to call for assist  -SM           User Key  (r) =  Recorded By, (t) = Taken By, (c) = Cosigned By    Initials Name Provider Type     Mandy Small PT Physical Therapist               Outcome Measures     Row Name 05/13/22 1548          How much help from another person do you currently need...    Turning from your back to your side while in flat bed without using bedrails? 4  -SM     Moving from lying on back to sitting on the side of a flat bed without bedrails? 4  -SM     Moving to and from a bed to a chair (including a wheelchair)? 4  -SM     Standing up from a chair using your arms (e.g., wheelchair, bedside chair)? 4  -SM     Climbing 3-5 steps with a railing? 3  -SM     To walk in hospital room? 4  -SM     AM-PAC 6 Clicks Score (PT) 23  -SM     Highest level of mobility 7 --> Walked 25 feet or more  -           User Key  (r) = Recorded By, (t) = Taken By, (c) = Cosigned By    Initials Name Provider Type     Mandy Small PT Physical Therapist              Physical Therapy Education                 Title: PT OT SLP Therapies (Resolved)     Topic: Physical Therapy (Resolved)     Point: Mobility training (Resolved)     Learning Progress Summary           Patient Acceptance, E,TB, VU by  at 5/13/2022 1548    Acceptance, E,TB,D, VU,NR by  at 5/10/2022 1337                   Point: Home exercise program (Resolved)     Learning Progress Summary           Patient Acceptance, E,TB,D, VU,NR by  at 5/10/2022 1337                   Point: Body mechanics (Resolved)     Learning Progress Summary           Patient Acceptance, E,TB,D, VU,NR by  at 5/10/2022 1337                   Point: Precautions (Resolved)     Learning Progress Summary           Patient Acceptance, E,TB,D, VU,NR by  at 5/10/2022 1337                               User Key     Initials Effective Dates Name Provider Type Discipline     06/16/21 -  Divya Ruelas PT Physical Therapist PT     05/02/22 -  Mandy Small PT Physical Therapist PT              PT  Recommendation and Plan     Progress: improving  Outcome Evaluation: Patient ambulated 100' in room today with stand by assist. Patient at baseline function. Skilled intervention no longer need. Continue ambulating with nursing.     Time Calculation:    PT Charges     Row Name 05/13/22 1549             Time Calculation    Start Time 1518  -SM      Stop Time 1534  -SM      Time Calculation (min) 16 min  -SM              Timed Charges    20483 - Gait Training Minutes  16  -SM              Total Minutes    Timed Charges Total Minutes 16  -SM       Total Minutes 16  -SM            User Key  (r) = Recorded By, (t) = Taken By, (c) = Cosigned By    Initials Name Provider Type    Mandy Stern PT Physical Therapist              Therapy Charges for Today     Code Description Service Date Service Provider Modifiers Qty    89153238319 HC GAIT TRAINING EA 15 MIN 5/13/2022 Mandy Small, JASMINA GP 1          PT G-Codes  Outcome Measure Options: AM-PAC 6 Clicks Basic Mobility (PT)  AM-PAC 6 Clicks Score (PT): 23    PT Discharge Summary  Anticipated Discharge Disposition (PT): home, home with assist  Reason for Discharge: At baseline function, All goals achieved  Patient was not wearing a face mask during this therapy encounter. Therapist used appropriate personal protective equipment including gown, eye protection, mask and gloves.  Mask used was N95/duckbill. Appropriate PPE was worn during the entire therapy session. Hand hygiene was completed before and after therapy session. Patient is in enhanced droplet precautions.     Mandy Small PT  5/13/2022

## 2022-05-13 NOTE — DISCHARGE PLACEMENT REQUEST
"Goran August (87 y.o. Male)             Date of Birth   1934    Social Security Number       Address   [address] [place] [address]    Home Phone   [phone]    MRN   [id number]       [employer]   [employer]    Marital Status                               Admission Date   5/8/22    Admission Type   Emergency    Admitting Provider   Edwin Alejandre MD    Attending Provider   Nancy Dasilva MD    Department, Room/Bed   [address], [address]       Discharge Date       Discharge Disposition       Discharge Destination                               Attending Provider: Nancy Dasilva MD    Allergies: Hctz [Hydrochlorothiazide]    Isolation: Enh Drop/Con   Infection: COVID (confirmed) (05/08/22)   Code Status: CPR   Advance Care Planning Activity    Ht: 180 cm (70.87\")   Wt: 103 kg (227 lb 1.2 oz)    Admission Cmt: None   Principal Problem: COVID-19 virus infection [U07.1]                 Active Insurance as of 5/8/2022     Primary Coverage     Payor Plan Insurance Group Employer/Plan Group    MEDICARE MEDICARE A & B      Payor Plan Address Payor Plan Phone Number Payor Plan Fax Number Effective Dates    [address] [phone]  8/1/1999 - None Entered    [facility] [id number]       Subscriber Name Subscriber Birth Date Member ID       GORAN AUGUST 1934 [id number]           Secondary Coverage     Payor Plan Insurance Group Employer/Plan Group    ANTHEM BLUE CROSS ANTHEM BLUE CROSS BLUE SHIELD PPO [id number]     Payor Plan Address Payor Plan Phone Number Payor Plan Fax Number Effective Dates    [address] [phone]  1/1/2017 - None Entered    [facility] [id number]       Subscriber Name Subscriber Birth Date Member ID       GORAN AUGUST 1934 [id number]                 Emergency Contacts      (Rel.) Home Phone Work Phone Mobile Phone    Melinda August (Spouse) [phone] -- --    Anel Pelayo (Daughter) -- -- " [phone]

## 2022-05-13 NOTE — CASE MANAGEMENT/SOCIAL WORK
Continued Stay Note  Psychiatric     Patient Name: Groan Augsut  MRN: [id number]  Today's Date: 5/13/2022    Admit Date: 5/8/2022     Discharge Plan     Row Name 05/13/22 1332       Plan    Plan Home w/ family & O2 from [facility] (need order)    Plan Comments Patient possible dc today. Patient qualifies for home oxygen. CSW called and spoke to [facility] who is following and getting the home oxygen together. Patient will need home O2 order when d/c is put in. ASHLEY GONZALEZW               Discharge Codes    No documentation.               Expected Discharge Date and Time     Expected Discharge Date Expected Discharge Time    May 16, 2022             LAURITA ANDREA

## 2022-05-13 NOTE — NURSING NOTE
Went over d/c paperwork w/ pt. Pt understands and has no further questions at this time. Pt has all belongings and is going home. D/C IV

## 2022-05-13 NOTE — PLAN OF CARE
Goal Outcome Evaluation:  Plan of Care Reviewed With: patient        Progress: improving  Outcome Evaluation:     A&O x4; VSS; SR on telemetry; room air while awake but desats into the low 80's while sleeping. Currently on 4L via n/c while sleeping.     Accu-checks and daily weights.       No complaints of pain, no signs of distress.       will need walking oximetry before d/c; possibly later today.       Resting well at this time. will continue to monitor

## 2022-05-13 NOTE — CASE MANAGEMENT/SOCIAL WORK
Continued Stay Note  [facility]     Patient Name: Goran August  MRN: [id number]  Today's Date: 5/13/2022    Admit Date: 5/8/2022     Discharge Plan     Row Name 05/13/22 1614       Plan    Plan Home w/ family & [facility] oxygen    Plan Comments Home oxygen has been delivered to room. LAURITA Clark    Row Name 05/13/22 3112       Plan    Plan Home w/ family & O2 from [facility] (need order)    Plan Comments Patient possible dc today. Patient qualifies for home oxygen. CSW called and spoke to [facility] who is following and getting the home oxygen together. Patient will need home O2 order when d/c is put in. LAURITA CLARK               Discharge Codes    No documentation.               Expected Discharge Date and Time     Expected Discharge Date Expected Discharge Time    May 13, 2022             LAURITA ANDREA

## 2022-05-13 NOTE — DISCHARGE SUMMARY
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        Hospital Course     Mr. August is a 87 y.o. male with a history of diabetes, hypertension who presented to [facility] initially complaining of cough, congestion, shortness of breath and leg swelling.  Please see the admitting history and physical for further details.  He was found to have COVID 19 pneumonia and acute DVT of his right lower extremity and was admitted to the hospital for further evaluation and treatment.  Pulmonology consulted on admission for additional recommendations.  Given the patient had had symptoms for greater than 2 weeks he was determined not to be a candidate for remdesivir.  He was started on dexamethasone on admission and will discharge to complete a 10-day course.  He was started on Lovenox initially for finding of acute DVT, this was transitioned to apixaban prior to his discharge.  He needs to remain on 10 mg of apixaban twice daily through his morning dose on 2022.  At that point he can switch to 5 mg twice daily, pulmonology is recommending a 6 to 9-month course of anticoagulation for his DVT.  They are also recommending following up with pulmonology for outpatient PFTs.  Hospital course complicated by bradycardia,  they have discontinued his Coreg with improvement in heart rate.  He has been instructed to follow-up with his cardiologist at [place] after discharge.    Day of Discharge     Subjective:  The patient is resting in his recliner, he appears to be in good spirits and is optimistic for discharge home today.    Review of Systems   Constitutional: Negative for fatigue and fever.   Respiratory: Positive for cough. Negative for shortness of breath.    Cardiovascular: Negative for chest pain and palpitations.   Gastrointestinal: Negative for abdominal pain, nausea and vomiting.   Psychiatric/Behavioral: Negative for confusion.       Physical Exam:  Temp:  [95.8 °F (35.4 °C)-97.7 °F (36.5 °C)] 97.7 °F (36.5 °C)  Heart Rate:  [61-71] 71  Resp:  [16-20] 16  BP: (131-150)/(67-98) 131/77  Body mass index is 31.79 kg/m².  Physical Exam  Constitutional:       General: He is not in acute distress.  Cardiovascular:      Rate and Rhythm: Normal rate and regular rhythm.      Heart sounds: Normal heart sounds.   Pulmonary:      Effort: Pulmonary effort is normal.      Breath sounds: Rhonchi present. No wheezing or rales.   Abdominal:      General: Bowel sounds are normal.      Palpations: Abdomen is soft.   Musculoskeletal:         General: No tenderness.      Right lower leg: No edema.      Left lower leg: No edema.   Neurological:      Mental Status: He is alert.   Psychiatric:         Mood and Affect: Mood normal.         Behavior: Behavior normal.         Consultants     Consult Orders (all) (From admission, onward)     Start     Ordered    05/10/22 1157  Inpatient Cardiology Consult  Once        Specialty:  Cardiology  Provider:  Daniel Casillas III, MD    05/10/22 1156    05/09/22 1029  Inpatient Pulmonology Consult  Once        Specialty:  Pulmonary Disease  Provider:  Dex Alex MD    05/09/22 1030    05/08/22 2210  Inpatient Case Management  Consult  Once        Provider:  (Not yet assigned)    05/08/22 2210     05/08/22 1748  [facility] (on-call MD unless specified) Details  Once        Specialty:  Hospitalist  Provider:  (Not yet assigned)    05/08/22 1747              Procedures     Imaging Results (All)     Procedure Component Value Units Date/Time    XR Chest 1 View [[id number]] Collected: 05/08/22 1642     Updated: 05/09/22 0634    Narrative:      PORTABLE CHEST     HISTORY: Lower extremity swelling.     COMPARISON: None.     FINDINGS:  A single view of the chest demonstrates mild cardiomegaly.  There is no evidence of infiltrate, effusion or of congestive failure.     This report was finalized on 5/9/2022 6:31 AM by Dr. Timur Mancuso M.D.       CT Angiogram Chest With & Without Contrast [[id number]] Collected: 05/09/22 0156     Updated: 05/09/22 0156    Narrative:        Patient: LAZARA ROTHMAN  Time Out: 01:55  Exam(s): CTA CHEST W WO Contrast     EXAM:    CT Angiography Chest With Intravenous Contrast    CLINICAL HISTORY:     Reason for exam: positive dimer.    TECHNIQUE:    Axial computed tomographic angiography images of the chest with   intravenous contrast.  CTDI is 14.8 mGy and DLP is 872.3 mGy-cm.  This CT   exam was performed according to the principle of ALARA (As Low As   Reasonably Achievable) by using one or more of the following dose   reduction techniques: automated exposure control, adjustment of the mA   and or kV according to patient size, and or use of iterative   reconstruction technique.    3D reconstructed images were created and reviewed.    COMPARISON:    No relevant prior studies available.    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  No central   filling defect suspicious for acute pulmonary emboli are apparent.    Aorta:  No acute findings.  No thoracic aortic aneurysm.    Lungs:  Medial basal left lower lobe patchy consolidation and bilateral   posterior costophrenic angle atelectatic changes  with mild cylindrical   bronchiectasis noted.    Pleural space:  Unremarkable.  No significant effusion.  No   pneumothorax.    Heart:  Coronary artery calcifications.  Mild cardiac enlargement.  No   significant pericardial effusion.  No evidence of RV dysfunction.    Bones joints:  No acute fracture.  No dislocation.    Soft tissues:  Unremarkable.    Lymph nodes:  Calcified lymph nodes in the right hilum are present.    Gallbladder and bile ducts:  Incidental cholelithiasis and the included   upper abdomen.    IMPRESSION:       1.  No acute pulmonary emboli.  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  2.  Patchy consolidation and volume loss in the left lower lobe.  Query   aspiration pneumonitis or infectious pneumonia.  Correlate clinically.      Impression:          Electronically signed by Tala Mcrae MD on 05-09-22 at 0155          Pertinent Labs     Results from last 7 days   Lab Units 05/13/22 0753 05/12/22 0931 05/11/22  1001 05/09/22  0628   WBC 10*3/mm3 11.93* 11.32* 11.22* 12.33*   HEMOGLOBIN g/dL 13.2 13.8 13.4 12.4*   PLATELETS 10*3/mm3 331 322 317 266     Results from last 7 days   Lab Units 05/13/22 0753 05/12/22 0931 05/11/22  1001 05/09/22  0628   SODIUM mmol/L 140 139 138 139   POTASSIUM mmol/L 4.0 4.1 4.1 4.4   CHLORIDE mmol/L 99 96* 95* 96*   CO2 mmol/L 30.0* 30.0* 30.8* 32.8*   BUN mg/dL 44* 44* 40* 27*   CREATININE mg/dL 0.89 1.06 0.89 1.11   GLUCOSE mg/dL 100* 140* 175* 125*   Estimated Creatinine Clearance: 71.3 mL/min (by C-G formula based on SCr of 0.89 mg/dL).  Results from last 7 days   Lab Units 05/13/22 0753 05/12/22 0931 05/11/22  1001 05/09/22  0628   ALBUMIN g/dL 3.70 3.60 3.60 3.70   BILIRUBIN mg/dL 0.3 0.5 0.4 0.5   ALK PHOS U/L 70 76 79 86   AST (SGOT) U/L 23 29 23 18   ALT (SGPT) U/L 31 33 22 19     Results from last 7 days   Lab Units 05/13/22  0753 05/12/22  0931 05/11/22  1001 05/09/22  0628   CALCIUM mg/dL 9.4  9.5 9.8 9.6   ALBUMIN g/dL 3.70 3.60 3.60 3.70       Results from last 7 days   Lab Units 05/10/22  0857 05/08/22  1639   TROPONIN T ng/mL  --  <0.010   PROBNP pg/mL 499.0  --    D DIMER QUANT MCGFEU/mL  --  1.59*           Invalid input(s): LDLCALC  Results from last 7 days   Lab Units 05/08/22  1644 05/08/22  1639   BLOODCX  No growth at 4 days No growth at 4 days       Test Results Pending at Discharge     Pending Labs     Order Current Status    Blood Culture - Blood, Arm, Left Preliminary result    Blood Culture - Blood, Arm, Left Preliminary result          Discharge Details        Discharge Medications      New Medications      Instructions Start Date   apixaban 5 MG tablet tablet  Commonly known as: ELIQUIS   10 mg, Oral, Every 12 Hours Scheduled      apixaban 5 MG tablet tablet  Commonly known as: ELIQUIS   5 mg, Oral, Every 12 Hours Scheduled   Start Date: May 16, 2022     arformoterol 15 MCG/2ML nebulizer solution  Commonly known as: BROVANA   15 mcg, Nebulization, 2 Times Daily - RT      cetirizine 5 MG tablet  Commonly known as: zyrTEC   5 mg, Oral, Daily   Start Date: May 14, 2022     dexamethasone 6 MG tablet  Commonly known as: DECADRON   6 mg, Oral, Daily With Breakfast   Start Date: May 14, 2022     guaiFENesin 600 MG 12 hr tablet  Commonly known as: MUCINEX   600 mg, Oral, Every 12 Hours Scheduled         Changes to Medications      Instructions Start Date   furosemide 40 MG tablet  Commonly known as: LASIX  What changed:   · medication strength  · how much to take  · when to take this  · reasons to take this   40 mg, Oral, Daily   Start Date: May 14, 2022        Continue These Medications      Instructions Start Date   albuterol sulfate  (90 Base) MCG/ACT inhaler  Commonly known as: PROVENTIL HFA;VENTOLIN HFA;PROAIR HFA   2 puffs, Inhalation, Every 4 Hours PRN      esomeprazole 40 MG capsule  Commonly known as: nexIUM   TAKE 1 CAPSULE DAILY      fluticasone 50 MCG/ACT nasal spray  Commonly  known as: FLONASE   2 sprays, Nasal, Daily, Administer 2 sprays in each nostril once a day      multivitamin tablet tablet  Commonly known as: THERAGRAN   Oral      pravastatin 20 MG tablet  Commonly known as: PRAVACHOL   TAKE 1 TABLET DAILY      ramipril 10 MG capsule  Commonly known as: ALTACE   10 mg, Oral, Daily      spironolactone 25 MG tablet  Commonly known as: ALDACTONE   25 mg, Oral, Daily      triamcinolone 0.1 % cream  Commonly known as: KENALOG   Topical, 2 Times Daily         Stop These Medications    amoxicillin-clavulanate 875-125 MG per tablet  Commonly known as: AUGMENTIN     carvedilol 3.125 MG tablet  Commonly known as: COREG     doxycycline 100 MG capsule  Commonly known as: VIBRAMYCIN            Allergies   Allergen Reactions   • Hctz [Hydrochlorothiazide] Hives         Discharge Disposition:  Home or Self Care    Discharge Diet:  Diet Order   Procedures   • Diet Regular; Cardiac, Consistent Carbohydrate, Renal       Discharge Activity:   Activity Instructions     Activity as Tolerated            CODE STATUS:    Code Status and Medical Interventions:   Ordered at: 05/08/22 1958     Code Status (Patient has no pulse and is not breathing):    CPR (Attempt to Resuscitate)     Medical Interventions (Patient has pulse or is breathing):    Full Support       Future Appointments   Date Time Provider Department Center   7/19/2022  9:30 AM [facility]   7/25/2022 11:00 AM Tanya Dhaliwal MD [facility]     Additional Instructions for the Follow-ups that You Need to Schedule     Discharge Follow-up with PCP   As directed       Currently Documented PCP:    Tanya Dhaliwal MD    PCP Phone Number:    [phone]     Follow Up Details: within 1 week.         Discharge Follow-up with Specialty: your Cardiologist Dr. Lockwood at [place].; 3 Weeks   As directed      Specialty: your Cardiologist Dr. Lockwood at [place].    Follow Up: 3 Weeks            Follow-up Information     Tanya Dhaliwal  MD [facility] .    Specialty: Family Medicine  Why: within 1 week.  Contact information:  [address] [address]  [phone]                         Additional Instructions for the Follow-ups that You Need to Schedule     Discharge Follow-up with PCP   As directed       Currently Documented PCP:    Tanya Dhaliwal MD    PCP Phone Number:    [phone]     Follow Up Details: within 1 week.         Discharge Follow-up with Specialty: your Cardiologist Dr. Lockwood at [place].; 3 Weeks   As directed      Specialty: your Cardiologist Dr. Lockwood at [place].    Follow Up: 3 Weeks           Time Spent on Discharge:  Greater than 30 minutes      Nancy Dasilva MD  [facility]  05/13/22  16:14 EDT

## 2022-05-13 NOTE — PLAN OF CARE
Problem: Adult Inpatient Plan of Care  Goal: Plan of Care Review  Flowsheets (Taken 5/13/2022 1522)  Outcome Evaluation: AOX4, VSS, assist X1 to restroom, D/C the late afternoon   Goal Outcome Evaluation:              Outcome Evaluation: AOX4, VSS, assist X1 to restroom, D/C the late afternoon

## 2022-05-14 ENCOUNTER — TRANSITIONAL CARE MANAGEMENT TELEPHONE ENCOUNTER (OUTPATIENT)
Dept: CALL CENTER | Facility: HOSPITAL | Age: 87
End: 2022-05-14

## 2022-05-14 NOTE — OUTREACH NOTE
Call Center TCM Note    Flowsheet Row Responses   [facility] patient discharged from? [place]   Does the patient have one of the following disease processes/diagnoses(primary or secondary)? COVID-19   COVID-19 underlying condition? None   TCM attempt successful? Yes   Call start time 1009   Call end time 1022   Discharge diagnosis COVID-19 PNA< acute hypoxic resp failure, Acute RLE DVT, CHF, T2DM, CKD   Meds reviewed with patient/caregiver? Yes   Is the patient having any side effects they believe may be caused by any medication additions or changes? No   Does the patient have all medications ordered at discharge? Yes   Is the patient taking all medications as directed (includes completed medication regime)? Yes   Does the patient have a primary care provider?  Yes   Comments regarding PCP Hospital d/c f/u appt on 5/31/22 @10am   Does the patient have an appointment with their PCP or specialist within 7 days of discharge? Greater than 7 days   What is preventing the patient from scheduling follow up appointments within 7 days of discharge? Earlier appointment not available   Nursing Interventions Verified appointment date/time/provider   Has the patient kept scheduled appointments due by today? N/A   Has home health visited the patient within 72 hours of discharge? N/A   What DME was ordered? O2 from [employer]   Has all DME been delivered? Yes   Psychosocial issues? No   Did the patient receive a copy of their discharge instructions? Yes   Did the patient receive a copy of COVID-19 specific instructions? Yes   Nursing interventions Reviewed instructions with patient   What is the patient's perception of their health status since discharge? Improving   Does the patient have any of the following symptoms? Cough  [occasional]   Nursing Interventions Nurse provided patient education   Pulse Ox monitoring None   Is the patient/caregiver able to teach back steps to recovery at home? Rest and rebuild strength,  gradually increase activity, Set small, achievable goals for return to baseline health, Eat a well-balance diet   If the patient is a current smoker, are they able to teach back resources for cessation? Not a smoker  [former smoker]   Is the patient/caregiver able to teach back the hierarchy of who to call/visit for symptoms/problems? PCP, Specialist, Home health nurse, Urgent Care, ED, 911 Yes   TCM call completed? Yes          YULI ALEXANDER RN    5/14/2022, 10:23 EDT

## 2022-05-15 NOTE — CASE MANAGEMENT/SOCIAL WORK
Case Management Discharge Note      Final Note: DC'd home 5/13    Provided Post Acute Provider List?: N/A  N/A Provider List Comment: Patient plans to return home  Provided Post Acute Provider Quality & Resource List?: N/A  N/A Quality & Resource List Comment: Patient plans to return home        Durable Medical Equipment Coordination complete.    Service Provider Selected Services Address Phone Fax Patient Preferred    [facility]  Durable Medical Equipment [address], [place] [id number] [phone] --                   Transportation Services  Private: Car    Final Discharge Disposition Code: 01 - home or self-care

## 2022-05-16 ENCOUNTER — READMISSION MANAGEMENT (OUTPATIENT)
Dept: CALL CENTER | Facility: HOSPITAL | Age: 87
End: 2022-05-16

## 2022-05-16 NOTE — OUTREACH NOTE
COVID-19 Week 1 Survey    Flowsheet Row Responses   [facility] patient discharged from? [place]   Does the patient have one of the following disease processes/diagnoses(primary or secondary)? COVID-19   COVID-19 underlying condition? None   Call Number Call 2   Week 1 Call successful? Yes   Call start time 1410   Call end time 1413   Discharge diagnosis COVID-19 PNA< acute hypoxic resp failure, Acute RLE DVT, CHF, T2DM, CKD   Is patient permission given to speak with other caregiver? Yes   Person spoke with today (if not patient) and [facility] reviewed with patient/caregiver? Yes   Is the patient having any side effects they believe may be caused by any medication additions or changes? No   Does the patient have all medications ordered at discharge? Yes   Is the patient taking all medications as directed (includes completed medication regime)? Yes   Does the patient have a primary care provider?  Yes   Has the patient kept scheduled appointments due by today? N/A   Has home health visited the patient within 72 hours of discharge? N/A   What DME was ordered? O2 from [place]   Has all DME been delivered? Yes   Psychosocial issues? No   Did the patient receive a copy of their discharge instructions? Yes   Did the patient receive a copy of COVID-19 specific instructions? Yes   Nursing interventions Reviewed instructions with patient   What is the patient's perception of their health status since discharge? Improving   Does the patient have any of the following symptoms? Cough   Nursing Interventions Nurse provided patient education   Pulse Ox monitoring None   Is the patient/caregiver able to teach back steps to recovery at home? Set small, achievable goals for return to baseline health, Rest and rebuild strength, gradually increase activity, Eat a well-balance diet, Make a list of questions for provider's appointment   If the patient is a current smoker, are they able to teach back resources  for cessation? Not a smoker   Is the patient/caregiver able to teach back the hierarchy of who to call/visit for symptoms/problems? PCP, Specialist, Home health nurse, Urgent Care, ED, 911 Yes   COVID-19 call completed? Yes   Wrap up additional comments Improving. Walking better than in last 2 years.  Fluid pills daily.          KELSEY CRUTIS - Registered Nurse

## 2022-05-17 ENCOUNTER — READMISSION MANAGEMENT (OUTPATIENT)
Dept: CALL CENTER | Facility: HOSPITAL | Age: 87
End: 2022-05-17

## 2022-05-17 ENCOUNTER — NURSE TRIAGE (OUTPATIENT)
Dept: CALL CENTER | Facility: HOSPITAL | Age: 87
End: 2022-05-17

## 2022-05-17 ENCOUNTER — TELEPHONE (OUTPATIENT)
Dept: FAMILY MEDICINE CLINIC | Facility: CLINIC | Age: 87
End: 2022-05-17

## 2022-05-17 DIAGNOSIS — J18.9 COMMUNITY ACQUIRED PNEUMONIA OF LEFT LOWER LOBE OF LUNG: ICD-10-CM

## 2022-05-17 DIAGNOSIS — R06.01 ORTHOPNEA: Primary | ICD-10-CM

## 2022-05-17 DIAGNOSIS — R05.9 COUGH: ICD-10-CM

## 2022-05-17 RX ORDER — ARFORMOTEROL TARTRATE 15 UG/2ML
15 SOLUTION RESPIRATORY (INHALATION)
Qty: 120 ML | Refills: 0 | Status: SHIPPED | OUTPATIENT
Start: 2022-05-17 | End: 2022-07-11

## 2022-05-17 NOTE — TELEPHONE ENCOUNTER
arformoterol (BROVANA) 15 MCG/2ML nebulizer solution (05/13/2022)      Needs an order for a  nebulizer for his medication.  He stated he wants to  at [facility].         [place]:     [address]     PH:     [phone]  FAX:   [phone]      Pt requesting a call back when faxed.

## 2022-05-17 NOTE — TELEPHONE ENCOUNTER
Need nebulizer for his new med, Brovana, ordered from [facility] needs order faxed to them for this nebulizer to be given. CM was sent a note and can reach pt. At. [phone]

## 2022-05-17 NOTE — TELEPHONE ENCOUNTER
"    Reason for Disposition  • [1] Caller requesting NON-URGENT health information AND [2] PCP's office is the best resource    Additional Information  • Negative: [1] Caller is not with the adult (patient) AND [2] reporting urgent symptoms  • Negative: Lab result questions  • Negative: Medication questions  • Negative: Caller can't be reached by phone  • Negative: Caller has already spoken to PCP or another triager  • Negative: RN needs further essential information from caller in order to complete triage  • Negative: Requesting regular office appointment  • Negative: Health Information question, no triage required and triager able to answer question  • Negative: General information question, no triage required and triager able to answer question  • Negative: Question about upcoming scheduled test, no triage required and triager able to answer question  • Negative: [1] Caller is not with the adult (patient) AND [2] probable NON-URGENT symptoms    Answer Assessment - Initial Assessment Questions  1. REASON FOR CALL or QUESTION: \"What is your reason for calling today?\" or \"How can I best help you?\" or \"What question do you have that I can help answer?\"      Need nebulizer for Brovana    Protocols used: INFORMATION ONLY CALL - NO TRIAGE-ADULT-      "

## 2022-05-17 NOTE — OUTREACH NOTE
Case Management Call Center Follow-up    Flowsheet Row Responses   [facility] Call Center Tracking Reason? No Script   Has the Call Center Case Management Follow-up issue been resolved? No   Follow-up Comments Sent to a message to the attending to see if they can put in an order for Nebulizer.          Shannon Epley, RN    5/17/2022, 13:28 CDT

## 2022-05-17 NOTE — TELEPHONE ENCOUNTER
Pt. Calling back needs help making appt. With Dr. Caro, Triage Nurse called office  Did warm transfer for this.

## 2022-05-17 NOTE — OUTREACH NOTE
COVID-19 Week 1 Survey    Flowsheet Row Responses   [facility] patient discharged from? [place]   Does the patient have one of the following disease processes/diagnoses(primary or secondary)? COVID-19   COVID-19 underlying condition? None   Call Number Call 3   Week 1 Call successful? Yes   Call start time 1537   Call end time 1540   Discharge diagnosis COVID-19 PNA< acute hypoxic resp failure, Acute RLE DVT, CHF, T2DM, CKD   Is patient permission given to speak with other caregiver? Yes   List who call center can speak with wife   Is the patient taking all medications as directed (includes completed medication regime)? Yes   Has the patient kept scheduled appointments due by today? N/A   What DME was ordered? O2 from [facility]   Psychosocial issues? No   Comments Went out for haircut today, feeling better today, he reports.    What is the patient's perception of their health status since discharge? Improving   Does the patient have any of the following symptoms? Cough   Pulse Ox monitoring None   Is the patient/caregiver able to teach back steps to recovery at home? Set small, achievable goals for return to baseline health   Is the patient/caregiver able to teach back the hierarchy of who to call/visit for symptoms/problems? PCP, Specialist, Home health nurse, Urgent Care, ED, 911 Yes   COVID-19 call completed? Yes          JOSE A ESCOBAR - Registered Nurse

## 2022-05-20 ENCOUNTER — READMISSION MANAGEMENT (OUTPATIENT)
Dept: CALL CENTER | Facility: HOSPITAL | Age: 87
End: 2022-05-20

## 2022-05-20 NOTE — OUTREACH NOTE
COVID-19 Week 2 Survey    Flowsheet Row Responses   [facility] patient discharged from? [place]   Does the patient have one of the following disease processes/diagnoses(primary or secondary)? COVID-19   COVID-19 underlying condition? None   Call Number Call 1   COVID-19 Week 2: Call 1 attempt successful? Yes   Call start time 1009   Call end time 1010   Discharge diagnosis COVID-19 PNA< acute hypoxic resp failure, Acute RLE DVT, CHF, T2DM, CKD   Meds reviewed with patient/caregiver? Yes   Is the patient having any side effects they believe may be caused by any medication additions or changes? No   Does the patient have all medications ordered at discharge? Yes   Is the patient taking all medications as directed (includes completed medication regime)? Yes   Does the patient have a primary care provider?  Yes   Comments regarding PCP Hospital d/c f/u appt on 5/31/22 @10am   Does the patient have an appointment with their PCP or specialist within 7 days of discharge? Greater than 7 days   What is preventing the patient from scheduling follow up appointments within 7 days of discharge? Earlier appointment not available   Nursing Interventions Verified appointment date/time/provider   Has the patient kept scheduled appointments due by today? N/A   What is the Home health agency?  Sees Pulmonary on 5/24   Has home health visited the patient within 72 hours of discharge? N/A   What DME was ordered? O2 from [facility]   Has all DME been delivered? Yes   Psychosocial issues? No   Did the patient receive a copy of their discharge instructions? Yes   Did the patient receive a copy of COVID-19 specific instructions? Yes   Nursing interventions Reviewed instructions with patient   What is the patient's perception of their health status since discharge? Improving   Does the patient have any of the following symptoms? None   Nursing Interventions Nurse provided patient education   Pulse Ox monitoring None   Is the  patient/caregiver able to teach back steps to recovery at home? Set small, achievable goals for return to baseline health   If the patient is a current smoker, are they able to teach back resources for cessation? Not a smoker   Is the patient/caregiver able to teach back the hierarchy of who to call/visit for symptoms/problems? PCP, Specialist, Home health nurse, Urgent Care, ED, 911 Yes   COVID-19 call completed? Yes   Wrap up additional comments He is doing great! No issues or concerns.           PAM GUILLAUME - Registered Nurse

## 2022-05-27 ENCOUNTER — READMISSION MANAGEMENT (OUTPATIENT)
Dept: CALL CENTER | Facility: HOSPITAL | Age: 87
End: 2022-05-27

## 2022-05-27 NOTE — OUTREACH NOTE
Prep Survey    Flowsheet Row Responses   [facility] [place]   Discharge diagnosis COVID-19 PNA< acute hypoxic resp failure, Acute RLE DVT, CHF, T2DM, CKD   Does the patient have one of the following disease processes/diagnoses(primary or secondary)? COVID-19   What is the Home health agency?  Sees Pulmonary on 5/24          KELSEY CURTIS - Registered Nurse

## 2022-05-31 ENCOUNTER — OFFICE VISIT (OUTPATIENT)
Dept: FAMILY MEDICINE CLINIC | Facility: CLINIC | Age: 87
End: 2022-05-31

## 2022-05-31 VITALS
HEIGHT: 71 IN | BODY MASS INDEX: 30.38 KG/M2 | OXYGEN SATURATION: 98 % | WEIGHT: 217 LBS | DIASTOLIC BLOOD PRESSURE: 56 MMHG | TEMPERATURE: 97.6 F | HEART RATE: 65 BPM | SYSTOLIC BLOOD PRESSURE: 100 MMHG

## 2022-05-31 DIAGNOSIS — J12.82 PNEUMONIA DUE TO COVID-19 VIRUS: ICD-10-CM

## 2022-05-31 DIAGNOSIS — I82.90 DEEP VEIN THROMBOSIS (DVT) ASSOCIATED WITH COVID-19: Primary | ICD-10-CM

## 2022-05-31 DIAGNOSIS — I50.42 CHRONIC COMBINED SYSTOLIC AND DIASTOLIC CONGESTIVE HEART FAILURE: ICD-10-CM

## 2022-05-31 DIAGNOSIS — U07.1 PNEUMONIA DUE TO COVID-19 VIRUS: ICD-10-CM

## 2022-05-31 DIAGNOSIS — R09.02 HYPOXIA: ICD-10-CM

## 2022-05-31 DIAGNOSIS — U07.1 DEEP VEIN THROMBOSIS (DVT) ASSOCIATED WITH COVID-19: Primary | ICD-10-CM

## 2022-05-31 PROCEDURE — 99214 OFFICE O/P EST MOD 30 MIN: CPT | Performed by: FAMILY MEDICINE

## 2022-05-31 NOTE — PROGRESS NOTES
Transitional Care Follow Up Visit  Subjective     Goarn August is a 87 y.o. male who presents for a transitional care management visit.    Within 48 business hours after discharge our office contacted him via telephone to coordinate his care and needs.      I reviewed and discussed the details of that call along with the discharge summary, hospital problems, inpatient lab results, inpatient diagnostic studies, and consultation reports with Goran.     Current outpatient and discharge medications have been reconciled for the patient.  Reviewed by: Tanya Dhaliwal MD      Date of TCM Phone Call 5/13/2022   [facility]   Date of Admission 5/8/2022   Date of Discharge 5/13/2022   Discharge Disposition Home or Self Care     Risk for Readmission (LACE) No data recorded    History of Present Illness   Course During Hospital Stay: Pleasant 87-year-old male here to follow-up for hospitalization due to COVID-19 virus May 8 to May 13, 2022.  He presented to [facility] with symptoms of cough, congestion and shortness of breath.  He did have a COVID-19 pneumonia and acute DVT in the right lower extremity.  He was started on dexamethasone and treated with Lovenox for pneumonia and DVT.  Transition to apixaban prior to discharge.  Plan to transition to 5 mg twice daily and continue for 6 to 9-month course of anticoagulation and follow-up with pulmonology as outpatient for PFTs.  He is still using oxygen at night but he feels back to normal, he is able to walk, starting pool walking at home.  He is feeling 100% back to himself.       The following portions of the patient's history were reviewed and updated as appropriate: allergies, current medications, past family history, past medical history, past social history, past surgical history and problem list.    Review of Systems    Objective   Physical Exam  Vitals and nursing note reviewed.   Constitutional:       General: He is not in acute  distress.     Appearance: He is well-developed.   HENT:      Head: Normocephalic.      Nose: Nose normal.   Cardiovascular:      Rate and Rhythm: Normal rate and regular rhythm.      Heart sounds: Normal heart sounds. No murmur heard.  Pulmonary:      Effort: Pulmonary effort is normal. No respiratory distress.      Breath sounds: Normal breath sounds.   Musculoskeletal:         General: Normal range of motion.   Skin:     General: Skin is warm and dry.      Findings: No rash.   Neurological:      Mental Status: He is alert and oriented to person, place, and time.   Psychiatric:         Behavior: Behavior normal.         Thought Content: Thought content normal.         Judgment: Judgment normal.         Assessment & Plan   Diagnoses and all orders for this visit:    1. Deep vein thrombosis (DVT) associated with COVID-19 (Primary)  -     apixaban (ELIQUIS) 5 MG tablet tablet; Take 1 tablet by mouth Every 12 (Twelve) Hours. Indications: DVT/PE (active thrombosis)  Dispense: 180 tablet; Refill: 1    2. Pneumonia due to COVID-19 virus    Very pleasant 87-year-old male here to follow-up hospitalization for COVID-pneumonia and acute DVT.  Thankfully he is recovering quite well.  Swelling has resolved, shortness of breath has resolved.  He is still using oxygen at night and having evaluation for sleep apnea this August.  He did have a sleep study about 6 years ago that was inconclusive.      In terms of DVT, recommendations at hospitalization to continue anticoagulation for 6 to 9 months.  6-month supply sent to pharmacy and recommended that he continue this through the end of the year.  Possibly discontinuing around January.  He can discuss this further with pulmonology in August as this is the recommendation to continue it for this duration of time.    He will start the pool walking again, feeling back to his normal strength.  I do not think we need to order PT at this time as he is doing quite well and recovering quite  well.  Follow-up sooner if not improving, otherwise continue with his follow-up in July as scheduled.    In terms of heart failure, he is doing quite well, swelling is back to normal.  We discussed that it would be okay to take the Lasix every other day as long as his swelling and weight is stable.  If he starts to gain weight or get more swelling to go back to taking Lasix every day.    Return if symptoms worsen or fail to improve.    Medical assistant and I wore mask and eyewear protection during entire encounter.  Patient wore mask.    Tanya Dhaliwal MD

## 2022-06-03 ENCOUNTER — TELEPHONE (OUTPATIENT)
Dept: FAMILY MEDICINE CLINIC | Facility: CLINIC | Age: 87
End: 2022-06-03

## 2022-06-03 NOTE — TELEPHONE ENCOUNTER
Caller: Goran August    Relationship to patient: Self    Best call back number: [phone]    Patient is needing: PATIENT WOULD LIKE A HANDICAP STICKER FOR PARKING.

## 2022-07-05 ENCOUNTER — TELEPHONE (OUTPATIENT)
Dept: FAMILY MEDICINE CLINIC | Facility: CLINIC | Age: 87
End: 2022-07-05

## 2022-07-05 NOTE — TELEPHONE ENCOUNTER
Pt called because his feet are swelling and he wants to know if he can or should refill the prescription furosemide (LASIX) 40 MG tablet    He stated Dr. Dhaliwal gave it to him for swelling and he has 2 refills on it but was not sure if he okay to take it again

## 2022-07-05 NOTE — TELEPHONE ENCOUNTER
Left voice  message  For pt ,is is ok to take it again ,dr henry said he will take this as need it for swelling .

## 2022-07-08 ENCOUNTER — TELEPHONE (OUTPATIENT)
Dept: FAMILY MEDICINE CLINIC | Facility: CLINIC | Age: 87
End: 2022-07-08

## 2022-07-08 NOTE — TELEPHONE ENCOUNTER
Thank you for making me aware, unfortunately I am incredibly overbooked.  Is it possible to get him in on [facility] and schedule?    If it seems mild and no redness or concerns for blood clot, okay to double up on the water pill for the next 3 days.  And see how he is doing over the weekend.  If he is not improving unfortunately he will need to be seen.  So this may mean he has to go back to immediate care.  Thank you

## 2022-07-08 NOTE — TELEPHONE ENCOUNTER
Spoke with pt.  There are no openings today. Pt has refused to go to the ER after telling him multiple times.  He says he wants to wait one more day.  He wants to be seen Monday, I did put him on the schedule with ELMIRA Mares at 10:45, as an OK Per .

## 2022-07-08 NOTE — TELEPHONE ENCOUNTER
Patient stated his right foot is swelling. It is quite larger than the left. Patient stated there is pain when he walks on it. He is concern that the medication is on is not working and does not want to go back to the hospital. He stated that he has propped the foot up throughout the night, but it is still swollen.    Please advise.

## 2022-07-08 NOTE — TELEPHONE ENCOUNTER
If not then I would recommend that he go to the emergency room.  I know that is not what he wants to hear but he does need to be seen.

## 2022-07-08 NOTE — TELEPHONE ENCOUNTER
I was going to talk to pt and he said the swelling is worse and has redness is hot to the touch and painful any opening for today on any other providers?

## 2022-07-08 NOTE — TELEPHONE ENCOUNTER
Okay, in the meantime please advise him to take 2 of the water pills over the weekend and to continue with elevating the feet.  If he feels worse or any symptoms of shortness of breath I would recommend he go to the ER.

## 2022-07-11 ENCOUNTER — OFFICE VISIT (OUTPATIENT)
Dept: FAMILY MEDICINE CLINIC | Facility: CLINIC | Age: 87
End: 2022-07-11

## 2022-07-11 VITALS — TEMPERATURE: 96.9 F | WEIGHT: 221 LBS | HEIGHT: 71 IN | BODY MASS INDEX: 30.94 KG/M2

## 2022-07-11 DIAGNOSIS — R22.41 LOCALIZED SWELLING OF RIGHT LOWER LEG: Primary | ICD-10-CM

## 2022-07-11 PROCEDURE — 99213 OFFICE O/P EST LOW 20 MIN: CPT | Performed by: NURSE PRACTITIONER

## 2022-07-11 RX ORDER — FUROSEMIDE 20 MG/1
20 TABLET ORAL DAILY
COMMUNITY
Start: 2022-07-05 | End: 2023-01-24

## 2022-07-11 RX ORDER — ERYTHROMYCIN 5 MG/G
OINTMENT OPHTHALMIC
COMMUNITY
Start: 2022-04-22 | End: 2022-10-20

## 2022-07-11 RX ORDER — PREDNISONE 20 MG/1
40 TABLET ORAL DAILY
Qty: 10 TABLET | Refills: 0 | Status: SHIPPED | OUTPATIENT
Start: 2022-07-11 | End: 2022-07-16

## 2022-07-11 RX ORDER — PRAVASTATIN SODIUM 40 MG
40 TABLET ORAL DAILY
COMMUNITY
End: 2022-07-14

## 2022-07-11 RX ORDER — MULTIPLE VITAMINS W/ MINERALS TAB 9MG-400MCG
1 TAB ORAL DAILY
COMMUNITY

## 2022-07-11 NOTE — PROGRESS NOTES
"Chief Complaint  Leg Swelling (Pt reports blood clot 5/8/22. Only 3-4D swelling, inflammation that has now resolved. Pt took 20 mg BID this weekend, this am, only  takes prn. Used kenalog on lower legs this weekend. )    Masks/face shield/appropriate PPE were worn for the entirety of the visit by the patient, MA, and provider.     Subjective        Goran August presents to [facility]  History of Present Illness  Goran August is a 87 y.o. male who presents to the clinic today with complaints of right leg swelling. Patient's symptoms started days ago.  Patient is unsure what caused his leg swelling.  He denies known injury.  Patient called the office and was told to increase his \"water pills\" by his PCP.  Patient has been taking increased doses of furosemide and this has improved his swelling.  He also is trying to keep his legs elevated.  Patient does have a history of acute DVT of the right leg diagnosed 5/8/2022.  Patient is currently taking Eliquis twice a day.  He denies missing doses of his Eliquis.  Patient denies recent long trips or flights.  He denies shortness of breath.  Patient does have a history of gout.    Review of Systems   Constitutional: Negative.    HENT: Negative.    Eyes: Negative.    Respiratory: Negative.    Cardiovascular: Negative.    Gastrointestinal: Negative.    Endocrine: Negative.    Genitourinary: Negative.    Musculoskeletal: Positive for joint swelling.   Skin: Positive for color change.   Allergic/Immunologic: Negative.    Neurological: Negative.    Hematological: Negative.    Psychiatric/Behavioral: Negative.       ROS documented by MA. Reviewed by YONI Moody.      Objective   Vital Signs:  Temp 96.9 °F (36.1 °C)   Ht 180.3 cm (71\")   Wt 100 kg (221 lb)   BMI 30.82 kg/m²   Estimated body mass index is 30.82 kg/m² as calculated from the following:    Height as of this encounter: 180.3 cm (71\").    Weight as of this encounter: 100 kg " (221 lb).          Physical Exam  Vitals reviewed.   Constitutional:       General: He is not in acute distress.     Appearance: Normal appearance. He is not ill-appearing, toxic-appearing or diaphoretic.   HENT:      Head: Normocephalic and atraumatic.   Eyes:      General: No scleral icterus.        Right eye: No discharge.         Left eye: No discharge.      Extraocular Movements: Extraocular movements intact.   Cardiovascular:      Rate and Rhythm: Normal rate and regular rhythm.   Pulmonary:      Effort: Pulmonary effort is normal. No respiratory distress.      Breath sounds: Normal breath sounds.   Musculoskeletal:      Right lower leg: Edema present.   Skin:     Comments: Erythema noted to bilateral ankles   Neurological:      General: No focal deficit present.      Mental Status: He is alert and oriented to person, place, and time.      Motor: No weakness.      Gait: Gait normal.   Psychiatric:         Mood and Affect: Mood normal.         Behavior: Behavior normal.        Result Review :             Duplex Venous Lower Extremity - Bilateral (05/08/2022 19:07)       Assessment and Plan   Diagnoses and all orders for this visit:    1. Localized swelling of right lower leg (Primary)  -     Uric acid  -     D-dimer, Quantitative  -     predniSONE (DELTASONE) 20 MG tablet; Take 2 tablets by mouth Daily for 5 days.  Dispense: 10 tablet; Refill: 0      Patient presents today with complaints of right lower extremity swelling to foot and ankle.  There is no known injury.  Patient does have a history of DVT to the right leg.  I am considering swelling being a residual of DVT.  Patient is on Eliquis 5 mg twice daily and has not missed a dose of this medication.  Patient does have erythema to his bilateral lower extremities, but patient states this has been present and has not changed.  There is possible concern that new DVT has developed.  There can be Eliquis failure in certain circumstances and in patients with  obesity.  However, based on presentation today and improvement of swelling, this is lower on the differentials.  I would like to perform a D-dimer to evaluate clot burden.  If there is any elevation in this level and taking into consideration patient's age, we will repeat Doppler lower extremity.  I am considering that patient may be having an acute gout flare causing his symptoms based on location of swelling.  Uric acid has been ordered today.  We will go ahead and prescribe prednisone for the patient.  I did discuss with patient that he may continue to wean down on Lasix and take 20 mg a day.  We can then plan to discontinue Lasix and only take as needed as he was taking prior to this incidence of swelling.  Patient is aware when to seek emergency care.       Follow Up   Return if symptoms worsen or fail to improve.  Patient was given instructions and counseling regarding his condition or for health maintenance advice. Please see specific information pulled into the AVS if appropriate.     Electronically signed by YONI Moody, 07/14/22, 7:55 AM EDT.

## 2022-07-12 LAB
D DIMER PPP FEU-MCNC: 0.72 MG/L FEU (ref 0–0.49)
URATE SERPL-MCNC: 10.3 MG/DL (ref 3.8–8.4)

## 2022-07-13 ENCOUNTER — TELEPHONE (OUTPATIENT)
Dept: FAMILY MEDICINE CLINIC | Facility: CLINIC | Age: 87
End: 2022-07-13

## 2022-07-13 NOTE — TELEPHONE ENCOUNTER
BW ordered on July 11th.      Pt wanted to discuss a possible ultrasound with provider.      [phone]  Pt will be at that number until 11:15 he provider can call, if not he will call back this afternoon.

## 2022-07-13 NOTE — TELEPHONE ENCOUNTER
I sent a result note with the patient's lab work. Do you mind to follow-up with the patient on this?

## 2022-07-19 DIAGNOSIS — E11.22 TYPE 2 DIABETES MELLITUS WITH STAGE 3A CHRONIC KIDNEY DISEASE, WITHOUT LONG-TERM CURRENT USE OF INSULIN: Primary | ICD-10-CM

## 2022-07-19 DIAGNOSIS — E11.22 TYPE 2 DIABETES MELLITUS WITH STAGE 3A CHRONIC KIDNEY DISEASE, WITHOUT LONG-TERM CURRENT USE OF INSULIN: ICD-10-CM

## 2022-07-19 DIAGNOSIS — N18.31 TYPE 2 DIABETES MELLITUS WITH STAGE 3A CHRONIC KIDNEY DISEASE, WITHOUT LONG-TERM CURRENT USE OF INSULIN: Primary | ICD-10-CM

## 2022-07-19 DIAGNOSIS — N18.31 TYPE 2 DIABETES MELLITUS WITH STAGE 3A CHRONIC KIDNEY DISEASE, WITHOUT LONG-TERM CURRENT USE OF INSULIN: ICD-10-CM

## 2022-07-20 LAB — HBA1C MFR BLD: 6.5 % (ref 4.8–5.6)

## 2022-07-25 ENCOUNTER — OFFICE VISIT (OUTPATIENT)
Dept: FAMILY MEDICINE CLINIC | Facility: CLINIC | Age: 87
End: 2022-07-25

## 2022-07-25 VITALS
DIASTOLIC BLOOD PRESSURE: 70 MMHG | BODY MASS INDEX: 31.08 KG/M2 | OXYGEN SATURATION: 98 % | WEIGHT: 222 LBS | TEMPERATURE: 97.6 F | HEIGHT: 71 IN | HEART RATE: 71 BPM | SYSTOLIC BLOOD PRESSURE: 128 MMHG

## 2022-07-25 DIAGNOSIS — U07.1 DEEP VEIN THROMBOSIS (DVT) ASSOCIATED WITH COVID-19: ICD-10-CM

## 2022-07-25 DIAGNOSIS — M1A.0710 IDIOPATHIC CHRONIC GOUT OF RIGHT ANKLE WITHOUT TOPHUS: ICD-10-CM

## 2022-07-25 DIAGNOSIS — E11.22 TYPE 2 DIABETES MELLITUS WITH STAGE 3A CHRONIC KIDNEY DISEASE, WITHOUT LONG-TERM CURRENT USE OF INSULIN: Primary | ICD-10-CM

## 2022-07-25 DIAGNOSIS — N18.31 STAGE 3A CHRONIC KIDNEY DISEASE: ICD-10-CM

## 2022-07-25 DIAGNOSIS — N18.31 TYPE 2 DIABETES MELLITUS WITH STAGE 3A CHRONIC KIDNEY DISEASE, WITHOUT LONG-TERM CURRENT USE OF INSULIN: Primary | ICD-10-CM

## 2022-07-25 DIAGNOSIS — I82.90 DEEP VEIN THROMBOSIS (DVT) ASSOCIATED WITH COVID-19: ICD-10-CM

## 2022-07-25 PROCEDURE — 99214 OFFICE O/P EST MOD 30 MIN: CPT | Performed by: FAMILY MEDICINE

## 2022-07-25 NOTE — PROGRESS NOTES
"Chief Complaint  Coagulation Disorder (Dvt follow up ,still has some swelling had prednisone treatment and pain went ,had  some  labs as well  he said nobody call him for results )    Subjective        Goran August presents to [facility]  History of Present Illness  Pleasant 87-year-old male here for acute right lower extremity DVT in the peroneal vein that was diagnosed May 8, 2022 and does seem to be precipitated from a COVID infection, he was recommended after hospitalization to continue anticoagulation for 6 to 9 months.    He was seen July 11 for an acute gout flare also in the right leg which thankfully has improved substantially, he does not have residual pain after taking a prednisone burst.  Objective   Vital Signs:  /70   Pulse 71   Temp 97.6 °F (36.4 °C)   Ht 180.3 cm (71\")   Wt 101 kg (222 lb)   SpO2 98%   BMI 30.96 kg/m²   Estimated body mass index is 30.96 kg/m² as calculated from the following:    Height as of this encounter: 180.3 cm (71\").    Weight as of this encounter: 101 kg (222 lb).          Physical Exam  Vitals and nursing note reviewed.   Constitutional:       General: He is not in acute distress.     Appearance: He is well-developed.   HENT:      Head: Normocephalic.      Nose: Nose normal.   Cardiovascular:      Rate and Rhythm: Normal rate and regular rhythm.      Heart sounds: Normal heart sounds. No murmur heard.  Pulmonary:      Effort: Pulmonary effort is normal. No respiratory distress.      Breath sounds: Normal breath sounds.   Musculoskeletal:         General: Normal range of motion.      Right lower leg: Edema present.      Left lower leg: Edema present.      Comments: Trace pitting edema bilaterally, some hemosiderin pigmentation erythematous changes of the legs but no warmth or redness.   Skin:     General: Skin is warm and dry.      Findings: No rash.   Neurological:      Mental Status: He is alert and oriented to person, place, " and time.   Psychiatric:         Behavior: Behavior normal.         Thought Content: Thought content normal.         Judgment: Judgment normal.        Result Review :                Assessment and Plan   Diagnoses and all orders for this visit:    1. Type 2 diabetes mellitus with stage 3a chronic kidney disease, without long-term current use of insulin (HCC) (Primary)  -     Comprehensive Metabolic Panel; Future  -     Lipid Panel; Future  -     Hemoglobin A1c; Future    2. Deep vein thrombosis (DVT) associated with COVID-19  -     CBC & Differential; Future    3. Stage 3a chronic kidney disease (HCC)  -     Comprehensive Metabolic Panel; Future  -     CBC & Differential; Future    4. Idiopathic chronic gout of right ankle without tophus  -     Uric acid; Future    Very pleasant 87-year-old male here for type 2 diabetes which is well controlled, also here to follow-up after a more acute right leg pain concerned that was initially worrisome for worsening DVT but was found to be gout.  He had complete resolution of symptoms with prednisone.  He now feels excellent.  Plan to get labs prior to next appointment, continue with dietary modification.    In terms of DVT, this occurred in May, recommended to continue Eliquis for 6 to 9 months, provoked by COVID-19 infection.  He will be seeing a pulmonologist next month and can also comment on the timeline for anticoagulation which is longer than the traditional 3-month time course.         Follow Up   Return in about 3 months (around 10/25/2022), or if symptoms worsen or fail to improve, for Recheck Diabetes with labs prior .  Patient was given instructions and counseling regarding his condition or for health maintenance advice. Please see specific information pulled into the AVS if appropriate. Medical assistant and I wore mask and eyewear protection during entire encounter.  Patient wore mask.    Tanya Dhaliwal MD

## 2022-08-08 ENCOUNTER — TRANSCRIBE ORDERS (OUTPATIENT)
Dept: ADMINISTRATIVE | Facility: HOSPITAL | Age: 87
End: 2022-08-08

## 2022-08-08 DIAGNOSIS — Z01.818 OTHER SPECIFIED PRE-OPERATIVE EXAMINATION: Primary | ICD-10-CM

## 2022-08-09 ENCOUNTER — TRANSCRIBE ORDERS (OUTPATIENT)
Dept: SLEEP MEDICINE | Facility: HOSPITAL | Age: 87
End: 2022-08-09

## 2022-08-09 DIAGNOSIS — G47.34 NOCTURNAL HYPOXEMIA: Primary | ICD-10-CM

## 2022-08-15 ENCOUNTER — TELEPHONE (OUTPATIENT)
Dept: FAMILY MEDICINE CLINIC | Facility: CLINIC | Age: 87
End: 2022-08-15

## 2022-08-15 RX ORDER — SPIRONOLACTONE 25 MG/1
25 TABLET ORAL DAILY
Qty: 90 TABLET | Refills: 3 | Status: SHIPPED | OUTPATIENT
Start: 2022-08-15

## 2022-08-15 RX ORDER — RAMIPRIL 10 MG/1
10 CAPSULE ORAL DAILY
Qty: 90 CAPSULE | Refills: 3 | Status: SHIPPED | OUTPATIENT
Start: 2022-08-15

## 2022-08-15 NOTE — TELEPHONE ENCOUNTER
Pt is requesting refills on     ramipril (ALTACE) 10 MG capsule    spironolactone (ALDACTONE) 25 MG tablet      LOV: 7/25/22  Upcoming: 10/25/22

## 2022-08-16 ENCOUNTER — LAB (OUTPATIENT)
Dept: LAB | Facility: HOSPITAL | Age: 87
End: 2022-08-16

## 2022-08-16 ENCOUNTER — TRANSCRIBE ORDERS (OUTPATIENT)
Dept: ADMINISTRATIVE | Facility: HOSPITAL | Age: 87
End: 2022-08-16

## 2022-08-16 ENCOUNTER — APPOINTMENT (OUTPATIENT)
Dept: LAB | Facility: HOSPITAL | Age: 87
End: 2022-08-16

## 2022-08-16 DIAGNOSIS — Z01.818 OTHER SPECIFIED PRE-OPERATIVE EXAMINATION: Primary | ICD-10-CM

## 2022-08-16 LAB — SARS-COV-2 RNA RESP QL NAA+PROBE: NOT DETECTED

## 2022-08-16 PROCEDURE — U0003 INFECTIOUS AGENT DETECTION BY NUCLEIC ACID (DNA OR RNA); SEVERE ACUTE RESPIRATORY SYNDROME CORONAVIRUS 2 (SARS-COV-2) (CORONAVIRUS DISEASE [COVID-19]), AMPLIFIED PROBE TECHNIQUE, MAKING USE OF HIGH THROUGHPUT TECHNOLOGIES AS DESCRIBED BY CMS-2020-01-R: HCPCS | Performed by: INTERNAL MEDICINE

## 2022-08-16 PROCEDURE — U0005 INFEC AGEN DETEC AMPLI PROBE: HCPCS | Performed by: INTERNAL MEDICINE

## 2022-08-16 PROCEDURE — C9803 HOPD COVID-19 SPEC COLLECT: HCPCS

## 2022-08-17 ENCOUNTER — HOSPITAL ENCOUNTER (OUTPATIENT)
Dept: SLEEP MEDICINE | Facility: HOSPITAL | Age: 87
Discharge: HOME | End: 2022-08-17
Admitting: INTERNAL MEDICINE

## 2022-08-17 VITALS — HEIGHT: 71 IN | WEIGHT: 222 LBS | BODY MASS INDEX: 31.08 KG/M2

## 2022-08-17 DIAGNOSIS — G47.34 NOCTURNAL HYPOXEMIA: ICD-10-CM

## 2022-08-17 PROCEDURE — 95811 POLYSOM 6/>YRS CPAP 4/> PARM: CPT

## 2022-08-23 ENCOUNTER — TELEPHONE (OUTPATIENT)
Dept: SLEEP MEDICINE | Facility: HOSPITAL | Age: 87
End: 2022-08-23

## 2022-09-15 ENCOUNTER — TELEPHONE (OUTPATIENT)
Dept: FAMILY MEDICINE CLINIC | Facility: CLINIC | Age: 87
End: 2022-09-15

## 2022-09-15 DIAGNOSIS — U07.1 UPPER RESPIRATORY TRACT INFECTION DUE TO COVID-19 VIRUS: Primary | ICD-10-CM

## 2022-09-15 DIAGNOSIS — J06.9 UPPER RESPIRATORY TRACT INFECTION DUE TO COVID-19 VIRUS: Primary | ICD-10-CM

## 2022-09-15 NOTE — TELEPHONE ENCOUNTER
In terms of medications that help the throat, he could do Cepacol lozenges, they have a lidocaine component that can help soothe the throat.  I would recommend using it every 6 hours and not more often.  He would qualify for Paxlovid for antiviral therapy if he would be interested, I am happy to send in the prescription.  Otherwise if his symptoms are pretty mild I think it would be okay to do lots of supportive care fluids.

## 2022-09-15 NOTE — TELEPHONE ENCOUNTER
Pt called because he was exposed to covid on Saturday but was not aware until 9/14/22 and he took an at home covid test and it was positive. His only symptom is a sore throat but he said it was bothering him a lot and he is gargling salt water, taking tylenol and Zcam    He is requesting some kind of medication for his throat. Nothing he has tried OTC is helping.

## 2022-09-16 NOTE — TELEPHONE ENCOUNTER
He is feeling a little tired and has cough ,not soa but is interested in the paxlovid medication ,please send it to [facility]

## 2022-09-16 NOTE — TELEPHONE ENCOUNTER
I was looking through his medication list and recalled that he is still on the Eliquis, the blood thinner.  The Paxlovid does interact with the Eliquis.  Therefore we would have to order monoclonal antibodies for him instead

## 2022-09-30 ENCOUNTER — FLU SHOT (OUTPATIENT)
Dept: FAMILY MEDICINE CLINIC | Facility: CLINIC | Age: 87
End: 2022-09-30

## 2022-09-30 DIAGNOSIS — Z23 NEED FOR VACCINATION: Primary | ICD-10-CM

## 2022-09-30 PROCEDURE — 90662 IIV NO PRSV INCREASED AG IM: CPT | Performed by: FAMILY MEDICINE

## 2022-09-30 PROCEDURE — G0008 ADMIN INFLUENZA VIRUS VAC: HCPCS | Performed by: FAMILY MEDICINE

## 2022-10-03 RX ORDER — ESOMEPRAZOLE MAGNESIUM 40 MG/1
CAPSULE, DELAYED RELEASE ORAL
Qty: 90 CAPSULE | Refills: 0 | Status: SHIPPED | OUTPATIENT
Start: 2022-10-03 | End: 2022-12-30

## 2022-10-14 ENCOUNTER — NURSE TRIAGE (OUTPATIENT)
Dept: CALL CENTER | Facility: HOSPITAL | Age: 87
End: 2022-10-14

## 2022-10-14 NOTE — TELEPHONE ENCOUNTER
"Instructed to call PCP for Rx for smaller portable oxygen tank for upcoming trip to [place].    Reason for Disposition  • Information only question and nurse able to answer    Additional Information  • Negative: Nursing judgment  • Negative: Nursing judgment  • Negative: Nursing judgment  • Negative: Nursing judgment    Answer Assessment - Initial Assessment Questions  1. REASON FOR CALL or QUESTION: \"What is your reason for calling today?\" or \"How can I best help you?\" or \"What question do you have that I can help answer?\"      Caller has home oxygen thru [facility]. Is planning trip to [place] and was told by [facility] would need RX for smaller portable tank. Caller questioning which doctor to call to receive a RX.    Protocols used: NO PROTOCOL AVAILABLE - INFORMATION ONLY-ADULT-[place]      "

## 2022-10-20 ENCOUNTER — TELEPHONE (OUTPATIENT)
Dept: FAMILY MEDICINE CLINIC | Facility: CLINIC | Age: 87
End: 2022-10-20

## 2022-10-20 NOTE — TELEPHONE ENCOUNTER
Patient leaving in Feb for 5 weeks, requesting portable oxygen (small) Rx to be faxed to [facility] on [address] please. Patient stated it may take up to 8 weeks to receive, he wanted to begin request early. Thank you.

## 2022-10-20 NOTE — TELEPHONE ENCOUNTER
Has he asked his pulmonologist?  I believe the earlier ones currently prescribing his oxygen and may be able to use the same form to get the portable oxygen as well.  I do not believe that I have the test necessary to get the portable oxygen improved.

## 2022-10-25 ENCOUNTER — OFFICE VISIT (OUTPATIENT)
Dept: FAMILY MEDICINE CLINIC | Facility: CLINIC | Age: 87
End: 2022-10-25

## 2022-10-25 VITALS
TEMPERATURE: 97.8 F | SYSTOLIC BLOOD PRESSURE: 124 MMHG | BODY MASS INDEX: 30.94 KG/M2 | HEART RATE: 63 BPM | OXYGEN SATURATION: 98 % | DIASTOLIC BLOOD PRESSURE: 89 MMHG | WEIGHT: 221 LBS | HEIGHT: 71 IN

## 2022-10-25 DIAGNOSIS — I42.8 NONISCHEMIC CARDIOMYOPATHY: ICD-10-CM

## 2022-10-25 DIAGNOSIS — N18.31 TYPE 2 DIABETES MELLITUS WITH STAGE 3A CHRONIC KIDNEY DISEASE, WITHOUT LONG-TERM CURRENT USE OF INSULIN: Primary | ICD-10-CM

## 2022-10-25 DIAGNOSIS — H61.23 BILATERAL IMPACTED CERUMEN: ICD-10-CM

## 2022-10-25 DIAGNOSIS — G47.34 NOCTURNAL HYPOXIA: ICD-10-CM

## 2022-10-25 DIAGNOSIS — E11.22 TYPE 2 DIABETES MELLITUS WITH STAGE 3A CHRONIC KIDNEY DISEASE, WITHOUT LONG-TERM CURRENT USE OF INSULIN: Primary | ICD-10-CM

## 2022-10-25 DIAGNOSIS — N18.31 STAGE 3A CHRONIC KIDNEY DISEASE: ICD-10-CM

## 2022-10-25 PROCEDURE — 99214 OFFICE O/P EST MOD 30 MIN: CPT | Performed by: FAMILY MEDICINE

## 2022-10-25 PROCEDURE — 69210 REMOVE IMPACTED EAR WAX UNI: CPT | Performed by: FAMILY MEDICINE

## 2022-10-25 NOTE — PROGRESS NOTES
"Chief Complaint  Diabetes ( 3 month follow up with labs ) and Breathing Problem (Pt is using oxygen at night but has not idea if he even need this    he left message at pulmonology doctor    but they don't answer him )    Subjective        Goran August presents to [facility]  History of Present Illness  Pleasant 88-year-old male here for type 2 diabetes which is well controlled.  Overall he has remained stable on current labs.  Gout is stable.    He has been having some shortness of breath that seems to have worsened after having COVID.  He has seen Dr. Liu with pulmonology, had some pulmonary function testing and imaging performed in August.  That was negative for COPD but he did have a sleep study that did show nocturnal hypoxia and obstructive sleep apnea.  He was not able to be adherent with a CPAP but he is getting fitted for an oral device.  Nonetheless he was recommended to be on chronic nocturnal oxygen, 2 L nightly.    As his DVT was provoked from a COVID infection he was recommended to take Eliquis for 6 to 9 months after.  He was started on blood thinners in May.  Will likely need to continue blood thinners until January.    Objective   Vital Signs:  /89   Pulse 63   Temp 97.8 °F (36.6 °C)   Ht 180.3 cm (71\")   Wt 100 kg (221 lb)   SpO2 98%   BMI 30.82 kg/m²   Estimated body mass index is 30.82 kg/m² as calculated from the following:    Height as of this encounter: 180.3 cm (71\").    Weight as of this encounter: 100 kg (221 lb).          Physical Exam  Vitals and nursing note reviewed.   Constitutional:       General: He is not in acute distress.     Appearance: He is well-developed.   HENT:      Head: Normocephalic.      Right Ear: There is impacted cerumen.      Left Ear: There is impacted cerumen.      Nose: Nose normal.   Cardiovascular:      Rate and Rhythm: Normal rate and regular rhythm.      Heart sounds: Normal heart sounds. No murmur " heard.  Pulmonary:      Effort: Pulmonary effort is normal. No respiratory distress.      Breath sounds: Normal breath sounds.   Musculoskeletal:         General: Normal range of motion.      Right lower leg: No edema.      Left lower leg: No edema.      Comments: No swelling bilaterally, some hemosiderin pigmentation and varicose veins but no other acute findings.   Skin:     General: Skin is warm and dry.      Findings: No rash.   Neurological:      Mental Status: He is alert and oriented to person, place, and time.   Psychiatric:         Behavior: Behavior normal.         Thought Content: Thought content normal.         Judgment: Judgment normal.        Result Review :  The following data was reviewed by: Tanya Dhaliwal MD on 10/25/2022:  CMP    CMP 5/12/22 5/13/22 10/20/22   Glucose 140 (A) 100 (A) 98   BUN 44 (A) 44 (A) 25 (A)   Creatinine 1.06 0.89 1.09   Sodium 139 140 142   Potassium 4.1 4.0 4.9   Chloride 96 (A) 99 103   Calcium 9.5 9.4 9.7   Total Protein   6.1   Albumin 3.60 3.70 3.90   Globulin   2.2   Total Bilirubin 0.5 0.3 0.5   Alkaline Phosphatase 76 70 67   AST (SGOT) 29 23 19   ALT (SGPT) 33 31 16   (A) Abnormal value            CBC    CBC 5/12/22 5/13/22 10/20/22   WBC 11.32 (A) 11.93 (A) 6.79   RBC 5.04 4.88 4.45   Hemoglobin 13.8 13.2 12.3 (A)   Hematocrit 43.1 41.3 37.9   MCV 85.5 84.6 85.2   MCH 27.4 27.0 27.6   MCHC 32.0 32.0 32.5   RDW 13.6 13.6 13.2   Platelets 322 331 184   (A) Abnormal value            Lipid Panel    Lipid Panel 3/25/22 10/20/22   Total Cholesterol 160 167   Triglycerides 256 (A) 224 (A)   HDL Cholesterol 35 (A) 38 (A)   VLDL Cholesterol 42 (A) 38   LDL Cholesterol  83 91   LDL/HDL Ratio 2.4    (A) Abnormal value       Comments are available for some flowsheets but are not being displayed.           A1C Last 3 Results    HGBA1C Last 3 Results 5/9/22 7/19/22 10/20/22   Hemoglobin A1C 6.10 (A) 6.5 (A) 6.50 (A)   (A) Abnormal value       Comments are available for some  flowsheets but are not being displayed.           Uric Acid    Common Labsle 10/20/22   Uric Acid 8.3 (A)   (A) Abnormal value               Ear Cerumen Removal    Date/Time: 10/25/2022 2:21 PM  Performed by: Tanya Dhaliwal MD  Authorized by: Tanya Dhaliwal MD   Consent: Verbal consent obtained.  Risks and benefits: risks, benefits and alternatives were discussed  Consent given by: patient  Patient understanding: patient states understanding of the procedure being performed  Patient identity confirmed: verbally with patient  Location details: right ear and left ear  Patient tolerance: Patient tolerated the procedure well with no immediate complications  Procedure type: instrumentation           Assessment and Plan   Diagnoses and all orders for this visit:    1. Type 2 diabetes mellitus with stage 3a chronic kidney disease, without long-term current use of insulin (HCC) (Primary)    2. Stage 3a chronic kidney disease (HCC)    3. Nonischemic cardiomyopathy (HCC)    4. Nocturnal hypoxia  Comments:  Now on 2 L nasal cannula at night.  See notes from pulmonology.  Okay to send in portable device for him to use when traveling.    5. Bilateral impacted cerumen  -     Ear Cerumen Removal    Type 2 diabetes and CKD both stable on current meds no changes.  Thankfully he has done well with lifestyle management, we discussed minimizing sweets intake to every other day.  I think this will allow him to keep his blood sugars down without needing to start a diabetes medication.  He is on an ACE inhibitor for CKD and Eliquis for DVT after COVID.  He would does plan to complete this 6 to 9-month course around January/ February.    In terms of nocturnal hypoxia.  I do agree with him getting fitted for an oral device to help prevent obstructive sleep apnea.  He also has diastolic congestive heart failure.  For both of these reasons he does need to continue nocturnal O2.  See pulmonology visit from September.  He does need a portable  O2 machine for travel, okay to approve this.    Bilateral cerumen impaction removed manually by me.         Follow Up   Return in about 3 months (around 1/25/2023), or if symptoms worsen or fail to improve, for Recheck Diabetes and hypoxia .  Patient was given instructions and counseling regarding his condition or for health maintenance advice. Please see specific information pulled into the AVS if appropriate. Medical assistant and I wore mask and eyewear protection during entire encounter.  Patient wore mask.    Tanya Dhaliwal MD

## 2022-11-09 DIAGNOSIS — I82.90 DEEP VEIN THROMBOSIS (DVT) ASSOCIATED WITH COVID-19: ICD-10-CM

## 2022-11-09 DIAGNOSIS — U07.1 DEEP VEIN THROMBOSIS (DVT) ASSOCIATED WITH COVID-19: ICD-10-CM

## 2022-11-09 RX ORDER — APIXABAN 5 MG/1
TABLET, FILM COATED ORAL
Qty: 180 TABLET | Refills: 3 | Status: SHIPPED | OUTPATIENT
Start: 2022-11-09 | End: 2023-01-24

## 2022-12-16 ENCOUNTER — TELEPHONE (OUTPATIENT)
Dept: FAMILY MEDICINE CLINIC | Facility: CLINIC | Age: 87
End: 2022-12-16

## 2022-12-16 NOTE — TELEPHONE ENCOUNTER
Pt has a Mohs Procedure at [facility] in [place]ology Dr. Diamond. They are requesting that starting Monday he should take one Eliquis 5 MG tablet tablet instead of 2 until after the procedure on Thursday.    Please advise.

## 2022-12-16 NOTE — TELEPHONE ENCOUNTER
How long is patient to continue the Eliquis after his DVT? No recent blood clots? This should not be an issue if he decreases the dose for his procedure as long as he has not had recent blood clot since the one he had during COVID in June..

## 2022-12-16 NOTE — TELEPHONE ENCOUNTER
Pt informed ,he mention about getting an mri of neck and I explained the steps we have to take  ,and he said he is seeing a chiropractor ,has done xray and he will call back to make an apt and bring those xray results

## 2022-12-29 DIAGNOSIS — M54.2 NECK PAIN: Primary | ICD-10-CM

## 2022-12-29 NOTE — PROGRESS NOTES
Patient came with his wife to her appointment, he did make me aware of right-sided neck pain and his request for physical therapy, I am happy to order this and follow-up with his appointment January 24, orders for PT as he requested

## 2022-12-30 RX ORDER — ESOMEPRAZOLE MAGNESIUM 40 MG/1
CAPSULE, DELAYED RELEASE ORAL
Qty: 90 CAPSULE | Refills: 3 | Status: SHIPPED | OUTPATIENT
Start: 2022-12-30

## 2023-01-11 ENCOUNTER — TELEPHONE (OUTPATIENT)
Dept: FAMILY MEDICINE CLINIC | Facility: CLINIC | Age: 88
End: 2023-01-11
Payer: MEDICARE

## 2023-01-11 NOTE — TELEPHONE ENCOUNTER
Pt called because he needs a new script sent to [facility]. The script needs to say portable concentrator and it needs to be faxed to the oxygen team at [phone]    States he needs this on order to fly with his oxygen

## 2023-01-18 RX ORDER — PRAVASTATIN SODIUM 20 MG
TABLET ORAL
Qty: 90 TABLET | Refills: 3 | Status: SHIPPED | OUTPATIENT
Start: 2023-01-18

## 2023-01-24 ENCOUNTER — OFFICE VISIT (OUTPATIENT)
Dept: FAMILY MEDICINE CLINIC | Facility: CLINIC | Age: 88
End: 2023-01-24
Payer: MEDICARE

## 2023-01-24 VITALS
TEMPERATURE: 97.8 F | HEART RATE: 78 BPM | SYSTOLIC BLOOD PRESSURE: 128 MMHG | OXYGEN SATURATION: 98 % | BODY MASS INDEX: 31.5 KG/M2 | WEIGHT: 225 LBS | HEIGHT: 71 IN | DIASTOLIC BLOOD PRESSURE: 66 MMHG

## 2023-01-24 DIAGNOSIS — N18.31 TYPE 2 DIABETES MELLITUS WITH STAGE 3A CHRONIC KIDNEY DISEASE, WITHOUT LONG-TERM CURRENT USE OF INSULIN: Primary | ICD-10-CM

## 2023-01-24 DIAGNOSIS — E11.22 TYPE 2 DIABETES MELLITUS WITH STAGE 3A CHRONIC KIDNEY DISEASE, WITHOUT LONG-TERM CURRENT USE OF INSULIN: Primary | ICD-10-CM

## 2023-01-24 DIAGNOSIS — G47.34 NOCTURNAL HYPOXIA: ICD-10-CM

## 2023-01-24 DIAGNOSIS — I82.431 ACUTE DEEP VEIN THROMBOSIS (DVT) OF POPLITEAL VEIN OF RIGHT LOWER EXTREMITY: ICD-10-CM

## 2023-01-24 LAB
EXPIRATION DATE: 2024
HBA1C MFR BLD: 6.1 %
Lab: 543

## 2023-01-24 PROCEDURE — 3044F HG A1C LEVEL LT 7.0%: CPT | Performed by: FAMILY MEDICINE

## 2023-01-24 PROCEDURE — 99213 OFFICE O/P EST LOW 20 MIN: CPT | Performed by: FAMILY MEDICINE

## 2023-01-24 PROCEDURE — 83036 HEMOGLOBIN GLYCOSYLATED A1C: CPT | Performed by: FAMILY MEDICINE

## 2023-01-24 NOTE — PROGRESS NOTES
"Answers for HPI/ROS submitted by the patient on 1/22/2023  What is the primary reason for your visit?: Other  Please describe your symptoms.: Routine 3 mos followup  Have you had these symptoms before?: No  How long have you been having these symptoms?: 1-4 days  Please list any medications you are currently taking for this condition.: N/A  Please describe any probable cause for these symptoms. : N/A    Chief Complaint  Diabetes (3 months follow up no complains ) and Shortness of Breath (Follow up all papers were faxed to oxygen  team   waiting portable machine )    Subjective        Goran August presents to [facility]  History of Present Illness  Very pleasant 88-year-old male here to follow-up for type 2 diabetes which is very well controlled with lifestyle management only, as hemoglobin A1c has only been improving since the holidays.    He does have nocturnal hypoxemia, he uses 2 L overnight.  He has seen Dr. Noe Caro    He also had a right sided DVT May 8, recommendationsTo stop Eliquis in January.    Objective   Vital Signs:  /66   Pulse 78   Temp 97.8 °F (36.6 °C)   Ht 180.3 cm (71\")   Wt 102 kg (225 lb)   SpO2 98%   BMI 31.38 kg/m²   Estimated body mass index is 31.38 kg/m² as calculated from the following:    Height as of this encounter: 180.3 cm (71\").    Weight as of this encounter: 102 kg (225 lb).             Physical Exam  Vitals and nursing note reviewed.   Constitutional:       General: He is not in acute distress.     Appearance: He is well-developed.   HENT:      Head: Normocephalic.      Nose: Nose normal.   Cardiovascular:      Heart sounds: No murmur heard.  Pulmonary:      Effort: Pulmonary effort is normal. No respiratory distress.   Musculoskeletal:         General: Normal range of motion.   Skin:     General: Skin is warm and dry.      Findings: No rash.   Neurological:      Mental Status: He is alert and oriented to person, place, and time. "   Psychiatric:         Behavior: Behavior normal.         Thought Content: Thought content normal.         Judgment: Judgment normal.        Result Review :  The following data was reviewed by: Tanya Dhaliwal MD on 01/24/2023:  A1C Last 3 Results    HGBA1C Last 3 Results 7/19/22 10/20/22 1/24/23   Hemoglobin A1C 6.5 (A) 6.50 (A) 6.1   (A) Abnormal value       Comments are available for some flowsheets but are not being displayed.                        Assessment and Plan   Diagnoses and all orders for this visit:    1. Type 2 diabetes mellitus with stage 3a chronic kidney disease, without long-term current use of insulin (HCC) (Primary)  -     POC Glycosylated Hemoglobin (Hb A1C)    2. Nocturnal hypoxia    3. Acute deep vein thrombosis (DVT) of popliteal vein of right lower extremity (HCC)    Pleasant 88-year-old male here to follow for type 2 diabetes which is excellently controlled with lifestyle alone, no medications needed at this time.  Continue with watching his carbs and sweets.    Patient with obstructive sleep apnea, not terminal hypoxemia, okay to continue with nocturnal O2 2 L nightly.  He is getting a new portable machine, unfortunate looks like insurance will not cover this as he is going on vacation.    He also had an acute DVT May 8022, okay to discontinue Eliquis.  He is traveling to [place], as he will have slightly higher risk with travel I would recommend that he continue his Eliquis until he gets back.  No need to refill, remove the medication from his med list.         Follow Up   Return in about 6 months (around 7/24/2023), or if symptoms worsen or fail to improve, for Annual Exam with fasting labs prior.  Patient was given instructions and counseling regarding his condition or for health maintenance advice. Please see specific information pulled into the AVS if appropriate. Medical assistant and I wore mask and eyewear protection during entire encounter.  Patient wore mask.    Tanya Dhaliwal  MD

## 2023-02-07 RX ORDER — FUROSEMIDE 40 MG/1
40 TABLET ORAL DAILY
Qty: 30 TABLET | Refills: 0 | OUTPATIENT
Start: 2023-02-07

## 2023-02-08 ENCOUNTER — TELEPHONE (OUTPATIENT)
Dept: FAMILY MEDICINE CLINIC | Facility: CLINIC | Age: 88
End: 2023-02-08
Payer: MEDICARE

## 2023-02-08 RX ORDER — FUROSEMIDE 20 MG/1
20 TABLET ORAL DAILY PRN
Qty: 90 TABLET | Refills: 1 | Status: SHIPPED | OUTPATIENT
Start: 2023-02-08

## 2023-02-08 NOTE — TELEPHONE ENCOUNTER
Caller: Goran August    Relationship to patient: Self    Best call back number:      Patient is needing: PATIENT STATES THAT HE IS NEEDING SOMETHING CALLED IN FOR THE SWELLING OF HIS ANKLES/FEET.   HE STATES THAT [facility] CONTACTED THE OFFICE FOR A REFILL, AND IT WAS DENIED.  PATIENT STATES HE IS IN [place] AND HE WOULD LIKE A CALLBACK TO LET HIM KNOW WHY IT WAS DENIED, AND TO DISCUSS WHAT HE NEEDS TO DO FOR THE SWELLING.

## 2023-04-25 ENCOUNTER — TELEPHONE (OUTPATIENT)
Dept: FAMILY MEDICINE CLINIC | Facility: CLINIC | Age: 88
End: 2023-04-25

## 2023-04-25 NOTE — TELEPHONE ENCOUNTER
"Caller: Goran August \"Bill\"    Relationship: Self    Best call back number: [phone]     What is the best time to reach you: ANY    Who are you requesting to speak with (clinical staff, provider,  specific staff member): KAVEH    What was the call regarding: PATIENT NEEDS A REFILL ON MEDICATION BUT ONLY WANTS TO SPEAK WITH KAVEH    Do you require a callback: YES        "

## 2023-04-26 ENCOUNTER — TELEPHONE (OUTPATIENT)
Dept: FAMILY MEDICINE CLINIC | Facility: CLINIC | Age: 88
End: 2023-04-26

## 2023-04-27 ENCOUNTER — TELEPHONE (OUTPATIENT)
Dept: FAMILY MEDICINE CLINIC | Facility: CLINIC | Age: 88
End: 2023-04-27
Payer: MEDICARE

## 2023-04-27 NOTE — TELEPHONE ENCOUNTER
"  Caller: Goran August \"Bill\"    Relationship: Self    Best call back number: [phone]     What is the best time to reach you: WITHIN THE NEXT HOUR    Who are you requesting to speak with (clinical staff, provider,  specific staff member): KAVEH    What was the call regarding: PATIENT NEEDS A REFILL BUT WOULD LIKE TO SPEAK WITH CLINICAL BEFOREHAND    Do you require a callback: YES        "

## 2023-07-24 ENCOUNTER — OFFICE VISIT (OUTPATIENT)
Dept: FAMILY MEDICINE CLINIC | Facility: CLINIC | Age: 88
End: 2023-07-24
Payer: MEDICARE

## 2023-07-24 VITALS
DIASTOLIC BLOOD PRESSURE: 68 MMHG | TEMPERATURE: 97.8 F | OXYGEN SATURATION: 98 % | BODY MASS INDEX: 32.48 KG/M2 | HEART RATE: 72 BPM | WEIGHT: 232 LBS | SYSTOLIC BLOOD PRESSURE: 122 MMHG | HEIGHT: 71 IN

## 2023-07-24 DIAGNOSIS — I10 BENIGN ESSENTIAL HTN: ICD-10-CM

## 2023-07-24 DIAGNOSIS — E11.22 TYPE 2 DIABETES MELLITUS WITH STAGE 3A CHRONIC KIDNEY DISEASE, WITHOUT LONG-TERM CURRENT USE OF INSULIN: Primary | ICD-10-CM

## 2023-07-24 DIAGNOSIS — M54.2 CHRONIC NECK PAIN: ICD-10-CM

## 2023-07-24 DIAGNOSIS — N18.31 TYPE 2 DIABETES MELLITUS WITH STAGE 3A CHRONIC KIDNEY DISEASE, WITHOUT LONG-TERM CURRENT USE OF INSULIN: Primary | ICD-10-CM

## 2023-07-24 DIAGNOSIS — G89.29 CHRONIC NECK PAIN: ICD-10-CM

## 2023-07-24 DIAGNOSIS — Z23 NEED FOR VACCINATION: ICD-10-CM

## 2023-07-24 LAB
EXPIRATION DATE: NORMAL
HBA1C MFR BLD: 6.1 %
Lab: 609

## 2023-07-24 PROCEDURE — 99214 OFFICE O/P EST MOD 30 MIN: CPT | Performed by: FAMILY MEDICINE

## 2023-07-24 PROCEDURE — G0009 ADMIN PNEUMOCOCCAL VACCINE: HCPCS | Performed by: FAMILY MEDICINE

## 2023-07-24 PROCEDURE — 83036 HEMOGLOBIN GLYCOSYLATED A1C: CPT | Performed by: FAMILY MEDICINE

## 2023-07-24 PROCEDURE — 1159F MED LIST DOCD IN RCRD: CPT | Performed by: FAMILY MEDICINE

## 2023-07-24 PROCEDURE — 3044F HG A1C LEVEL LT 7.0%: CPT | Performed by: FAMILY MEDICINE

## 2023-07-24 PROCEDURE — 72040 X-RAY EXAM NECK SPINE 2-3 VW: CPT | Performed by: FAMILY MEDICINE

## 2023-07-24 PROCEDURE — 1160F RVW MEDS BY RX/DR IN RCRD: CPT | Performed by: FAMILY MEDICINE

## 2023-07-24 PROCEDURE — 90677 PCV20 VACCINE IM: CPT | Performed by: FAMILY MEDICINE

## 2023-07-24 RX ORDER — CYCLOBENZAPRINE HCL 5 MG
5 TABLET ORAL 3 TIMES DAILY PRN
Qty: 30 TABLET | Refills: 3 | Status: SHIPPED | OUTPATIENT
Start: 2023-07-24

## 2023-07-24 NOTE — PROGRESS NOTES
"Chief Complaint  Diabetes (3 month follow up ,doing well  no complains ) and Neck Pain (Went to PT for neck pain and is better still has some discomfort )    Subjective        Goran August presents to [facility]  History of Present Illness  Pleasant 88-year-old male here with his wife to follow for type 2 diabetes and chronic neck pain    type 2 diabetes which is thankfully well controlled at 6.1.      Neck pain, chronic problem not well controlled.  He did do PT for months and feels like it has not changed, he is aware of it mst of the time, hard to turn his head when driving.  It does affect his life every day.  Thankfully it does not wake him from sleep.  He does think that it does get worse after he spends more time reading his iPad.  However despite other changes his symptoms have not improved.  He would want to consider further evaluation as it does bother him sufficiently.    Objective   Vital Signs:  /68   Pulse 72   Temp 97.8 °F (36.6 °C)   Ht 180.3 cm (71\")   Wt 105 kg (232 lb)   SpO2 98%   BMI 32.36 kg/m²   Estimated body mass index is 32.36 kg/m² as calculated from the following:    Height as of this encounter: 180.3 cm (71\").    Weight as of this encounter: 105 kg (232 lb).       BMI is >= 30 and <35. (Class 1 Obesity). The following options were offered after discussion;: exercise counseling/recommendations and nutrition counseling/recommendations      Physical Exam  Vitals and nursing note reviewed.   Constitutional:       General: He is not in acute distress.     Appearance: He is well-developed.   HENT:      Head: Normocephalic.      Nose: Nose normal.   Cardiovascular:      Rate and Rhythm: Normal rate and regular rhythm.      Heart sounds: Normal heart sounds. No murmur heard.  Pulmonary:      Effort: Pulmonary effort is normal. No respiratory distress.      Breath sounds: Normal breath sounds.   Musculoskeletal:         General: Normal range of " motion.   Skin:     General: Skin is warm and dry.      Findings: No rash.   Neurological:      Mental Status: He is alert and oriented to person, place, and time.   Psychiatric:         Behavior: Behavior normal.         Thought Content: Thought content normal.         Judgment: Judgment normal.      Result Review :  The following data was reviewed by: Tanya Julien MD on 07/24/2023:  A1C Last 3 Results          10/20/2022    09:47 1/24/2023    13:45 7/24/2023    12:46   HGBA1C Last 3 Results   Hemoglobin A1C 6.50  6.1  6.1        A cervical spine AP and lateral X-Ray was ordered. My reading of this film is no acute fracture or dislocation, however there is advanced arthritis and osteophyte production especially towards the anterior portion of t C3-C4 region. No spondylolisthesis. (No comparison films available: pending review by Radiologist.)             Assessment and Plan   Diagnoses and all orders for this visit:    1. Type 2 diabetes mellitus with stage 3a chronic kidney disease, without long-term current use of insulin (Primary)  -     POC Glycosylated Hemoglobin (Hb A1C)  -     Microalbumin / Creatinine Urine Ratio - Urine, Clean Catch    2. Chronic neck pain  -     XR Spine Cervical 2 or 3 View  -     cyclobenzaprine (FLEXERIL) 5 MG tablet; Take 1 tablet by mouth 3 (Three) Times a Day As Needed for Muscle Spasms (Pain).  Dispense: 30 tablet; Refill: 3    3. Benign essential HTN  -     Comprehensive Metabolic Panel  -     CBC & Differential  -     Lipid Panel  -     TSH Rfx On Abnormal To Free T4    4. Need for vaccination  -     Pneumococcal Conjugate Vaccine 20-Valent (PCV20)    Very pleasant 88-year-old male for type 2 diabetes which is thankfully very well controlled, he is not on medications currently and I would like to continue with this.    Hypertension and hyperlipidemia both well controlled due for labs now.  Blood work performed today    From a neck pain standpoint we spent some time talking  about this.  In general I would not recommend surgery or things that are more invasive.  However he is healthier than most [id number] and does feel that this problem is very bothersome.  He may be a candidate for epidurals or other less invasive options that could give him relief.  Plan to follow-up with an MRI of the spine and take a muscle relaxer especially in the evenings as needed as long as it does not make him too drowsy.  If so I would not recommend that he take this medication any further.  Also discussed that he try to read with his iPad elevated so that he is not looking down.       Follow Up   Return in about 6 months (around 1/24/2024), or if symptoms worsen or fail to improve, for Recheck diabetes and neck pain.  Patient was given instructions and counseling regarding his condition or for health maintenance advice. Please see specific information pulled into the AVS if appropriate.     Tanya Julien MD

## 2023-07-25 LAB
ALBUMIN SERPL-MCNC: 4.7 G/DL (ref 3.5–5.2)
ALBUMIN/CREAT UR: 138 MG/G CREAT (ref 0–29)
ALBUMIN/GLOB SERPL: 2.4 G/DL
ALP SERPL-CCNC: 73 U/L (ref 39–117)
ALT SERPL-CCNC: 20 U/L (ref 1–41)
AST SERPL-CCNC: 20 U/L (ref 1–40)
BASOPHILS # BLD AUTO: 0.03 10*3/MM3 (ref 0–0.2)
BASOPHILS NFR BLD AUTO: 0.4 % (ref 0–1.5)
BILIRUB SERPL-MCNC: 0.4 MG/DL (ref 0–1.2)
BUN SERPL-MCNC: 26 MG/DL (ref 8–23)
BUN/CREAT SERPL: 25.2 (ref 7–25)
CALCIUM SERPL-MCNC: 9.8 MG/DL (ref 8.6–10.5)
CHLORIDE SERPL-SCNC: 100 MMOL/L (ref 98–107)
CHOLEST SERPL-MCNC: 184 MG/DL (ref 0–200)
CO2 SERPL-SCNC: 31 MMOL/L (ref 22–29)
CREAT SERPL-MCNC: 1.03 MG/DL (ref 0.76–1.27)
CREAT UR-MCNC: 82 MG/DL
EGFRCR SERPLBLD CKD-EPI 2021: 69.9 ML/MIN/1.73
EOSINOPHIL # BLD AUTO: 0.07 10*3/MM3 (ref 0–0.4)
EOSINOPHIL NFR BLD AUTO: 0.8 % (ref 0.3–6.2)
ERYTHROCYTE [DISTWIDTH] IN BLOOD BY AUTOMATED COUNT: 14.6 % (ref 12.3–15.4)
GLOBULIN SER CALC-MCNC: 2 GM/DL
GLUCOSE SERPL-MCNC: 92 MG/DL (ref 65–99)
HCT VFR BLD AUTO: 39.4 % (ref 37.5–51)
HDLC SERPL-MCNC: 36 MG/DL (ref 40–60)
HGB BLD-MCNC: 12.8 G/DL (ref 13–17.7)
IMM GRANULOCYTES # BLD AUTO: 0.02 10*3/MM3 (ref 0–0.05)
IMM GRANULOCYTES NFR BLD AUTO: 0.2 % (ref 0–0.5)
LDLC SERPL CALC-MCNC: 78 MG/DL (ref 0–100)
LYMPHOCYTES # BLD AUTO: 1.54 10*3/MM3 (ref 0.7–3.1)
LYMPHOCYTES NFR BLD AUTO: 18.3 % (ref 19.6–45.3)
MCH RBC QN AUTO: 27.3 PG (ref 26.6–33)
MCHC RBC AUTO-ENTMCNC: 32.5 G/DL (ref 31.5–35.7)
MCV RBC AUTO: 84 FL (ref 79–97)
MICROALBUMIN UR-MCNC: 113.3 UG/ML
MONOCYTES # BLD AUTO: 0.72 10*3/MM3 (ref 0.1–0.9)
MONOCYTES NFR BLD AUTO: 8.6 % (ref 5–12)
NEUTROPHILS # BLD AUTO: 6.02 10*3/MM3 (ref 1.7–7)
NEUTROPHILS NFR BLD AUTO: 71.7 % (ref 42.7–76)
NRBC BLD AUTO-RTO: 0 /100 WBC (ref 0–0.2)
PLATELET # BLD AUTO: 203 10*3/MM3 (ref 140–450)
POTASSIUM SERPL-SCNC: 5 MMOL/L (ref 3.5–5.2)
PROT SERPL-MCNC: 6.7 G/DL (ref 6–8.5)
RBC # BLD AUTO: 4.69 10*6/MM3 (ref 4.14–5.8)
SODIUM SERPL-SCNC: 139 MMOL/L (ref 136–145)
TRIGL SERPL-MCNC: 436 MG/DL (ref 0–150)
TSH SERPL DL<=0.005 MIU/L-ACNC: 1.81 UIU/ML (ref 0.27–4.2)
VLDLC SERPL CALC-MCNC: 70 MG/DL (ref 5–40)
WBC # BLD AUTO: 8.4 10*3/MM3 (ref 3.4–10.8)

## 2023-08-07 RX ORDER — SPIRONOLACTONE 25 MG/1
TABLET ORAL
Qty: 90 TABLET | Refills: 3 | Status: SHIPPED | OUTPATIENT
Start: 2023-08-07

## 2023-08-07 RX ORDER — RAMIPRIL 10 MG/1
CAPSULE ORAL
Qty: 90 CAPSULE | Refills: 3 | Status: SHIPPED | OUTPATIENT
Start: 2023-08-07

## 2023-10-17 DIAGNOSIS — G25.81 RESTLESS LEG SYNDROME: Primary | ICD-10-CM

## 2023-10-17 RX ORDER — PRAMIPEXOLE DIHYDROCHLORIDE 0.25 MG/1
0.25 TABLET ORAL NIGHTLY
Qty: 90 TABLET | Refills: 1 | Status: SHIPPED | OUTPATIENT
Start: 2023-10-17

## 2023-10-17 RX ORDER — PRAMIPEXOLE DIHYDROCHLORIDE 0.25 MG/1
0.25 TABLET ORAL NIGHTLY
Qty: 90 TABLET | Refills: 1 | Status: CANCELLED | OUTPATIENT
Start: 2023-10-17

## 2023-10-17 NOTE — TELEPHONE ENCOUNTER
Pt was given at hospital for restless legs is working nicely would like arefill if dr blackmon is ok with it

## 2023-12-18 RX ORDER — APIXABAN 5 MG/1
TABLET, FILM COATED ORAL
Qty: 180 TABLET | Refills: 3 | Status: SHIPPED | OUTPATIENT
Start: 2023-12-18

## 2023-12-19 ENCOUNTER — HOSPITAL ENCOUNTER (OUTPATIENT)
Facility: HOSPITAL | Age: 88
Setting detail: OBSERVATION
Discharge: HOME | End: 2023-12-20
Attending: EMERGENCY MEDICINE | Admitting: STUDENT IN AN ORGANIZED HEALTH CARE EDUCATION/TRAINING PROGRAM
Payer: MEDICARE

## 2023-12-19 ENCOUNTER — NURSE TRIAGE (OUTPATIENT)
Dept: CALL CENTER | Facility: HOSPITAL | Age: 88
End: 2023-12-19
Payer: MEDICARE

## 2023-12-19 ENCOUNTER — APPOINTMENT (OUTPATIENT)
Dept: CARDIOLOGY | Facility: HOSPITAL | Age: 88
End: 2023-12-19
Payer: MEDICARE

## 2023-12-19 DIAGNOSIS — R06.00 DYSPNEA, UNSPECIFIED TYPE: ICD-10-CM

## 2023-12-19 DIAGNOSIS — R05.1 ACUTE COUGH: ICD-10-CM

## 2023-12-19 DIAGNOSIS — R60.0 BILATERAL LOWER EXTREMITY EDEMA: Primary | ICD-10-CM

## 2023-12-19 PROBLEM — J20.9 ACUTE BRONCHITIS: Status: ACTIVE | Noted: 2023-12-19

## 2023-12-19 PROBLEM — Z86.718 HISTORY OF DVT (DEEP VEIN THROMBOSIS): Status: ACTIVE | Noted: 2023-12-19

## 2023-12-19 PROBLEM — I50.22 HEART FAILURE WITH MILDLY REDUCED EJECTION FRACTION (HFMREF): Status: ACTIVE | Noted: 2023-12-19

## 2023-12-19 LAB
ALBUMIN SERPL-MCNC: 3.7 G/DL (ref 3.5–5.2)
ALBUMIN/GLOB SERPL: 1.2 G/DL
ALP SERPL-CCNC: 71 U/L (ref 39–117)
ALT SERPL W P-5'-P-CCNC: 24 U/L (ref 1–41)
ANION GAP SERPL CALCULATED.3IONS-SCNC: 10 MMOL/L (ref 5–15)
APTT PPP: 35.6 SECONDS (ref 22.7–35.4)
AST SERPL-CCNC: 23 U/L (ref 1–40)
B PARAPERT DNA SPEC QL NAA+PROBE: NOT DETECTED
B PERT DNA SPEC QL NAA+PROBE: NOT DETECTED
BASOPHILS # BLD AUTO: 0.03 10*3/MM3 (ref 0–0.2)
BASOPHILS NFR BLD AUTO: 0.4 % (ref 0–1.5)
BH CV LOWER VASCULAR LEFT COMMON FEMORAL AUGMENT: NORMAL
BH CV LOWER VASCULAR LEFT COMMON FEMORAL COMPETENT: NORMAL
BH CV LOWER VASCULAR LEFT COMMON FEMORAL COMPRESS: NORMAL
BH CV LOWER VASCULAR LEFT COMMON FEMORAL PHASIC: NORMAL
BH CV LOWER VASCULAR LEFT COMMON FEMORAL SPONT: NORMAL
BH CV LOWER VASCULAR LEFT DISTAL FEMORAL COMPRESS: NORMAL
BH CV LOWER VASCULAR LEFT GASTRONEMIUS COMPRESS: NORMAL
BH CV LOWER VASCULAR LEFT GREATER SAPH AK COMPRESS: NORMAL
BH CV LOWER VASCULAR LEFT GREATER SAPH BK COMPRESS: NORMAL
BH CV LOWER VASCULAR LEFT LESSER SAPH COMPRESS: NORMAL
BH CV LOWER VASCULAR LEFT MID FEMORAL AUGMENT: NORMAL
BH CV LOWER VASCULAR LEFT MID FEMORAL COMPETENT: NORMAL
BH CV LOWER VASCULAR LEFT MID FEMORAL COMPRESS: NORMAL
BH CV LOWER VASCULAR LEFT MID FEMORAL PHASIC: NORMAL
BH CV LOWER VASCULAR LEFT MID FEMORAL SPONT: NORMAL
BH CV LOWER VASCULAR LEFT PERONEAL COMPRESS: NORMAL
BH CV LOWER VASCULAR LEFT POPLITEAL AUGMENT: NORMAL
BH CV LOWER VASCULAR LEFT POPLITEAL COMPETENT: NORMAL
BH CV LOWER VASCULAR LEFT POPLITEAL COMPRESS: NORMAL
BH CV LOWER VASCULAR LEFT POPLITEAL PHASIC: NORMAL
BH CV LOWER VASCULAR LEFT POPLITEAL SPONT: NORMAL
BH CV LOWER VASCULAR LEFT POSTERIOR TIBIAL COMPRESS: NORMAL
BH CV LOWER VASCULAR LEFT PROFUNDA FEMORAL COMPRESS: NORMAL
BH CV LOWER VASCULAR LEFT PROXIMAL FEMORAL COMPRESS: NORMAL
BH CV LOWER VASCULAR LEFT SAPHENOFEMORAL JUNCTION COMPRESS: NORMAL
BH CV LOWER VASCULAR RIGHT COMMON FEMORAL AUGMENT: NORMAL
BH CV LOWER VASCULAR RIGHT COMMON FEMORAL COMPETENT: NORMAL
BH CV LOWER VASCULAR RIGHT COMMON FEMORAL COMPRESS: NORMAL
BH CV LOWER VASCULAR RIGHT COMMON FEMORAL PHASIC: NORMAL
BH CV LOWER VASCULAR RIGHT COMMON FEMORAL SPONT: NORMAL
BH CV LOWER VASCULAR RIGHT DISTAL FEMORAL COMPRESS: NORMAL
BH CV LOWER VASCULAR RIGHT GASTRONEMIUS COMPRESS: NORMAL
BH CV LOWER VASCULAR RIGHT LESSER SAPH COMPRESS: NORMAL
BH CV LOWER VASCULAR RIGHT MID FEMORAL AUGMENT: NORMAL
BH CV LOWER VASCULAR RIGHT MID FEMORAL COMPETENT: NORMAL
BH CV LOWER VASCULAR RIGHT MID FEMORAL COMPRESS: NORMAL
BH CV LOWER VASCULAR RIGHT MID FEMORAL PHASIC: NORMAL
BH CV LOWER VASCULAR RIGHT MID FEMORAL SPONT: NORMAL
BH CV LOWER VASCULAR RIGHT PERONEAL COMPRESS: NORMAL
BH CV LOWER VASCULAR RIGHT POPLITEAL AUGMENT: NORMAL
BH CV LOWER VASCULAR RIGHT POPLITEAL COMPETENT: NORMAL
BH CV LOWER VASCULAR RIGHT POPLITEAL COMPRESS: NORMAL
BH CV LOWER VASCULAR RIGHT POPLITEAL PHASIC: NORMAL
BH CV LOWER VASCULAR RIGHT POPLITEAL SPONT: NORMAL
BH CV LOWER VASCULAR RIGHT POSTERIOR TIBIAL COMPRESS: NORMAL
BH CV LOWER VASCULAR RIGHT PROFUNDA FEMORAL COMPRESS: NORMAL
BH CV LOWER VASCULAR RIGHT PROXIMAL FEMORAL COMPRESS: NORMAL
BH CV LOWER VASCULAR RIGHT SAPHENOFEMORAL JUNCTION COMPRESS: NORMAL
BILIRUB SERPL-MCNC: 0.4 MG/DL (ref 0–1.2)
BUN SERPL-MCNC: 26 MG/DL (ref 8–23)
BUN/CREAT SERPL: 23.9 (ref 7–25)
C PNEUM DNA NPH QL NAA+NON-PROBE: NOT DETECTED
CALCIUM SPEC-SCNC: 9.4 MG/DL (ref 8.6–10.5)
CHLORIDE SERPL-SCNC: 100 MMOL/L (ref 98–107)
CO2 SERPL-SCNC: 28 MMOL/L (ref 22–29)
CREAT SERPL-MCNC: 1.09 MG/DL (ref 0.76–1.27)
CREAT UR-MCNC: 74.4 MG/DL
DEPRECATED RDW RBC AUTO: 41.1 FL (ref 37–54)
EGFRCR SERPLBLD CKD-EPI 2021: 64.9 ML/MIN/1.73
EOSINOPHIL # BLD AUTO: 0.08 10*3/MM3 (ref 0–0.4)
EOSINOPHIL NFR BLD AUTO: 1 % (ref 0.3–6.2)
ERYTHROCYTE [DISTWIDTH] IN BLOOD BY AUTOMATED COUNT: 13.5 % (ref 12.3–15.4)
FLUAV SUBTYP SPEC NAA+PROBE: NOT DETECTED
FLUBV RNA ISLT QL NAA+PROBE: NOT DETECTED
GLOBULIN UR ELPH-MCNC: 3.2 GM/DL
GLUCOSE BLDC GLUCOMTR-MCNC: 245 MG/DL (ref 70–130)
GLUCOSE SERPL-MCNC: 185 MG/DL (ref 65–99)
HADV DNA SPEC NAA+PROBE: NOT DETECTED
HCOV 229E RNA SPEC QL NAA+PROBE: NOT DETECTED
HCOV HKU1 RNA SPEC QL NAA+PROBE: NOT DETECTED
HCOV NL63 RNA SPEC QL NAA+PROBE: NOT DETECTED
HCOV OC43 RNA SPEC QL NAA+PROBE: NOT DETECTED
HCT VFR BLD AUTO: 36.7 % (ref 37.5–51)
HGB BLD-MCNC: 11.9 G/DL (ref 13–17.7)
HMPV RNA NPH QL NAA+NON-PROBE: NOT DETECTED
HPIV1 RNA ISLT QL NAA+PROBE: NOT DETECTED
HPIV2 RNA SPEC QL NAA+PROBE: NOT DETECTED
HPIV3 RNA NPH QL NAA+PROBE: NOT DETECTED
HPIV4 P GENE NPH QL NAA+PROBE: NOT DETECTED
IMM GRANULOCYTES # BLD AUTO: 0.02 10*3/MM3 (ref 0–0.05)
IMM GRANULOCYTES NFR BLD AUTO: 0.2 % (ref 0–0.5)
INR PPP: 1.17 (ref 0.9–1.1)
LYMPHOCYTES # BLD AUTO: 0.99 10*3/MM3 (ref 0.7–3.1)
LYMPHOCYTES NFR BLD AUTO: 12.1 % (ref 19.6–45.3)
M PNEUMO IGG SER IA-ACNC: NOT DETECTED
MCH RBC QN AUTO: 27.2 PG (ref 26.6–33)
MCHC RBC AUTO-ENTMCNC: 32.4 G/DL (ref 31.5–35.7)
MCV RBC AUTO: 84 FL (ref 79–97)
MONOCYTES # BLD AUTO: 0.52 10*3/MM3 (ref 0.1–0.9)
MONOCYTES NFR BLD AUTO: 6.4 % (ref 5–12)
NEUTROPHILS NFR BLD AUTO: 6.52 10*3/MM3 (ref 1.7–7)
NEUTROPHILS NFR BLD AUTO: 79.9 % (ref 42.7–76)
NRBC BLD AUTO-RTO: 0 /100 WBC (ref 0–0.2)
NT-PROBNP SERPL-MCNC: 629 PG/ML (ref 0–1800)
PLATELET # BLD AUTO: 245 10*3/MM3 (ref 140–450)
PMV BLD AUTO: 9.8 FL (ref 6–12)
POTASSIUM SERPL-SCNC: 4.5 MMOL/L (ref 3.5–5.2)
PROCALCITONIN SERPL-MCNC: 0.06 NG/ML (ref 0–0.25)
PROT ?TM UR-MCNC: 17.8 MG/DL
PROT SERPL-MCNC: 6.9 G/DL (ref 6–8.5)
PROT/CREAT UR: 239.2 MG/G CREA (ref 0–200)
PROTHROMBIN TIME: 15.1 SECONDS (ref 11.7–14.2)
RBC # BLD AUTO: 4.37 10*6/MM3 (ref 4.14–5.8)
RHINOVIRUS RNA SPEC NAA+PROBE: NOT DETECTED
RSV RNA NPH QL NAA+NON-PROBE: NOT DETECTED
SARS-COV-2 RNA NPH QL NAA+NON-PROBE: NOT DETECTED
SODIUM SERPL-SCNC: 138 MMOL/L (ref 136–145)
TROPONIN T SERPL HS-MCNC: 35 NG/L
WBC NRBC COR # BLD AUTO: 8.16 10*3/MM3 (ref 3.4–10.8)

## 2023-12-19 PROCEDURE — 94640 AIRWAY INHALATION TREATMENT: CPT

## 2023-12-19 PROCEDURE — 81003 URINALYSIS AUTO W/O SCOPE: CPT | Performed by: STUDENT IN AN ORGANIZED HEALTH CARE EDUCATION/TRAINING PROGRAM

## 2023-12-19 PROCEDURE — 84145 PROCALCITONIN (PCT): CPT | Performed by: PHYSICIAN ASSISTANT

## 2023-12-19 PROCEDURE — 25010000002 METHYLPREDNISOLONE PER 125 MG: Performed by: PHYSICIAN ASSISTANT

## 2023-12-19 PROCEDURE — 82948 REAGENT STRIP/BLOOD GLUCOSE: CPT

## 2023-12-19 PROCEDURE — 85025 COMPLETE CBC W/AUTO DIFF WBC: CPT | Performed by: PHYSICIAN ASSISTANT

## 2023-12-19 PROCEDURE — 94799 UNLISTED PULMONARY SVC/PX: CPT

## 2023-12-19 PROCEDURE — 93970 EXTREMITY STUDY: CPT

## 2023-12-19 PROCEDURE — 0202U NFCT DS 22 TRGT SARS-COV-2: CPT | Performed by: PHYSICIAN ASSISTANT

## 2023-12-19 PROCEDURE — 99284 EMERGENCY DEPT VISIT MOD MDM: CPT

## 2023-12-19 PROCEDURE — 96372 THER/PROPH/DIAG INJ SC/IM: CPT

## 2023-12-19 PROCEDURE — 80053 COMPREHEN METABOLIC PANEL: CPT | Performed by: PHYSICIAN ASSISTANT

## 2023-12-19 PROCEDURE — G0378 HOSPITAL OBSERVATION PER HR: HCPCS

## 2023-12-19 PROCEDURE — 25010000002 ENOXAPARIN PER 10 MG: Performed by: STUDENT IN AN ORGANIZED HEALTH CARE EDUCATION/TRAINING PROGRAM

## 2023-12-19 PROCEDURE — 85730 THROMBOPLASTIN TIME PARTIAL: CPT | Performed by: PHYSICIAN ASSISTANT

## 2023-12-19 PROCEDURE — 94664 DEMO&/EVAL PT USE INHALER: CPT

## 2023-12-19 PROCEDURE — 84156 ASSAY OF PROTEIN URINE: CPT | Performed by: STUDENT IN AN ORGANIZED HEALTH CARE EDUCATION/TRAINING PROGRAM

## 2023-12-19 PROCEDURE — 84484 ASSAY OF TROPONIN QUANT: CPT | Performed by: PHYSICIAN ASSISTANT

## 2023-12-19 PROCEDURE — 85610 PROTHROMBIN TIME: CPT | Performed by: PHYSICIAN ASSISTANT

## 2023-12-19 PROCEDURE — 83880 ASSAY OF NATRIURETIC PEPTIDE: CPT | Performed by: PHYSICIAN ASSISTANT

## 2023-12-19 PROCEDURE — 82570 ASSAY OF URINE CREATININE: CPT | Performed by: STUDENT IN AN ORGANIZED HEALTH CARE EDUCATION/TRAINING PROGRAM

## 2023-12-19 PROCEDURE — 96374 THER/PROPH/DIAG INJ IV PUSH: CPT

## 2023-12-19 RX ORDER — UREA 10 %
3 LOTION (ML) TOPICAL NIGHTLY PRN
Status: DISCONTINUED | OUTPATIENT
Start: 2023-12-19 | End: 2023-12-20 | Stop reason: HOSPADM

## 2023-12-19 RX ORDER — NICOTINE POLACRILEX 4 MG
15 LOZENGE BUCCAL
Status: DISCONTINUED | OUTPATIENT
Start: 2023-12-19 | End: 2023-12-20 | Stop reason: HOSPADM

## 2023-12-19 RX ORDER — ENOXAPARIN SODIUM 100 MG/ML
40 INJECTION SUBCUTANEOUS DAILY
Status: DISCONTINUED | OUTPATIENT
Start: 2023-12-20 | End: 2023-12-19 | Stop reason: SDUPTHER

## 2023-12-19 RX ORDER — METHYLPREDNISOLONE SODIUM SUCCINATE 125 MG/2ML
60 INJECTION, POWDER, LYOPHILIZED, FOR SOLUTION INTRAMUSCULAR; INTRAVENOUS EVERY 12 HOURS
Status: DISCONTINUED | OUTPATIENT
Start: 2023-12-20 | End: 2023-12-19

## 2023-12-19 RX ORDER — IBUPROFEN 600 MG/1
1 TABLET ORAL
Status: DISCONTINUED | OUTPATIENT
Start: 2023-12-19 | End: 2023-12-20 | Stop reason: HOSPADM

## 2023-12-19 RX ORDER — BISACODYL 5 MG/1
5 TABLET, DELAYED RELEASE ORAL DAILY PRN
Status: DISCONTINUED | OUTPATIENT
Start: 2023-12-19 | End: 2023-12-20 | Stop reason: HOSPADM

## 2023-12-19 RX ORDER — IPRATROPIUM BROMIDE AND ALBUTEROL SULFATE 2.5; .5 MG/3ML; MG/3ML
3 SOLUTION RESPIRATORY (INHALATION) ONCE
Status: COMPLETED | OUTPATIENT
Start: 2023-12-19 | End: 2023-12-19

## 2023-12-19 RX ORDER — BISACODYL 10 MG
10 SUPPOSITORY, RECTAL RECTAL DAILY PRN
Status: DISCONTINUED | OUTPATIENT
Start: 2023-12-19 | End: 2023-12-20 | Stop reason: HOSPADM

## 2023-12-19 RX ORDER — DEXTROSE MONOHYDRATE 25 G/50ML
25 INJECTION, SOLUTION INTRAVENOUS
Status: DISCONTINUED | OUTPATIENT
Start: 2023-12-19 | End: 2023-12-20 | Stop reason: HOSPADM

## 2023-12-19 RX ORDER — METHYLPREDNISOLONE SODIUM SUCCINATE 125 MG/2ML
60 INJECTION, POWDER, LYOPHILIZED, FOR SOLUTION INTRAMUSCULAR; INTRAVENOUS ONCE
Status: COMPLETED | OUTPATIENT
Start: 2023-12-19 | End: 2023-12-19

## 2023-12-19 RX ORDER — ONDANSETRON 4 MG/1
4 TABLET, ORALLY DISINTEGRATING ORAL EVERY 6 HOURS PRN
Status: DISCONTINUED | OUTPATIENT
Start: 2023-12-19 | End: 2023-12-20 | Stop reason: HOSPADM

## 2023-12-19 RX ORDER — ONDANSETRON 2 MG/ML
4 INJECTION INTRAMUSCULAR; INTRAVENOUS EVERY 6 HOURS PRN
Status: DISCONTINUED | OUTPATIENT
Start: 2023-12-19 | End: 2023-12-20 | Stop reason: HOSPADM

## 2023-12-19 RX ORDER — POLYETHYLENE GLYCOL 3350 17 G/17G
17 POWDER, FOR SOLUTION ORAL DAILY PRN
Status: DISCONTINUED | OUTPATIENT
Start: 2023-12-19 | End: 2023-12-20 | Stop reason: HOSPADM

## 2023-12-19 RX ORDER — INSULIN LISPRO 100 [IU]/ML
2-7 INJECTION, SOLUTION INTRAVENOUS; SUBCUTANEOUS
Status: DISCONTINUED | OUTPATIENT
Start: 2023-12-19 | End: 2023-12-20 | Stop reason: HOSPADM

## 2023-12-19 RX ORDER — GUAIFENESIN 600 MG/1
600 TABLET, EXTENDED RELEASE ORAL EVERY 12 HOURS SCHEDULED
Status: DISCONTINUED | OUTPATIENT
Start: 2023-12-19 | End: 2023-12-20 | Stop reason: HOSPADM

## 2023-12-19 RX ORDER — ALBUTEROL SULFATE 2.5 MG/3ML
2.5 SOLUTION RESPIRATORY (INHALATION) EVERY 6 HOURS PRN
Status: DISCONTINUED | OUTPATIENT
Start: 2023-12-19 | End: 2023-12-20 | Stop reason: HOSPADM

## 2023-12-19 RX ORDER — AMOXICILLIN 250 MG
2 CAPSULE ORAL 2 TIMES DAILY
Status: DISCONTINUED | OUTPATIENT
Start: 2023-12-19 | End: 2023-12-20 | Stop reason: HOSPADM

## 2023-12-19 RX ORDER — ACETAMINOPHEN 325 MG/1
650 TABLET ORAL EVERY 4 HOURS PRN
Status: DISCONTINUED | OUTPATIENT
Start: 2023-12-19 | End: 2023-12-20 | Stop reason: HOSPADM

## 2023-12-19 RX ORDER — IPRATROPIUM BROMIDE AND ALBUTEROL SULFATE 2.5; .5 MG/3ML; MG/3ML
3 SOLUTION RESPIRATORY (INHALATION)
Status: DISCONTINUED | OUTPATIENT
Start: 2023-12-19 | End: 2023-12-20 | Stop reason: HOSPADM

## 2023-12-19 RX ORDER — ENOXAPARIN SODIUM 100 MG/ML
40 INJECTION SUBCUTANEOUS EVERY 24 HOURS
Status: DISCONTINUED | OUTPATIENT
Start: 2023-12-19 | End: 2023-12-20 | Stop reason: HOSPADM

## 2023-12-19 RX ORDER — SODIUM CHLORIDE 0.9 % (FLUSH) 0.9 %
10 SYRINGE (ML) INJECTION AS NEEDED
Status: DISCONTINUED | OUTPATIENT
Start: 2023-12-19 | End: 2023-12-20 | Stop reason: HOSPADM

## 2023-12-19 RX ADMIN — IPRATROPIUM BROMIDE AND ALBUTEROL SULFATE 3 ML: 2.5; .5 SOLUTION RESPIRATORY (INHALATION) at 18:53

## 2023-12-19 RX ADMIN — ENOXAPARIN SODIUM 40 MG: 100 INJECTION SUBCUTANEOUS at 22:53

## 2023-12-19 RX ADMIN — METHYLPREDNISOLONE SODIUM SUCCINATE 60 MG: 125 INJECTION, POWDER, FOR SOLUTION INTRAMUSCULAR; INTRAVENOUS at 18:50

## 2023-12-19 RX ADMIN — IPRATROPIUM BROMIDE AND ALBUTEROL SULFATE 3 ML: 2.5; .5 SOLUTION RESPIRATORY (INHALATION) at 21:06

## 2023-12-19 NOTE — TELEPHONE ENCOUNTER
" HUB call - patient at  with blood clot and daughter requesting to talk with PCP but office is closed.    Spoke with caller, who is daughter. States father is currently being treated at an  and [facility] provider there is recommending that he go to ER due to probable blood clot in leg and [facility] provider told her his chest xray looks like may have pneumonia. Daughter is requesting to see if Dr. Julien can get them though the ER faster and not have to wait.     Informed daughter that Dr. Julien's office is currently closed and that the treating provider at  should make decision if patient needs EMS transport to ED. Reviewed with caller  the need for EMS: if vs unstable, respiratory difficulty, or if needs to travel in supine position. Verbalized understanding and states will speak with  provider. Instructed also if goes by car to be sure to let ED know that they are being sent by an  provider.    Reason for Disposition   General information question, no triage required and triager able to answer question    Additional Information   Negative: [1] Caller is not with the adult (patient) AND [2] reporting urgent symptoms   Negative: Lab result questions   Negative: Medication questions   Negative: Caller can't be reached by phone   Negative: Caller has already spoken to PCP or another triager   Negative: RN needs further essential information from caller in order to complete triage   Negative: Requesting regular office appointment   Negative: [1] Caller requesting NON-URGENT health information AND [2] PCP's office is the best resource   Negative: Health Information question, no triage required and triager able to answer question    Answer Assessment - Initial Assessment Questions  1. REASON FOR CALL or QUESTION: \"What is your reason for calling today?\" or \"How can I best help you?\" or \"What question do you have that I can help answer?\"      HUB call - patient at  with blood clot and daughter requesting to talk " with PCP but office is closed.    Protocols used: Information Only Call - No Triage-ADULT-AH

## 2023-12-19 NOTE — ED PROVIDER NOTES
EMERGENCY DEPARTMENT ENCOUNTER    Room Number:  32/32  PCP: Tanya Julien MD  Discussed/ obtained information from independent historians: Family at bedside      HPI:  Chief Complaint: Cough and leg swelling    Context: Goran August is a 89 y.o. male who presents to the ED c/o cough with leg swelling.  Patient reports for the last week he has had persistent cough that is productive of sputum.  He reports associated dyspnea and wheezing.  He went to an urgent care 2 days ago where he was diagnosed with bronchitis and prescribed a Z-Qamar.  He has been taking the Z-Qamar without improvement in his symptoms.  Patient reports yesterday he developed leg swelling that became warm to touch today.  He returned to urgent care today where he had chest x-ray performed and was referred to the emergency department for further workup.  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  Patient denies injury or trauma to the lower extremities.  Patient denies fever, chest pain, syncope, vomiting, abdominal pain, or any other systemic complaints.      External (non-ED) record review:   Reviewed note from urgent care visit today where patient seen for cough and shortness of breath.  Reviewed assessment and plan.  Chest x-ray obtained, patient referred to emergency department for further workup.  Reviewed prior imaging studies.  2 view chest x-ray obtained today urgent care reveals cardiomegaly without evidence of edema or infiltrate.      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    Hiatal hernia with gastroesophageal reflux 05/06/2016    Stage 3 chronic kidney disease 11/02/2018    Environmental allergies 05/01/2019    Benign essential HTN 11/05/2019    Left bundle branch block 11/05/2019     Nonischemic cardiomyopathy 2013    Exudative age-related macular degeneration, left eye, with active choroidal neovascularization 2021    Peripheral vascular disease, unspecified 2021    Bilateral lower extremity edema 2022    Hypoxia 2022    Dyspnea 2022    COVID-19 virus infection 2022    Combined systolic and diastolic congestive heart failure 2022    Type 2 diabetes mellitus     Positive D dimer 2022    Acute DVT (deep venous thrombosis) 2022    Nocturnal hypoxia 2022     Resolved Ambulatory Problems     Diagnosis Date Noted    No Resolved Ambulatory Problems     Past Medical History:   Diagnosis Date    Anemia     Benign hypertensive heart disease     Cardiomyopathy     Cataract 2020    GERD (gastroesophageal reflux disease)     Heart murmur     Hx of colonic polyps     Hypertension     LBBB (left bundle branch block)     Mitral regurgitation     Osteoarthritis          PAST SURGICAL HISTORY  Past Surgical History:   Procedure Laterality Date    CATARACT EXTRACTION, BILATERAL Bilateral 2020    INGUINAL HERNIA REPAIR      X4    REPLACEMENT TOTAL KNEE Right          FAMILY HISTORY  Family History   Problem Relation Age of Onset    Aneurysm Mother         Colonic AVM in 90's    Hypertension Mother     Parkinsonism Father         90's    Aortic aneurysm Father         AAA    Tuberculosis Father     Tuberculosis Paternal Aunt     Cancer Maternal Grandmother     No Known Problems Daughter     No Known Problems Son          SOCIAL HISTORY  Social History     Socioeconomic History    Marital status:    Tobacco Use    Smoking status: Former     Packs/day: 0.50     Years: 25.00     Additional pack years: 0.00     Total pack years: 12.50     Types: Cigarettes     Quit date:      Years since quittin.9    Smokeless tobacco: Former   Vaping Use    Vaping Use: Never used   Substance and Sexual Activity    Alcohol use: Yes      Comment: one drink monthly    Drug use: No    Sexual activity: Defer         ALLERGIES  Hctz [hydrochlorothiazide]        REVIEW OF SYSTEMS  Review of Systems   Constitutional:  Negative for chills and fever.   HENT:  Negative for ear pain and sore throat.    Respiratory:  Positive for cough and shortness of breath.    Cardiovascular:  Positive for leg swelling. Negative for chest pain and palpitations.   Gastrointestinal:  Negative for abdominal pain and vomiting.   Genitourinary:  Negative for dysuria and hematuria.   Musculoskeletal:  Negative for arthralgias and joint swelling.   Skin:  Negative for pallor and rash.   Neurological:  Negative for syncope and headaches.   Psychiatric/Behavioral:  Negative for confusion and hallucinations.             PHYSICAL EXAM  ED Triage Vitals   Temp Heart Rate Resp BP SpO2   12/19/23 1755 12/19/23 1755 12/19/23 1755 12/19/23 1802 12/19/23 1755   97.9 °F (36.6 °C) 91 18 148/68 95 %      Temp src Heart Rate Source Patient Position BP Location FiO2 (%)   -- 12/19/23 1755 -- -- --    Monitor          Physical Exam  Constitutional:       General: He is not in acute distress.     Appearance: Normal appearance.   HENT:      Head: Normocephalic and atraumatic.      Nose: Nose normal.      Mouth/Throat:      Mouth: Mucous membranes are moist.   Eyes:      Conjunctiva/sclera: Conjunctivae normal.      Pupils: Pupils are equal, round, and reactive to light.   Cardiovascular:      Rate and Rhythm: Normal rate and regular rhythm.      Pulses: Normal pulses.           Radial pulses are 2+ on the right side and 2+ on the left side.        Dorsalis pedis pulses are 2+ on the right side and 2+ on the left side.      Heart sounds: Normal heart sounds.      Comments: L>R pitting lower extremity edema.  He has varicose veins bilaterally, mild erythema and warmth noted over bilateral lower extremities.  Pulmonary:      Effort: Pulmonary effort is normal.      Breath sounds: Wheezing present.       Comments: O2 sats 100% on room air.  There are bilateral wheezes.  Bronchospastic coughing  Abdominal:      General: There is no distension.   Musculoskeletal:         General: Normal range of motion.      Cervical back: Normal range of motion and neck supple.   Skin:     General: Skin is warm.      Capillary Refill: Capillary refill takes less than 2 seconds.   Neurological:      General: No focal deficit present.      Mental Status: He is alert and oriented to person, place, and time.   Psychiatric:         Mood and Affect: Mood normal.         Vital signs and nursing notes reviewed.          LAB RESULTS  Recent Results (from the past 24 hour(s))   Comprehensive Metabolic Panel    Collection Time: 12/19/23  6:43 PM    Specimen: Blood   Result Value Ref Range    Glucose 185 (H) 65 - 99 mg/dL    BUN 26 (H) 8 - 23 mg/dL    Creatinine 1.09 0.76 - 1.27 mg/dL    Sodium 138 136 - 145 mmol/L    Potassium 4.5 3.5 - 5.2 mmol/L    Chloride 100 98 - 107 mmol/L    CO2 28.0 22.0 - 29.0 mmol/L    Calcium 9.4 8.6 - 10.5 mg/dL    Total Protein 6.9 6.0 - 8.5 g/dL    Albumin 3.7 3.5 - 5.2 g/dL    ALT (SGPT) 24 1 - 41 U/L    AST (SGOT) 23 1 - 40 U/L    Alkaline Phosphatase 71 39 - 117 U/L    Total Bilirubin 0.4 0.0 - 1.2 mg/dL    Globulin 3.2 gm/dL    A/G Ratio 1.2 g/dL    BUN/Creatinine Ratio 23.9 7.0 - 25.0    Anion Gap 10.0 5.0 - 15.0 mmol/L    eGFR 64.9 >60.0 mL/min/1.73   Protime-INR    Collection Time: 12/19/23  6:43 PM    Specimen: Blood   Result Value Ref Range    Protime 15.1 (H) 11.7 - 14.2 Seconds    INR 1.17 (H) 0.90 - 1.10   aPTT    Collection Time: 12/19/23  6:43 PM    Specimen: Blood   Result Value Ref Range    PTT 35.6 (H) 22.7 - 35.4 seconds   BNP    Collection Time: 12/19/23  6:43 PM    Specimen: Blood   Result Value Ref Range    proBNP 629.0 0.0 - 1,800.0 pg/mL   Single High Sensitivity Troponin T    Collection Time: 12/19/23  6:43 PM    Specimen: Blood   Result Value Ref Range    HS Troponin T 35 (H) <22 ng/L    Procalcitonin    Collection Time: 12/19/23  6:43 PM    Specimen: Blood   Result Value Ref Range    Procalcitonin 0.06 0.00 - 0.25 ng/mL   CBC Auto Differential    Collection Time: 12/19/23  6:43 PM    Specimen: Blood   Result Value Ref Range    WBC 8.16 3.40 - 10.80 10*3/mm3    RBC 4.37 4.14 - 5.80 10*6/mm3    Hemoglobin 11.9 (L) 13.0 - 17.7 g/dL    Hematocrit 36.7 (L) 37.5 - 51.0 %    MCV 84.0 79.0 - 97.0 fL    MCH 27.2 26.6 - 33.0 pg    MCHC 32.4 31.5 - 35.7 g/dL    RDW 13.5 12.3 - 15.4 %    RDW-SD 41.1 37.0 - 54.0 fl    MPV 9.8 6.0 - 12.0 fL    Platelets 245 140 - 450 10*3/mm3    Neutrophil % 79.9 (H) 42.7 - 76.0 %    Lymphocyte % 12.1 (L) 19.6 - 45.3 %    Monocyte % 6.4 5.0 - 12.0 %    Eosinophil % 1.0 0.3 - 6.2 %    Basophil % 0.4 0.0 - 1.5 %    Immature Grans % 0.2 0.0 - 0.5 %    Neutrophils, Absolute 6.52 1.70 - 7.00 10*3/mm3    Lymphocytes, Absolute 0.99 0.70 - 3.10 10*3/mm3    Monocytes, Absolute 0.52 0.10 - 0.90 10*3/mm3    Eosinophils, Absolute 0.08 0.00 - 0.40 10*3/mm3    Basophils, Absolute 0.03 0.00 - 0.20 10*3/mm3    Immature Grans, Absolute 0.02 0.00 - 0.05 10*3/mm3    nRBC 0.0 0.0 - 0.2 /100 WBC   Respiratory Panel PCR w/COVID-19(SARS-CoV-2) [facility]E/[facility] In-House, NP Swab in UTM/VTM, 2 HR TAT - Swab, Nasopharynx    Collection Time: 12/19/23  6:44 PM    Specimen: Nasopharynx; Swab   Result Value Ref Range    ADENOVIRUS, PCR Not Detected Not Detected    Coronavirus 229E Not Detected Not Detected    Coronavirus HKU1 Not Detected Not Detected    Coronavirus NL63 Not Detected Not Detected    Coronavirus OC43 Not Detected Not Detected    COVID19 Not Detected Not Detected - Ref. Range    Human Metapneumovirus Not Detected Not Detected    Human Rhinovirus/Enterovirus Not Detected Not Detected    Influenza A PCR Not Detected Not Detected    Influenza B PCR Not Detected Not Detected    Parainfluenza Virus 1 Not Detected Not Detected    Parainfluenza Virus 2 Not Detected Not Detected     Parainfluenza Virus 3 Not Detected Not Detected    Parainfluenza Virus 4 Not Detected Not Detected    RSV, PCR Not Detected Not Detected    Bordetella pertussis pcr Not Detected Not Detected    Bordetella parapertussis PCR Not Detected Not Detected    Chlamydophila pneumoniae PCR Not Detected Not Detected    Mycoplasma pneumo by PCR Not Detected Not Detected   Duplex Venous Lower Extremity - BILATERAL    Collection Time: 12/19/23  7:25 PM   Result Value Ref Range    Right Common Femoral Spont Y     Right Common Femoral Competent Y     Right Common Femoral Phasic Y     Right Common Femoral Compress C     Right Common Femoral Augment Y     Right Saphenofemoral Junction Compress C     Right Profunda Femoral Compress C     Right Proximal Femoral Compress C     Right Mid Femoral Spont Y     Right Mid Femoral Competent Y     Right Mid Femoral Phasic Y     Right Mid Femoral Compress C     Right Mid Femoral Augment Y     Right Distal Femoral Compress C     Right Popliteal Spont Y     Right Popliteal Competent Y     Right Popliteal Phasic Y     Right Popliteal Compress C     Right Popliteal Augment Y     Right Posterior Tibial Compress C     Right Peroneal Compress C     Right Gastronemius Compress C     Right Lesser Saph Compress C     Left Common Femoral Spont Y     Left Common Femoral Competent Y     Left Common Femoral Phasic Y     Left Common Femoral Compress C     Left Common Femoral Augment Y     Left Saphenofemoral Junction Compress C     Left Profunda Femoral Compress C     Left Proximal Femoral Compress C     Left Mid Femoral Spont Y     Left Mid Femoral Competent Y     Left Mid Femoral Phasic Y     Left Mid Femoral Compress C     Left Mid Femoral Augment Y     Left Distal Femoral Compress C     Left Popliteal Spont Y     Left Popliteal Competent Y     Left Popliteal Phasic Y     Left Popliteal Compress C     Left Popliteal Augment Y     Left Posterior Tibial Compress C     Left Peroneal Compress C     Left  Gastronemius Compress C     Left Greater Saph AK Compress C     Left Greater Saph BK Compress C     Left Lesser Saph Compress C        Ordered the above labs and reviewed the results.        RADIOLOGY  XR Chest 2Vw    Result Date: 2023  RE[facility]ING YOUR [facility] RESULTS [facility] IS NOT A SUBSTITUTE FOR DISCUSSING THOSE RESULTS WITH YOUR HEALTH CARE PROVIDER. PLEASE CONTACT YOUR PROVIDER [facility] TO DISCUSS ANY QUESTIONS OR CONCERNS YOU MAY HAVE REGARDING THESE TEST RESULTS.  RADIOLOGY REPORT FACILITY:  [facility] UNIT/AGE/GENDER: P.ICC-LY  OP      AGE:89 Y          SEX:M PATIENT NAME/:  LAZARA ROTHMAN R    1934 UNIT NUMBER:  [id number] ACCOUNT NUMBER:  [id number] ACCESSION NUMBER:  [id number] EXAMINATION: XR CHEST 2VW DATE: 2023 COMPARISON: None HISTORY: Cough x2 weeks. FINDINGS: There is no evidence of infiltrate, effusion, or pneumothorax. Silhouette is enlarged. No evidence of edema. IMPRESSION: 1. Cardiomegaly without evidence of edema or infiltrate. Dictated by: Andrez Singletary M.D. Images and Report reviewed and interpreted by: Andrez Singletary M.D. <PS><Electronically signed by: Andrez Singletary M.D.> 2023 1617 D: 2023 1616 T: 2023 1616     Ordered the above noted radiological studies. Reviewed by me in PACS.            MEDICATIONS GIVEN IN ER  Medications   sodium chloride 0.9 % flush 10 mL (has no administration in time range)   ipratropium-albuterol (DUO-NEB) nebulizer solution 3 mL (3 mL Nebulization Given 23)   methylPREDNISolone sodium succinate (SOLU-Medrol) injection 60 mg (60 mg Intravenous Given 23)               MEDICAL DECISION MAKING, PROGRESS, and CONSULTS    All labs have been independently reviewed by me.  All radiology studies have been reviewed by me and I have also reviewed the radiology report.   EKG's independently viewed and interpreted by me.  Discussion below represents my analysis of pertinent  findings related to patient's condition, differential diagnosis, treatment plan and final disposition.            Orders placed during this visit:  Orders Placed This Encounter   Procedures    Respiratory Panel PCR w/COVID-19(SARS-CoV-2) [facility]/E[facility] In-House, NP Swab in UTM/VTM, 2 HR TAT - Swab, Nasopharynx    Comprehensive Metabolic Panel    Protime-INR    aPTT    BNP    Single High Sensitivity Troponin T    Procalcitonin    CBC Auto Differential    [facility] (on-call MD unless specified) Details    Discontinue Patient Isolation    Insert Peripheral IV    CBC & Differential           Differential diagnosis:  Acute CHF, DVT, viral lower respiratory disease      Independent interpretation of labs, radiology studies, and discussions with consultants:  ED Course as of 12/19/23 2011   Tue Dec 19, 2023   1916 WBC: 8.16 [MP]   1916 Hemoglobin(!): 11.9 [MP]   1916 INR(!): 1.17 [MP]   2011 I spoke with Dr. Waldrop with [facility].  Reviewed patient presentation and ED findings.  He agrees to admit to a telemetry observation bed. [MP]      ED Course User Index  [MP] Georgia Wiseman PA-C         Additional orders considered but not ordered:  CT scan of the chest      Additional sources:    - Chronic or social conditions impacting care: Chronic anticoagulation          DIAGNOSIS  Final diagnoses:   Bilateral lower extremity edema   Acute cough   Dyspnea, unspecified type             Latest Documented Vital Signs:  As of 20:11 EST  BP- 148/58 HR- 69 Temp- 97.9 °F (36.6 °C) O2 sat- 93%              --    Please note that portions of this were completed with a voice recognition program.       Note Disclaimer: At [facility], we believe that sharing information builds trust and better relationships. You are receiving this note because you are receiving care at [facility] or recently visited. It is possible you will see health information before a provider has talked with you about it. This kind of information can be easy  to misunderstand. To help you fully understand what it means for your health, we urge you to discuss this note with your provider.             Georgia Wiseman PA-C  12/19/23 2012

## 2023-12-20 ENCOUNTER — READMISSION MANAGEMENT (OUTPATIENT)
Dept: CALL CENTER | Facility: HOSPITAL | Age: 88
End: 2023-12-20
Payer: MEDICARE

## 2023-12-20 ENCOUNTER — APPOINTMENT (OUTPATIENT)
Dept: CARDIOLOGY | Facility: HOSPITAL | Age: 88
End: 2023-12-20
Payer: MEDICARE

## 2023-12-20 VITALS
SYSTOLIC BLOOD PRESSURE: 125 MMHG | HEIGHT: 71 IN | TEMPERATURE: 97.9 F | WEIGHT: 229 LBS | RESPIRATION RATE: 20 BRPM | BODY MASS INDEX: 32.06 KG/M2 | OXYGEN SATURATION: 94 % | HEART RATE: 56 BPM | DIASTOLIC BLOOD PRESSURE: 47 MMHG

## 2023-12-20 LAB
ANION GAP SERPL CALCULATED.3IONS-SCNC: 12 MMOL/L (ref 5–15)
AORTIC ARCH: 2.9 CM
AORTIC DIMENSIONLESS INDEX: 0.6 (DI)
ASCENDING AORTA: 3.4 CM
BH CV ECHO MEAS - ACS: 2.16 CM
BH CV ECHO MEAS - AO MAX PG: 20 MMHG
BH CV ECHO MEAS - AO MEAN PG: 10 MMHG
BH CV ECHO MEAS - AO ROOT DIAM: 3.5 CM
BH CV ECHO MEAS - AO V2 MAX: 223.8 CM/SEC
BH CV ECHO MEAS - AO V2 VTI: 52.3 CM
BH CV ECHO MEAS - AVA(I,D): 2.04 CM2
BH CV ECHO MEAS - EDV(CUBED): 194.5 ML
BH CV ECHO MEAS - EDV(MOD-SP2): 101 ML
BH CV ECHO MEAS - EDV(MOD-SP4): 133 ML
BH CV ECHO MEAS - EF(MOD-BP): 55.4 %
BH CV ECHO MEAS - EF(MOD-SP2): 55.4 %
BH CV ECHO MEAS - EF(MOD-SP4): 54.1 %
BH CV ECHO MEAS - ESV(CUBED): 75.1 ML
BH CV ECHO MEAS - ESV(MOD-SP2): 45 ML
BH CV ECHO MEAS - ESV(MOD-SP4): 61 ML
BH CV ECHO MEAS - FS: 27.2 %
BH CV ECHO MEAS - IVS/LVPW: 1.01 CM
BH CV ECHO MEAS - IVSD: 1.12 CM
BH CV ECHO MEAS - LAT PEAK E' VEL: 10.6 CM/SEC
BH CV ECHO MEAS - LV DIASTOLIC VOL/BSA (35-75): 59.5 CM2
BH CV ECHO MEAS - LV MASS(C)D: 268.1 GRAMS
BH CV ECHO MEAS - LV MAX PG: 6.9 MMHG
BH CV ECHO MEAS - LV MEAN PG: 3.4 MMHG
BH CV ECHO MEAS - LV SYSTOLIC VOL/BSA (12-30): 27.3 CM2
BH CV ECHO MEAS - LV V1 MAX: 130.9 CM/SEC
BH CV ECHO MEAS - LV V1 VTI: 32.3 CM
BH CV ECHO MEAS - LVIDD: 5.8 CM
BH CV ECHO MEAS - LVIDS: 4.2 CM
BH CV ECHO MEAS - LVOT AREA: 3.3 CM2
BH CV ECHO MEAS - LVOT DIAM: 2.05 CM
BH CV ECHO MEAS - LVPWD: 1.11 CM
BH CV ECHO MEAS - MED PEAK E' VEL: 6.6 CM/SEC
BH CV ECHO MEAS - MR MAX PG: 119 MMHG
BH CV ECHO MEAS - MR MAX VEL: 545.4 CM/SEC
BH CV ECHO MEAS - MV A DUR: 0.11 SEC
BH CV ECHO MEAS - MV A MAX VEL: 114 CM/SEC
BH CV ECHO MEAS - MV DEC SLOPE: 281.1 CM/SEC2
BH CV ECHO MEAS - MV DEC TIME: 0.27 SEC
BH CV ECHO MEAS - MV E MAX VEL: 101 CM/SEC
BH CV ECHO MEAS - MV E/A: 0.89
BH CV ECHO MEAS - MV MAX PG: 5.4 MMHG
BH CV ECHO MEAS - MV MEAN PG: 2.19 MMHG
BH CV ECHO MEAS - MV P1/2T: 94.4 MSEC
BH CV ECHO MEAS - MV V2 VTI: 27 CM
BH CV ECHO MEAS - MVA(P1/2T): 2.33 CM2
BH CV ECHO MEAS - MVA(VTI): 3.9 CM2
BH CV ECHO MEAS - PA ACC TIME: 0.06 SEC
BH CV ECHO MEAS - PA V2 MAX: 102.7 CM/SEC
BH CV ECHO MEAS - PULM A REVS DUR: 0.2 SEC
BH CV ECHO MEAS - PULM A REVS VEL: 37.6 CM/SEC
BH CV ECHO MEAS - PULM DIAS VEL: 56.5 CM/SEC
BH CV ECHO MEAS - PULM S/D: 1
BH CV ECHO MEAS - PULM SYS VEL: 56.5 CM/SEC
BH CV ECHO MEAS - RAP SYSTOLE: 3 MMHG
BH CV ECHO MEAS - RV MAX PG: 4.6 MMHG
BH CV ECHO MEAS - RV V1 MAX: 106.7 CM/SEC
BH CV ECHO MEAS - RV V1 VTI: 24.1 CM
BH CV ECHO MEAS - RVSP: 62.3 MMHG
BH CV ECHO MEAS - SI(MOD-SP2): 25.1 ML/M2
BH CV ECHO MEAS - SI(MOD-SP4): 32.2 ML/M2
BH CV ECHO MEAS - SUP REN AO DIAM: 2 CM
BH CV ECHO MEAS - SV(LVOT): 106.4 ML
BH CV ECHO MEAS - SV(MOD-SP2): 56 ML
BH CV ECHO MEAS - SV(MOD-SP4): 72 ML
BH CV ECHO MEAS - TAPSE (>1.6): 1.88 CM
BH CV ECHO MEAS - TR MAX PG: 59.3 MMHG
BH CV ECHO MEAS - TR MAX VEL: 385 CM/SEC
BH CV ECHO MEASUREMENTS AVERAGE E/E' RATIO: 11.74
BH CV XLRA - RV BASE: 4.3 CM
BH CV XLRA - RV LENGTH: 8 CM
BH CV XLRA - RV MID: 1.65 CM
BH CV XLRA - TDI S': 22 CM/SEC
BILIRUB UR QL STRIP: NEGATIVE
BUN SERPL-MCNC: 26 MG/DL (ref 8–23)
BUN/CREAT SERPL: 28.6 (ref 7–25)
CALCIUM SPEC-SCNC: 9.2 MG/DL (ref 8.6–10.5)
CHLORIDE SERPL-SCNC: 103 MMOL/L (ref 98–107)
CLARITY UR: CLEAR
CO2 SERPL-SCNC: 25 MMOL/L (ref 22–29)
COLOR UR: YELLOW
CREAT SERPL-MCNC: 0.91 MG/DL (ref 0.76–1.27)
DEPRECATED RDW RBC AUTO: 40.3 FL (ref 37–54)
EGFRCR SERPLBLD CKD-EPI 2021: 80.6 ML/MIN/1.73
ERYTHROCYTE [DISTWIDTH] IN BLOOD BY AUTOMATED COUNT: 13.6 % (ref 12.3–15.4)
GLUCOSE BLDC GLUCOMTR-MCNC: 162 MG/DL (ref 70–130)
GLUCOSE BLDC GLUCOMTR-MCNC: 178 MG/DL (ref 70–130)
GLUCOSE SERPL-MCNC: 192 MG/DL (ref 65–99)
GLUCOSE UR STRIP-MCNC: NEGATIVE MG/DL
HCT VFR BLD AUTO: 34.6 % (ref 37.5–51)
HGB BLD-MCNC: 11.2 G/DL (ref 13–17.7)
HGB UR QL STRIP.AUTO: NEGATIVE
KETONES UR QL STRIP: NEGATIVE
LEFT ATRIUM VOLUME INDEX: 40.3 ML/M2
LEUKOCYTE ESTERASE UR QL STRIP.AUTO: NEGATIVE
MCH RBC QN AUTO: 27 PG (ref 26.6–33)
MCHC RBC AUTO-ENTMCNC: 32.4 G/DL (ref 31.5–35.7)
MCV RBC AUTO: 83.4 FL (ref 79–97)
NITRITE UR QL STRIP: NEGATIVE
PH UR STRIP.AUTO: <=5 [PH] (ref 5–8)
PLATELET # BLD AUTO: 231 10*3/MM3 (ref 140–450)
PMV BLD AUTO: 9.8 FL (ref 6–12)
POTASSIUM SERPL-SCNC: 4.7 MMOL/L (ref 3.5–5.2)
PROT UR QL STRIP: ABNORMAL
RBC # BLD AUTO: 4.15 10*6/MM3 (ref 4.14–5.8)
SINUS: 3.2 CM
SODIUM SERPL-SCNC: 140 MMOL/L (ref 136–145)
SP GR UR STRIP: 1.02 (ref 1–1.03)
STJ: 3.1 CM
UROBILINOGEN UR QL STRIP: ABNORMAL
WBC NRBC COR # BLD AUTO: 6.94 10*3/MM3 (ref 3.4–10.8)

## 2023-12-20 PROCEDURE — 85027 COMPLETE CBC AUTOMATED: CPT | Performed by: STUDENT IN AN ORGANIZED HEALTH CARE EDUCATION/TRAINING PROGRAM

## 2023-12-20 PROCEDURE — 94799 UNLISTED PULMONARY SVC/PX: CPT

## 2023-12-20 PROCEDURE — G0378 HOSPITAL OBSERVATION PER HR: HCPCS

## 2023-12-20 PROCEDURE — 94664 DEMO&/EVAL PT USE INHALER: CPT

## 2023-12-20 PROCEDURE — 82948 REAGENT STRIP/BLOOD GLUCOSE: CPT

## 2023-12-20 PROCEDURE — 63710000001 INSULIN LISPRO (HUMAN) PER 5 UNITS: Performed by: STUDENT IN AN ORGANIZED HEALTH CARE EDUCATION/TRAINING PROGRAM

## 2023-12-20 PROCEDURE — 94761 N-INVAS EAR/PLS OXIMETRY MLT: CPT

## 2023-12-20 PROCEDURE — 80048 BASIC METABOLIC PNL TOTAL CA: CPT | Performed by: STUDENT IN AN ORGANIZED HEALTH CARE EDUCATION/TRAINING PROGRAM

## 2023-12-20 PROCEDURE — 36415 COLL VENOUS BLD VENIPUNCTURE: CPT | Performed by: STUDENT IN AN ORGANIZED HEALTH CARE EDUCATION/TRAINING PROGRAM

## 2023-12-20 PROCEDURE — 93306 TTE W/DOPPLER COMPLETE: CPT | Performed by: INTERNAL MEDICINE

## 2023-12-20 PROCEDURE — 94760 N-INVAS EAR/PLS OXIMETRY 1: CPT

## 2023-12-20 PROCEDURE — 93306 TTE W/DOPPLER COMPLETE: CPT

## 2023-12-20 RX ORDER — LISINOPRIL 40 MG/1
40 TABLET ORAL
Status: DISCONTINUED | OUTPATIENT
Start: 2023-12-20 | End: 2023-12-20 | Stop reason: HOSPADM

## 2023-12-20 RX ORDER — PREDNISONE 20 MG/1
40 TABLET ORAL DAILY
Qty: 8 TABLET | Refills: 0 | Status: SHIPPED | OUTPATIENT
Start: 2023-12-20 | End: 2023-12-29

## 2023-12-20 RX ORDER — SPIRONOLACTONE 25 MG/1
25 TABLET ORAL DAILY
Status: DISCONTINUED | OUTPATIENT
Start: 2023-12-20 | End: 2023-12-20 | Stop reason: HOSPADM

## 2023-12-20 RX ORDER — FLUTICASONE PROPIONATE 50 MCG
2 SPRAY, SUSPENSION (ML) NASAL DAILY
Status: DISCONTINUED | OUTPATIENT
Start: 2023-12-20 | End: 2023-12-20 | Stop reason: HOSPADM

## 2023-12-20 RX ORDER — PANTOPRAZOLE SODIUM 40 MG/1
40 TABLET, DELAYED RELEASE ORAL
Status: DISCONTINUED | OUTPATIENT
Start: 2023-12-20 | End: 2023-12-20 | Stop reason: HOSPADM

## 2023-12-20 RX ORDER — BENZONATATE 100 MG/1
200 CAPSULE ORAL 3 TIMES DAILY PRN
Status: DISCONTINUED | OUTPATIENT
Start: 2023-12-20 | End: 2023-12-20 | Stop reason: HOSPADM

## 2023-12-20 RX ORDER — PRAMIPEXOLE DIHYDROCHLORIDE 0.25 MG/1
0.25 TABLET ORAL NIGHTLY
Status: DISCONTINUED | OUTPATIENT
Start: 2023-12-20 | End: 2023-12-20 | Stop reason: HOSPADM

## 2023-12-20 RX ORDER — PRAVASTATIN SODIUM 20 MG
20 TABLET ORAL DAILY
Status: DISCONTINUED | OUTPATIENT
Start: 2023-12-20 | End: 2023-12-20 | Stop reason: HOSPADM

## 2023-12-20 RX ADMIN — IPRATROPIUM BROMIDE AND ALBUTEROL SULFATE 3 ML: 2.5; .5 SOLUTION RESPIRATORY (INHALATION) at 15:15

## 2023-12-20 RX ADMIN — IPRATROPIUM BROMIDE AND ALBUTEROL SULFATE 3 ML: 2.5; .5 SOLUTION RESPIRATORY (INHALATION) at 07:20

## 2023-12-20 RX ADMIN — PANTOPRAZOLE SODIUM 40 MG: 40 TABLET, DELAYED RELEASE ORAL at 05:29

## 2023-12-20 RX ADMIN — LISINOPRIL 40 MG: 40 TABLET ORAL at 08:32

## 2023-12-20 RX ADMIN — PRAVASTATIN SODIUM 20 MG: 20 TABLET ORAL at 08:32

## 2023-12-20 RX ADMIN — INSULIN LISPRO 2 UNITS: 100 INJECTION, SOLUTION INTRAVENOUS; SUBCUTANEOUS at 12:33

## 2023-12-20 RX ADMIN — SPIRONOLACTONE 25 MG: 25 TABLET ORAL at 08:32

## 2023-12-20 RX ADMIN — IPRATROPIUM BROMIDE AND ALBUTEROL SULFATE 3 ML: 2.5; .5 SOLUTION RESPIRATORY (INHALATION) at 10:42

## 2023-12-20 RX ADMIN — GUAIFENESIN 600 MG: 600 TABLET, EXTENDED RELEASE ORAL at 08:32

## 2023-12-20 NOTE — CASE MANAGEMENT/SOCIAL WORK
Case Management Discharge Note      Final Note: Home with spouse. Denies any needs. Transport by wife.    Provided Post Acute Provider List?: N/A  N/A Provider List Comment: Denies any need for services  Provided Post Acute Provider Quality & Resource List?: N/A    Selected Continued Care - Admitted Since 12/19/2023       Destination    No services have been selected for the patient.                Durable Medical Equipment    No services have been selected for the patient.                Dialysis/Infusion    No services have been selected for the patient.                Home Medical Care    No services have been selected for the patient.                Therapy    No services have been selected for the patient.                Community Resources    No services have been selected for the patient.                Community & DME    No services have been selected for the patient.                    Transportation Services  Private: Car    Final Discharge Disposition Code: 01 - home or self-care

## 2023-12-20 NOTE — CASE MANAGEMENT/SOCIAL WORK
Continued Stay Note  [facility]     Patient Name: Goran August  MRN: [id number]  Today's Date: 12/20/2023    Admit Date: 12/19/2023    Plan: Home with spouse. Denies any needs. Wife will transport at D/C.   Discharge Plan       Row Name 12/20/23 1533       Plan    Plan Home with spouse. Denies any needs. Wife will transport at D/C.    Patient/Family in Agreement with Plan yes    Plan Comments Met with pt. and wife at bedside. Explained roll of . Face sheet and pharmacy verified. Pt lives with his wife in a single-story home.  There are 2 steps to enter home. Home DME includes a CPAP, cane, walker, and pulse ox.  Pt is independent with ADLs. States he does use a cane when walking out in the yard. Pt has been to [facility] in the past. He has never used HH. Pt's PCP is Tanya Julien MD. Pt enrolled with [facility]. Pt normally drives himself to appointments. At discharge, wife will transport. Pt denies any discharge needs. Explained that CCP would follow to assess for discharge needs.  Shad Johansen RN-BC                   Discharge Codes    No documentation.                 Expected Discharge Date and Time       Expected Discharge Date Expected Discharge Time    Dec 20, 2023               Shad Johansen RN

## 2023-12-20 NOTE — PLAN OF CARE
Goal Outcome Evaluation:         Pt arrived to 4E on 3L n/c due to SOA, is normally on R/A but uses CPAP at home at HS sometimes.  Family and pt requested to keep n/c on since pt would soon be sleeping; pt dropped into high 40%-60% while having apneic episodes.  Pt awoken; pt O2 sats WDL while awake; sat his HOB higher as he said this helps at home with his apnea; some improvement noted with HOB 35 degrees and higher.  SR while awake, SB while sleeping. Denies pain.  Gait unsteady, x1 assist to standby assist; uses urinal for voiding.  U/A sent down was negative.  Transthoracic echo ordered but has not yet been completed.               Problem: Adult Inpatient Plan of Care  Goal: Plan of Care Review  Outcome: Ongoing, Progressing  Goal: Patient-Specific Goal (Individualized)  Outcome: Ongoing, Progressing  Goal: Absence of Hospital-Acquired Illness or Injury  Outcome: Ongoing, Progressing  Intervention: Identify and Manage Fall Risk  Description: Perform standard risk assessment on admission using a validated tool or comprehensive approach appropriate to the patient; reassess fall risk frequently, with change in status or transfer to another level of care.  Communicate fall injury risk to interprofessional healthcare team.  Determine need for increased observation, equipment and environmental modification, such as low bed, signage and supportive, nonskid footwear.  Adjust safety measures to individual developmental age, stage and identified risk factors.  Reinforce the importance of safety and physical activity with patient and family.  Perform regular intentional rounding to assess need for position change, pain assessment and personal needs, including assistance with toileting.  Recent Flowsheet Documentation  Taken 12/20/2023 0222 by Kay Daniel, RN  Safety Promotion/Fall Prevention: safety round/check completed  Taken 12/20/2023 0035 by Kay Daniel, RN  Safety Promotion/Fall Prevention: safety  round/check completed  Taken 12/19/2023 2202 by Kay Daniel RN  Safety Promotion/Fall Prevention: safety round/check completed  Intervention: Prevent Skin Injury  Description: Perform a screening for skin injury risk, such as pressure or moisture associated skin damage on admission and at regular intervals throughout hospital stay.  Keep all areas of skin (especially folds) clean and dry.  Maintain adequate skin hydration.  Relieve and redistribute pressure and protect bony prominences; implement measures based on patient-specific risk factors.  Match turning and repositioning schedule to clinical condition.  Encourage weight shift frequently; assist with reposition if unable to complete independently.  Float heels off bed; avoid pressure on the Achilles tendon.  Keep skin free from extended contact with medical devices.  Encourage functional activity and mobility, as early as tolerated.  Use aids (e.g., slide boards, mechanical lift) during transfer.  Recent Flowsheet Documentation  Taken 12/20/2023 0222 by Kay Daniel, RN  Body Position:   sitting up in bed   weight shifting   position changed independently  Taken 12/20/2023 0035 by Kay Daniel RN  Body Position:   position changed independently   weight shifting   left   tilted  Skin Protection:   adhesive use limited   incontinence pads utilized   tubing/devices free from skin contact   transparent dressing maintained  Taken 12/19/2023 2202 by Kay Daniel, RN  Body Position:   position changed independently   dangle, side of bed   sitting up in bed  Skin Protection:   adhesive use limited   tubing/devices free from skin contact   transparent dressing maintained  Intervention: Prevent and Manage VTE (Venous Thromboembolism) Risk  Description: Assess for VTE (venous thromboembolism) risk.  Encourage and assist with early ambulation.  Initiate and maintain compression or other therapy, as indicated, based on identified risk in accordance with  organizational protocol and provider order.  Encourage both active and passive leg exercises while in bed, if unable to ambulate.  Recent Flowsheet Documentation  Taken 12/20/2023 0035 by Kay Daniel RN  Activity Management: activity encouraged  VTE Prevention/Management: (Lovenox) other (see comments)  Taken 12/19/2023 2202 by Kay Daniel RN  Activity Management:   ambulated in room   back to bed   activity encouraged  VTE Prevention/Management: (Eliquis at home, Lovenox ordered here) other (see comments)  Intervention: Prevent Infection  Description: Maintain skin and mucous membrane integrity; promote hand, oral and pulmonary hygiene.  Optimize fluid balance, nutrition, sleep and glycemic control to maximize infection resistance.  Identify potential sources of infection early to prevent or mitigate progression of infection (e.g., wound, lines, devices).  Evaluate ongoing need for invasive devices; remove promptly when no longer indicated.  Recent Flowsheet Documentation  Taken 12/19/2023 2202 by Kay Daniel RN  Infection Prevention:   visitors restricted/screened   single patient room provided   hand hygiene promoted   rest/sleep promoted   personal protective equipment utilized  Goal: Optimal Comfort and Wellbeing  Outcome: Ongoing, Progressing  Intervention: Provide Person-Centered Care  Description: Use a family-focused approach to care.  Develop trust and rapport by proactively providing information, encouraging questions, addressing concerns and offering reassurance.  Acknowledge emotional response to hospitalization.  Recognize and utilize personal coping strategies.  Honor spiritual and cultural preferences.  Recent Flowsheet Documentation  Taken 12/20/2023 0035 by Kay Daniel RN  Trust Relationship/Rapport:   care explained   choices provided   empathic listening provided   thoughts/feelings acknowledged   questions answered   reassurance provided   questions encouraged  Taken  12/19/2023 2202 by Kay Daniel, RN  Trust Relationship/Rapport:   care explained   emotional support provided   choices provided   reassurance provided   thoughts/feelings acknowledged   questions answered   questions encouraged  Goal: Readiness for Transition of Care  Outcome: Ongoing, Progressing  Intervention: Mutually Develop Transition Plan  Description: Identify available resources for support (e.g., family, friends, community).  Identify and address barriers to ongoing treatment and home management (e.g., environmental, financial).  Provide opportunities to practice self-management skills.  Assess and monitor emotional readiness for transition.  Establish or reconnect linkage with outpatient providers or community-based services.  Recent Flowsheet Documentation  Taken 12/19/2023 2207 by Kay Daniel, RN  Transportation Anticipated: family or friend will provide  Patient/Family Anticipated Services at Transition: none  Patient/Family Anticipates Transition to: home with family  Taken 12/19/2023 2205 by Kay Daniel, RN  Equipment Currently Used at Home:   cpap   cane, straight

## 2023-12-20 NOTE — CASE MANAGEMENT/SOCIAL WORK
Discharge Planning Assessment  [facility]     Patient Name: Goran August  MRN: [id number]  Today's Date: 12/20/2023    Admit Date: 12/19/2023    Plan: Home with spouse. Denies any needs. Wife will transport at D/C.   Discharge Needs Assessment       Row Name 12/20/23 1528       Living Environment    People in Home spouse    Current Living Arrangements home    Potentially Unsafe Housing Conditions none    Primary Care Provided by self    Provides Primary Care For no one, unable/limited ability to care for self    Family Caregiver if Needed spouse    Quality of Family Relationships involved;helpful       Resource/Environmental Concerns    Resource/Environmental Concerns none    Transportation Concerns none       Transition Planning    Patient/Family Anticipates Transition to home with family    Patient/Family Anticipated Services at Transition none    Transportation Anticipated family or friend will provide       Discharge Needs Assessment    Readmission Within the Last 30 Days no previous admission in last 30 days    Equipment Currently Used at Home cpap;cane, straight;walker, rolling;pulse ox    Concerns to be Addressed care coordination/care conferences;discharge planning;denies needs/concerns at this time    Anticipated Changes Related to Illness none    Equipment Needed After Discharge none    Provided Post Acute Provider List? N/A    N/A Provider List Comment Denies any need for services    Provided Post Acute Provider Quality & Resource List? N/A    Current Discharge Risk chronically ill                   Discharge Plan       Row Name 12/20/23 1535       Plan    Plan Home with spouse. Denies any needs. Wife will transport at D/C.    Patient/Family in Agreement with Plan yes    Plan Comments Met with pt. and wife at bedside. Explained roll of . Face sheet and pharmacy verified. Pt lives with his wife in a single-story home.  There are 2 steps to enter home. Home DME includes a CPAP, cane,  walker, and pulse ox.  Pt is independent with ADLs. States he does use a cane when walking out in the yard. Pt has been to [facility] in the past. He has never used HH. Pt's PCP is Tanya Julien MD. Pt enrolled with [facility]. Pt normally drives himself to appointments. At discharge, wife will transport. Pt denies any discharge needs. Explained that CCP would follow to assess for discharge needs.  Shad Johansen RN-BC                  Continued Care and Services - Admitted Since 12/19/2023    Coordination has not been started for this encounter.       Expected Discharge Date and Time       Expected Discharge Date Expected Discharge Time    Dec 20, 2023            Demographic Summary       Row Name 12/20/23 1527       General Information    Admission Type observation    Arrived From emergency department    Required Notices Provided Observation Status Notice    Reason for Consult care coordination/care conference;discharge planning    Preferred Language English                   Functional Status       Row Name 12/20/23 1527       Functional Status    Usual Activity Tolerance moderate    Current Activity Tolerance moderate       Functional Status, IADL    Medications independent    Meal Preparation independent    Housekeeping independent    Laundry independent    Shopping independent       Mental Status    General Appearance WDL WDL       Mental Status Summary    Recent Changes in Mental Status/Cognitive Functioning no changes       Employment/    Employment Status retired                   Shad Johansen, RN

## 2023-12-20 NOTE — PROGRESS NOTES
"[facility] Clinical Pharmacy Services: Enoxaparin Consult    Goran FUNG Mata has a pharmacy consult to dose prophylactic enoxaparin per Dr. Waldrop's request.     Indication: VTE Prophylaxis  Home Anticoagulation: none (Eliquis but should have been stopped a while ago)     Relevant clinical data and objective history reviewed:  89 y.o. male 180.3 cm (71\") 104 kg (230 lb)   Body mass index is 32.08 kg/m².   Results from last 7 days   Lab Units 12/19/23  1843   PLATELETS 10*3/mm3 245     Estimated Creatinine Clearance: 56.4 mL/min (by C-G formula based on SCr of 1.09 mg/dL).    Assessment/Plan    Will start patient on 40mg subcutaneous every 24 hours, adjusted for renal function. Consult order will be discontinued but pharmacy will continue to follow.     Charissa Jin, [facility]  Clinical Pharmacist    "

## 2023-12-20 NOTE — NURSING NOTE
Paged YONI Varma with [facility] to advise I reviewed all pt's medications from home.  Pt is requesting to take his usual Eliquis dose if possible for VTE prevention instead of the ordered Lovenox.      Pt did not want to take Mucinex as he says it has not helped him and he was on it before.  I educated him about it but he still refused.      Pt also refused insulin as he doesn't take it at home.  I educated pt regarding steroids and illness causing spikes in blood sugar but pt refused and his family empathetically agreed.    Additionally, pt said he does want to be a full code and not a DNR.  He does not want to be on life support and says he has a new Living Will/Advance Directive that was just updated today.  His daughter was at bedside and will bring a copy of the advance directive in the AM.    YONI Byod will advise Dr. Waldrpo of the code status but she advised while the Eliquis may be restarted tomorrow, they want the pt to take the Lovenox tonight due to the shorter half-life.  Repeated and verified.    Dr. Waldrop then called back as the page went to him as well so I advised of the meds being reviewed with pt and his request to by a full code.  MD changed pt to full code.

## 2023-12-20 NOTE — ED NOTES
"Nursing report ED to floor  Goran August  89 y.o.  male      Goran August is a 89 y.o. male who presents to the ED c/o cough with leg swelling.  Patient reports for the last week he has had persistent cough that is productive of sputum.  He reports associated dyspnea and wheezing.  He went to an urgent care 2 days ago where he was diagnosed with bronchitis and prescribed a Z-Qamar.  He has been taking the Z-Qamar without improvement in his symptoms.  Patient reports yesterday he developed leg swelling that became warm to touch today.  He returned to urgent care today where he had chest x-ray performed and was referred to the emergency department for further workup.  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  Patient denies injury or trauma to the lower extremities.  Patient denies fever, chest pain, syncope, vomiting, abdominal pain, or any other systemic complaints.   HPI :   Chief Complaint   Patient presents with    Leg Swelling       Admitting doctor:   Gus Waldrop MD    Admitting diagnosis:   The primary encounter diagnosis was Bilateral lower extremity edema. Diagnoses of Acute cough and Dyspnea, unspecified type were also pertinent to this visit.    Code status:   Current Code Status       Date Active Code Status Order ID Comments User Context       Prior            Allergies:   Hctz [hydrochlorothiazide]    Isolation:   No active isolations    Intake and Output  No intake or output data in the 24 hours ending 12/19/23 2025    Weight:       12/19/23 1836   Weight: 104 kg (230 lb)       Most recent vitals:   Vitals:    12/19/23 1836 12/19/23 1853 12/19/23 1901 12/19/23 2001   BP:   138/45 148/58   Pulse:  73 66 69   Resp:  16 16    Temp:       SpO2:  95% 100% 93%   Weight: 104 kg (230 lb)      Height: 180.3 cm (71\")          Active LDAs/IV Access:   Lines, Drains & Airways       Active LDAs       Name Placement date Placement time Site Days    Peripheral IV 12/19/23 1842 Right Antecubital 12/19/23  " 1842  Antecubital  less than 1                    Labs (abnormal labs have a star):   Labs Reviewed   COMPREHENSIVE METABOLIC PANEL - Abnormal; Notable for the following components:       Result Value    Glucose 185 (*)     BUN 26 (*)     All other components within normal limits    Narrative:     GFR Normal >60  Chronic Kidney Disease <60  Kidney Failure <15    The GFR formula is only valid for adults with stable renal function between ages 18 and 70.   PROTIME-INR - Abnormal; Notable for the following components:    Protime 15.1 (*)     INR 1.17 (*)     All other components within normal limits   APTT - Abnormal; Notable for the following components:    PTT 35.6 (*)     All other components within normal limits   SINGLE HSTROPONIN T - Abnormal; Notable for the following components:    HS Troponin T 35 (*)     All other components within normal limits    Narrative:     High Sensitive Troponin T Reference Range:  <14.0 ng/L- Negative Female for AMI  <22.0 ng/L- Negative Male for AMI  >=14 - Abnormal Female indicating possible myocardial injury.  >=22 - Abnormal Male indicating possible myocardial injury.   Clinicians would have to utilize clinical acumen, EKG, Troponin, and serial changes to determine if it is an Acute Myocardial Infarction or myocardial injury due to an underlying chronic condition.        CBC WITH AUTO DIFFERENTIAL - Abnormal; Notable for the following components:    Hemoglobin 11.9 (*)     Hematocrit 36.7 (*)     Neutrophil % 79.9 (*)     Lymphocyte % 12.1 (*)     All other components within normal limits   RESPIRATORY PANEL PCR W/ COVID-19 (SARS-COV-2), NP SWAB IN UTM/VTP, 2 HR TAT - Normal    Narrative:     In the setting of a positive respiratory panel with a viral infection PLUS a negative procalcitonin without other underlying concern for bacterial infection, consider observing off antibiotics or discontinuation of antibiotics and continue supportive care. If the respiratory panel is  "positive for atypical bacterial infection (Bordetella pertussis, Chlamydophila pneumoniae, or Mycoplasma pneumoniae), consider antibiotic de-escalation to target atypical bacterial infection.   BNP (IN-HOUSE) - Normal    Narrative:     This assay is used as an aid in the diagnosis of individuals suspected of having heart failure. It can be used as an aid in the diagnosis of acute decompensated heart failure (ADHF) in patients presenting with signs and symptoms of ADHF to the emergency department (ED). In addition, NT-proBNP of <300 pg/mL indicates ADHF is not likely.    Age Range Result Interpretation  NT-proBNP Concentration (pg/mL:      <50             Positive            >450                   Gray                 300-450                    Negative             <300    50-75           Positive            >900                  Gray                300-900                  Negative            <300      >75             Positive            >1800                  Gray                300-1800                  Negative            <300   PROCALCITONIN - Normal    Narrative:     As a Marker for Sepsis (Non-Neonates):    1. <0.5 ng/mL represents a low risk of severe sepsis and/or septic shock.  2. >2 ng/mL represents a high risk of severe sepsis and/or septic shock.    As a Marker for Lower Respiratory Tract Infections that require antibiotic therapy:    PCT on Admission    Antibiotic Therapy       6-12 Hrs later    >0.5                Strongly Recommended  >0.25 - <0.5        Recommended   0.1 - 0.25          Discouraged              Remeasure/reassess PCT  <0.1                Strongly Discouraged     Remeasure/reassess PCT    As 28 day mortality risk marker: \"Change in Procalcitonin Result\" (>80% or <=80%) if Day 0 (or Day 1) and Day 4 values are available. Refer to http://www.[employer]-pct-calculator.com    Change in PCT <=80%  A decrease of PCT levels below or equal to 80% defines a positive change in PCT test result " representing a higher risk for 28-day all-cause mortality of patients diagnosed with severe sepsis for septic shock.    Change in PCT >80%  A decrease of PCT levels of more than 80% defines a negative change in PCT result representing a lower risk for 28-day all-cause mortality of patients diagnosed with severe sepsis or septic shock.      CBC AND DIFFERENTIAL    Narrative:     The following orders were created for panel order CBC & Differential.  Procedure                               Abnormality         Status                     ---------                               -----------         ------                     CBC Auto Differential[[id number]]        Abnormal            Final result                 Please view results for these tests on the individual orders.       EKG:   No orders to display       Meds given in ED:   Medications   sodium chloride 0.9 % flush 10 mL (has no administration in time range)   acetaminophen (TYLENOL) tablet 650 mg (has no administration in time range)   sennosides-docusate (PERICOLACE) 8.6-50 MG per tablet 2 tablet (has no administration in time range)     And   polyethylene glycol (MIRALAX) packet 17 g (has no administration in time range)     And   bisacodyl (DULCOLAX) EC tablet 5 mg (has no administration in time range)     And   bisacodyl (DULCOLAX) suppository 10 mg (has no administration in time range)   ondansetron ODT (ZOFRAN-ODT) disintegrating tablet 4 mg (has no administration in time range)     Or   ondansetron (ZOFRAN) injection 4 mg (has no administration in time range)   melatonin tablet 3 mg (has no administration in time range)   ipratropium-albuterol (DUO-NEB) nebulizer solution 3 mL (has no administration in time range)   albuterol (PROVENTIL) nebulizer solution 0.083% 2.5 mg/3mL (has no administration in time range)   guaiFENesin (MUCINEX) 12 hr tablet 600 mg (has no administration in time range)   ipratropium-albuterol (DUO-NEB) nebulizer solution 3 mL (3 mL  Nebulization Given 23)   methylPREDNISolone sodium succinate (SOLU-Medrol) injection 60 mg (60 mg Intravenous Given 23)       Imaging results:  No radiology results for the last day    Ambulatory status:   - with assist x2    Social issues:   Social History     Socioeconomic History    Marital status:    Tobacco Use    Smoking status: Former     Packs/day: 0.50     Years: 25.00     Additional pack years: 0.00     Total pack years: 12.50     Types: Cigarettes     Quit date:      Years since quittin.9    Smokeless tobacco: Former   Vaping Use    Vaping Use: Never used   Substance and Sexual Activity    Alcohol use: Yes     Comment: one drink monthly    Drug use: No    Sexual activity: Defer       NIH Stroke Scale:       Mohit Lucio RN  23 20:25 EST

## 2023-12-20 NOTE — DISCHARGE SUMMARY
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        Hospital Course       89 year old man with history of provoked (covid-19) right lower dvt on eliquis, gerd, chronic heart failure with moderately reduced EF (46% in may 2022), type 2 diabetes, ckd 3a, essential hypertension, hyperlipidemia, chronic LBBB who presented to the hospital on  with leg swelling and productive cough with associating shortness of breath and wheezing. He was seen in an urgent care 2 days ago and diagnosed with bronchitis and was given a zpak without any improvement in his symptoms.  He was treated with steroids and nebulizers while admitted with improvement in his wheezing.  Chest x-ray showed cardiomegaly without any edema or infiltrate.  He also has some bilateral lower extremity edema.  Venous duplex of bilateral lower extremities were without any signs of thromboembolism.  Urinalysis showed elevated protein creatinine ratio.  He is already on lisinopril 40.  Echocardiogram was normal.      He will be discharged home with a short course of oral prednisone 40 mg daily and will have to follow-up with his primary care provider in approximately one  week to ensure resolution of symptoms.         Physical Exam:  Temp:  [97.5 °F (36.4 °C)-98.4 °F (36.9 °C)] 97.9 °F (36.6 °C)  Heart Rate:  [57-91] 58  Resp:  [16-20] 20  BP: (117-148)/(45-86) 125/47  Body mass index is 31.94 kg/m².  Physical Exam  Constitutional:       General: He is not in acute distress.  HENT:      Head: Normocephalic and atraumatic.   Cardiovascular:      Rate and Rhythm: Normal rate and regular rhythm.   Pulmonary:      Effort: Pulmonary effort is normal. No respiratory distress.   Abdominal:      General: There is no distension.      Palpations: Abdomen is soft.      Tenderness: There is no abdominal tenderness.   Musculoskeletal:      Right lower leg: No edema.      Left lower leg: No edema.   Neurological:      General: No focal deficit present.      Mental Status: He is alert and oriented to person, place, and time.             Consultants     Consult Orders (all) (From admission, onward)       Start     Ordered    12/19/23 1958  [facility] (on-call MD unless specified) Details  Once        Specialty:  Hospitalist  Provider:  (Not yet assigned)    12/19/23 1957                  Procedures     Imaging Results (All)       None            Pertinent Labs     Results from last 7 days   Lab Units 12/20/23 0439 12/19/23 1843   WBC 10*3/mm3 6.94 8.16   HEMOGLOBIN g/dL 11.2* 11.9*   PLATELETS 10*3/mm3 231 245     Results from last 7 days   Lab Units 12/20/23  0439 12/19/23  1843   SODIUM mmol/L 140 138   POTASSIUM mmol/L 4.7 4.5   CHLORIDE mmol/L 103 100   CO2 mmol/L 25.0 28.0   BUN mg/dL 26* 26*   CREATININE mg/dL 0.91 1.09   GLUCOSE mg/dL 192* 185*   Estimated Creatinine Clearance: 67.6 mL/min (by C-G formula based on SCr of 0.91 mg/dL).  Results from last 7 days   Lab Units 12/19/23  1843   ALBUMIN g/dL 3.7   BILIRUBIN mg/dL 0.4   ALK PHOS U/L 71   AST (SGOT) U/L 23   ALT (SGPT) U/L 24     Results from last 7 days   Lab Units 12/20/23  0439 12/19/23  1843   CALCIUM mg/dL 9.2 9.4   ALBUMIN g/dL  --   "3.7       Results from last 7 days   Lab Units 12/19/23  1843   HSTROP T ng/L 35*   PROBNP pg/mL 629.0     Results from last 7 days   Lab Units 12/19/23  2255   CREATININE UR mg/dL 74.4   PROTEIN TOTAL URINE mg/dL 17.8   PROT/CREAT RATIO UR mg/G Crea 239.2*         Invalid input(s): \"LDLCALC\"        Test Results Pending at Discharge       Discharge Details        Discharge Medications        Changes to Medications        Instructions Start Date   triamcinolone 0.1 % cream  Commonly known as: KENALOG  What changed: additional instructions   Topical, 2 Times Daily             Continue These Medications        Instructions Start Date   azithromycin 250 MG tablet  Commonly known as: Zithromax Z-Qamar   Take 2 tablets at the same time Day 1 and then 1 tablet every 24 hour thereafter.      benzonatate 200 MG capsule  Commonly known as: TESSALON   200 mg, Oral, 3 Times Daily PRN      Eliquis 5 MG tablet tablet  Generic drug: apixaban   TAKE 1 TABLET EVERY 12 HOURS (DVT/PE : ACTIVE THROMBOSIS)      esomeprazole 40 MG capsule  Commonly known as: nexIUM   TAKE 1 CAPSULE DAILY      fluticasone 50 MCG/ACT nasal spray  Commonly known as: FLONASE   2 sprays, Nasal, Daily, Administer 2 sprays in each nostril once a day      multivitamin with minerals tablet tablet   1 each, Oral, Daily      PRESERVISION AREDS 2 PO   Oral      pramipexole 0.25 MG tablet  Commonly known as: MIRAPEX   0.25 mg, Oral, Nightly      pravastatin 20 MG tablet  Commonly known as: PRAVACHOL   TAKE 1 TABLET DAILY      ramipril 10 MG capsule  Commonly known as: ALTACE   TAKE 1 CAPSULE DAILY      spironolactone 25 MG tablet  Commonly known as: ALDACTONE   TAKE 1 TABLET DAILY               Allergies   Allergen Reactions    Hctz [Hydrochlorothiazide] Hives         Discharge Disposition: Home   Home or Self Care    Discharge Diet:  Diet Order   Procedures    Diet: Cardiac Diets; Low Sodium (2g); Texture: Regular Texture (IDDSI 7); Fluid Consistency: Thin (IDDSI 0) "       Discharge Activity: as tolerated       CODE STATUS:    Code Status and Medical Interventions:   Ordered at: 12/19/23 2238     Level Of Support Discussed With:    Patient     Code Status (Patient has no pulse and is not breathing):    CPR (Attempt to Resuscitate)     Medical Interventions (Patient has pulse or is breathing):    Full Support       Future Appointments   Date Time Provider Department Center   1/25/2024  9:00 AM Tanya Julien MD [facility]      Follow-up Information       Tanya Julien MD .    Specialty: Family Medicine  Contact information:  [address] [address]  [phone]                             Time Spent on Discharge:  Greater than 30 minutes      Rod Arana MD  [facility]  12/20/23  14:20 EST

## 2023-12-20 NOTE — H&P
Patient Name:  Goran August  YOB: 1934  MRN:  [id number]  Admit Date:  12/19/2023  Patient Care Team:  Tanya Julien MD as PCP - General (Family Medicine)      Subjective   History Present Illness     Chief Complaint   Patient presents with    Leg Swelling       Mr. August is a 89 y.o. man with history of provoked (covid-19) right lower dvt in may of 2022 on eliquis, gerd, chronic heart failure with moderately reduced EF (46% in may 2022), type 2 diabetes, ckd 3a, essential hypertension, hyperlipidemia, chronic LBBB who presents with leg swelling and productive cough with associating shortness of breath and wheezing. He was seen in an urgent care several days ago and was diagnosed with bronchitis and started on a zpak. He felt like he was improving initially, but then he worsened. Yesterday, he noticed bilateral lower extremity swelling.     History of Present Illness  Review of Systems   Constitutional:  Positive for fatigue. Negative for chills, diaphoresis and fever.   HENT:  Negative for postnasal drip, rhinorrhea, sinus pressure, sinus pain and sore throat.    Eyes: Negative.    Respiratory:  Positive for cough. Negative for shortness of breath.    Cardiovascular:  Positive for leg swelling. Negative for chest pain and palpitations.   Gastrointestinal:  Negative for abdominal pain, diarrhea, nausea and vomiting.   Endocrine: Negative.    Genitourinary:  Negative for dysuria, frequency and urgency.   Musculoskeletal:  Negative for arthralgias and myalgias.   Skin:  Negative for rash and wound.   Allergic/Immunologic: Negative.    Neurological:  Negative for light-headedness and headaches.   Hematological: Negative.    Psychiatric/Behavioral:  Negative for confusion and decreased concentration.         Personal History     Past Medical History:   Diagnosis Date    Anemia     Benign hypertensive heart disease     Cardiomyopathy     EF 45-50% by echo 3/11    Cataract 08/01/2020 9/1/20     GERD (gastroesophageal reflux disease)     Heart murmur     Hx of colonic polyps     Hyperlipidemia     Hypertension     LBBB (left bundle branch block)     Mitral regurgitation     Osteoarthritis     Type 2 diabetes mellitus      Past Surgical History:   Procedure Laterality Date    CATARACT EXTRACTION, BILATERAL Bilateral 2020    INGUINAL HERNIA REPAIR      X4    REPLACEMENT TOTAL KNEE Right      Family History   Problem Relation Age of Onset    Aneurysm Mother         Colonic AVM in 90's    Hypertension Mother     Parkinsonism Father         90's    Aortic aneurysm Father         AAA    Tuberculosis Father     Tuberculosis Paternal Aunt     Cancer Maternal Grandmother     No Known Problems Daughter     No Known Problems Son      Social History     Tobacco Use    Smoking status: Former     Packs/day: 0.50     Years: 25.00     Additional pack years: 0.00     Total pack years: 12.50     Types: Cigarettes     Quit date:      Years since quittin.9    Smokeless tobacco: Former   Vaping Use    Vaping Use: Never used   Substance Use Topics    Alcohol use: Yes     Comment: one drink monthly    Drug use: No     No current facility-administered medications on file prior to encounter.     Current Outpatient Medications on File Prior to Encounter   Medication Sig Dispense Refill    azithromycin (Zithromax Z-Qamar) 250 MG tablet Take 2 tablets at the same time Day 1 and then 1 tablet every 24 hour thereafter. 6 tablet 0    benzonatate (TESSALON) 200 MG capsule Take 1 capsule by mouth 3 (Three) Times a Day As Needed for Cough for up to 10 days. 30 capsule 0    cetirizine (zyrTEC) 5 MG tablet Take 1 tablet by mouth Daily. 30 tablet 0    cyclobenzaprine (FLEXERIL) 5 MG tablet Take 1 tablet by mouth 3 (Three) Times a Day As Needed for Muscle Spasms (Pain). 30 tablet 3    Eliquis 5 MG tablet tablet TAKE 1 TABLET EVERY 12 HOURS (DVT/PE : ACTIVE THROMBOSIS) 180 tablet 3    esomeprazole (nexIUM) 40 MG capsule TAKE 1  CAPSULE DAILY 90 capsule 3    fluticasone (FLONASE) 50 MCG/ACT nasal spray 2 sprays into the nostril(s) as directed by provider Daily. Administer 2 sprays in each nostril once a day 3 bottle 3    furosemide (Lasix) 20 MG tablet Take 1 tablet by mouth Daily As Needed (swelling in ankles). (Patient not taking: Reported on 7/24/2023) 90 tablet 1    multivitamin with minerals tablet tablet Take 1 tablet by mouth Daily.      pramipexole (MIRAPEX) 0.25 MG tablet Take 1 tablet by mouth Every Night. 90 tablet 1    pravastatin (PRAVACHOL) 20 MG tablet TAKE 1 TABLET DAILY 90 tablet 3    ramipril (ALTACE) 10 MG capsule TAKE 1 CAPSULE DAILY 90 capsule 3    spironolactone (ALDACTONE) 25 MG tablet TAKE 1 TABLET DAILY 90 tablet 3    triamcinolone (KENALOG) 0.1 % cream Apply  topically to the appropriate area as directed 2 (Two) Times a Day. 45 g 1     Allergies   Allergen Reactions    Hctz [Hydrochlorothiazide] Hives       Objective    Objective     Vital Signs  Temp:  [97.9 °F (36.6 °C)] 97.9 °F (36.6 °C)  Heart Rate:  [66-91] 69  Resp:  [16-18] 16  BP: (138-148)/(45-68) 148/58  SpO2:  [93 %-100 %] 93 %  on   ;   Device (Oxygen Therapy): room air  Body mass index is 32.08 kg/m².    Physical Exam  Vitals and nursing note reviewed.   Constitutional:       General: He is not in acute distress.     Appearance: He is not toxic-appearing.   HENT:      Head: Normocephalic and atraumatic.      Mouth/Throat:      Mouth: Mucous membranes are moist.      Pharynx: Oropharynx is clear. No oropharyngeal exudate.   Eyes:      Extraocular Movements: Extraocular movements intact.      Conjunctiva/sclera: Conjunctivae normal.      Pupils: Pupils are equal, round, and reactive to light.   Cardiovascular:      Rate and Rhythm: Normal rate and regular rhythm.      Heart sounds: Normal heart sounds.   Pulmonary:      Effort: Pulmonary effort is normal. No respiratory distress.      Breath sounds: Normal breath sounds. No wheezing, rhonchi or rales.    Abdominal:      General: Bowel sounds are normal. There is no distension.      Palpations: Abdomen is soft.      Tenderness: There is no abdominal tenderness. There is no guarding or rebound.   Musculoskeletal:         General: No tenderness.      Cervical back: Normal range of motion and neck supple.      Right lower leg: Edema present.      Left lower leg: Edema present.      Comments: L>R swelling   Skin:     General: Skin is warm and dry.      Findings: No erythema or rash.   Neurological:      General: No focal deficit present.      Mental Status: He is alert and oriented to person, place, and time.   Psychiatric:         Mood and Affect: Mood normal.         Behavior: Behavior normal.         Results Review:  I reviewed the patient's new clinical results.  I reviewed the patient's new imaging results and agree with the interpretation.  I reviewed the patient's other test results and agree with the interpretation  I personally viewed and interpreted the patient's EKG/Telemetry data  Discussed with ED provider.    Lab Results (last 24 hours)       Procedure Component Value Units Date/Time    CBC & Differential [[id number]]  (Abnormal) Collected: 12/19/23 1843    Specimen: Blood Updated: 12/19/23 1901    Narrative:      The following orders were created for panel order CBC & Differential.  Procedure                               Abnormality         Status                     ---------                               -----------         ------                     CBC Auto Differential[[id number]]        Abnormal            Final result                 Please view results for these tests on the individual orders.    Comprehensive Metabolic Panel [[id number]]  (Abnormal) Collected: 12/19/23 1843    Specimen: Blood Updated: 12/19/23 1927     Glucose 185 mg/dL      BUN 26 mg/dL      Creatinine 1.09 mg/dL      Sodium 138 mmol/L      Potassium 4.5 mmol/L      Chloride 100 mmol/L      CO2 28.0 mmol/L      Calcium 9.4 mg/dL       Total Protein 6.9 g/dL      Albumin 3.7 g/dL      ALT (SGPT) 24 U/L      AST (SGOT) 23 U/L      Alkaline Phosphatase 71 U/L      Total Bilirubin 0.4 mg/dL      Globulin 3.2 gm/dL      A/G Ratio 1.2 g/dL      BUN/Creatinine Ratio 23.9     Anion Gap 10.0 mmol/L      eGFR 64.9 mL/min/1.73     Narrative:      GFR Normal >60  Chronic Kidney Disease <60  Kidney Failure <15    The GFR formula is only valid for adults with stable renal function between ages 18 and 70.    Protime-INR [[id number]]  (Abnormal) Collected: 12/19/23 1843    Specimen: Blood Updated: 12/19/23 1914     Protime 15.1 Seconds      INR 1.17    aPTT [[id number]]  (Abnormal) Collected: 12/19/23 1843    Specimen: Blood Updated: 12/19/23 1914     PTT 35.6 seconds     BNP [[id number]]  (Normal) Collected: 12/19/23 1843    Specimen: Blood Updated: 12/19/23 1925     proBNP 629.0 pg/mL     Narrative:      This assay is used as an aid in the diagnosis of individuals suspected of having heart failure. It can be used as an aid in the diagnosis of acute decompensated heart failure (ADHF) in patients presenting with signs and symptoms of ADHF to the emergency department (ED). In addition, NT-proBNP of <300 pg/mL indicates ADHF is not likely.    Age Range Result Interpretation  NT-proBNP Concentration (pg/mL:      <50             Positive            >450                   Gray                 300-450                    Negative             <300    50-75           Positive            >900                  Gray                300-900                  Negative            <300      >75             Positive            >1800                  Gray                300-1800                  Negative            <300    Single High Sensitivity Troponin T [[id number]]  (Abnormal) Collected: 12/19/23 1843    Specimen: Blood Updated: 12/19/23 1927     HS Troponin T 35 ng/L     Narrative:      High Sensitive Troponin T Reference Range:  <14.0 ng/L- Negative Female for AMI  <22.0  "ng/L- Negative Male for AMI  >=14 - Abnormal Female indicating possible myocardial injury.  >=22 - Abnormal Male indicating possible myocardial injury.   Clinicians would have to utilize clinical acumen, EKG, Troponin, and serial changes to determine if it is an Acute Myocardial Infarction or myocardial injury due to an underlying chronic condition.         Procalcitonin [[id number]]  (Normal) Collected: 12/19/23 1843    Specimen: Blood Updated: 12/19/23 1925     Procalcitonin 0.06 ng/mL     Narrative:      As a Marker for Sepsis (Non-Neonates):    1. <0.5 ng/mL represents a low risk of severe sepsis and/or septic shock.  2. >2 ng/mL represents a high risk of severe sepsis and/or septic shock.    As a Marker for Lower Respiratory Tract Infections that require antibiotic therapy:    PCT on Admission    Antibiotic Therapy       6-12 Hrs later    >0.5                Strongly Recommended  >0.25 - <0.5        Recommended   0.1 - 0.25          Discouraged              Remeasure/reassess PCT  <0.1                Strongly Discouraged     Remeasure/reassess PCT    As 28 day mortality risk marker: \"Change in Procalcitonin Result\" (>80% or <=80%) if Day 0 (or Day 1) and Day 4 values are available. Refer to http://www.Figure 8 [facility]pct[facility]calculator.com    Change in PCT <=80%  A decrease of PCT levels below or equal to 80% defines a positive change in PCT test result representing a higher risk for 28-day all-cause mortality of patients diagnosed with severe sepsis for septic shock.    Change in PCT >80%  A decrease of PCT levels of more than 80% defines a negative change in PCT result representing a lower risk for 28-day all-cause mortality of patients diagnosed with severe sepsis or septic shock.       CBC Auto Differential [[id number]]  (Abnormal) Collected: 12/19/23 1843    Specimen: Blood Updated: 12/19/23 1901     WBC 8.16 10*3/mm3      RBC 4.37 10*6/mm3      Hemoglobin 11.9 g/dL      Hematocrit 36.7 %      MCV 84.0 fL      MCH 27.2 " pg      MCHC 32.4 g/dL      RDW 13.5 %      RDW-SD 41.1 fl      MPV 9.8 fL      Platelets 245 10*3/mm3      Neutrophil % 79.9 %      Lymphocyte % 12.1 %      Monocyte % 6.4 %      Eosinophil % 1.0 %      Basophil % 0.4 %      Immature Grans % 0.2 %      Neutrophils, Absolute 6.52 10*3/mm3      Lymphocytes, Absolute 0.99 10*3/mm3      Monocytes, Absolute 0.52 10*3/mm3      Eosinophils, Absolute 0.08 10*3/mm3      Basophils, Absolute 0.03 10*3/mm3      Immature Grans, Absolute 0.02 10*3/mm3      nRBC 0.0 /100 WBC     Respiratory Panel PCR w/COVID-19(SARS-CoV-2) [facility] In-House, NP Swab in UTM/VTM, 2 HR TAT - Swab, Nasopharynx [[id number]]  (Normal) Collected: 12/19/23 1844    Specimen: Swab from Nasopharynx Updated: 12/19/23 2002     ADENOVIRUS, PCR Not Detected     Coronavirus 229E Not Detected     Coronavirus HKU1 Not Detected     Coronavirus NL63 Not Detected     Coronavirus OC43 Not Detected     COVID19 Not Detected     Human Metapneumovirus Not Detected     Human Rhinovirus/Enterovirus Not Detected     Influenza A PCR Not Detected     Influenza B PCR Not Detected     Parainfluenza Virus 1 Not Detected     Parainfluenza Virus 2 Not Detected     Parainfluenza Virus 3 Not Detected     Parainfluenza Virus 4 Not Detected     RSV, PCR Not Detected     Bordetella pertussis pcr Not Detected     Bordetella parapertussis PCR Not Detected     Chlamydophila pneumoniae PCR Not Detected     Mycoplasma pneumo by PCR Not Detected    Narrative:      In the setting of a positive respiratory panel with a viral infection PLUS a negative procalcitonin without other underlying concern for bacterial infection, consider observing off antibiotics or discontinuation of antibiotics and continue supportive care. If the respiratory panel is positive for atypical bacterial infection (Bordetella pertussis, Chlamydophila pneumoniae, or Mycoplasma pneumoniae), consider antibiotic de-escalation to target atypical bacterial  infection.            Imaging Results (Last 24 Hours)       ** No results found for the last 24 hours. **            Results for orders placed during the hospital encounter of 05/08/22    Adult Transthoracic Echo Limited W/ Cont if Necessary Per Protocol    Interpretation Summary  · Left ventricular wall thickness is consistent with mild concentric hypertrophy.  · Estimated left ventricular EF = 46% Left ventricular systolic function is low normal.  · Septal wall motion is abnormal, consistent with a bundle branch block.  · Left ventricular diastolic function was not assessed.  · This is a limited study.      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       Mr. August is a 89 y.o. man with history of provoked (covid-19) right lower dvt in may of 2022 on eliquis, gerd, chronic heart failure with moderately reduced EF (46% in may 2022), type 2 diabetes, ckd 3a, essential hypertension, hyperlipidemia, chronic LBBB who presents with bilateral leg swelling (L>R) and acute bronchitis causing hypoxemia    Acute bronchitis causing hypoxemia-no history of copd/asthma (normal PFTs about a year ago in the pulmonology office). I don't think there is any role for antibiotics or steroids at this time. It isn't uncommon for an episode to last 3 weeks and the cough can be prolonged afterwards. He was wheezing on presentation, but this is already dramatically better  after a nebulizer treatment. We will continue nebulizers and add guaifenesin and a flutter valve.  Bilateral lower extremity swelling L>R-lower extremity duplexes were normal. He doesn't have many other symptoms consistent with heart failure. He describes some recent exertional fatigue, but no orthopnea/pnd/weight gain. Other than the leg swelling, I don't see any JVD or hear any crackles on exam. His chest xray does show any edema and bnp is normal. Will check a urinalysis and pr/cr ratio as well as an echocardiogram.   History of DVT in May of 2022 that was provoked by covid-19-unclear why the patient is still on eliquis as an outpatient. It's even mentioned in his PCP notes that he was to discontinue this medication after a trip to [place] in January. We'll stop it this hospitalization.   Type 2 diabetes with hyperglycemia-possibly in part due to steroids received in the ED. Will start sliding scale  Restart home medications once reconciled.  I discussed the patient's findings and my recommendations with patient, family, and ED provider.    VTE Prophylaxis - Pharmacy to dose Lovenox.  Code Status - Full code.       Gus Waldrop MD  [facility]  12/19/23  20:18 EST

## 2023-12-21 ENCOUNTER — TRANSITIONAL CARE MANAGEMENT TELEPHONE ENCOUNTER (OUTPATIENT)
Dept: CALL CENTER | Facility: HOSPITAL | Age: 88
End: 2023-12-21
Payer: MEDICARE

## 2023-12-21 NOTE — OUTREACH NOTE
Call Center TCM Note      Flowsheet Row Responses   [facility] patient discharged from? [facility]   Does the patient have one of the following disease processes/diagnoses(primary or secondary)? CHF   TCM attempt successful? Yes   Call start time 0906   Call end time 0911   Discharge diagnosis chronic heart failure with moderately reduced EF   Meds reviewed with patient/caregiver? Yes   Is the patient having any side effects they believe may be caused by any medication additions or changes? No   Does the patient have all medications ordered at discharge? Yes   Is the patient taking all medications as directed (includes completed medication regime)? Yes   Comments Patient provided with number for [facility] Failure Clinic Scheduler, as he prefers to call to schedule his own appointment.   Does the patient have an appointment with their PCP within 7-14 days of discharge? No   Nursing Interventions Assisted patient with making appointment per protocol   Has home health visited the patient within 72 hours of discharge? N/A   Pulse Ox monitoring None   Did the patient receive a copy of their discharge instructions? Yes   Nursing interventions Reviewed instructions with patient   What is the patient's perception of their health status since discharge? Improving   Nursing interventions Nurse provided patient education   Is the patient able to teach back signs and symptoms of worsening condition? (i.e. weight gain, shortness of air, etc.) Yes   If the patient is a current smoker, are they able to teach back resources for cessation? Not a smoker   Is the patient/caregiver able to teach back the hierarchy of who to call/visit for symptoms/problems? PCP, Specialist, Home health nurse, Urgent Care, ED, 911 Yes   Is the patient able to teach back Heart Failure Zones? Yes   CHF Zone this Call Green Zone   Green Zone Patient reports doing well, No new or worsening shortness of breath, Physical activity level is  Per lab urine will be sent to [facility] at 1630. It was not run because it was not marked STAT. Patient did provide sample.     Spoke to Indy advised of the above. Empirical bactrim sent to pharmacy per protocol. Indy is aware that antibiotic may need to be changed pending culture results.    normal for you, No new swelling -  feet, ankles and legs look normal for you, Weight check stable, No chest pain   Green Zone Interventions Daily weight check, Meds as directed, Follow up visits planned, Low sodium diet   TCM call completed? Yes   Call end time 0911   Would this patient benefit from a Referral to [facility] Social Work? No   Is the patient interested in additional calls from an ambulatory ? No             Viviana Yun LPN    12/21/2023, 09:31 EST

## 2023-12-26 RX ORDER — ESOMEPRAZOLE MAGNESIUM 40 MG/1
CAPSULE, DELAYED RELEASE ORAL
Qty: 90 CAPSULE | Refills: 3 | Status: SHIPPED | OUTPATIENT
Start: 2023-12-26

## 2023-12-29 ENCOUNTER — OFFICE VISIT (OUTPATIENT)
Dept: FAMILY MEDICINE CLINIC | Facility: CLINIC | Age: 88
End: 2023-12-29
Payer: MEDICARE

## 2023-12-29 VITALS
SYSTOLIC BLOOD PRESSURE: 128 MMHG | WEIGHT: 221 LBS | TEMPERATURE: 97.9 F | DIASTOLIC BLOOD PRESSURE: 62 MMHG | BODY MASS INDEX: 30.94 KG/M2 | OXYGEN SATURATION: 96 % | HEIGHT: 71 IN | HEART RATE: 76 BPM

## 2023-12-29 DIAGNOSIS — D63.1 ANEMIA DUE TO STAGE 3A CHRONIC KIDNEY DISEASE: ICD-10-CM

## 2023-12-29 DIAGNOSIS — I10 BENIGN ESSENTIAL HTN: ICD-10-CM

## 2023-12-29 DIAGNOSIS — Z86.718 HISTORY OF DVT OF LOWER EXTREMITY: ICD-10-CM

## 2023-12-29 DIAGNOSIS — R05.1 ACUTE COUGH: ICD-10-CM

## 2023-12-29 DIAGNOSIS — Z09 HOSPITAL DISCHARGE FOLLOW-UP: Primary | ICD-10-CM

## 2023-12-29 DIAGNOSIS — N18.31 ANEMIA DUE TO STAGE 3A CHRONIC KIDNEY DISEASE: ICD-10-CM

## 2023-12-29 DIAGNOSIS — E11.22 TYPE 2 DIABETES MELLITUS WITH STAGE 3A CHRONIC KIDNEY DISEASE, WITHOUT LONG-TERM CURRENT USE OF INSULIN: ICD-10-CM

## 2023-12-29 DIAGNOSIS — N18.31 TYPE 2 DIABETES MELLITUS WITH STAGE 3A CHRONIC KIDNEY DISEASE, WITHOUT LONG-TERM CURRENT USE OF INSULIN: ICD-10-CM

## 2023-12-29 PROBLEM — I82.409 ACUTE DVT (DEEP VENOUS THROMBOSIS): Status: RESOLVED | Noted: 2022-05-09 | Resolved: 2023-12-29

## 2023-12-29 RX ORDER — DEXTROMETHORPHAN HYDROBROMIDE AND PROMETHAZINE HYDROCHLORIDE 15; 6.25 MG/5ML; MG/5ML
5 SYRUP ORAL 4 TIMES DAILY PRN
Qty: 240 ML | Refills: 0 | Status: SHIPPED | OUTPATIENT
Start: 2023-12-29

## 2023-12-29 RX ORDER — ALBUTEROL SULFATE 90 UG/1
2 AEROSOL, METERED RESPIRATORY (INHALATION) EVERY 4 HOURS PRN
Qty: 18 G | Refills: 6 | Status: SHIPPED | OUTPATIENT
Start: 2023-12-29

## 2023-12-29 NOTE — PROGRESS NOTES
Transitional Care Follow Up Visit  Subjective     Goran August is a 89 y.o. male who presents for a transitional care management visit.    Within 48 business hours after discharge our office contacted him via telephone to coordinate his care and needs.      I reviewed and discussed the details of that call along with the discharge summary, hospital problems, inpatient lab results, inpatient diagnostic studies, and consultation reports with Goran.     Current outpatient and discharge medications have been reconciled for the patient.  Reviewed by: Tanya Julien MD          12/20/2023     5:59 PM   Date of TCM Phone Call   [facility]   Date of Admission 12/19/2023   Date of Discharge 12/20/2023   Discharge Disposition Home or Self Care     Risk for Readmission (LACE) Score: 7 (12/20/2023  6:00 AM)      History of Present Illness   Course During Hospital Stay: Pleasant 89-year-old male here to follow-up for hospitalization at [facility] December 19 to December 20, 2023.  He presented to the emergency room for leg swelling and was unfortunately found some associated shortness of breath, wheezing with low oxygen and was treated with a bronchitis prior to admission.  His symptoms improved with steroids and nebulizer treatments, x-ray negative for edema or infiltrate.  He was discharged home with a short course of prednisone, they did evaluate him for DVT, thankfully negative duplex, he was adherent with Eliquis.     The following portions of the patient's history were reviewed and updated as appropriate: allergies, current medications, past family history, past medical history, past social history, past surgical history, and problem list.    Review of Systems    Objective   Physical Exam  Vitals and nursing note reviewed.   Constitutional:       General: He is not in acute distress.     Appearance: He is well-developed.   HENT:      Head: Normocephalic.      Nose: Nose normal.    Cardiovascular:      Rate and Rhythm: Normal rate and regular rhythm.      Heart sounds: Normal heart sounds. No murmur heard.  Pulmonary:      Effort: Pulmonary effort is normal. No respiratory distress.      Breath sounds: Normal breath sounds.   Musculoskeletal:         General: Normal range of motion.   Skin:     General: Skin is warm and dry.      Findings: No rash.   Neurological:      Mental Status: He is alert and oriented to person, place, and time.   Psychiatric:         Behavior: Behavior normal.         Thought Content: Thought content normal.         Judgment: Judgment normal.         CBC & Differential (12/19/2023 18:43)  Respiratory Panel PCR w/COVID-19(SARS-CoV-2) CHRIS/KIMMIE/URI/PAD/COR/EZ In-House, NP Swab in UTM/VTM, 2 HR TAT - Swab, Nasopharynx (12/19/2023 18:44)  Procalcitonin (12/19/2023 18:43)  Single High Sensitivity Troponin T (12/19/2023 18:43)  BNP (12/19/2023 18:43)  aPTT (12/19/2023 18:43)  Protime-INR (12/19/2023 18:43)  Comprehensive Metabolic Panel (12/19/2023 18:43)    CBC (No Diff) (12/20/2023 04:39)  Basic Metabolic Panel (12/20/2023 04:39)  Urinalysis With Microscopic If Indicated (No Culture) - Urine, Clean Catch (12/19/2023 22:55)  Protein / Creatinine Ratio, Urine - Urine, Clean Catch (12/19/2023 22:55)    Duplex Venous Lower Extremity - BILATERAL (12/19/2023 19:25)   Adult Transthoracic Echo Complete W/ Cont if Necessary Per Protocol (12/20/2023 10:40)   XR Chest 2Vw (12/19/2023 16:04)     Assessment & Plan   Diagnoses and all orders for this visit:    1. Hospital discharge follow-up (Primary)    2. Acute cough  -     promethazine-dextromethorphan (PROMETHAZINE-DM) 6.25-15 MG/5ML syrup; Take 5 mL by mouth 4 (Four) Times a Day As Needed for Cough.  Dispense: 240 mL; Refill: 0  -     albuterol sulfate  (90 Base) MCG/ACT inhaler; Inhale 2 puffs Every 4 (Four) Hours As Needed for Wheezing or Shortness of Air.  Dispense: 18 g; Refill: 6    3. History of DVT of lower  extremity  Comments:  stopped elliquis 12/29/23    4. Type 2 diabetes mellitus with stage 3a chronic kidney disease, without long-term current use of insulin  -     Hemoglobin A1c; Future  -     TSH Rfx On Abnormal To Free T4; Future    5. Benign essential HTN  -     Comprehensive Metabolic Panel; Future  -     CBC & Differential; Future  -     Lipid Panel; Future  -     TSH Rfx On Abnormal To Free T4; Future    6. Anemia due to stage 3a chronic kidney disease  -     Comprehensive Metabolic Panel; Future  -     CBC & Differential; Future  -     Lipid Panel; Future  -     Ferritin; Future      Pleasant 89-year-old male here to follow-up for acute cough with some hypoxemia after bronchitis, thankfully is improved substantially from an upper respiratory standpoint, he does have some scattered wheezes, we discussed using his spirometer that he was discharged home with.  Will add albuterol to use during the day and promethazine.  His chest x-ray actually looked pretty good.  I do not see an indication for antibiotics at this time.    We also discussed the Eliquis, he has had a single unprovoked DVT, I do agree that it is agreeable to discontinue the Eliquis, thankfully his duplex looked excellent.  He is comfortable with this, plan to stop Eliquis 5 mg twice daily today.    Will follow-up as scheduled in January to make sure his wheezing and coughing is improving.       Return if symptoms worsen or fail to improve, for Recheck as previously scheduled.    Tanya Julien MD

## 2024-01-03 ENCOUNTER — TELEPHONE (OUTPATIENT)
Dept: FAMILY MEDICINE CLINIC | Facility: CLINIC | Age: 89
End: 2024-01-03
Payer: MEDICARE

## 2024-01-03 NOTE — TELEPHONE ENCOUNTER
"  Caller: Goran August \"Bill\"    Relationship: Self    Best call back number: [phone]     What is the best time to reach you: ANY    Who are you requesting to speak with (clinical staff, provider,  specific staff member): DOLORES        What was the call regarding: PATIENT HAS A FEW QUESTIONS AND IS REQUESTING A TELEPHONE CALL BACK PLEASE.    THANK YOU        "

## 2024-01-04 ENCOUNTER — APPOINTMENT (OUTPATIENT)
Dept: CARDIOLOGY | Facility: HOSPITAL | Age: 89
End: 2024-01-04
Payer: COMMERCIAL

## 2024-01-04 ENCOUNTER — HOSPITAL ENCOUNTER (EMERGENCY)
Facility: HOSPITAL | Age: 89
Discharge: HOME | End: 2024-01-04
Attending: EMERGENCY MEDICINE
Payer: COMMERCIAL

## 2024-01-04 VITALS
SYSTOLIC BLOOD PRESSURE: 144 MMHG | BODY MASS INDEX: 32.06 KG/M2 | OXYGEN SATURATION: 98 % | RESPIRATION RATE: 16 BRPM | HEIGHT: 71 IN | WEIGHT: 229 LBS | HEART RATE: 73 BPM | DIASTOLIC BLOOD PRESSURE: 62 MMHG | TEMPERATURE: 98.2 F

## 2024-01-04 DIAGNOSIS — Z86.79 HISTORY OF CARDIOMYOPATHY: ICD-10-CM

## 2024-01-04 DIAGNOSIS — Z86.39 HISTORY OF DIABETES MELLITUS: ICD-10-CM

## 2024-01-04 DIAGNOSIS — R60.0 BILATERAL LOWER EXTREMITY EDEMA: Primary | ICD-10-CM

## 2024-01-04 LAB
ALBUMIN SERPL-MCNC: 3.6 G/DL (ref 3.5–5.2)
ALBUMIN/GLOB SERPL: 1 G/DL
ALP SERPL-CCNC: 76 U/L (ref 39–117)
ALT SERPL W P-5'-P-CCNC: 15 U/L (ref 1–41)
ANION GAP SERPL CALCULATED.3IONS-SCNC: 12.8 MMOL/L (ref 5–15)
AST SERPL-CCNC: 11 U/L (ref 1–40)
BASOPHILS # BLD AUTO: 0.03 10*3/MM3 (ref 0–0.2)
BASOPHILS NFR BLD AUTO: 0.2 % (ref 0–1.5)
BH CV LOWER VASCULAR LEFT COMMON FEMORAL AUGMENT: NORMAL
BH CV LOWER VASCULAR LEFT COMMON FEMORAL COMPETENT: NORMAL
BH CV LOWER VASCULAR LEFT COMMON FEMORAL COMPRESS: NORMAL
BH CV LOWER VASCULAR LEFT COMMON FEMORAL PHASIC: NORMAL
BH CV LOWER VASCULAR LEFT COMMON FEMORAL SPONT: NORMAL
BH CV LOWER VASCULAR LEFT DISTAL FEMORAL COMPRESS: NORMAL
BH CV LOWER VASCULAR LEFT GASTRONEMIUS COMPRESS: NORMAL
BH CV LOWER VASCULAR LEFT GREATER SAPH AK COMPRESS: NORMAL
BH CV LOWER VASCULAR LEFT GREATER SAPH BK COMPRESS: NORMAL
BH CV LOWER VASCULAR LEFT LESSER SAPH COMPRESS: NORMAL
BH CV LOWER VASCULAR LEFT MID FEMORAL AUGMENT: NORMAL
BH CV LOWER VASCULAR LEFT MID FEMORAL COMPETENT: NORMAL
BH CV LOWER VASCULAR LEFT MID FEMORAL COMPRESS: NORMAL
BH CV LOWER VASCULAR LEFT MID FEMORAL PHASIC: NORMAL
BH CV LOWER VASCULAR LEFT MID FEMORAL SPONT: NORMAL
BH CV LOWER VASCULAR LEFT PERONEAL COMPRESS: NORMAL
BH CV LOWER VASCULAR LEFT POPLITEAL AUGMENT: NORMAL
BH CV LOWER VASCULAR LEFT POPLITEAL COMPETENT: NORMAL
BH CV LOWER VASCULAR LEFT POPLITEAL COMPRESS: NORMAL
BH CV LOWER VASCULAR LEFT POPLITEAL PHASIC: NORMAL
BH CV LOWER VASCULAR LEFT POPLITEAL SPONT: NORMAL
BH CV LOWER VASCULAR LEFT POSTERIOR TIBIAL COMPRESS: NORMAL
BH CV LOWER VASCULAR LEFT PROFUNDA FEMORAL COMPRESS: NORMAL
BH CV LOWER VASCULAR LEFT PROXIMAL FEMORAL COMPRESS: NORMAL
BH CV LOWER VASCULAR LEFT SAPHENOFEMORAL JUNCTION COMPRESS: NORMAL
BH CV LOWER VASCULAR RIGHT COMMON FEMORAL AUGMENT: NORMAL
BH CV LOWER VASCULAR RIGHT COMMON FEMORAL COMPETENT: NORMAL
BH CV LOWER VASCULAR RIGHT COMMON FEMORAL COMPRESS: NORMAL
BH CV LOWER VASCULAR RIGHT COMMON FEMORAL PHASIC: NORMAL
BH CV LOWER VASCULAR RIGHT COMMON FEMORAL SPONT: NORMAL
BH CV LOWER VASCULAR RIGHT DISTAL FEMORAL COMPRESS: NORMAL
BH CV LOWER VASCULAR RIGHT GASTRONEMIUS COMPRESS: NORMAL
BH CV LOWER VASCULAR RIGHT LESSER SAPH COMPRESS: NORMAL
BH CV LOWER VASCULAR RIGHT MID FEMORAL AUGMENT: NORMAL
BH CV LOWER VASCULAR RIGHT MID FEMORAL COMPETENT: NORMAL
BH CV LOWER VASCULAR RIGHT MID FEMORAL COMPRESS: NORMAL
BH CV LOWER VASCULAR RIGHT MID FEMORAL PHASIC: NORMAL
BH CV LOWER VASCULAR RIGHT MID FEMORAL SPONT: NORMAL
BH CV LOWER VASCULAR RIGHT PERONEAL COMPRESS: NORMAL
BH CV LOWER VASCULAR RIGHT POPLITEAL AUGMENT: NORMAL
BH CV LOWER VASCULAR RIGHT POPLITEAL COMPETENT: NORMAL
BH CV LOWER VASCULAR RIGHT POPLITEAL COMPRESS: NORMAL
BH CV LOWER VASCULAR RIGHT POPLITEAL PHASIC: NORMAL
BH CV LOWER VASCULAR RIGHT POPLITEAL SPONT: NORMAL
BH CV LOWER VASCULAR RIGHT POSTERIOR TIBIAL COMPRESS: NORMAL
BH CV LOWER VASCULAR RIGHT PROFUNDA FEMORAL COMPRESS: NORMAL
BH CV LOWER VASCULAR RIGHT PROXIMAL FEMORAL COMPRESS: NORMAL
BH CV LOWER VASCULAR RIGHT SAPHENOFEMORAL JUNCTION COMPRESS: NORMAL
BH CV VAS PRELIMINARY FINDINGS SCRIPTING: 1
BILIRUB SERPL-MCNC: 0.6 MG/DL (ref 0–1.2)
BUN SERPL-MCNC: 27 MG/DL (ref 8–23)
BUN/CREAT SERPL: 23.1 (ref 7–25)
CALCIUM SPEC-SCNC: 9.8 MG/DL (ref 8.6–10.5)
CHLORIDE SERPL-SCNC: 98 MMOL/L (ref 98–107)
CO2 SERPL-SCNC: 29.2 MMOL/L (ref 22–29)
CREAT SERPL-MCNC: 1.17 MG/DL (ref 0.76–1.27)
DEPRECATED RDW RBC AUTO: 43.2 FL (ref 37–54)
EGFRCR SERPLBLD CKD-EPI 2021: 59.6 ML/MIN/1.73
EOSINOPHIL # BLD AUTO: 0.03 10*3/MM3 (ref 0–0.4)
EOSINOPHIL NFR BLD AUTO: 0.2 % (ref 0.3–6.2)
ERYTHROCYTE [DISTWIDTH] IN BLOOD BY AUTOMATED COUNT: 13.9 % (ref 12.3–15.4)
GLOBULIN UR ELPH-MCNC: 3.6 GM/DL
GLUCOSE SERPL-MCNC: 150 MG/DL (ref 65–99)
HCT VFR BLD AUTO: 36.7 % (ref 37.5–51)
HGB BLD-MCNC: 11.8 G/DL (ref 13–17.7)
IMM GRANULOCYTES # BLD AUTO: 0.03 10*3/MM3 (ref 0–0.05)
IMM GRANULOCYTES NFR BLD AUTO: 0.2 % (ref 0–0.5)
LYMPHOCYTES # BLD AUTO: 0.87 10*3/MM3 (ref 0.7–3.1)
LYMPHOCYTES NFR BLD AUTO: 6.7 % (ref 19.6–45.3)
MCH RBC QN AUTO: 27.4 PG (ref 26.6–33)
MCHC RBC AUTO-ENTMCNC: 32.2 G/DL (ref 31.5–35.7)
MCV RBC AUTO: 85.2 FL (ref 79–97)
MONOCYTES # BLD AUTO: 0.85 10*3/MM3 (ref 0.1–0.9)
MONOCYTES NFR BLD AUTO: 6.6 % (ref 5–12)
NEUTROPHILS NFR BLD AUTO: 11.15 10*3/MM3 (ref 1.7–7)
NEUTROPHILS NFR BLD AUTO: 86.1 % (ref 42.7–76)
NRBC BLD AUTO-RTO: 0 /100 WBC (ref 0–0.2)
NT-PROBNP SERPL-MCNC: 485 PG/ML (ref 0–1800)
PLATELET # BLD AUTO: 231 10*3/MM3 (ref 140–450)
PMV BLD AUTO: 9.9 FL (ref 6–12)
POTASSIUM SERPL-SCNC: 4.7 MMOL/L (ref 3.5–5.2)
PROT SERPL-MCNC: 7.2 G/DL (ref 6–8.5)
RBC # BLD AUTO: 4.31 10*6/MM3 (ref 4.14–5.8)
SODIUM SERPL-SCNC: 140 MMOL/L (ref 136–145)
WBC NRBC COR # BLD AUTO: 12.96 10*3/MM3 (ref 3.4–10.8)

## 2024-01-04 PROCEDURE — 99284 EMERGENCY DEPT VISIT MOD MDM: CPT

## 2024-01-04 PROCEDURE — 93970 EXTREMITY STUDY: CPT

## 2024-01-04 PROCEDURE — 96374 THER/PROPH/DIAG INJ IV PUSH: CPT

## 2024-01-04 PROCEDURE — 25010000002 FUROSEMIDE PER 20 MG: Performed by: EMERGENCY MEDICINE

## 2024-01-04 PROCEDURE — 85025 COMPLETE CBC W/AUTO DIFF WBC: CPT | Performed by: EMERGENCY MEDICINE

## 2024-01-04 PROCEDURE — 36415 COLL VENOUS BLD VENIPUNCTURE: CPT

## 2024-01-04 PROCEDURE — 80053 COMPREHEN METABOLIC PANEL: CPT | Performed by: EMERGENCY MEDICINE

## 2024-01-04 PROCEDURE — 83880 ASSAY OF NATRIURETIC PEPTIDE: CPT | Performed by: EMERGENCY MEDICINE

## 2024-01-04 RX ORDER — FUROSEMIDE 10 MG/ML
40 INJECTION INTRAMUSCULAR; INTRAVENOUS ONCE
Status: COMPLETED | OUTPATIENT
Start: 2024-01-04 | End: 2024-01-04

## 2024-01-04 RX ORDER — FUROSEMIDE 20 MG/1
40 TABLET ORAL DAILY
Qty: 60 TABLET | Refills: 0 | Status: SHIPPED | OUTPATIENT
Start: 2024-01-04 | End: 2024-02-03

## 2024-01-04 RX ADMIN — FUROSEMIDE 40 MG: 10 INJECTION, SOLUTION INTRAMUSCULAR; INTRAVENOUS at 13:09

## 2024-01-04 NOTE — DISCHARGE INSTRUCTIONS
SUBJECTIVE:   CC: Nuris is an 23 year old who presents for preventive health visit.       Patient has been advised of split billing requirements and indicates understanding: Yes  Healthy Habits:     Getting at least 3 servings of Calcium per day:  NO    Bi-annual eye exam:  Yes    Dental care twice a year:  Yes    Sleep apnea or symptoms of sleep apnea:  None    Diet:  Other    Frequency of exercise:  2-3 days/week    Duration of exercise:  45-60 minutes    Taking medications regularly:  Yes    Medication side effects:  Not applicable    PHQ-2 Total Score: 1    Additional concerns today:  No    Several episodes in last year of palpitations, dyspnea and anxiety, consistently have occurred after drinking coffee. Last episode 1 month ago. Reviewed her workup.    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       Today's PHQ-2 Score:   PHQ-2 ( 1999 [employer]) 9/28/2022   Q1: Little interest or pleasure in doing things 0   Q2: Feeling down, depressed or hopeless 1   PHQ-2 Score 1   PHQ-2 Total Score (12-17 Years)- Positive if 3 or more points; Administer PHQ-A if positive -   Q1: Little interest or pleasure in doing things Not at all   Q2: Feeling down, depressed or hopeless Several days   PHQ-2 Score 1       Abuse: Current or Past (Physical, Sexual or Emotional) - No  Do you feel safe in your environment? Yes        Social History     Tobacco Use     Smoking status: Never Smoker     Smokeless tobacco: Never Used     Tobacco comment: Non-smoking home   Substance Use Topics     Alcohol use: Yes     Comment: Social         Alcohol Use 9/28/2022   Prescreen: >3 drinks/day or >7 drinks/week? No   Prescreen: >3 drinks/day or >7 drinks/week? -     Reviewed orders with patient.  Reviewed health maintenance and updated orders accordingly - Yes  Labs reviewed in EPIC    Breast Cancer Screening:        History of abnormal Pap smear: NO - age 21-29 PAP every 3 years recommended  PAP / HPV Latest Ref Rng & Units 3/1/2021   PAP  Compression stockings, elevation when at rest, stop taking spironolactone, start taking Lasix, close outpatient PCP follow-up, ED return for worsening symptoms as needed.   "(Historical) - NIL   HPV16 NEG:Negative Negative   HPV18 NEG:Negative Negative   HRHPV NEG:Negative Negative     Reviewed and updated as needed this visit by clinical staff   Tobacco  Allergies    Med Hx  Surg Hx  Fam Hx  Soc Hx          Reviewed and updated as needed this visit by Provider                       Review of Systems   Constitutional: Negative for chills and fever.   HENT: Negative for congestion, ear pain, hearing loss and sore throat.    Eyes: Negative for pain and visual disturbance.   Respiratory: Positive for shortness of breath. Negative for cough.    Cardiovascular: Positive for chest pain and palpitations. Negative for peripheral edema.   Gastrointestinal: Positive for constipation and diarrhea. Negative for abdominal pain, heartburn, hematochezia and nausea.   Breasts:  Negative for tenderness, breast mass and discharge.   Genitourinary: Negative for dysuria, frequency, genital sores, hematuria, pelvic pain, urgency, vaginal bleeding and vaginal discharge.   Musculoskeletal: Negative for arthralgias, joint swelling and myalgias.   Skin: Positive for rash.   Neurological: Negative for dizziness, weakness, headaches and paresthesias.   Psychiatric/Behavioral: Negative for mood changes. The patient is not nervous/anxious.         OBJECTIVE:   BP 92/64 (BP Location: Right arm, Patient Position: Sitting, Cuff Size: Adult Regular)   Pulse 74   Temp 98.4  F (36.9  C) (Tympanic)   Resp 16   Ht 1.638 m (5' 4.5\")   Wt 55.5 kg (122 lb 6.4 oz)   LMP 09/06/2022   SpO2 100%   BMI 20.69 kg/m    Physical Exam  GENERAL: healthy, alert and no distress  EYES: Eyes grossly normal to inspection, PERRL and conjunctivae and sclerae normal  NECK: no adenopathy, no asymmetry, masses, or scars and thyroid normal to palpation  RESP: lungs clear to auscultation - no rales, rhonchi or wheezes  CV: regular rate and rhythm, normal S1 S2, no S3 or S4, no murmur, click or rub, no peripheral edema and peripheral " "pulses strong  ABDOMEN: soft, nontender, no hepatosplenomegaly, no masses and bowel sounds normal  MS: no gross musculoskeletal defects noted, no edema  SKIN: no suspicious lesions or rashes  NEURO: Normal strength and tone, mentation intact and speech normal  PSYCH: mentation appears normal, affect normal/bright    Diagnostic Test Results:  Labs reviewed in Epic    ASSESSMENT/PLAN:     1. Routine general medical examination at a health care facility    - REVIEW OF HEALTH MAINTENANCE PROTOCOL ORDERS    2. Proteinuria, unspecified type  Yearly urine testing.  - Protein timed urine; Future  - Protein timed urine    3. Short gut syndrome  She is concerned about vitamin absorption. Has been on B12 supplements since childhood but doesn't take every day. Will check basic labs, have encouraged her to discuss with her GI what they think she is at risk for.  - Hemoglobin; Future  - Vitamin B12; Future  - Folate; Future  - Hemoglobin  - Vitamin B12  - Folate    4. PVC's (premature ventricular contractions)  Several episodes after caffeine with palpitations, dyspnea. Stress echo normal, heart monitor significant for PVC's/bigeminy during symptoms. Suspect her initial episodes were related to her anemia, caffeine, stress, etc. Now hemoglobin should be coming up. At this point, I don't think further holter monitoring is indicated, especially if she is able to avoid caffeine. She will let me know if she has further episodes.    5. Need for COVID-19 vaccine    - COVID-19,PF,PFIZER BOOSTER BIVALENT (12+YRS)    6. Screening cholesterol level    - Lipid panel reflex to direct LDL Fasting; Future  - Lipid panel reflex to direct LDL Fasting        COUNSELING:  Reviewed preventive health counseling, as reflected in patient instructions    Estimated body mass index is 20.69 kg/m  as calculated from the following:    Height as of this encounter: 1.638 m (5' 4.5\").    Weight as of this encounter: 55.5 kg (122 lb 6.4 oz).        She reports " that she has never smoked. She has never used smokeless tobacco.      Counseling Resources:  ATP IV Guidelines  Pooled Cohorts Equation Calculator  Breast Cancer Risk Calculator  BRCA-Related Cancer Risk Assessment: FHS-7 Tool  FRAX Risk Assessment  ICSI Preventive Guidelines  Dietary Guidelines for Americans, 2010  USDA's MyPlate  ASA Prophylaxis  Lung CA Screening    Aliya Osei MD  [facility]

## 2024-01-04 NOTE — ED PROVIDER NOTES
EMERGENCY DEPARTMENT ENCOUNTER    Room Number:  22/22  PCP: Tanya Julien MD  Historian: Patient, Family      HPI:  Chief Complaint: Bilateral lower extremity edema  A complete HPI/ROS/PMH/PSH/SH/FH are unobtainable due to: None    Context: Goran August is a 89 y.o. male who presents to the ED via private vehicle for evaluation of bilateral lower extremity pain and swelling.  Was admitted partially for this couple weeks ago and improved with IV diuresis.  Does take spironolactone.  History of provoked DVTs in the past, was on Eliquis, and just recently stopped that on Friday.  Was doing great but swelling started to increase again on Tuesday of this week.  No significant chest pains or shortness of breath.  He is concerned that potentially has a new DVT after stopping the Eliquis.  Has not been using any compressive wraps or compression stockings.      MEDICAL RECORD REVIEW    External (non-ED) record review: Discharge summary reviewed from December 23, 2023 after being admitted December 19, 2023 with hypoxemia, lower extremity edema, was treated with steroids and albuterol given a bronchitis at that time, venous duplex ultrasounds were negative at that time but he was on Eliquis.    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    Hiatal hernia with gastroesophageal reflux 05/06/2016    Stage 3 chronic kidney disease 11/02/2018    Environmental allergies 05/01/2019    Benign essential HTN 11/05/2019    Left bundle branch block 11/05/2019    Nonischemic cardiomyopathy 05/24/2013    Exudative age-related macular degeneration, left eye, with active choroidal neovascularization 03/30/2021    Peripheral vascular disease, unspecified 03/30/2021    Bilateral lower extremity  edema 2022    Hypoxemia 2022    Dyspnea 2022    COVID-19 virus infection 2022    Combined systolic and diastolic congestive heart failure 2022    Type 2 diabetes mellitus     Positive D dimer 2022    Nocturnal hypoxia 2022    History of DVT (deep vein thrombosis) 2023    Heart failure with mildly reduced ejection fraction (HFmrEF) 2023    Acute bronchitis 2023     Resolved Ambulatory Problems     Diagnosis Date Noted    Acute DVT (deep venous thrombosis) 2022     Past Medical History:   Diagnosis Date    Anemia     Benign hypertensive heart disease     Cardiomyopathy     Cataract 2020    GERD (gastroesophageal reflux disease)     Heart murmur     Hx of colonic polyps     Hypertension     LBBB (left bundle branch block)     Mitral regurgitation     Osteoarthritis          PAST SURGICAL HISTORY  Past Surgical History:   Procedure Laterality Date    CATARACT EXTRACTION, BILATERAL Bilateral 2020    INGUINAL HERNIA REPAIR      X4    REPLACEMENT TOTAL KNEE Right          FAMILY HISTORY  Family History   Problem Relation Age of Onset    Aneurysm Mother         Colonic AVM in 90's    Hypertension Mother     Parkinsonism Father         90's    Aortic aneurysm Father         AAA    Tuberculosis Father     Tuberculosis Paternal Aunt     Cancer Maternal Grandmother     No Known Problems Daughter     No Known Problems Son          SOCIAL HISTORY  Social History     Socioeconomic History    Marital status:    Tobacco Use    Smoking status: Former     Packs/day: 0.50     Years: 25.00     Additional pack years: 0.00     Total pack years: 12.50     Types: Cigarettes     Quit date:      Years since quittin.0    Smokeless tobacco: Former   Vaping Use    Vaping Use: Never used   Substance and Sexual Activity    Alcohol use: Yes     Comment: one drink monthly    Drug use: No    Sexual activity: Defer         ALLERGIES  Hctz  [hydrochlorothiazide]        REVIEW OF SYSTEMS  Review of Systems     All systems reviewed and negative except for those discussed in HPI.       PHYSICAL EXAM    I have reviewed the triage vital signs and nursing notes.    ED Triage Vitals   Temp Heart Rate Resp BP SpO2   01/04/24 1136 01/04/24 1136 01/04/24 1136 01/04/24 1138 01/04/24 1136   98.2 °F (36.8 °C) 88 16 129/69 92 %      Temp src Heart Rate Source Patient Position BP Location FiO2 (%)   01/04/24 1136 01/04/24 1136 -- -- --   Tympanic Monitor          Physical Exam  General: No acute distress, nontoxic  HEENT: Mucous membranes moist, atraumatic, EOMI  Neck: Full ROM  Pulm: Symmetric chest rise, nonlabored, lungs CTAB  Cardiovascular: Regular rate and rhythm, intact distal pulses, bilateral lower extremity edema with chronic venous stasis changes  GI: Soft, nontender, nondistended, no rebound, no guarding, bowel sounds present  MSK: Full ROM, no deformity  Skin: Warm, dry  Neuro: Awake, alert, oriented x 4, GCS 15, moving all extremities, no focal deficits  Psych: Calm, cooperative        LAB RESULTS  Recent Results (from the past 24 hour(s))   Comprehensive Metabolic Panel    Collection Time: 01/04/24 11:55 AM    Specimen: Arm, Right; Blood   Result Value Ref Range    Glucose 150 (H) 65 - 99 mg/dL    BUN 27 (H) 8 - 23 mg/dL    Creatinine 1.17 0.76 - 1.27 mg/dL    Sodium 140 136 - 145 mmol/L    Potassium 4.7 3.5 - 5.2 mmol/L    Chloride 98 98 - 107 mmol/L    CO2 29.2 (H) 22.0 - 29.0 mmol/L    Calcium 9.8 8.6 - 10.5 mg/dL    Total Protein 7.2 6.0 - 8.5 g/dL    Albumin 3.6 3.5 - 5.2 g/dL    ALT (SGPT) 15 1 - 41 U/L    AST (SGOT) 11 1 - 40 U/L    Alkaline Phosphatase 76 39 - 117 U/L    Total Bilirubin 0.6 0.0 - 1.2 mg/dL    Globulin 3.6 gm/dL    A/G Ratio 1.0 g/dL    BUN/Creatinine Ratio 23.1 7.0 - 25.0    Anion Gap 12.8 5.0 - 15.0 mmol/L    eGFR 59.6 (L) >60.0 mL/min/1.73   CBC Auto Differential    Collection Time: 01/04/24 11:55 AM    Specimen: Arm, Right;  Blood   Result Value Ref Range    WBC 12.96 (H) 3.40 - 10.80 10*3/mm3    RBC 4.31 4.14 - 5.80 10*6/mm3    Hemoglobin 11.8 (L) 13.0 - 17.7 g/dL    Hematocrit 36.7 (L) 37.5 - 51.0 %    MCV 85.2 79.0 - 97.0 fL    MCH 27.4 26.6 - 33.0 pg    MCHC 32.2 31.5 - 35.7 g/dL    RDW 13.9 12.3 - 15.4 %    RDW-SD 43.2 37.0 - 54.0 fl    MPV 9.9 6.0 - 12.0 fL    Platelets 231 140 - 450 10*3/mm3    Neutrophil % 86.1 (H) 42.7 - 76.0 %    Lymphocyte % 6.7 (L) 19.6 - 45.3 %    Monocyte % 6.6 5.0 - 12.0 %    Eosinophil % 0.2 (L) 0.3 - 6.2 %    Basophil % 0.2 0.0 - 1.5 %    Immature Grans % 0.2 0.0 - 0.5 %    Neutrophils, Absolute 11.15 (H) 1.70 - 7.00 10*3/mm3    Lymphocytes, Absolute 0.87 0.70 - 3.10 10*3/mm3    Monocytes, Absolute 0.85 0.10 - 0.90 10*3/mm3    Eosinophils, Absolute 0.03 0.00 - 0.40 10*3/mm3    Basophils, Absolute 0.03 0.00 - 0.20 10*3/mm3    Immature Grans, Absolute 0.03 0.00 - 0.05 10*3/mm3    nRBC 0.0 0.0 - 0.2 /100 WBC   BNP    Collection Time: 01/04/24 11:55 AM    Specimen: Arm, Right; Blood   Result Value Ref Range    proBNP 485.0 0.0 - 1,800.0 pg/mL   Duplex Venous Lower Extremity - Bilateral    Collection Time: 01/04/24  3:08 PM   Result Value Ref Range    Right Common Femoral Spont Y     Right Common Femoral Competent Y     Right Common Femoral Phasic Y     Right Common Femoral Compress C     Right Common Femoral Augment Y     Right Saphenofemoral Junction Compress C     Right Profunda Femoral Compress C     Right Proximal Femoral Compress C     Right Mid Femoral Spont Y     Right Mid Femoral Competent Y     Right Mid Femoral Phasic Y     Right Mid Femoral Compress C     Right Mid Femoral Augment Y     Right Distal Femoral Compress C     Right Popliteal Spont Y     Right Popliteal Competent Y     Right Popliteal Phasic Y     Right Popliteal Compress C     Right Popliteal Augment Y     Right Posterior Tibial Compress C     Right Peroneal Compress C     Right Gastronemius Compress C     Right Lesser Saph Compress  C     Left Common Femoral Spont Y     Left Common Femoral Competent Y     Left Common Femoral Phasic Y     Left Common Femoral Compress C     Left Common Femoral Augment Y     Left Saphenofemoral Junction Compress C     Left Profunda Femoral Compress C     Left Proximal Femoral Compress C     Left Mid Femoral Spont Y     Left Mid Femoral Competent Y     Left Mid Femoral Phasic Y     Left Mid Femoral Compress C     Left Mid Femoral Augment Y     Left Distal Femoral Compress C     Left Popliteal Spont Y     Left Popliteal Competent Y     Left Popliteal Phasic Y     Left Popliteal Compress C     Left Popliteal Augment Y     Left Posterior Tibial Compress C     Left Peroneal Compress C     Left Gastronemius Compress C     Left Greater Saph AK Compress C     Left Greater Saph BK Compress C     Left Lesser Saph Compress C     BH CV VAS PRELIMINARY FINDINGS SCRIPTING 1.0        Ordered the above labs and independently interpreted results. My findings will be discussed in the medical decision making section below        RADIOLOGY  Duplex Venous Lower Extremity - Bilateral    Result Date: 1/4/2024    Normal bilateral lower extremity venous duplex scan.      Ordered the above noted radiological studies.  Independently interpreted by me and my independent review of findings can be found in the ED Course.  See dictation for official radiology interpretation.      PROCEDURES    Procedures      MEDICATIONS GIVEN IN ER    Medications   furosemide (LASIX) injection 40 mg (40 mg Intravenous Given 1/4/24 1309)         PROGRESS, DATA ANALYSIS, CONSULTS, AND MEDICAL DECISION MAKING    Please note that this section constitutes my independent interpretation of clinical data including lab results, radiology, EKG's.  This constitutes my independent professional opinion regarding differential diagnosis and management of this patient.  It may include any factors such as history from outside sources, review of external records, social  determinants of health, management of medications, response to those treatments, and discussions with other providers.    Differential Diagnosis and Plan: Initial concern for chronic venous stasis dermatitis, heart failure exacerbation, renal failure, new onset bilateral DVT after stopping Eliquis on Friday, among others.  Plan for labs, ultrasound, diuresis, and reevaluation with results.    Additional sources:  - Discussed/ obtained information from independent historians: Daughter at bedside states that the swelling started on Tuesday, several days after ending the Eliquis     - Chronic or social conditions impacting care:      - Shared decision making:  Patient and family at bedside fully updated on and in agreement with the course and plan moving forward    ED Course as of 01/04/24 1555   Thu Jan 04, 2024   1237 proBNP: 485.0  629 two weeks ago [DC]   1237 Glucose(!): 150 [DC]   1237 BUN(!): 27 [DC]   1237 Creatinine: 1.17 [DC]   1237 Sodium: 140 [DC]   1237 Potassium: 4.7 [DC]   1237 ALT (SGPT): 15 [DC]   1237 AST (SGOT): 11 [DC]   1237 Alkaline Phosphatase: 76 [DC]   1237 Total Bilirubin: 0.6 [DC]   1237 WBC(!): 12.96  On recent steroids [DC]   1237 Hemoglobin(!): 11.8 [DC]   1237 Platelets: 231 [DC]   1518 Discussed with vascular , negative for DVT bilaterally [DC]   1550 Patient and family updated on the plan today, will discharge on a course of Lasix with outpatient PCP follow-up.  Recommended discontinuing spironolactone for now.  ED return for worsening symptoms as needed.  All questions and concerns addressed. [DC]      ED Course User Index  [DC] Song Ness MD       Hospitalization Considered?:     Orders Placed During This Visit:  Orders Placed This Encounter   Procedures    Comprehensive Metabolic Panel    CBC Auto Differential    BNP    CBC & Differential       Additional orders considered but not placed:      Independent interpretation of labs, radiology studies, and discussions with  consultants: See ED Course        AS OF 15:55 EST VITALS:    BP - 144/62  HR - 73  TEMP - 98.2 °F (36.8 °C) (Tympanic)  02 SATS - 98%        DIAGNOSIS  Final diagnoses:   Bilateral lower extremity edema   History of cardiomyopathy   History of diabetes mellitus         DISPOSITION  DISCHARGE    Patient discharged in stable condition.    Reviewed implications of results, diagnosis, meds, responsibility to follow up, warning signs and symptoms of possible worsening, potential complications and reasons to return to ER. If your blood pressure > 120/80 please follow up with your primary care provider for further management.    Patient/Family voiced understanding of above instructions.    Discussed plan for discharge, as there is no emergent indication for admission. Pt/family is agreeable and understands need for follow up and repeat testing.  Pt is aware that discharge does not mean that nothing is wrong but it indicates no emergency is present that requires admission and they must continue care with follow-up as given below or physician of their choice.     FOLLOW-UP  [facility] EMERGENCY DEPARTMENT  [address]  [facility] [address] [phone]    As needed, If symptoms worsen    Tanya Julien MD  [address] [facility] [id number] [phone]    Call            Medication List        New Prescriptions      furosemide 20 MG tablet  Commonly known as: LASIX  Take 2 tablets by mouth Daily for 30 days.            Stop      spironolactone 25 MG tablet  Commonly known as: ALDACTONE               Where to Get Your Medications        These medications were sent to [id number] - [facility] [address] AT [facility] [phone] [facility] [phone]   [address] [facility] [facility] [address] [place] [address]      Phone: [phone]   furosemide 20 MG tablet                       --    Please note that portions of this were completed with a  voice recognition program.       Note Disclaimer: At [facility], we believe that sharing information builds trust and better relationships. You are receiving this note because you are receiving care at [facility] or recently visited. It is possible you will see health information before a provider has talked with you about it. This kind of information can be easy to misunderstand. To help you fully understand what it means for your health, we urge you to discuss this note with your provider.           Song Ness MD  01/04/24 3188

## 2024-01-09 ENCOUNTER — OFFICE VISIT (OUTPATIENT)
Dept: FAMILY MEDICINE CLINIC | Facility: CLINIC | Age: 89
End: 2024-01-09
Payer: MEDICARE

## 2024-01-09 VITALS
SYSTOLIC BLOOD PRESSURE: 108 MMHG | HEIGHT: 71 IN | BODY MASS INDEX: 31.08 KG/M2 | TEMPERATURE: 97.8 F | WEIGHT: 222 LBS | DIASTOLIC BLOOD PRESSURE: 58 MMHG | HEART RATE: 80 BPM | OXYGEN SATURATION: 95 %

## 2024-01-09 DIAGNOSIS — R29.898 WEAKNESS OF BOTH LOWER EXTREMITIES: ICD-10-CM

## 2024-01-09 DIAGNOSIS — R05.1 ACUTE COUGH: ICD-10-CM

## 2024-01-09 DIAGNOSIS — R26.89 BALANCE PROBLEM: ICD-10-CM

## 2024-01-09 DIAGNOSIS — R60.0 PEDAL EDEMA: Primary | ICD-10-CM

## 2024-01-09 DIAGNOSIS — I11.9 HYPERTENSIVE HEART DISEASE WITHOUT HEART FAILURE: ICD-10-CM

## 2024-01-09 DIAGNOSIS — R79.89 ELEVATED SERUM CREATININE: ICD-10-CM

## 2024-01-09 DIAGNOSIS — I42.8 NONISCHEMIC CARDIOMYOPATHY: ICD-10-CM

## 2024-01-09 LAB
BUN SERPL-MCNC: 34 MG/DL (ref 8–23)
BUN/CREAT SERPL: 32.4 (ref 7–25)
CALCIUM SERPL-MCNC: 9.6 MG/DL (ref 8.6–10.5)
CHLORIDE SERPL-SCNC: 99 MMOL/L (ref 98–107)
CO2 SERPL-SCNC: 30.2 MMOL/L (ref 22–29)
CREAT SERPL-MCNC: 1.05 MG/DL (ref 0.76–1.27)
EGFRCR SERPLBLD CKD-EPI 2021: 67.9 ML/MIN/1.73
GLUCOSE SERPL-MCNC: 130 MG/DL (ref 65–99)
POTASSIUM SERPL-SCNC: 5.3 MMOL/L (ref 3.5–5.2)
SODIUM SERPL-SCNC: 139 MMOL/L (ref 136–145)

## 2024-01-09 RX ORDER — FUROSEMIDE 20 MG/1
40 TABLET ORAL DAILY
Qty: 60 TABLET | Refills: 3 | Status: SHIPPED | OUTPATIENT
Start: 2024-01-09

## 2024-01-09 RX ORDER — DEXTROMETHORPHAN HYDROBROMIDE AND PROMETHAZINE HYDROCHLORIDE 15; 6.25 MG/5ML; MG/5ML
5 SYRUP ORAL 4 TIMES DAILY PRN
Qty: 240 ML | Refills: 0 | Status: SHIPPED | OUTPATIENT
Start: 2024-01-09

## 2024-01-09 NOTE — PROGRESS NOTES
"Chief Complaint  Edema (Follow up edema from hospital still has swelling  is taking the furosemide but is seda taking spironolactone  at the same time )    Subjective        Goran August presents to [facility]  History of Present Illness  Pleasant 89-year-old male here to follow-up for ER follow-up for swelling, he presented January 4 for bilateral lower edema and pain.  He is taking spironolactone and does have a history of provoked DVT and was previously on Eliquis but stopped taking this recently.  He was concerned about having a new DVT after stopping the Eliquis.  Duplex performed showing normal bilateral lower extremity findings, BNP improved at 485 from 629 before at his discharge a few weeks ago.  CMP shows a worsening creatinine-normal baseline around 0.9 and went up to 1.7.  He was advised to stop Xarelto but has continue taking this.    Objective   Vital Signs:  /58   Pulse 80   Temp 97.8 °F (36.6 °C)   Ht 180.3 cm (71\")   Wt 101 kg (222 lb)   SpO2 95%   BMI 30.96 kg/m²   Estimated body mass index is 30.96 kg/m² as calculated from the following:    Height as of this encounter: 180.3 cm (71\").    Weight as of this encounter: 101 kg (222 lb).       Physical Exam  Vitals and nursing note reviewed.   Constitutional:       General: He is not in acute distress.     Appearance: He is well-developed.   HENT:      Head: Normocephalic.      Nose: Nose normal.   Cardiovascular:      Rate and Rhythm: Normal rate and regular rhythm.      Heart sounds: Normal heart sounds. No murmur heard.  Pulmonary:      Effort: Pulmonary effort is normal. No respiratory distress.      Breath sounds: Normal breath sounds.   Musculoskeletal:         General: Normal range of motion.      Right lower leg: Edema present.      Left lower leg: Edema present.      Comments: 38 cm around both leg   Skin:     General: Skin is warm and dry.      Findings: No rash.   Neurological:      Mental " Status: He is alert and oriented to person, place, and time.   Psychiatric:         Behavior: Behavior normal.         Thought Content: Thought content normal.         Judgment: Judgment normal.        Result Review :  The following data was reviewed by: Tanya Julien MD on 01/09/2024:        BNP (01/04/2024 11:55)  Comprehensive Metabolic Panel (01/04/2024 11:55)  CBC & Differential (01/04/2024 11:55)  TSH Rfx On Abnormal To Free T4 (12/31/2023 02:00)           Assessment and Plan   Diagnoses and all orders for this visit:    1. Pedal edema (Primary)  -     furosemide (LASIX) 20 MG tablet; Take 2 tablets by mouth Daily.  Dispense: 60 tablet; Refill: 3    2. Elevated serum creatinine  -     Basic Metabolic Panel    3. Acute cough  -     promethazine-dextromethorphan (PROMETHAZINE-DM) 6.25-15 MG/5ML syrup; Take 5 mL by mouth 4 (Four) Times a Day As Needed for Cough.  Dispense: 240 mL; Refill: 0    4. Weakness of both lower extremities  -     Ambulatory Referral to Home Health    5. Balance problem  -     Ambulatory Referral to Home Health    6. Nonischemic cardiomyopathy  -     Ambulatory Referral to Home Health    7. Hypertensive heart disease without heart failure  -     Ambulatory Referral to Home Health    Pleasant 89-year-old male here with his wife and daughter Anel.  It does seem that his edema overall has improved and lost about 5 pounds with increasing Lasix to 40 mg, however I do think he should stop the spironolactone as this is likely lowering his blood pressure and renal function, reassess BMP today.  Discussed elevation, compression socks.    Generalized weakness worsening, home health for weakness in his lower extremities that have worsened with his recent upper respiratory infection, I think he is lost some of his strength.  Home health ordered as above.    Echo respiratory infection: He is overall improving from the standpoint as well but does want 1 refill on his cough medicine.  We can  reassess with his next visit.       Follow Up   Return if symptoms worsen or fail to improve, for Fu as scheduled.  Patient was given instructions and counseling regarding his condition or for health maintenance advice. Please see specific information pulled into the AVS if appropriate.     Tanya Julien MD

## 2024-01-12 ENCOUNTER — TELEPHONE (OUTPATIENT)
Dept: FAMILY MEDICINE CLINIC | Facility: CLINIC | Age: 89
End: 2024-01-12
Payer: MEDICARE

## 2024-01-12 ENCOUNTER — HOME HEALTH ADMISSION (OUTPATIENT)
Dept: HOME HEALTH SERVICES | Facility: HOME HEALTHCARE | Age: 89
End: 2024-01-12
Payer: MEDICARE

## 2024-01-12 NOTE — TELEPHONE ENCOUNTER
[facility] recently received referral for patient. However, they will not be able to see Patient until Monday 01/15/2024. Suzie from [facility] would like to know if this is okay. Please advise, thank you.

## 2024-01-15 ENCOUNTER — HOME CARE VISIT (OUTPATIENT)
Dept: HOME HEALTH SERVICES | Facility: HOME HEALTHCARE | Age: 89
End: 2024-01-15
Payer: MEDICARE

## 2024-01-15 RX ORDER — PRAVASTATIN SODIUM 20 MG
TABLET ORAL
Qty: 90 TABLET | Refills: 3 | Status: SHIPPED | OUTPATIENT
Start: 2024-01-15

## 2024-01-29 ENCOUNTER — OFFICE VISIT (OUTPATIENT)
Dept: FAMILY MEDICINE CLINIC | Facility: CLINIC | Age: 89
End: 2024-01-29
Payer: MEDICARE

## 2024-01-29 VITALS
SYSTOLIC BLOOD PRESSURE: 110 MMHG | TEMPERATURE: 97.6 F | HEART RATE: 72 BPM | OXYGEN SATURATION: 96 % | DIASTOLIC BLOOD PRESSURE: 54 MMHG | HEIGHT: 71 IN | WEIGHT: 223 LBS | BODY MASS INDEX: 31.22 KG/M2

## 2024-01-29 DIAGNOSIS — E11.22 TYPE 2 DIABETES MELLITUS WITH STAGE 3A CHRONIC KIDNEY DISEASE, WITHOUT LONG-TERM CURRENT USE OF INSULIN: Primary | ICD-10-CM

## 2024-01-29 DIAGNOSIS — N18.31 TYPE 2 DIABETES MELLITUS WITH STAGE 3A CHRONIC KIDNEY DISEASE, WITHOUT LONG-TERM CURRENT USE OF INSULIN: Primary | ICD-10-CM

## 2024-01-29 DIAGNOSIS — R60.0 BILATERAL LOWER EXTREMITY EDEMA: ICD-10-CM

## 2024-01-29 DIAGNOSIS — H61.23 BILATERAL HEARING LOSS DUE TO CERUMEN IMPACTION: ICD-10-CM

## 2024-01-29 PROCEDURE — 99214 OFFICE O/P EST MOD 30 MIN: CPT | Performed by: FAMILY MEDICINE

## 2024-01-29 PROCEDURE — 1160F RVW MEDS BY RX/DR IN RCRD: CPT | Performed by: FAMILY MEDICINE

## 2024-01-29 PROCEDURE — 69210 REMOVE IMPACTED EAR WAX UNI: CPT | Performed by: FAMILY MEDICINE

## 2024-01-29 PROCEDURE — 1159F MED LIST DOCD IN RCRD: CPT | Performed by: FAMILY MEDICINE

## 2024-01-29 NOTE — PROGRESS NOTES
"Chief Complaint  Diabetes (Follow up no complains ) and Edema (Is better now taking 2 20 mg lasix)    Subjective        Goran August presents to [facility]  Diabetes      Pleasant 89-year-old male to follow-up for type 2 diabetes that is worsening but still well-controlled with A1c goal less than 7.5.  And lower extremity swelling that has thankfully improved substantially, he is taking the 40 mg of Lasix with improvement.    Rear feels like it is stopped up, he feels like it is sticky.     Objective   Vital Signs:  /54   Pulse 72   Temp 97.6 °F (36.4 °C)   Ht 180.3 cm (71\")   Wt 101 kg (223 lb)   SpO2 96%   BMI 31.10 kg/m²   Estimated body mass index is 31.1 kg/m² as calculated from the following:    Height as of this encounter: 180.3 cm (71\").    Weight as of this encounter: 101 kg (223 lb).               Physical Exam  Vitals and nursing note reviewed.   Constitutional:       General: He is not in acute distress.     Appearance: He is well-developed.   HENT:      Head: Normocephalic.      Right Ear: There is impacted cerumen.      Left Ear: There is impacted cerumen.      Nose: Nose normal.   Cardiovascular:      Rate and Rhythm: Normal rate and regular rhythm.      Heart sounds: Normal heart sounds. No murmur heard.  Pulmonary:      Effort: Pulmonary effort is normal. No respiratory distress.      Breath sounds: Normal breath sounds.   Musculoskeletal:         General: Normal range of motion.      Comments: Trace bilateral pitting edema, wearing compression socks.   Skin:     General: Skin is warm and dry.      Findings: No rash.   Neurological:      Mental Status: He is alert and oriented to person, place, and time.   Psychiatric:         Behavior: Behavior normal.         Thought Content: Thought content normal.         Judgment: Judgment normal.        Result Review :    The following data was reviewed by: Tanya Julien MD on 01/29/2024:        TSH Rfx On " Abnormal To Free T4 (01/23/2024 11:16)  Ferritin (01/23/2024 11:16)  Hemoglobin A1c (01/23/2024 11:16)  Lipid Panel (01/23/2024 11:16)  CBC & Differential (01/23/2024 11:16)  Comprehensive Metabolic Panel (01/23/2024 11:16)  Ear Cerumen Removal    Date/Time: 1/29/2024 3:03 PM    Performed by: Tanya Julien MD  Authorized by: Tanya Julien MD  Consent: Verbal consent obtained.  Risks and benefits: risks, benefits and alternatives were discussed  Consent given by: patient  Patient understanding: patient states understanding of the procedure being performed  Patient identity confirmed: verbally with patient  Location details: right ear and left ear  Patient tolerance: Patient tolerated the procedure well with no immediate complications  Procedure type: instrumentation           Assessment and Plan     Diagnoses and all orders for this visit:    1. Type 2 diabetes mellitus with stage 3a chronic kidney disease, without long-term current use of insulin (Primary)    2. Bilateral lower extremity edema    3. Bilateral hearing loss due to cerumen impaction  -     Ear Cerumen Removal    Very pleasant 89-year-old male who is overall doing well, type 2 diabetes which is worsening but technically well-controlled with A1c goal less than 7.5.  No change to meds, discussed decreasing sweets but otherwise continuing with staying active and strong.    Bilateral edema improving, continue off spironolactone.  Okay to continue with furosemide 40 mg daily.    Cerumen impaction bilaterally removed, tolerated procedure well with no complications.         Follow Up     Return in about 3 months (around 4/29/2024), or if symptoms worsen or fail to improve, for Recheck Diabetes - ok to make an AWV.  Patient was given instructions and counseling regarding his condition or for health maintenance advice. Please see specific information pulled into the AVS if appropriate.
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
## 2024-02-08 DIAGNOSIS — G25.81 RESTLESS LEG SYNDROME: ICD-10-CM

## 2024-02-08 RX ORDER — PRAMIPEXOLE DIHYDROCHLORIDE 0.25 MG/1
0.25 TABLET ORAL NIGHTLY
Qty: 90 TABLET | Refills: 1 | Status: SHIPPED | OUTPATIENT
Start: 2024-02-08

## 2024-02-08 NOTE — TELEPHONE ENCOUNTER
"  Caller: Goran August \"Bill\"    Relationship: Self    Best call back number: [phone]*    Requested Prescriptions:   Requested Prescriptions     Pending Prescriptions Disp Refills    pramipexole (MIRAPEX) 0.25 MG tablet 90 tablet 1     Sig: Take 1 tablet by mouth Every Night.        Pharmacy where request should be sent: [id number] [address] AT [facility] [phone] PH - [phone]      Last office visit with prescribing clinician: 1/29/2024   Last telemedicine visit with prescribing clinician: Visit date not found   Next office visit with prescribing clinician: 4/30/2024     Additional details provided by patient: PATIENT CALLING STATING THAT THIS MEDICATION IS OUT OF REFILLS, AND WILL NEED TO BE \"AUTHORIZED\" AGAIN.    Does the patient have less than a 3 day supply:  [] Yes  [x] No    Would you like a call back once the refill request has been completed: [] Yes [x] No    If the office needs to give you a call back, can they leave a voicemail: [] Yes [x] No    Andrei Rubi   02/08/24 14:07 EST         "

## 2024-02-27 ENCOUNTER — CLINICAL SUPPORT (OUTPATIENT)
Dept: FAMILY MEDICINE CLINIC | Facility: CLINIC | Age: 89
End: 2024-02-27
Payer: MEDICARE

## 2024-02-27 DIAGNOSIS — Z23 ENCOUNTER FOR IMMUNIZATION: Primary | ICD-10-CM

## 2024-04-03 DIAGNOSIS — G25.81 RESTLESS LEG SYNDROME: ICD-10-CM

## 2024-04-03 RX ORDER — PRAMIPEXOLE DIHYDROCHLORIDE 0.25 MG/1
0.25 TABLET ORAL NIGHTLY
Qty: 90 TABLET | Refills: 1 | Status: SHIPPED | OUTPATIENT
Start: 2024-04-03

## 2024-04-30 ENCOUNTER — OFFICE VISIT (OUTPATIENT)
Dept: FAMILY MEDICINE CLINIC | Facility: CLINIC | Age: 89
End: 2024-04-30
Payer: MEDICARE

## 2024-04-30 VITALS
BODY MASS INDEX: 31.36 KG/M2 | TEMPERATURE: 97.6 F | DIASTOLIC BLOOD PRESSURE: 76 MMHG | HEART RATE: 60 BPM | OXYGEN SATURATION: 98 % | SYSTOLIC BLOOD PRESSURE: 142 MMHG | HEIGHT: 71 IN | WEIGHT: 224 LBS

## 2024-04-30 DIAGNOSIS — I50.22 HEART FAILURE WITH MILDLY REDUCED EJECTION FRACTION (HFMREF): ICD-10-CM

## 2024-04-30 DIAGNOSIS — E13.51 PERIPHERAL VASCULAR DISEASE DUE TO SECONDARY DIABETES: ICD-10-CM

## 2024-04-30 DIAGNOSIS — Z00.00 MEDICARE ANNUAL WELLNESS VISIT, SUBSEQUENT: Primary | ICD-10-CM

## 2024-04-30 DIAGNOSIS — E11.22 TYPE 2 DIABETES MELLITUS WITH STAGE 3A CHRONIC KIDNEY DISEASE, WITHOUT LONG-TERM CURRENT USE OF INSULIN: ICD-10-CM

## 2024-04-30 DIAGNOSIS — N18.31 TYPE 2 DIABETES MELLITUS WITH STAGE 3A CHRONIC KIDNEY DISEASE, WITHOUT LONG-TERM CURRENT USE OF INSULIN: ICD-10-CM

## 2024-04-30 DIAGNOSIS — H35.3221 EXUDATIVE AGE-RELATED MACULAR DEGENERATION, LEFT EYE, WITH ACTIVE CHOROIDAL NEOVASCULARIZATION: ICD-10-CM

## 2024-04-30 DIAGNOSIS — N18.31 STAGE 3A CHRONIC KIDNEY DISEASE: ICD-10-CM

## 2024-04-30 LAB
EXPIRATION DATE: ABNORMAL
HBA1C MFR BLD: 6.3 % (ref 4.5–5.7)
Lab: 678

## 2024-04-30 PROCEDURE — 1159F MED LIST DOCD IN RCRD: CPT | Performed by: FAMILY MEDICINE

## 2024-04-30 PROCEDURE — 83036 HEMOGLOBIN GLYCOSYLATED A1C: CPT | Performed by: FAMILY MEDICINE

## 2024-04-30 PROCEDURE — 1170F FXNL STATUS ASSESSED: CPT | Performed by: FAMILY MEDICINE

## 2024-04-30 PROCEDURE — G0439 PPPS, SUBSEQ VISIT: HCPCS | Performed by: FAMILY MEDICINE

## 2024-04-30 PROCEDURE — 3044F HG A1C LEVEL LT 7.0%: CPT | Performed by: FAMILY MEDICINE

## 2024-04-30 PROCEDURE — 1160F RVW MEDS BY RX/DR IN RCRD: CPT | Performed by: FAMILY MEDICINE

## 2024-04-30 NOTE — PROGRESS NOTES
The ABCs of the Annual Wellness Visit  Subsequent Medicare Wellness Visit    Subjective    Goran August is a 89 y.o. male who presents for a Subsequent Medicare Wellness Visit.    The following portions of the patient's history were reviewed and   updated as appropriate: allergies, current medications, past family history, past medical history, past social history, past surgical history, and problem list.    Compared to one year ago, the patient feels his physical   health is the same.    Compared to one year ago, the patient feels his mental   health is the same.  He has a decent amount of stress seeing his wife, he is a generally optimistic person and sees the good.     Recent Hospitalizations:  This patient has had a [facility] admission record on file within the last 365 days.    Current Medical Providers:  Patient Care Team:  Tanya Julien MD as PCP - General (Family Medicine)    Outpatient Medications Prior to Visit   Medication Sig Dispense Refill    albuterol sulfate  (90 Base) MCG/ACT inhaler Inhale 2 puffs Every 4 (Four) Hours As Needed for Wheezing or Shortness of Air. 18 g 6    esomeprazole (nexIUM) 40 MG capsule TAKE 1 CAPSULE DAILY 90 capsule 3    fluticasone (FLONASE) 50 MCG/ACT nasal spray 2 sprays into the nostril(s) as directed by provider Daily. Administer 2 sprays in each nostril once a day 3 bottle 3    furosemide (LASIX) 20 MG tablet Take 2 tablets by mouth Daily. 60 tablet 3    Multiple Vitamins-Minerals (PRESERVISION AREDS 2 PO) Take  by mouth.      multivitamin with minerals tablet tablet Take 1 tablet by mouth Daily.      pramipexole (MIRAPEX) 0.25 MG tablet TAKE 1 TABLET BY MOUTH EVERY NIGHT 90 tablet 1    pravastatin (PRAVACHOL) 20 MG tablet TAKE 1 TABLET DAILY 90 tablet 3    promethazine-dextromethorphan (PROMETHAZINE-DM) 6.25-15 MG/5ML syrup Take 5 mL by mouth 4 (Four) Times a Day As Needed for Cough. 240 mL 0    ramipril (ALTACE) 10 MG capsule TAKE 1 CAPSULE DAILY  "90 capsule 3    triamcinolone (KENALOG) 0.1 % cream Apply  topically to the appropriate area as directed 2 (Two) Times a Day. 45 g 1     No facility-administered medications prior to visit.       No opioid medication identified on active medication list. I have reviewed chart for other potential  high risk medication/s and harmful drug interactions in the elderly.        Aspirin is not on active medication list.  Aspirin use is not indicated based on review of current medical condition/s. Risk of harm outweighs potential benefits.  .    Patient Active Problem List   Diagnosis    Insulin resistance    Hypertensive heart disease without heart failure    Hyperlipidemia    Iron-deficiency anemia    Idiopathic chronic gout without tophus    Hiatal hernia with gastroesophageal reflux    Stage 3 chronic kidney disease    Environmental allergies    Benign essential HTN    Left bundle branch block    Nonischemic cardiomyopathy    Exudative age-related macular degeneration, left eye, with active choroidal neovascularization    Peripheral vascular disease, unspecified    Bilateral lower extremity edema    Hypoxemia    Dyspnea    COVID-19 virus infection    Combined systolic and diastolic congestive heart failure    Type 2 diabetes mellitus    Positive D dimer    Nocturnal hypoxia    History of DVT (deep vein thrombosis)    Heart failure with mildly reduced ejection fraction (HFmrEF)    Acute bronchitis     Advance Care Planning   Advance Care Planning     Advance Directive is on file.  ACP discussion was held with the patient during this visit. Patient has an advance directive in EMR which is still valid.      Objective    Vitals:    04/30/24 1007   BP: 142/76   Pulse: 60   Temp: 97.6 °F (36.4 °C)   SpO2: 98%   Weight: 102 kg (224 lb)   Height: 180.3 cm (71\")     Estimated body mass index is 31.24 kg/m² as calculated from the following:    Height as of this encounter: 180.3 cm (71\").    Weight as of this encounter: 102 kg (224 " lb).           Does the patient have evidence of cognitive impairment? No    Lab Results   Component Value Date    HGBA1C 6.3 (A) 2024        HEALTH RISK ASSESSMENT    Smoking Status:  Social History     Tobacco Use   Smoking Status Former    Current packs/day: 0.00    Average packs/day: 0.5 packs/day for 25.0 years (12.5 ttl pk-yrs)    Types: Cigarettes    Start date:     Quit date: 1980    Years since quittin.3   Smokeless Tobacco Former     Alcohol Consumption:  Social History     Substance and Sexual Activity   Alcohol Use Yes    Comment: one drink monthly     Fall Risk Screen:    SETH Fall Risk Assessment was completed, and patient is at LOW risk for falls.Assessment completed on:2024    Depression Screenin/30/2024    10:07 AM   PHQ-2/PHQ-9 Depression Screening   Little Interest or Pleasure in Doing Things 0-->not at all   Feeling Down, Depressed or Hopeless 0-->not at all   PHQ-9: Brief Depression Severity Measure Score 0       Health Habits and Functional and Cognitive Screenin/30/2024    10:08 AM   Functional & Cognitive Status   Do you have difficulty preparing food and eating? No   Do you have difficulty bathing yourself, getting dressed or grooming yourself? No   Do you have difficulty using the toilet? No   Do you have difficulty moving around from place to place? No   Do you have trouble with steps or getting out of a bed or a chair? No   Current Diet Well Balanced Diet   Dental Exam Up to date   Eye Exam Up to date   Exercise (times per week) 3 times per week   Current Exercises Include Walking   Do you need help using the phone?  No   Are you deaf or do you have serious difficulty hearing?  No   Do you need help to go to places out of walking distance? No   Do you need help shopping? No   Do you need help preparing meals?  No   Do you need help with housework?  No   Do you need help with laundry? No   Do you need help taking your medications? No   Do you need  help managing money? No   Do you ever drive or ride in a car without wearing a seat belt? No   Have you felt unusual stress, anger or loneliness in the last month? No   Who do you live with? Spouse   If you need help, do you have trouble finding someone available to you? No   Have you been bothered in the last four weeks by sexual problems? No   Do you have difficulty concentrating, remembering or making decisions? No       Age-appropriate Screening Schedule:  Refer to the list below for future screening recommendations based on patient's age, sex and/or medical conditions. Orders for these recommended tests are listed in the plan section. The patient has been provided with a written plan.    Health Maintenance   Topic Date Due    RSV Vaccine - Adults (1 - 1-dose 60+ series) Never done    COVID-19 Vaccine (4 - 2023-24 season) 09/01/2023    BMI FOLLOWUP  07/24/2024    INFLUENZA VACCINE  08/01/2024    HEMOGLOBIN A1C  10/30/2024    URINE MICROALBUMIN  12/19/2024    LIPID PANEL  01/23/2025    DIABETIC EYE EXAM  01/25/2025    ANNUAL WELLNESS VISIT  04/30/2025    TDAP/TD VACCINES (3 - Td or Tdap) 03/02/2034    Pneumococcal Vaccine 65+  Completed    ZOSTER VACCINE  Completed                  CMS Preventative Services Quick Reference  Risk Factors Identified During Encounter  Dental Screening Recommended  Vision Screening Recommended  The above risks/problems have been discussed with the patient.  Pertinent information has been shared with the patient in the After Visit Summary.  An After Visit Summary and PPPS were made available to the patient.    Follow Up:   Next Medicare Wellness visit to be scheduled in 1 year.       Additional E&M Note during same encounter follows:  Patient has multiple medical problems which are significant and separately identifiable that require additional work above and beyond the Medicare Wellness Visit.      Chief Complaint  Medicare Wellness-subsequent (No complains   not sure  if still need  "to take spironolactone )    Subjective        HPI  Goran August is also being seen today for diabetes which is thankfully well-controlled off medication no adverse effects.  Up-to-date with his cardiologist, echocardiogram done in December with an EF of 55% and grade 1 diastolic dysfunction, pedal edema well-controlled on Lasix 40 mg daily which has been very helpful.  He is still wearing compression socks.Does see his podiatrist every 61 days and gets glaucoma shots every 8 weeks with his eye doctor.         Objective   Vital Signs:  /76   Pulse 60   Temp 97.6 °F (36.4 °C)   Ht 180.3 cm (71\")   Wt 102 kg (224 lb)   SpO2 98%   BMI 31.24 kg/m²     Physical Exam  Vitals and nursing note reviewed.   Constitutional:       General: He is not in acute distress.     Appearance: He is well-developed.   HENT:      Head: Normocephalic.      Nose: Nose normal.   Cardiovascular:      Rate and Rhythm: Normal rate and regular rhythm.      Heart sounds: Normal heart sounds. No murmur heard.  Pulmonary:      Effort: Pulmonary effort is normal. No respiratory distress.      Breath sounds: Normal breath sounds.   Musculoskeletal:         General: Normal range of motion.      Right lower leg: No edema.      Left lower leg: No edema.      Comments: Narrow gait, ambulating with a cane.   Skin:     General: Skin is warm and dry.      Findings: No rash.   Neurological:      Mental Status: He is alert and oriented to person, place, and time.   Psychiatric:         Behavior: Behavior normal.         Thought Content: Thought content normal.         Judgment: Judgment normal.          The following data was reviewed by: Tanya Julien MD on 04/30/2024:  A1C Last 3 Results          7/24/2023    12:46 1/23/2024    11:16 4/30/2024    10:22   HGBA1C Last 3 Results   Hemoglobin A1C 6.1  7.00  6.3                 Assessment and Plan   Diagnoses and all orders for this visit:    1. Medicare annual wellness visit, subsequent " (Primary)    2. Type 2 diabetes mellitus with stage 3a chronic kidney disease, without long-term current use of insulin  -     POC Glycosylated Hemoglobin (Hb A1C)  -     Comprehensive Metabolic Panel; Future  -     CBC & Differential; Future  -     Lipid Panel; Future  -     Hemoglobin A1c; Future  -     Microalbumin / Creatinine Urine Ratio - Urine, Clean Catch; Future    3. Exudative age-related macular degeneration, left eye, with active choroidal neovascularization    4. Heart failure with mildly reduced ejection fraction (HFmrEF)    5. Peripheral vascular disease due to secondary diabetes  -     Comprehensive Metabolic Panel; Future  -     CBC & Differential; Future    6. Stage 3a chronic kidney disease  -     Vitamin D,25-Hydroxy; Future    Medicare wellness visit today, doing well up-to-date with immunizations and screening tests.  He is really doing great, turning 89 this year.  Very optimistic, some stress with caring for his wife with dementia but he seems to be handling this well and has great support with his children especially his daughter Anel.    He is up-to-date with his cardiologist, gets his feet check with podiatry every 61 days and gets vision evaluation and intraocular shots for macular degeneration every 8 weeks.  He feels that things are stable and going well overall.  Will do for labs as above prior to next visit but otherwise continue same meds and follow-up sooner if needed.         Follow Up   Return in about 6 months (around 10/30/2024), or if symptoms worsen or fail to improve, for Recheck Diabetes with labs prior.  Patient was given instructions and counseling regarding his condition or for health maintenance advice. Please see specific information pulled into the AVS if appropriate.

## 2024-06-21 ENCOUNTER — TELEPHONE (OUTPATIENT)
Dept: FAMILY MEDICINE CLINIC | Facility: CLINIC | Age: 89
End: 2024-06-21
Payer: MEDICARE

## 2024-06-21 NOTE — TELEPHONE ENCOUNTER
"    Caller: Goran August \"Shady\"    Relationship to patient: Self    Best call back number: [phone]     Patient is needing: PATIENT STATES HIS CARDIOLOGIST WANTS TO KNOW WHEN THE PATIENT STOPPED TAKING ELOQUIS.    PATIENT STATES THAT HE WOULD LIKE A CALL BACK TO [facility] SO HE CAN UPDATE HIS CARDIOLOGIST.     PLEASE CALL AFTER 12PM TODAY AS THE PATIENT WILL NOT BE NEAR HIS PHONE TIL THEN.       "

## 2024-07-24 RX ORDER — SPIRONOLACTONE 25 MG/1
25 TABLET ORAL DAILY
Qty: 90 TABLET | Refills: 3 | OUTPATIENT
Start: 2024-07-24

## 2024-07-25 DIAGNOSIS — G25.81 RESTLESS LEG SYNDROME: ICD-10-CM

## 2024-07-25 RX ORDER — PRAMIPEXOLE DIHYDROCHLORIDE 0.25 MG/1
0.25 TABLET ORAL NIGHTLY
Qty: 90 TABLET | Refills: 1 | Status: SHIPPED | OUTPATIENT
Start: 2024-07-25

## 2024-08-01 RX ORDER — RAMIPRIL 10 MG/1
CAPSULE ORAL
Qty: 90 CAPSULE | Refills: 3 | Status: SHIPPED | OUTPATIENT
Start: 2024-08-01

## 2024-10-01 DIAGNOSIS — Z23 NEED FOR VACCINATION: Primary | ICD-10-CM

## 2024-10-01 PROCEDURE — 90662 IIV NO PRSV INCREASED AG IM: CPT | Performed by: FAMILY MEDICINE

## 2024-10-01 PROCEDURE — G0008 ADMIN INFLUENZA VIRUS VAC: HCPCS | Performed by: FAMILY MEDICINE

## 2024-10-22 ENCOUNTER — HOSPITAL ENCOUNTER (EMERGENCY)
Facility: HOSPITAL | Age: 89
Discharge: HOME | End: 2024-10-22
Attending: EMERGENCY MEDICINE | Admitting: EMERGENCY MEDICINE
Payer: MEDICARE

## 2024-10-22 ENCOUNTER — APPOINTMENT (OUTPATIENT)
Dept: CT IMAGING | Facility: HOSPITAL | Age: 89
End: 2024-10-22
Payer: MEDICARE

## 2024-10-22 VITALS
HEART RATE: 76 BPM | HEIGHT: 71 IN | WEIGHT: 224.87 LBS | TEMPERATURE: 98.4 F | OXYGEN SATURATION: 96 % | SYSTOLIC BLOOD PRESSURE: 144 MMHG | DIASTOLIC BLOOD PRESSURE: 68 MMHG | BODY MASS INDEX: 31.48 KG/M2 | RESPIRATION RATE: 18 BRPM

## 2024-10-22 DIAGNOSIS — S09.90XA INJURY OF HEAD, INITIAL ENCOUNTER: ICD-10-CM
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
## 2024-10-22 DIAGNOSIS — S01.01XA LACERATION OF SCALP, INITIAL ENCOUNTER: ICD-10-CM

## 2024-10-22 DIAGNOSIS — S39.012A ACUTE MYOFASCIAL STRAIN OF LUMBAR REGION, INITIAL ENCOUNTER: ICD-10-CM

## 2024-10-22 PROCEDURE — 72125 CT NECK SPINE W/O DYE: CPT

## 2024-10-22 PROCEDURE — 90715 TDAP VACCINE 7 YRS/> IM: CPT | Performed by: EMERGENCY MEDICINE

## 2024-10-22 PROCEDURE — 70450 CT HEAD/BRAIN W/O DYE: CPT

## 2024-10-22 PROCEDURE — 25010000002 TETANUS-DIPHTH-ACELL PERTUSSIS 5-2.5-18.5 LF-MCG/0.5 SUSPENSION PREFILLED SYRINGE: Performed by: EMERGENCY MEDICINE

## 2024-10-22 PROCEDURE — 25010000002 LIDOCAINE 1% - EPINEPHRINE 1:100000 1 %-1:100000 SOLUTION: Performed by: EMERGENCY MEDICINE

## 2024-10-22 PROCEDURE — 99284 EMERGENCY DEPT VISIT MOD MDM: CPT

## 2024-10-22 PROCEDURE — 72131 CT LUMBAR SPINE W/O DYE: CPT

## 2024-10-22 PROCEDURE — 90471 IMMUNIZATION ADMIN: CPT | Performed by: EMERGENCY MEDICINE

## 2024-10-22 RX ORDER — LIDOCAINE HYDROCHLORIDE AND EPINEPHRINE 10; 10 MG/ML; UG/ML
10 INJECTION, SOLUTION INFILTRATION; PERINEURAL ONCE
Status: COMPLETED | OUTPATIENT
Start: 2024-10-22 | End: 2024-10-22

## 2024-10-22 RX ADMIN — Medication 3 ML: at 18:23

## 2024-10-22 RX ADMIN — LIDOCAINE HYDROCHLORIDE,EPINEPHRINE BITARTRATE 10 ML: 10; .01 INJECTION, SOLUTION INFILTRATION; PERINEURAL at 18:23

## 2024-10-22 RX ADMIN — TETANUS TOXOID, REDUCED DIPHTHERIA TOXOID AND ACELLULAR PERTUSSIS VACCINE, ADSORBED 0.5 ML: 5; 2.5; 8; 8; 2.5 SUSPENSION INTRAMUSCULAR at 18:19

## 2024-10-22 NOTE — ED NOTES
Patient arrives via [facility] from home for complaints of a trip and fall. Patient tripped and hit his head on his porch. Patient has a head laceration to the back of the head. No LOC, no thinners. Patient also complains of back pain. Alert and oriented x4.

## 2024-10-22 NOTE — ED PROVIDER NOTES
EMERGENCY DEPARTMENT ENCOUNTER  Room Number:  19/19  PCP: Tanya Julien MD  Independent Historians: Patient and family      HPI:  Chief Complaint: Fall at home; head injury    A complete HPI/ROS/PMH/PSH/SH/FH are unobtainable due to: None    Chronic or social conditions impacting patient care (Social Determinants of Health): None      Context: Goran August is a 89 y.o. male with a medical history of hypertension, diabetes and CKD who presents to the ED c/o acute head injury status post fall.  The patient reports he was outside watering plants in flip-flops when he misstepped and fell backwards hitting his head.  He sustained a laceration to the occiput but denies any headache or head pain.  He denies any neck pain, focal numbness or weakness, extremity injuries though he does have a little bit of low back pain that he has noted.  No nausea vomiting chest pain or dyspnea reported.  Unknown date of last tetanus immunization      Review of prior external notes (non-ED) -and- Review of prior external test results outside of this encounter:  Discharge summary 12/20/2023 reviewed: Patient was admitted for evaluation of leg swelling and acute hypoxemia.      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    Hiatal hernia with gastroesophageal reflux 05/06/2016    Stage 3 chronic kidney disease 11/02/2018    Environmental allergies 05/01/2019    Benign essential HTN 11/05/2019    Left bundle branch block 11/05/2019    Nonischemic cardiomyopathy 05/24/2013    Exudative age-related macular degeneration, left eye, with active choroidal neovascularization 03/30/2021    Peripheral vascular disease, unspecified 03/30/2021    Bilateral lower extremity edema 05/08/2022    Hypoxemia  2022    Dyspnea 2022    COVID-19 virus infection 2022    Combined systolic and diastolic congestive heart failure 2022    Type 2 diabetes mellitus     Positive D dimer 2022    Nocturnal hypoxia 2022    History of DVT (deep vein thrombosis) 2023    Heart failure with mildly reduced ejection fraction (HFmrEF) 2023    Acute bronchitis 2023     Resolved Ambulatory Problems     Diagnosis Date Noted    Acute DVT (deep venous thrombosis) 2022     Past Medical History:   Diagnosis Date    Anemia     Benign hypertensive heart disease     Cardiomyopathy     Cataract 2020    GERD (gastroesophageal reflux disease)     Heart murmur     Hx of colonic polyps     Hypertension     LBBB (left bundle branch block)     Mitral regurgitation     Osteoarthritis          PAST SURGICAL HISTORY  Past Surgical History:   Procedure Laterality Date    CATARACT EXTRACTION, BILATERAL Bilateral 2020    INGUINAL HERNIA REPAIR      X4    REPLACEMENT TOTAL KNEE Right          FAMILY HISTORY  Family History   Problem Relation Age of Onset    Aneurysm Mother         Colonic AVM in 90's    Hypertension Mother     Parkinsonism Father         90's    Aortic aneurysm Father         AAA    Tuberculosis Father     Tuberculosis Paternal Aunt     Cancer Maternal Grandmother     No Known Problems Daughter     No Known Problems Son          SOCIAL HISTORY  Social History     Socioeconomic History    Marital status:    Tobacco Use    Smoking status: Former     Current packs/day: 0.00     Average packs/day: 0.5 packs/day for 25.0 years (12.5 ttl pk-yrs)     Types: Cigarettes     Start date:      Quit date:      Years since quittin.8    Smokeless tobacco: Former   Vaping Use    Vaping status: Never Used   Substance and Sexual Activity    Alcohol use: Yes     Comment: one drink monthly    Drug use: No    Sexual activity: Defer         ALLERGIES  Hctz  [hydrochlorothiazide]      REVIEW OF SYSTEMS  Review of Systems  Included in HPI  All systems reviewed and negative except for those discussed in HPI.      PHYSICAL EXAM    I have reviewed the triage vital signs and nursing notes.    ED Triage Vitals [10/22/24 1715]   Temp Heart Rate Resp BP SpO2   98.4 °F (36.9 °C) 76 18 141/71 96 %      Temp src Heart Rate Source Patient Position BP Location FiO2 (%)   -- -- -- -- --       Physical Exam    Physical Exam   Constitutional: No distress.  Nontoxic  HENT:  Head: Normocephalic.  2 cm laceration left occiput with no associated depressed skull fracture or evidence of basilar skull fracture noted.  Oropharynx: Mucous membranes are moist.   Eyes: . No scleral icterus. No conjunctival pallor.  PERRL, EOMI  Neck: Normal range of motion. Neck supple.  No midline tenderness to palpation or step-off.  C5-8 motor and sensory grossly intact.  Cardiovascular: Pink warm and well perfused throughout.    Pulmonary/Chest: No respiratory distress.  No tachypnea or increased work of breathing appreciated.    Abdominal: Soft. There is no tenderness. There is no rebound and no guarding.   Musculoskeletal: Moves all extremities equally.  Atraumatic extremity exam.  Mild upper lumbar paraspinal discomfort with palpation with no external findings of trauma.  Neurological: Alert and oriented.  No acute focal deficit appreciated.  Skin: Skin is pink, warm, and dry.   Psychiatric: Mood and affect normal.   Nursing note and vitals reviewed.             LAB RESULTS  No results found for this or any previous visit (from the past 24 hours).      RADIOLOGY  No Radiology Exams Resulted Within Past 24 Hours      MEDICATIONS GIVEN IN ER  Medications   Lido-EPINEPHrine-Tetracaine 4-0.18-0.5 % gel 3 mL (3 mL Topical Given 10/22/24 1823)   lidocaine 1% - EPINEPHrine [id number] (XYLOCAINE W/EPI) 1 %-1:[id number] injection 10 mL (10 mL Injection Given by Other 10/22/24 1823)   Tetanus-Diphth-Acell Pertussis  (BOOSTRIX) injection 0.5 mL (0.5 mL Intramuscular Given 10/22/24 1819)         ORDERS PLACED DURING THIS VISIT:  Orders Placed This Encounter   Procedures    CT Head Without Contrast    CT Cervical Spine Without Contrast    CT Lumbar Spine Without Contrast    Irrigate wound         OUTPATIENT MEDICATION MANAGEMENT:  No current Epic-ordered facility-administered medications on file.     Current Outpatient Medications Ordered in Epic   Medication Sig Dispense Refill    albuterol sulfate  (90 Base) MCG/ACT inhaler Inhale 2 puffs Every 4 (Four) Hours As Needed for Wheezing or Shortness of Air. 18 g 6    esomeprazole (nexIUM) 40 MG capsule TAKE 1 CAPSULE DAILY 90 capsule 3    fluticasone (FLONASE) 50 MCG/ACT nasal spray 2 sprays into the nostril(s) as directed by provider Daily. Administer 2 sprays in each nostril once a day 3 bottle 3    furosemide (LASIX) 20 MG tablet Take 2 tablets by mouth Daily. 60 tablet 3    Multiple Vitamins-Minerals (PRESERVISION AREDS 2 PO) Take  by mouth.      multivitamin with minerals tablet tablet Take 1 tablet by mouth Daily.      pramipexole (MIRAPEX) 0.25 MG tablet Take 1 tablet by mouth Every Night. 90 tablet 1    pravastatin (PRAVACHOL) 20 MG tablet TAKE 1 TABLET DAILY 90 tablet 3    promethazine-dextromethorphan (PROMETHAZINE-DM) 6.25-15 MG/5ML syrup Take 5 mL by mouth 4 (Four) Times a Day As Needed for Cough. 240 mL 0    ramipril (ALTACE) 10 MG capsule TAKE 1 CAPSULE DAILY 90 capsule 3    triamcinolone (KENALOG) 0.1 % cream Apply  topically to the appropriate area as directed 2 (Two) Times a Day. 45 g 1         PROCEDURES  Procedures            PROGRESS, DATA ANALYSIS, CONSULTS, AND MEDICAL DECISION MAKING  All labs have been independently interpreted by me.  All radiology studies have been reviewed by me. All EKG's have been independently viewed and interpreted by me.  Discussion below represents my analysis of pertinent findings related to patient's condition, differential  diagnosis, treatment plan and final disposition.    Differential diagnosis:   My differential diagnosis includes but is not limited to cerebral contusion, cervical strain, concussion with LOC, concussion without LOC, contusion, fracture of the skull, orbits or mandible, hematoma, intracranial hemorrhage including subdural, epidural, subarachnoid and intracerebral, laceration and postconcussion syndrome    1900: Patient's case and care of this patient discussed and transferred to oncoming physician, Dr. Lewis who is agreeable to follow-up on imaging studies and make final disposition for this patient.      Clinical Scores:                         Prescription drug monitoring program review:     AS OF 18:44 EDT VITALS:    BP - 141/71  HR - 76  TEMP - 98.4 °F (36.9 °C)  O2 SATS - 96%    COMPLEXITY OF CARE  Pending      DIAGNOSIS  Final diagnoses:   Fall in home, initial encounter   Injury of head, initial encounter   Laceration of scalp, initial encounter   Acute myofascial strain of lumbar region, initial encounter         DISPOSITION  ED Disposition       None                   Please note that portions of this document were completed with a voice recognition program.    Note Disclaimer: At [facility], we believe that sharing information builds trust and better relationships. You are receiving this note because you recently visited [facility]. It is possible you will see health information before a provider has talked with you about it. This kind of information can be easy to misunderstand. To help you fully understand what it means for your health, we urge you to discuss this note with your provider.         Henrik Rosenthal MD  10/22/24 2314

## 2024-10-23 NOTE — ED PROVIDER NOTES
Laceration Repair    Date/Time: 10/22/2024 8:10 PM    Performed by: Kayce Lane PA  Authorized by: Henrik Rosenthal MD    Consent:     Consent obtained:  Verbal    Consent given by:  Patient    Risks, benefits, and alternatives were discussed: yes      Risks discussed:  Infection and pain  Universal protocol:     Procedure explained and questions answered to patient or proxy's satisfaction: yes      Imaging studies available: yes      Patient identity confirmed:  Verbally with patient  Anesthesia:     Anesthesia method:  Topical application and local infiltration    Topical anesthetic:  LET    Local anesthetic:  Lidocaine 1% WITH epi  Laceration details:     Location:  Scalp    Scalp location:  Occipital    Length (cm):  2  Exploration:     Hemostasis achieved with:  Direct pressure, LET and epinephrine    Wound extent: no underlying fracture noted    Treatment:     Area cleansed with:  Chlorhexidine and saline    Amount of cleaning:  Standard    Irrigation solution:  Sterile saline  Skin repair:     Repair method:  Staples    Number of staples:  3  Approximation:     Approximation:  Close  Repair type:     Repair type:  Simple  Post-procedure details:     Dressing:  Open (no dressing)    Procedure completion:  Tolerated well, no immediate complications         Kayce Lane PA  10/22/24 2011

## 2024-10-28 ENCOUNTER — TELEPHONE (OUTPATIENT)
Dept: FAMILY MEDICINE CLINIC | Facility: CLINIC | Age: 89
End: 2024-10-28
Payer: MEDICARE

## 2024-10-28 NOTE — TELEPHONE ENCOUNTER
Patient was seen in the ER on 10/22/2024 for a fall. Patient received stitches and told to have removed in one week. Patient also needs a follow up to ER visit. How would you like to proceed with scheduling this patient? Patient will be in for labs on 10/29/2024 prior to next scheduled appointment.      Please advise, daughter Anel Pelayo [phone]

## 2024-10-28 NOTE — TELEPHONE ENCOUNTER
Thank you, okay to work patient in tomorrow, we can add him to my 4 PM slot or in the middle of the day as available

## 2024-10-29 ENCOUNTER — OFFICE VISIT (OUTPATIENT)
Dept: FAMILY MEDICINE CLINIC | Facility: CLINIC | Age: 89
End: 2024-10-29
Payer: MEDICARE

## 2024-10-29 VITALS
HEART RATE: 78 BPM | HEIGHT: 71 IN | BODY MASS INDEX: 31.5 KG/M2 | OXYGEN SATURATION: 97 % | SYSTOLIC BLOOD PRESSURE: 126 MMHG | DIASTOLIC BLOOD PRESSURE: 72 MMHG | WEIGHT: 225 LBS | TEMPERATURE: 97.8 F
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
## 2024-10-29 DIAGNOSIS — Z48.02 VISIT FOR SUTURE REMOVAL: Primary | ICD-10-CM

## 2024-10-29 DIAGNOSIS — S01.01XS: ICD-10-CM
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
## 2024-10-29 PROCEDURE — 99213 OFFICE O/P EST LOW 20 MIN: CPT | Performed by: FAMILY MEDICINE

## 2024-10-29 PROCEDURE — 1160F RVW MEDS BY RX/DR IN RCRD: CPT | Performed by: FAMILY MEDICINE

## 2024-10-29 PROCEDURE — 15853 REMOVAL SUTR/STAPL XREQ ANES: CPT | Performed by: FAMILY MEDICINE

## 2024-10-29 PROCEDURE — 1125F AMNT PAIN NOTED PAIN PRSNT: CPT | Performed by: FAMILY MEDICINE

## 2024-10-29 PROCEDURE — 1159F MED LIST DOCD IN RCRD: CPT | Performed by: FAMILY MEDICINE

## 2024-10-29 NOTE — PROGRESS NOTES
"Chief Complaint  Suture / Staple Removal ( Pt needs staple removal today no pain no concerns  jyst his right arm sore from td shot )    Subjective        Goran August presents to [facility]  History of Present Illness  Pleasant 89-year-old male here to follow-up for a suture removal which was placed October 22, 2024, he presented after falling and hitting his head, outside watering his plants in flip-flops and missed stepped and fell backwards, he sustained a laceration to the posterior aspect of his scalp, no loss of consciousness or head pain.  In the emergency room CT scan of the head was performedThat showed no evidence of intracranial hemorrhage, some white matter disease that is chronic but not related to his recent fall, possible considerations for an MRI if indicated, in terms of lower back pain CT scan of the lower back was positive only for degenerative changes and same for the cervical spine no fracture.    He is feeling well, he has had no headaches, but ironiucally some abdominal pain that may be with his back and has been using something to elevate his head and doing better.      Objective   Vital Signs:  /72   Pulse 78   Temp 97.8 °F (36.6 °C)   Ht 180.3 cm (71\")   Wt 102 kg (225 lb)   SpO2 97%   BMI 31.38 kg/m²   Estimated body mass index is 31.38 kg/m² as calculated from the following:    Height as of this encounter: 180.3 cm (71\").    Weight as of this encounter: 102 kg (225 lb).    BMI is >= 30 and <35. (Class 1 Obesity). The following options were offered after discussion;: exercise counseling/recommendations and nutrition counseling/recommendations      Physical Exam  Vitals and nursing note reviewed.   Constitutional:       General: He is not in acute distress.     Appearance: He is well-developed.   HENT:      Head: Normocephalic.      Nose: Nose normal.   Cardiovascular:      Heart sounds: No murmur heard.  Pulmonary:      Effort: Pulmonary " effort is normal. No respiratory distress.   Musculoskeletal:         General: Normal range of motion.   Skin:     General: Skin is warm and dry.      Findings: No rash.   Neurological:      Mental Status: He is alert and oriented to person, place, and time.   Psychiatric:         Behavior: Behavior normal.         Thought Content: Thought content normal.         Judgment: Judgment normal.        Result Review :  The following data was reviewed by: Tanya Julien MD on 10/29/2024:       Suture Removal    Date/Time: 10/29/2024 5:19 PM    Performed by: Tanya Julien MD  Authorized by: Tanya Julien MD  Consent: Verbal consent obtained.  Risks and benefits: risks, benefits and alternatives were discussed  Consent given by: patient  Patient identity confirmed: verbally with patient  Body area: head/neck  Location details: scalp  Wound Appearance: clean  Staples Removed: 3  Patient tolerance: Patient tolerated the procedure well with no immediate complications       CT Head Without Contrast (10/22/2024 19:11)  CT Cervical Spine Without Contrast (10/22/2024 19:11)  CT Lumbar Spine Without Contrast (10/22/2024 19:11)     Assessment and Plan   Diagnoses and all orders for this visit:    1. Visit for suture removal (Primary)  -     Suture Removal    2. Fall in home, subsequent encounter    3. Occipital scalp laceration, sequela    Patient here to follow-up for recent ER visit after he fell at home, he thankfully had negative scans for fracture and is doing well overall, mechanical fall no cardiac concerns.    He had 3 staples located on the posterior occiput it, removed without complication no adverse effects, the area has healed well, discussed continuing to wash gently, follow-up at his normal 6-month follow-up         Follow Up   Return if symptoms worsen or fail to improve.  Patient was given instructions and counseling regarding his condition or for health maintenance advice. Please see specific information  pulled into the AVS if appropriate.

## 2024-11-05 ENCOUNTER — OFFICE VISIT (OUTPATIENT)
Dept: FAMILY MEDICINE CLINIC | Facility: CLINIC | Age: 89
End: 2024-11-05
Payer: MEDICARE

## 2024-11-05 VITALS
BODY MASS INDEX: 31.36 KG/M2 | WEIGHT: 224 LBS | HEART RATE: 64 BPM | SYSTOLIC BLOOD PRESSURE: 108 MMHG | HEIGHT: 71 IN | OXYGEN SATURATION: 98 % | TEMPERATURE: 97.8 F | DIASTOLIC BLOOD PRESSURE: 62 MMHG

## 2024-11-05 DIAGNOSIS — N18.31 TYPE 2 DIABETES MELLITUS WITH STAGE 3A CHRONIC KIDNEY DISEASE, WITHOUT LONG-TERM CURRENT USE OF INSULIN: ICD-10-CM

## 2024-11-05 DIAGNOSIS — E11.22 TYPE 2 DIABETES MELLITUS WITH STAGE 3A CHRONIC KIDNEY DISEASE, WITHOUT LONG-TERM CURRENT USE OF INSULIN: ICD-10-CM

## 2024-11-05 DIAGNOSIS — I10 BENIGN ESSENTIAL HTN: ICD-10-CM

## 2024-11-05 DIAGNOSIS — E78.2 MIXED HYPERLIPIDEMIA: Primary | ICD-10-CM

## 2024-11-05 PROCEDURE — 1160F RVW MEDS BY RX/DR IN RCRD: CPT | Performed by: FAMILY MEDICINE

## 2024-11-05 PROCEDURE — 1159F MED LIST DOCD IN RCRD: CPT | Performed by: FAMILY MEDICINE

## 2024-11-05 PROCEDURE — 99214 OFFICE O/P EST MOD 30 MIN: CPT | Performed by: FAMILY MEDICINE

## 2024-11-05 PROCEDURE — 1126F AMNT PAIN NOTED NONE PRSNT: CPT | Performed by: FAMILY MEDICINE

## 2024-11-05 RX ORDER — PNV NO.95/FERROUS FUM/FOLIC AC 28MG-0.8MG
1 TABLET ORAL
COMMUNITY

## 2024-11-05 NOTE — PROGRESS NOTES
"Chief Complaint  Diabetes (Follow up with labs no complains had labs prior ) and Hyperlipidemia (Doing well no complains )    Subjective        Goran August presents to [facility]  Diabetes    Hyperlipidemia      Pleasant 89-year-old male here to follow-up for type 2 diabetes that is well-controlled, hyperlipidemia that is well controlled, he is on pravastatin 20 mg.  We discussed that we could potentially stop the statin with his daughter Anel and wife Melinda.  We discussed that it probably would be wise for him to eat less sweets which is very difficult for him.    Objective   Vital Signs:  /62   Pulse 64   Temp 97.8 °F (36.6 °C)   Ht 180.3 cm (71\")   Wt 102 kg (224 lb)   SpO2 98%   BMI 31.24 kg/m²   Estimated body mass index is 31.24 kg/m² as calculated from the following:    Height as of this encounter: 180.3 cm (71\").    Weight as of this encounter: 102 kg (224 lb).            Physical Exam  Vitals and nursing note reviewed.   Constitutional:       General: He is not in acute distress.     Appearance: He is well-developed.   HENT:      Head: Normocephalic.      Nose: Nose normal.   Cardiovascular:      Rate and Rhythm: Normal rate and regular rhythm.      Heart sounds: Normal heart sounds. No murmur heard.  Pulmonary:      Effort: Pulmonary effort is normal. No respiratory distress.      Breath sounds: Normal breath sounds.   Musculoskeletal:         General: Normal range of motion.   Skin:     General: Skin is warm and dry.      Findings: No rash.   Neurological:      Mental Status: He is alert and oriented to person, place, and time.   Psychiatric:         Behavior: Behavior normal.         Thought Content: Thought content normal.         Judgment: Judgment normal.        Result Review :  The following data was reviewed by: Tanya Julien MD on 11/05/2024:         Vitamin D,25-Hydroxy (10/29/2024 13:45)  Hemoglobin A1c (10/29/2024 13:45)  Lipid Panel " (10/29/2024 13:45)  CBC & Differential (10/29/2024 13:45)  Comprehensive Metabolic Panel (10/29/2024 13:45)     Assessment and Plan   Diagnoses and all orders for this visit:    1. Mixed hyperlipidemia (Primary)    2. Benign essential HTN    3. Type 2 diabetes mellitus with stage 3a chronic kidney disease, without long-term current use of insulin    Very pleasant 89-year-old male here with his daughter Anel and wife Melinda, we discussed that he is overall doing well, he does struggle with eating healthy, we discussed trying to decrease sweet treats, increased protein intake and exercise, he does enjoy drinking water.  His labs are overall stable.  We considered stopping pravastatin but with the holidays coming up he would like to continue this and potentially discontinue it in the spring.         Follow Up   Return in about 3 months (around 2/5/2025), or if symptoms worsen or fail to improve, for Recheck hypertension follow-up.  Patient was given instructions and counseling regarding his condition or for health maintenance advice. Please see specific information pulled into the AVS if appropriate.

## 2024-11-29 RX ORDER — ESOMEPRAZOLE MAGNESIUM 40 MG/1
CAPSULE, DELAYED RELEASE ORAL
Qty: 90 CAPSULE | Refills: 3 | Status: SHIPPED | OUTPATIENT
Start: 2024-11-29

## 2025-01-07 DIAGNOSIS — G25.81 RESTLESS LEG SYNDROME: ICD-10-CM

## 2025-01-07 RX ORDER — PRAVASTATIN SODIUM 20 MG
TABLET ORAL
Qty: 90 TABLET | Refills: 3 | Status: SHIPPED | OUTPATIENT
Start: 2025-01-07

## 2025-01-07 RX ORDER — PRAMIPEXOLE DIHYDROCHLORIDE 0.25 MG/1
0.25 TABLET ORAL NIGHTLY
Qty: 90 TABLET | Refills: 3 | Status: SHIPPED | OUTPATIENT
Start: 2025-01-07

## 2025-02-13 ENCOUNTER — OFFICE VISIT (OUTPATIENT)
Dept: FAMILY MEDICINE CLINIC | Facility: CLINIC | Age: OVER 89
End: 2025-02-13
Payer: MEDICARE

## 2025-02-13 VITALS
WEIGHT: 227 LBS | BODY MASS INDEX: 31.78 KG/M2 | HEIGHT: 71 IN | DIASTOLIC BLOOD PRESSURE: 72 MMHG | TEMPERATURE: 96.5 F | OXYGEN SATURATION: 99 % | HEART RATE: 65 BPM | SYSTOLIC BLOOD PRESSURE: 116 MMHG

## 2025-02-13 DIAGNOSIS — N18.31 TYPE 2 DIABETES MELLITUS WITH STAGE 3A CHRONIC KIDNEY DISEASE, WITHOUT LONG-TERM CURRENT USE OF INSULIN: Primary | ICD-10-CM

## 2025-02-13 DIAGNOSIS — N18.31 ANEMIA DUE TO STAGE 3A CHRONIC KIDNEY DISEASE: ICD-10-CM

## 2025-02-13 DIAGNOSIS — D63.1 ANEMIA DUE TO STAGE 3A CHRONIC KIDNEY DISEASE: ICD-10-CM

## 2025-02-13 DIAGNOSIS — R29.898 WEAKNESS OF BOTH LOWER EXTREMITIES: ICD-10-CM

## 2025-02-13 DIAGNOSIS — R26.89 BALANCE PROBLEM: ICD-10-CM

## 2025-02-13 DIAGNOSIS — E11.22 TYPE 2 DIABETES MELLITUS WITH STAGE 3A CHRONIC KIDNEY DISEASE, WITHOUT LONG-TERM CURRENT USE OF INSULIN: Primary | ICD-10-CM

## 2025-02-13 LAB
EXPIRATION DATE: ABNORMAL
HBA1C MFR BLD: 6.3 % (ref 4.5–5.7)
Lab: 567

## 2025-02-13 PROCEDURE — 1160F RVW MEDS BY RX/DR IN RCRD: CPT | Performed by: FAMILY MEDICINE

## 2025-02-13 PROCEDURE — 1159F MED LIST DOCD IN RCRD: CPT | Performed by: FAMILY MEDICINE

## 2025-02-13 PROCEDURE — 99214 OFFICE O/P EST MOD 30 MIN: CPT | Performed by: FAMILY MEDICINE

## 2025-02-13 PROCEDURE — 1126F AMNT PAIN NOTED NONE PRSNT: CPT | Performed by: FAMILY MEDICINE

## 2025-02-13 PROCEDURE — 83036 HEMOGLOBIN GLYCOSYLATED A1C: CPT | Performed by: FAMILY MEDICINE

## 2025-02-13 PROCEDURE — 3044F HG A1C LEVEL LT 7.0%: CPT | Performed by: FAMILY MEDICINE

## 2025-02-13 NOTE — PROGRESS NOTES
"Chief Complaint  Diabetes (3 months follow up A1c ) and Hypertension (Pt taking iron 3 tid not sure if needs to checks level today ) balance issues    Subjective        Goran August presents to [facility]  Diabetes    Hypertension      Pleasant 89-year-old male here with his daughter Anel and wife Melinda.  Here to follow-up type 2 diabetes that is thankfully very well-controlled, doing lifestyle changes without medication.    Hypertension well-controlled.    Anemia that was previously improving still taking iron 3 times a day, symptomatically improving.    Also feeling more balance issues difficulty with standing from a seated position    Objective   Vital Signs:  /72   Pulse 65   Temp 96.5 °F (35.8 °C)   Ht 180.3 cm (71\")   Wt 103 kg (227 lb)   SpO2 99%   BMI 31.66 kg/m²   Estimated body mass index is 31.66 kg/m² as calculated from the following:    Height as of this encounter: 180.3 cm (71\").    Weight as of this encounter: 103 kg (227 lb).    Physical Exam  Vitals and nursing note reviewed.   Constitutional:       General: He is not in acute distress.     Appearance: He is well-developed.      Comments: Weakness with standing needing to use hands for assistance.   HENT:      Head: Normocephalic.      Nose: Nose normal.   Cardiovascular:      Rate and Rhythm: Normal rate and regular rhythm.      Heart sounds: Normal heart sounds. No murmur heard.  Pulmonary:      Effort: Pulmonary effort is normal. No respiratory distress.      Breath sounds: Normal breath sounds.   Musculoskeletal:         General: Normal range of motion.   Skin:     General: Skin is warm and dry.      Findings: No rash.   Neurological:      Mental Status: He is alert and oriented to person, place, and time.   Psychiatric:         Behavior: Behavior normal.         Thought Content: Thought content normal.         Judgment: Judgment normal.        Result Review :  The following data was reviewed " by: Tanya Julien MD on 02/13/2025:  A1C Last 3 Results          4/30/2024    10:22 10/29/2024    13:45 2/13/2025    11:22   HGBA1C Last 3 Results   Hemoglobin A1C 6.3  6.4  6.3                Assessment and Plan   Diagnoses and all orders for this visit:    1. Type 2 diabetes mellitus with stage 3a chronic kidney disease, without long-term current use of insulin (Primary)  -     POC Glycosylated Hemoglobin (Hb A1C)    2. Weakness of both lower extremities  -     Ambulatory Referral to Physical Therapy for Evaluation & Treatment    3. Balance problem  -     Ambulatory Referral to Physical Therapy for Evaluation & Treatment    4. 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    Weakness, this is a significant concern, I can see a notable change in his ability to get up from a seated position, refer to physical therapy as above, hopefully this will help reduce his risk of falls.    Follow-up labs for anemia if his iron levels are back to normal okay to stop taking iron supplementation.         Follow Up   Return in about 6 months (around 8/13/2025), or if symptoms worsen or fail to improve, for Medicare Wellness.  Patient was given instructions and counseling regarding his condition or for health maintenance advice. Please see specific information pulled into the AVS if appropriate.

## 2025-02-14 LAB
BASOPHILS # BLD AUTO: 0 X10E3/UL (ref 0–0.2)
BASOPHILS NFR BLD AUTO: 0 %
BUN SERPL-MCNC: 28 MG/DL (ref 10–36)
BUN/CREAT SERPL: 29 (ref 10–24)
CALCIUM SERPL-MCNC: 9.8 MG/DL (ref 8.6–10.2)
CHLORIDE SERPL-SCNC: 100 MMOL/L (ref 96–106)
CO2 SERPL-SCNC: 27 MMOL/L (ref 20–29)
CREAT SERPL-MCNC: 0.98 MG/DL (ref 0.76–1.27)
EGFRCR SERPLBLD CKD-EPI 2021: 73 ML/MIN/1.73
EOSINOPHIL # BLD AUTO: 0.1 X10E3/UL (ref 0–0.4)
EOSINOPHIL NFR BLD AUTO: 1 %
ERYTHROCYTE [DISTWIDTH] IN BLOOD BY AUTOMATED COUNT: 14.1 % (ref 11.6–15.4)
FERRITIN SERPL-MCNC: 95 NG/ML (ref 30–400)
GLUCOSE SERPL-MCNC: 86 MG/DL (ref 70–99)
HCT VFR BLD AUTO: 42.8 % (ref 37.5–51)
HGB BLD-MCNC: 13.4 G/DL (ref 13–17.7)
IMM GRANULOCYTES # BLD AUTO: 0 X10E3/UL (ref 0–0.1)
IMM GRANULOCYTES NFR BLD AUTO: 0 %
LYMPHOCYTES # BLD AUTO: 1.7 X10E3/UL (ref 0.7–3.1)
LYMPHOCYTES NFR BLD AUTO: 18 %
MCH RBC QN AUTO: 26.5 PG (ref 26.6–33)
MCHC RBC AUTO-ENTMCNC: 31.3 G/DL (ref 31.5–35.7)
MCV RBC AUTO: 85 FL (ref 79–97)
MONOCYTES # BLD AUTO: 0.8 X10E3/UL (ref 0.1–0.9)
MONOCYTES NFR BLD AUTO: 9 %
NEUTROPHILS # BLD AUTO: 6.7 X10E3/UL (ref 1.4–7)
NEUTROPHILS NFR BLD AUTO: 72 %
PLATELET # BLD AUTO: 209 X10E3/UL (ref 150–450)
POTASSIUM SERPL-SCNC: 5.3 MMOL/L (ref 3.5–5.2)
RBC # BLD AUTO: 5.05 X10E6/UL (ref 4.14–5.8)
SODIUM SERPL-SCNC: 140 MMOL/L (ref 134–144)
WBC # BLD AUTO: 9.3 X10E3/UL (ref 3.4–10.8)

## 2025-02-25 DIAGNOSIS — E87.5 HYPERKALEMIA: Primary | ICD-10-CM

## 2025-07-18 ENCOUNTER — OFFICE VISIT (OUTPATIENT)
Dept: FAMILY MEDICINE CLINIC | Facility: CLINIC | Age: OVER 89
End: 2025-07-18
Payer: MEDICARE

## 2025-07-18 VITALS
HEIGHT: 71 IN | DIASTOLIC BLOOD PRESSURE: 62 MMHG | TEMPERATURE: 98 F | HEART RATE: 88 BPM | OXYGEN SATURATION: 99 % | SYSTOLIC BLOOD PRESSURE: 122 MMHG | BODY MASS INDEX: 32.62 KG/M2 | WEIGHT: 233 LBS

## 2025-07-18 DIAGNOSIS — H10.32 ACUTE CONJUNCTIVITIS OF LEFT EYE, UNSPECIFIED ACUTE CONJUNCTIVITIS TYPE: ICD-10-CM

## 2025-07-18 DIAGNOSIS — J31.0 CHRONIC RHINITIS: ICD-10-CM

## 2025-07-18 DIAGNOSIS — R05.1 ACUTE COUGH: Primary | ICD-10-CM

## 2025-07-18 LAB
EXPIRATION DATE: NORMAL
FLUAV AG UPPER RESP QL IA.RAPID: NOT DETECTED
FLUBV AG UPPER RESP QL IA.RAPID: NOT DETECTED
INTERNAL CONTROL: NORMAL
Lab: NORMAL
SARS-COV-2 AG UPPER RESP QL IA.RAPID: NORMAL

## 2025-07-18 PROCEDURE — 87428 SARSCOV & INF VIR A&B AG IA: CPT | Performed by: NURSE PRACTITIONER

## 2025-07-18 PROCEDURE — 1159F MED LIST DOCD IN RCRD: CPT | Performed by: NURSE PRACTITIONER

## 2025-07-18 PROCEDURE — 1126F AMNT PAIN NOTED NONE PRSNT: CPT | Performed by: NURSE PRACTITIONER

## 2025-07-18 PROCEDURE — 99213 OFFICE O/P EST LOW 20 MIN: CPT | Performed by: NURSE PRACTITIONER

## 2025-07-18 PROCEDURE — 1160F RVW MEDS BY RX/DR IN RCRD: CPT | Performed by: NURSE PRACTITIONER

## 2025-07-18 RX ORDER — BENZONATATE 100 MG/1
100 CAPSULE ORAL 3 TIMES DAILY PRN
Qty: 21 CAPSULE | Refills: 0 | Status: ON HOLD | OUTPATIENT
Start: 2025-07-18

## 2025-07-18 RX ORDER — FLUTICASONE PROPIONATE 50 MCG
2 SPRAY, SUSPENSION (ML) NASAL DAILY
Qty: 16 G | Refills: 0 | Status: ON HOLD | OUTPATIENT
Start: 2025-07-18

## 2025-07-18 RX ORDER — CIPROFLOXACIN HYDROCHLORIDE 3.5 MG/ML
1 SOLUTION/ DROPS TOPICAL EVERY 4 HOURS
Qty: 2.5 ML | Refills: 0 | Status: ON HOLD | OUTPATIENT
Start: 2025-07-18 | End: 2025-07-23

## 2025-07-18 NOTE — PROGRESS NOTES
"Chief Complaint  Cough (Productive deep cough and dry eyes for a few days. Denies fever, SOB, sore throat. No improvement w zinc )    Subjective        Goran August presents to [facility]  Cough  Chronicity:  New  Onset:  Yesterday  Cough characteristics:  Productive of sputum  Associated symptoms: no chills, no ear congestion, no ear pain, no fever, no headaches, no nasal congestion, no postnasal drip, no rash, no rhinorrhea, no shortness of breath, no sore throat and no sweats    Associated symptoms comment:  Left eye drainage, bilateral eye lid redness  PMH includes: pneumonia        Objective   Vital Signs:  /62   Pulse 88   Temp 98 °F (36.7 °C)   Ht 180.3 cm (70.98\")   Wt 106 kg (233 lb)   SpO2 99%   BMI 32.51 kg/m²   Estimated body mass index is 32.51 kg/m² as calculated from the following:    Height as of this encounter: 180.3 cm (70.98\").    Weight as of this encounter: 106 kg (233 lb).            Physical Exam  Vitals reviewed.   Constitutional:       General: He is not in acute distress.     Appearance: Normal appearance. He is not ill-appearing, toxic-appearing or diaphoretic.   HENT:      Head: Normocephalic and atraumatic.   Eyes:      General:         Left eye: Discharge present.  Pulmonary:      Effort: Pulmonary effort is normal. No respiratory distress.      Breath sounds: No stridor. No wheezing or rhonchi.   Neurological:      General: No focal deficit present.      Mental Status: He is alert and oriented to person, place, and time.   Psychiatric:         Mood and Affect: Mood normal.         Behavior: Behavior normal.        Result Review :         POCT SARS-CoV-2 Antigen CHAKA + Flu (07/18/2025 14:49)          Assessment and Plan   Diagnoses and all orders for this visit:    1. Acute cough (Primary)  -     POCT SARS-CoV-2 Antigen CHAKA + Flu  -     benzonatate (Tessalon Perles) 100 MG capsule; Take 1 capsule by mouth 3 (Three) Times a Day As Needed for " Cough.  Dispense: 21 capsule; Refill: 0    2. Chronic rhinitis  -     fluticasone (FLONASE) 50 MCG/ACT nasal spray; Administer 2 sprays into the nostril(s) as directed by provider Daily. Administer 2 sprays in each nostril once a day  Dispense: 16 g; Refill: 0    3. Acute conjunctivitis of left eye, unspecified acute conjunctivitis type  -     ciprofloxacin (Ciloxan) 0.3 % ophthalmic solution; Administer 1 drop into the left eye Every 4 (Four) Hours for 5 days.  Dispense: 2.5 mL; Refill: 0      COVID-19 and flu testing negative.  Suspect viral or allergic etiology.  He does have conjunctivitis to the left eye.  Eyedrops prescribed.  Lung sounds clear to auscultation.  Recommended restarting Flonase and Zyrtec.  Cough medicine as needed.  Follow-up if symptoms worsen or not improving by next week.         Follow Up   Return if symptoms worsen or fail to improve.  Patient was given instructions and counseling regarding his condition or for health maintenance advice. Please see specific information pulled into the AVS if appropriate.       Electronically signed by YONI Moody, 07/18/25, 3:52 PM EDT.

## 2025-07-20 ENCOUNTER — APPOINTMENT (OUTPATIENT)
Dept: GENERAL RADIOLOGY | Facility: HOSPITAL | Age: OVER 89
DRG: 280 | End: 2025-07-20
Payer: MEDICARE

## 2025-07-20 ENCOUNTER — HOSPITAL ENCOUNTER (INPATIENT)
Facility: HOSPITAL | Age: OVER 89
LOS: 5 days | Discharge: NURSING, REHAB OR LONG-TERM CARE | DRG: 280 | End: 2025-07-26
Attending: EMERGENCY MEDICINE | Admitting: STUDENT IN AN ORGANIZED HEALTH CARE EDUCATION/TRAINING PROGRAM
Payer: MEDICARE

## 2025-07-20 DIAGNOSIS — J18.9 PNEUMONIA OF BOTH LOWER LOBES DUE TO INFECTIOUS ORGANISM: Primary | ICD-10-CM

## 2025-07-20 DIAGNOSIS — H10.9 CONJUNCTIVITIS OF BOTH EYES, UNSPECIFIED CONJUNCTIVITIS TYPE: ICD-10-CM

## 2025-07-20 DIAGNOSIS — I50.9 ACUTE ON CHRONIC CONGESTIVE HEART FAILURE, UNSPECIFIED HEART FAILURE TYPE: ICD-10-CM

## 2025-07-20 DIAGNOSIS — J81.0 ACUTE PULMONARY EDEMA: ICD-10-CM

## 2025-07-20 DIAGNOSIS — R79.89 ELEVATED TROPONIN: ICD-10-CM

## 2025-07-20 DIAGNOSIS — J96.01 ACUTE RESPIRATORY FAILURE WITH HYPOXIA: ICD-10-CM

## 2025-07-20 LAB
ANION GAP SERPL CALCULATED.3IONS-SCNC: 16 MMOL/L (ref 5–15)
BASOPHILS # BLD AUTO: 0.02 10*3/MM3 (ref 0–0.2)
BASOPHILS NFR BLD AUTO: 0.2 % (ref 0–1.5)
BUN SERPL-MCNC: 37 MG/DL (ref 8–23)
BUN/CREAT SERPL: 33.9 (ref 7–25)
CALCIUM SPEC-SCNC: 8.8 MG/DL (ref 8.2–9.6)
CHLORIDE SERPL-SCNC: 99 MMOL/L (ref 98–107)
CO2 SERPL-SCNC: 22 MMOL/L (ref 22–29)
CREAT SERPL-MCNC: 1.09 MG/DL (ref 0.76–1.27)
DEPRECATED RDW RBC AUTO: 42.5 FL (ref 37–54)
EGFRCR SERPLBLD CKD-EPI 2021: 64.5 ML/MIN/1.73
EOSINOPHIL # BLD AUTO: 0 10*3/MM3 (ref 0–0.4)
EOSINOPHIL NFR BLD AUTO: 0 % (ref 0.3–6.2)
ERYTHROCYTE [DISTWIDTH] IN BLOOD BY AUTOMATED COUNT: 13.8 % (ref 12.3–15.4)
GLUCOSE SERPL-MCNC: 93 MG/DL (ref 65–99)
HCT VFR BLD AUTO: 38.9 % (ref 37.5–51)
HGB BLD-MCNC: 12.3 G/DL (ref 13–17.7)
IMM GRANULOCYTES # BLD AUTO: 0.04 10*3/MM3 (ref 0–0.05)
IMM GRANULOCYTES NFR BLD AUTO: 0.4 % (ref 0–0.5)
LYMPHOCYTES # BLD AUTO: 0.66 10*3/MM3 (ref 0.7–3.1)
LYMPHOCYTES NFR BLD AUTO: 5.8 % (ref 19.6–45.3)
MAGNESIUM SERPL-MCNC: 1.9 MG/DL (ref 1.6–2.4)
MCH RBC QN AUTO: 26.9 PG (ref 26.6–33)
MCHC RBC AUTO-ENTMCNC: 31.6 G/DL (ref 31.5–35.7)
MCV RBC AUTO: 85.1 FL (ref 79–97)
MONOCYTES # BLD AUTO: 1.37 10*3/MM3 (ref 0.1–0.9)
MONOCYTES NFR BLD AUTO: 12 % (ref 5–12)
NEUTROPHILS NFR BLD AUTO: 81.6 % (ref 42.7–76)
NEUTROPHILS NFR BLD AUTO: 9.32 10*3/MM3 (ref 1.7–7)
NRBC BLD AUTO-RTO: 0 /100 WBC (ref 0–0.2)
NT-PROBNP SERPL-MCNC: ABNORMAL PG/ML (ref 0–1800)
PLATELET # BLD AUTO: 216 10*3/MM3 (ref 140–450)
PMV BLD AUTO: 10.3 FL (ref 6–12)
POTASSIUM SERPL-SCNC: 4.5 MMOL/L (ref 3.5–5.2)
PROCALCITONIN SERPL-MCNC: 0.31 NG/ML (ref 0–0.25)
RBC # BLD AUTO: 4.57 10*6/MM3 (ref 4.14–5.8)
SODIUM SERPL-SCNC: 137 MMOL/L (ref 136–145)
WBC NRBC COR # BLD AUTO: 11.41 10*3/MM3 (ref 3.4–10.8)

## 2025-07-20 PROCEDURE — 83880 ASSAY OF NATRIURETIC PEPTIDE: CPT | Performed by: EMERGENCY MEDICINE

## 2025-07-20 PROCEDURE — 71045 X-RAY EXAM CHEST 1 VIEW: CPT

## 2025-07-20 PROCEDURE — 99291 CRITICAL CARE FIRST HOUR: CPT

## 2025-07-20 PROCEDURE — 85025 COMPLETE CBC W/AUTO DIFF WBC: CPT | Performed by: EMERGENCY MEDICINE

## 2025-07-20 PROCEDURE — 84484 ASSAY OF TROPONIN QUANT: CPT | Performed by: EMERGENCY MEDICINE

## 2025-07-20 PROCEDURE — 83735 ASSAY OF MAGNESIUM: CPT | Performed by: EMERGENCY MEDICINE

## 2025-07-20 PROCEDURE — 84145 PROCALCITONIN (PCT): CPT | Performed by: EMERGENCY MEDICINE

## 2025-07-20 PROCEDURE — 80048 BASIC METABOLIC PNL TOTAL CA: CPT | Performed by: EMERGENCY MEDICINE

## 2025-07-20 PROCEDURE — 0202U NFCT DS 22 TRGT SARS-COV-2: CPT | Performed by: EMERGENCY MEDICINE

## 2025-07-20 PROCEDURE — 93005 ELECTROCARDIOGRAM TRACING: CPT | Performed by: EMERGENCY MEDICINE

## 2025-07-20 RX ORDER — FUROSEMIDE 10 MG/ML
80 INJECTION INTRAMUSCULAR; INTRAVENOUS ONCE
Status: COMPLETED | OUTPATIENT
Start: 2025-07-21 | End: 2025-07-21

## 2025-07-20 NOTE — Clinical Note
A 6 fr sheath was successfully inserted with ultrasound guidance into the right radial artery. The patient is a 66 year old /White female, who presents on referral from , for a gastroenterology evaluation for abdominal pain/weight loss. weight loss is stable now. has gained some weight since last year The patient has described the pain as mild to moderate and crampy and throbbing occurring intermittently and after meals, and lasts days at a time. The pain is slowly improving. The location of the pain is in the upper abdomen and has been present for the past 3-4 years. The pain is improved by bentyl and is aggravated by eating. The pain is not reproducible with palpation over the area. has h/o chronic intermittent diarrhea. some better after course of xifaxan Patient has a history of GERD.  EGD 4 years ago-gastritis. colonoscopy 5 years ago-good prep/diverticulosis/internal hemorrhoids. Did not tolerate remeron/marinol. on dronabinol. CBC/CMP/thyroid panel-normal. normal albumin. started drinking 2 boosts daily. not very compliant with it. CT last year-non-contributary.  s/p luh. normal gastric emptying study. CT chest-negative. not depressed. celiac panel/pancreatic elastase-normal. EGD-mild gastritis. No h.pylori. recent colonoscopy was incomplete till the sigmoid colon due to extensive diverticulosis/tight turns. she had a hard time in post-op to get air out. Would advise to get another colonoscopy in 5 years with peds colonoscope with CO2 for screening. she has intermittent diarrhea. Has anxiety

## 2025-07-20 NOTE — Clinical Note
Hemostasis started on the right brachial vein. Manual pressure applied to vessel. Manual pressure was held by JOHNATHAN BARTHOLOMEW)KENTON. Manual pressure was held for 5 min. Hemostasis achieved successfully. Closure device additional comment: tegaderm

## 2025-07-20 NOTE — Clinical Note
Hemostasis started on the right radial artery. R-Band was used in achieving hemostasis. Radial compression device applied to vessel. Hemostasis achieved successfully. Closure device additional comment: Tr band applied

## 2025-07-21 ENCOUNTER — APPOINTMENT (OUTPATIENT)
Dept: CT IMAGING | Facility: HOSPITAL | Age: OVER 89
DRG: 280 | End: 2025-07-21
Payer: MEDICARE

## 2025-07-21 ENCOUNTER — APPOINTMENT (OUTPATIENT)
Dept: CARDIOLOGY | Facility: HOSPITAL | Age: OVER 89
DRG: 280 | End: 2025-07-21
Payer: MEDICARE

## 2025-07-21 PROBLEM — J96.90 RESPIRATORY FAILURE: Status: ACTIVE | Noted: 2025-07-21

## 2025-07-21 LAB
ANION GAP SERPL CALCULATED.3IONS-SCNC: 11.2 MMOL/L (ref 5–15)
AORTIC DIMENSIONLESS INDEX: 0.65 (DI)
AV MEAN PRESS GRAD SYS DOP V1V2: 4.3 MMHG
AV VMAX SYS DOP: 145.8 CM/SEC
B PARAPERT DNA SPEC QL NAA+PROBE: NOT DETECTED
B PERT DNA SPEC QL NAA+PROBE: NOT DETECTED
BH CV ECHO LEFT VENTRICLE GLOBAL LONGITUDINAL STRAIN: -8.2 %
BH CV ECHO MEAS - ACS: 1.83 CM
BH CV ECHO MEAS - AO MAX PG: 8.5 MMHG
BH CV ECHO MEAS - AO ROOT DIAM: 3.5 CM
BH CV ECHO MEAS - AO V2 VTI: 24.6 CM
BH CV ECHO MEAS - AVA(I,D): 2.13 CM2
BH CV ECHO MEAS - EDV(CUBED): 244.2 ML
BH CV ECHO MEAS - EDV(MOD-SP2): 174 ML
BH CV ECHO MEAS - EDV(MOD-SP4): 175 ML
BH CV ECHO MEAS - EF(MOD-SP2): 26.4 %
BH CV ECHO MEAS - EF(MOD-SP4): 21.1 %
BH CV ECHO MEAS - ESV(CUBED): 222.1 ML
BH CV ECHO MEAS - ESV(MOD-SP2): 128 ML
BH CV ECHO MEAS - ESV(MOD-SP4): 138 ML
BH CV ECHO MEAS - FS: 3.1 %
BH CV ECHO MEAS - IVS/LVPW: 1.12 CM
BH CV ECHO MEAS - IVSD: 0.96 CM
BH CV ECHO MEAS - LAT PEAK E' VEL: 6 CM/SEC
BH CV ECHO MEAS - LV DIASTOLIC VOL/BSA (35-75): 77.3 CM2
BH CV ECHO MEAS - LV MASS(C)D: 234.1 GRAMS
BH CV ECHO MEAS - LV MAX PG: 3.6 MMHG
BH CV ECHO MEAS - LV MEAN PG: 1.41 MMHG
BH CV ECHO MEAS - LV SYSTOLIC VOL/BSA (12-30): 61 CM2
BH CV ECHO MEAS - LV V1 MAX: 95.2 CM/SEC
BH CV ECHO MEAS - LV V1 VTI: 15.9 CM
BH CV ECHO MEAS - LVIDD: 6.3 CM
BH CV ECHO MEAS - LVIDS: 6.1 CM
BH CV ECHO MEAS - LVOT AREA: 3.3 CM2
BH CV ECHO MEAS - LVOT DIAM: 2.04 CM
BH CV ECHO MEAS - LVPWD: 0.86 CM
BH CV ECHO MEAS - MED PEAK E' VEL: 3.2 CM/SEC
BH CV ECHO MEAS - MV A DUR: 0.12 SEC
BH CV ECHO MEAS - MV A MAX VEL: 81.5 CM/SEC
BH CV ECHO MEAS - MV DEC SLOPE: 534.4 CM/SEC2
BH CV ECHO MEAS - MV DEC TIME: 0.18 SEC
BH CV ECHO MEAS - MV E MAX VEL: 110 CM/SEC
BH CV ECHO MEAS - MV E/A: 1.35
BH CV ECHO MEAS - MV MAX PG: 5.2 MMHG
BH CV ECHO MEAS - MV MEAN PG: 2.05 MMHG
BH CV ECHO MEAS - MV P1/2T: 56.5 MSEC
BH CV ECHO MEAS - MV V2 VTI: 20.8 CM
BH CV ECHO MEAS - MVA(P1/2T): 3.9 CM2
BH CV ECHO MEAS - MVA(VTI): 2.5 CM2
BH CV ECHO MEAS - PA ACC TIME: 0.11 SEC
BH CV ECHO MEAS - PA V2 MAX: 96.5 CM/SEC
BH CV ECHO MEAS - PI END-D VEL: 134.2 CM/SEC
BH CV ECHO MEAS - PULM DIAS VEL: 35.2 CM/SEC
BH CV ECHO MEAS - PULM S/D: 1.32
BH CV ECHO MEAS - PULM SYS VEL: 46.4 CM/SEC
BH CV ECHO MEAS - RAP SYSTOLE: 15 MMHG
BH CV ECHO MEAS - RV MAX PG: 1.38 MMHG
BH CV ECHO MEAS - RV V1 MAX: 58.7 CM/SEC
BH CV ECHO MEAS - RV V1 VTI: 9.2 CM
BH CV ECHO MEAS - RVSP: 59 MMHG
BH CV ECHO MEAS - SV(LVOT): 52.4 ML
BH CV ECHO MEAS - SV(MOD-SP2): 46 ML
BH CV ECHO MEAS - SV(MOD-SP4): 37 ML
BH CV ECHO MEAS - SVI(LVOT): 23.1 ML/M2
BH CV ECHO MEAS - SVI(MOD-SP2): 20.3 ML/M2
BH CV ECHO MEAS - SVI(MOD-SP4): 16.4 ML/M2
BH CV ECHO MEAS - TAPSE (>1.6): 2.36 CM
BH CV ECHO MEAS - TR MAX PG: 44.2 MMHG
BH CV ECHO MEAS - TR MAX VEL: 332.6 CM/SEC
BH CV ECHO MEASUREMENTS AVERAGE E/E' RATIO: 23.91
BH CV XLRA - TDI S': 9.5 CM/SEC
BUN SERPL-MCNC: 37 MG/DL (ref 8–23)
BUN/CREAT SERPL: 31.4 (ref 7–25)
C PNEUM DNA NPH QL NAA+NON-PROBE: NOT DETECTED
CALCIUM SPEC-SCNC: 8.8 MG/DL (ref 8.2–9.6)
CHLORIDE SERPL-SCNC: 99 MMOL/L (ref 98–107)
CK SERPL-CCNC: 1242 U/L (ref 20–200)
CO2 SERPL-SCNC: 25.8 MMOL/L (ref 22–29)
CREAT SERPL-MCNC: 1.18 MG/DL (ref 0.76–1.27)
D-LACTATE SERPL-SCNC: 1.8 MMOL/L (ref 0.5–2)
DEPRECATED RDW RBC AUTO: 43 FL (ref 37–54)
EGFRCR SERPLBLD CKD-EPI 2021: 58.6 ML/MIN/1.73
ERYTHROCYTE [DISTWIDTH] IN BLOOD BY AUTOMATED COUNT: 14 % (ref 12.3–15.4)
FLUAV SUBTYP SPEC NAA+PROBE: NOT DETECTED
FLUBV RNA NPH QL NAA+NON-PROBE: NOT DETECTED
GEN 5 1HR TROPONIN T REFLEX: 771 NG/L
GLUCOSE BLDC GLUCOMTR-MCNC: 107 MG/DL (ref 70–130)
GLUCOSE BLDC GLUCOMTR-MCNC: 114 MG/DL (ref 70–130)
GLUCOSE BLDC GLUCOMTR-MCNC: 149 MG/DL (ref 70–130)
GLUCOSE BLDC GLUCOMTR-MCNC: 155 MG/DL (ref 70–130)
GLUCOSE SERPL-MCNC: 146 MG/DL (ref 65–99)
HADV DNA SPEC NAA+PROBE: NOT DETECTED
HCOV 229E RNA SPEC QL NAA+PROBE: NOT DETECTED
HCOV HKU1 RNA SPEC QL NAA+PROBE: NOT DETECTED
HCOV NL63 RNA SPEC QL NAA+PROBE: NOT DETECTED
HCOV OC43 RNA SPEC QL NAA+PROBE: NOT DETECTED
HCT VFR BLD AUTO: 39.2 % (ref 37.5–51)
HGB BLD-MCNC: 12.2 G/DL (ref 13–17.7)
HMPV RNA NPH QL NAA+NON-PROBE: NOT DETECTED
HPIV1 RNA ISLT QL NAA+PROBE: NOT DETECTED
HPIV2 RNA SPEC QL NAA+PROBE: NOT DETECTED
HPIV3 RNA NPH QL NAA+PROBE: NOT DETECTED
HPIV4 P GENE NPH QL NAA+PROBE: NOT DETECTED
L PNEUMO1 AG UR QL IA: NEGATIVE
LEFT ATRIUM VOLUME INDEX: 39.3 ML/M2
LV EF BIPLANE MOD: 23.3 %
M PNEUMO IGG SER IA-ACNC: NOT DETECTED
MCH RBC QN AUTO: 26.7 PG (ref 26.6–33)
MCHC RBC AUTO-ENTMCNC: 31.1 G/DL (ref 31.5–35.7)
MCV RBC AUTO: 85.8 FL (ref 79–97)
PLATELET # BLD AUTO: 204 10*3/MM3 (ref 140–450)
PMV BLD AUTO: 10.3 FL (ref 6–12)
POTASSIUM SERPL-SCNC: 3.8 MMOL/L (ref 3.5–5.2)
RBC # BLD AUTO: 4.57 10*6/MM3 (ref 4.14–5.8)
RHINOVIRUS RNA SPEC NAA+PROBE: NOT DETECTED
RSV RNA NPH QL NAA+NON-PROBE: NOT DETECTED
S PNEUM AG SPEC QL LA: NEGATIVE
SARS-COV-2 RNA NPH QL NAA+NON-PROBE: NOT DETECTED
SINUS: 3.5 CM
SODIUM SERPL-SCNC: 136 MMOL/L (ref 136–145)
STJ: 2.7 CM
TROPONIN T % DELTA: -8
TROPONIN T NUMERIC DELTA: -69 NG/L
TROPONIN T SERPL HS-MCNC: 767 NG/L
TROPONIN T SERPL HS-MCNC: 840 NG/L
WBC NRBC COR # BLD AUTO: 10.28 10*3/MM3 (ref 3.4–10.8)

## 2025-07-21 PROCEDURE — 25010000002 FUROSEMIDE PER 20 MG: Performed by: EMERGENCY MEDICINE

## 2025-07-21 PROCEDURE — 83605 ASSAY OF LACTIC ACID: CPT | Performed by: EMERGENCY MEDICINE

## 2025-07-21 PROCEDURE — 36415 COLL VENOUS BLD VENIPUNCTURE: CPT

## 2025-07-21 PROCEDURE — 93306 TTE W/DOPPLER COMPLETE: CPT

## 2025-07-21 PROCEDURE — 25510000001 IOPAMIDOL PER 1 ML: Performed by: EMERGENCY MEDICINE

## 2025-07-21 PROCEDURE — 87040 BLOOD CULTURE FOR BACTERIA: CPT | Performed by: EMERGENCY MEDICINE

## 2025-07-21 PROCEDURE — 87449 NOS EACH ORGANISM AG IA: CPT | Performed by: STUDENT IN AN ORGANIZED HEALTH CARE EDUCATION/TRAINING PROGRAM

## 2025-07-21 PROCEDURE — 94664 DEMO&/EVAL PT USE INHALER: CPT

## 2025-07-21 PROCEDURE — 25810000003 SODIUM CHLORIDE 0.9 % SOLUTION 250 ML FLEX CONT: Performed by: EMERGENCY MEDICINE

## 2025-07-21 PROCEDURE — 84484 ASSAY OF TROPONIN QUANT: CPT | Performed by: NURSE PRACTITIONER

## 2025-07-21 PROCEDURE — 87899 AGENT NOS ASSAY W/OPTIC: CPT | Performed by: STUDENT IN AN ORGANIZED HEALTH CARE EDUCATION/TRAINING PROGRAM

## 2025-07-21 PROCEDURE — 25010000002 CEFTRIAXONE PER 250 MG: Performed by: EMERGENCY MEDICINE

## 2025-07-21 PROCEDURE — 82948 REAGENT STRIP/BLOOD GLUCOSE: CPT

## 2025-07-21 PROCEDURE — 93356 MYOCRD STRAIN IMG SPCKL TRCK: CPT

## 2025-07-21 PROCEDURE — 94799 UNLISTED PULMONARY SVC/PX: CPT

## 2025-07-21 PROCEDURE — 63710000001 INSULIN LISPRO (HUMAN) PER 5 UNITS: Performed by: NURSE PRACTITIONER

## 2025-07-21 PROCEDURE — 25010000002 FUROSEMIDE PER 20 MG: Performed by: INTERNAL MEDICINE

## 2025-07-21 PROCEDURE — 71275 CT ANGIOGRAPHY CHEST: CPT

## 2025-07-21 PROCEDURE — 85027 COMPLETE CBC AUTOMATED: CPT | Performed by: NURSE PRACTITIONER

## 2025-07-21 PROCEDURE — 99222 1ST HOSP IP/OBS MODERATE 55: CPT | Performed by: INTERNAL MEDICINE

## 2025-07-21 PROCEDURE — 93010 ELECTROCARDIOGRAM REPORT: CPT | Performed by: INTERNAL MEDICINE

## 2025-07-21 PROCEDURE — 80048 BASIC METABOLIC PNL TOTAL CA: CPT | Performed by: NURSE PRACTITIONER

## 2025-07-21 PROCEDURE — 25010000002 CEFTRIAXONE PER 250 MG: Performed by: STUDENT IN AN ORGANIZED HEALTH CARE EDUCATION/TRAINING PROGRAM

## 2025-07-21 PROCEDURE — 36415 COLL VENOUS BLD VENIPUNCTURE: CPT | Performed by: NURSE PRACTITIONER

## 2025-07-21 PROCEDURE — 25010000002 AZITHROMYCIN PER 500 MG: Performed by: EMERGENCY MEDICINE

## 2025-07-21 PROCEDURE — 93306 TTE W/DOPPLER COMPLETE: CPT | Performed by: INTERNAL MEDICINE

## 2025-07-21 PROCEDURE — 93356 MYOCRD STRAIN IMG SPCKL TRCK: CPT | Performed by: INTERNAL MEDICINE

## 2025-07-21 PROCEDURE — 94640 AIRWAY INHALATION TREATMENT: CPT

## 2025-07-21 PROCEDURE — 84484 ASSAY OF TROPONIN QUANT: CPT | Performed by: EMERGENCY MEDICINE

## 2025-07-21 PROCEDURE — 82550 ASSAY OF CK (CPK): CPT | Performed by: NURSE PRACTITIONER

## 2025-07-21 PROCEDURE — 25510000001 PERFLUTREN 6.52 MG/ML SUSPENSION 2 ML VIAL: Performed by: INTERNAL MEDICINE

## 2025-07-21 PROCEDURE — 25010000002 ENOXAPARIN PER 10 MG: Performed by: STUDENT IN AN ORGANIZED HEALTH CARE EDUCATION/TRAINING PROGRAM

## 2025-07-21 PROCEDURE — 94761 N-INVAS EAR/PLS OXIMETRY MLT: CPT

## 2025-07-21 RX ORDER — FLUTICASONE PROPIONATE 50 MCG
2 SPRAY, SUSPENSION (ML) NASAL DAILY
Status: DISCONTINUED | OUTPATIENT
Start: 2025-07-21 | End: 2025-07-26 | Stop reason: HOSPADM

## 2025-07-21 RX ORDER — PRAMIPEXOLE DIHYDROCHLORIDE 0.5 MG/1
0.25 TABLET ORAL NIGHTLY
Status: DISCONTINUED | OUTPATIENT
Start: 2025-07-21 | End: 2025-07-26 | Stop reason: HOSPADM

## 2025-07-21 RX ORDER — NICOTINE POLACRILEX 4 MG
15 LOZENGE BUCCAL
Status: DISCONTINUED | OUTPATIENT
Start: 2025-07-21 | End: 2025-07-26 | Stop reason: HOSPADM

## 2025-07-21 RX ORDER — FUROSEMIDE 10 MG/ML
40 INJECTION INTRAMUSCULAR; INTRAVENOUS EVERY 12 HOURS
Status: DISCONTINUED | OUTPATIENT
Start: 2025-07-21 | End: 2025-07-23

## 2025-07-21 RX ORDER — ACETAMINOPHEN 650 MG/1
650 SUPPOSITORY RECTAL EVERY 4 HOURS PRN
Status: DISCONTINUED | OUTPATIENT
Start: 2025-07-21 | End: 2025-07-26 | Stop reason: HOSPADM

## 2025-07-21 RX ORDER — BISACODYL 5 MG/1
5 TABLET, DELAYED RELEASE ORAL DAILY PRN
Status: DISCONTINUED | OUTPATIENT
Start: 2025-07-21 | End: 2025-07-26 | Stop reason: HOSPADM

## 2025-07-21 RX ORDER — NITROGLYCERIN 0.4 MG/1
0.4 TABLET SUBLINGUAL
Status: DISCONTINUED | OUTPATIENT
Start: 2025-07-21 | End: 2025-07-26 | Stop reason: HOSPADM

## 2025-07-21 RX ORDER — INSULIN LISPRO 100 [IU]/ML
2-7 INJECTION, SOLUTION INTRAVENOUS; SUBCUTANEOUS
Status: DISCONTINUED | OUTPATIENT
Start: 2025-07-21 | End: 2025-07-26 | Stop reason: HOSPADM

## 2025-07-21 RX ORDER — ASPIRIN 81 MG/1
324 TABLET, CHEWABLE ORAL ONCE
Status: COMPLETED | OUTPATIENT
Start: 2025-07-21 | End: 2025-07-21

## 2025-07-21 RX ORDER — ACETAMINOPHEN 160 MG/5ML
650 SOLUTION ORAL EVERY 4 HOURS PRN
Status: DISCONTINUED | OUTPATIENT
Start: 2025-07-21 | End: 2025-07-26 | Stop reason: HOSPADM

## 2025-07-21 RX ORDER — IPRATROPIUM BROMIDE AND ALBUTEROL SULFATE 2.5; .5 MG/3ML; MG/3ML
3 SOLUTION RESPIRATORY (INHALATION)
Status: DISCONTINUED | OUTPATIENT
Start: 2025-07-21 | End: 2025-07-26 | Stop reason: HOSPADM

## 2025-07-21 RX ORDER — IBUPROFEN 600 MG/1
1 TABLET ORAL
Status: DISCONTINUED | OUTPATIENT
Start: 2025-07-21 | End: 2025-07-26 | Stop reason: HOSPADM

## 2025-07-21 RX ORDER — DEXTROSE MONOHYDRATE 25 G/50ML
25 INJECTION, SOLUTION INTRAVENOUS
Status: DISCONTINUED | OUTPATIENT
Start: 2025-07-21 | End: 2025-07-26 | Stop reason: HOSPADM

## 2025-07-21 RX ORDER — BISACODYL 10 MG
10 SUPPOSITORY, RECTAL RECTAL DAILY PRN
Status: DISCONTINUED | OUTPATIENT
Start: 2025-07-21 | End: 2025-07-26 | Stop reason: HOSPADM

## 2025-07-21 RX ORDER — CALCIUM CARBONATE 500 MG/1
2 TABLET, CHEWABLE ORAL 2 TIMES DAILY PRN
Status: DISCONTINUED | OUTPATIENT
Start: 2025-07-21 | End: 2025-07-22

## 2025-07-21 RX ORDER — IPRATROPIUM BROMIDE AND ALBUTEROL SULFATE 2.5; .5 MG/3ML; MG/3ML
3 SOLUTION RESPIRATORY (INHALATION) EVERY 4 HOURS PRN
Status: DISCONTINUED | OUTPATIENT
Start: 2025-07-21 | End: 2025-07-26 | Stop reason: HOSPADM

## 2025-07-21 RX ORDER — SODIUM CHLORIDE 0.9 % (FLUSH) 0.9 %
10 SYRINGE (ML) INJECTION AS NEEDED
Status: DISCONTINUED | OUTPATIENT
Start: 2025-07-21 | End: 2025-07-26 | Stop reason: HOSPADM

## 2025-07-21 RX ORDER — CIPROFLOXACIN HYDROCHLORIDE 3.5 MG/ML
1 SOLUTION/ DROPS TOPICAL EVERY 4 HOURS
Status: DISPENSED | OUTPATIENT
Start: 2025-07-21 | End: 2025-07-23

## 2025-07-21 RX ORDER — IPRATROPIUM BROMIDE AND ALBUTEROL SULFATE 2.5; .5 MG/3ML; MG/3ML
3 SOLUTION RESPIRATORY (INHALATION) ONCE
Status: COMPLETED | OUTPATIENT
Start: 2025-07-21 | End: 2025-07-21

## 2025-07-21 RX ORDER — POLYMYXIN B SULFATE AND TRIMETHOPRIM 1; 10000 MG/ML; [USP'U]/ML
1 SOLUTION OPHTHALMIC EVERY 6 HOURS SCHEDULED
Status: DISCONTINUED | OUTPATIENT
Start: 2025-07-21 | End: 2025-07-26 | Stop reason: HOSPADM

## 2025-07-21 RX ORDER — ENOXAPARIN SODIUM 100 MG/ML
40 INJECTION SUBCUTANEOUS EVERY 24 HOURS
Status: DISCONTINUED | OUTPATIENT
Start: 2025-07-21 | End: 2025-07-22

## 2025-07-21 RX ORDER — ONDANSETRON 2 MG/ML
4 INJECTION INTRAMUSCULAR; INTRAVENOUS EVERY 6 HOURS PRN
Status: DISCONTINUED | OUTPATIENT
Start: 2025-07-21 | End: 2025-07-21

## 2025-07-21 RX ORDER — ONDANSETRON 4 MG/1
4 TABLET, ORALLY DISINTEGRATING ORAL EVERY 6 HOURS PRN
Status: DISCONTINUED | OUTPATIENT
Start: 2025-07-21 | End: 2025-07-21

## 2025-07-21 RX ORDER — GUAIFENESIN/DEXTROMETHORPHAN 100-10MG/5
5 SYRUP ORAL EVERY 4 HOURS PRN
Status: DISCONTINUED | OUTPATIENT
Start: 2025-07-21 | End: 2025-07-26 | Stop reason: HOSPADM

## 2025-07-21 RX ORDER — PANTOPRAZOLE SODIUM 40 MG/1
40 TABLET, DELAYED RELEASE ORAL
Status: DISCONTINUED | OUTPATIENT
Start: 2025-07-21 | End: 2025-07-26 | Stop reason: HOSPADM

## 2025-07-21 RX ORDER — TETRACAINE HYDROCHLORIDE 5 MG/ML
2 SOLUTION OPHTHALMIC ONCE
Status: COMPLETED | OUTPATIENT
Start: 2025-07-21 | End: 2025-07-21

## 2025-07-21 RX ORDER — SODIUM CHLORIDE 0.9 % (FLUSH) 0.9 %
10 SYRINGE (ML) INJECTION EVERY 12 HOURS SCHEDULED
Status: DISCONTINUED | OUTPATIENT
Start: 2025-07-21 | End: 2025-07-26 | Stop reason: HOSPADM

## 2025-07-21 RX ORDER — SODIUM CHLORIDE 9 MG/ML
40 INJECTION, SOLUTION INTRAVENOUS AS NEEDED
Status: DISCONTINUED | OUTPATIENT
Start: 2025-07-21 | End: 2025-07-26 | Stop reason: HOSPADM

## 2025-07-21 RX ORDER — FERROUS SULFATE 325(65) MG
325 TABLET ORAL
Status: DISCONTINUED | OUTPATIENT
Start: 2025-07-21 | End: 2025-07-26 | Stop reason: HOSPADM

## 2025-07-21 RX ORDER — MULTIPLE VITAMINS W/ MINERALS TAB 9MG-400MCG
1 TAB ORAL DAILY
Status: DISCONTINUED | OUTPATIENT
Start: 2025-07-21 | End: 2025-07-26 | Stop reason: HOSPADM

## 2025-07-21 RX ORDER — FLUORESCEIN SODIUM 1 MG/MG
2 STRIP OPHTHALMIC ONCE
Status: COMPLETED | OUTPATIENT
Start: 2025-07-21 | End: 2025-07-21

## 2025-07-21 RX ORDER — AMOXICILLIN 250 MG
2 CAPSULE ORAL 2 TIMES DAILY PRN
Status: DISCONTINUED | OUTPATIENT
Start: 2025-07-21 | End: 2025-07-26 | Stop reason: HOSPADM

## 2025-07-21 RX ORDER — IOPAMIDOL 755 MG/ML
100 INJECTION, SOLUTION INTRAVASCULAR
Status: COMPLETED | OUTPATIENT
Start: 2025-07-21 | End: 2025-07-21

## 2025-07-21 RX ORDER — ACETAMINOPHEN 325 MG/1
650 TABLET ORAL EVERY 4 HOURS PRN
Status: DISCONTINUED | OUTPATIENT
Start: 2025-07-21 | End: 2025-07-26 | Stop reason: HOSPADM

## 2025-07-21 RX ORDER — PRAVASTATIN SODIUM 20 MG
20 TABLET ORAL DAILY
Status: DISCONTINUED | OUTPATIENT
Start: 2025-07-21 | End: 2025-07-22

## 2025-07-21 RX ORDER — LISINOPRIL 20 MG/1
40 TABLET ORAL
Status: DISCONTINUED | OUTPATIENT
Start: 2025-07-22 | End: 2025-07-26

## 2025-07-21 RX ORDER — POLYETHYLENE GLYCOL 3350 17 G/17G
17 POWDER, FOR SOLUTION ORAL DAILY PRN
Status: DISCONTINUED | OUTPATIENT
Start: 2025-07-21 | End: 2025-07-26 | Stop reason: HOSPADM

## 2025-07-21 RX ADMIN — CEFTRIAXONE 2000 MG: 2 INJECTION, POWDER, FOR SOLUTION INTRAMUSCULAR; INTRAVENOUS at 01:58

## 2025-07-21 RX ADMIN — Medication 10 ML: at 22:14

## 2025-07-21 RX ADMIN — INSULIN LISPRO 2 UNITS: 100 INJECTION, SOLUTION INTRAVENOUS; SUBCUTANEOUS at 07:04

## 2025-07-21 RX ADMIN — ENOXAPARIN SODIUM 40 MG: 100 INJECTION SUBCUTANEOUS at 16:24

## 2025-07-21 RX ADMIN — SODIUM CHLORIDE 500 MG: 9 INJECTION, SOLUTION INTRAVENOUS at 02:24

## 2025-07-21 RX ADMIN — POLYMYXIN B SULFATE AND TRIMETHOPRIM 1 DROP: 10000; 1 SOLUTION OPHTHALMIC at 11:50

## 2025-07-21 RX ADMIN — SENNOSIDES, DOCUSATE SODIUM 2 TABLET: 50; 8.6 TABLET, FILM COATED ORAL at 22:14

## 2025-07-21 RX ADMIN — PRAVASTATIN SODIUM 20 MG: 20 TABLET ORAL at 11:51

## 2025-07-21 RX ADMIN — CIPROFLOXACIN HYDROCHLORIDE 1 DROP: 3 SOLUTION/ DROPS OPHTHALMIC at 11:50

## 2025-07-21 RX ADMIN — GUAIFENESIN SYRUP AND DEXTROMETHORPHAN 5 ML: 100; 10 SYRUP ORAL at 01:37

## 2025-07-21 RX ADMIN — POLYMYXIN B SULFATE AND TRIMETHOPRIM 1 DROP: 10000; 1 SOLUTION OPHTHALMIC at 23:58

## 2025-07-21 RX ADMIN — FERROUS SULFATE TAB 325 MG (65 MG ELEMENTAL FE) 325 MG: 325 (65 FE) TAB at 11:51

## 2025-07-21 RX ADMIN — IPRATROPIUM BROMIDE AND ALBUTEROL SULFATE 3 ML: .5; 3 SOLUTION RESPIRATORY (INHALATION) at 20:40

## 2025-07-21 RX ADMIN — FUROSEMIDE 40 MG: 10 INJECTION, SOLUTION INTRAMUSCULAR; INTRAVENOUS at 12:28

## 2025-07-21 RX ADMIN — POLYMYXIN B SULFATE AND TRIMETHOPRIM 1 DROP: 10000; 1 SOLUTION OPHTHALMIC at 18:31

## 2025-07-21 RX ADMIN — Medication 10 ML: at 10:04

## 2025-07-21 RX ADMIN — FLUTICASONE PROPIONATE 2 SPRAY: 50 SPRAY, METERED NASAL at 11:49

## 2025-07-21 RX ADMIN — PANTOPRAZOLE SODIUM 40 MG: 40 TABLET, DELAYED RELEASE ORAL at 11:51

## 2025-07-21 RX ADMIN — Medication 1 TABLET: at 11:51

## 2025-07-21 RX ADMIN — CEFTRIAXONE SODIUM 1000 MG: 1 INJECTION, POWDER, FOR SOLUTION INTRAMUSCULAR; INTRAVENOUS at 22:14

## 2025-07-21 RX ADMIN — FUROSEMIDE 40 MG: 10 INJECTION, SOLUTION INTRAMUSCULAR; INTRAVENOUS at 23:59

## 2025-07-21 RX ADMIN — PRAMIPEXOLE DIHYDROCHLORIDE 0.25 MG: 0.5 TABLET ORAL at 22:14

## 2025-07-21 RX ADMIN — CIPROFLOXACIN HYDROCHLORIDE 1 DROP: 3 SOLUTION/ DROPS OPHTHALMIC at 14:24

## 2025-07-21 RX ADMIN — ASPIRIN 81 MG CHEWABLE TABLET 324 MG: 81 TABLET CHEWABLE at 02:24

## 2025-07-21 RX ADMIN — GUAIFENESIN SYRUP AND DEXTROMETHORPHAN 5 ML: 100; 10 SYRUP ORAL at 23:59

## 2025-07-21 RX ADMIN — Medication 10 ML: at 23:57

## 2025-07-21 RX ADMIN — FLUORESCEIN SODIUM 2 STRIP: 1 STRIP OPHTHALMIC at 01:39

## 2025-07-21 RX ADMIN — FUROSEMIDE 80 MG: 10 INJECTION, SOLUTION INTRAMUSCULAR; INTRAVENOUS at 00:29

## 2025-07-21 RX ADMIN — CIPROFLOXACIN HYDROCHLORIDE 1 DROP: 3 SOLUTION/ DROPS OPHTHALMIC at 22:15

## 2025-07-21 RX ADMIN — CIPROFLOXACIN HYDROCHLORIDE 1 DROP: 3 SOLUTION/ DROPS OPHTHALMIC at 18:28

## 2025-07-21 RX ADMIN — GUAIFENESIN SYRUP AND DEXTROMETHORPHAN 5 ML: 100; 10 SYRUP ORAL at 06:59

## 2025-07-21 RX ADMIN — PERFLUTREN 2 ML: 6.52 INJECTION, SUSPENSION INTRAVENOUS at 11:11

## 2025-07-21 RX ADMIN — IPRATROPIUM BROMIDE AND ALBUTEROL SULFATE 3 ML: .5; 3 SOLUTION RESPIRATORY (INHALATION) at 11:55

## 2025-07-21 RX ADMIN — IOPAMIDOL 95 ML: 755 INJECTION, SOLUTION INTRAVENOUS at 00:44

## 2025-07-21 RX ADMIN — TETRACAINE HYDROCHLORIDE 2 DROP: 5 SOLUTION OPHTHALMIC at 01:39

## 2025-07-21 RX ADMIN — IPRATROPIUM BROMIDE AND ALBUTEROL SULFATE 3 ML: .5; 3 SOLUTION RESPIRATORY (INHALATION) at 00:56

## 2025-07-21 NOTE — ED NOTES
"Nursing report ED to floor  Goran August  89 y.o.  male    HPI :  HPI  Stated Reason for Visit: cough x 4 days  History Obtained From: patient, EMS    Chief Complaint  Chief Complaint   Patient presents with    Cough       Admitting doctor:   Alex Stevens MD    Admitting diagnosis:   The primary encounter diagnosis was Pneumonia of both lower lobes due to infectious organism. Diagnoses of Acute respiratory failure with hypoxia, Elevated troponin, Acute pulmonary edema, Acute on chronic congestive heart failure, unspecified heart failure type, and Conjunctivitis of both eyes, unspecified conjunctivitis type were also pertinent to this visit.    Code status:   Current Code Status       Date Active Code Status Order ID Comments User Context       Prior            Allergies:   Hctz [hydrochlorothiazide]    Isolation:   No active isolations    Intake and Output  No intake or output data in the 24 hours ending 07/21/25 0223    Weight:       07/20/25 2235   Weight: 104 kg (230 lb)       Most recent vitals:   Vitals:    07/20/25 2235 07/20/25 2358 07/21/25 0137 07/21/25 0200   BP: 132/84 129/60 (!) 142/119 139/57   BP Location: Right arm      Patient Position: Lying      Pulse: 93 77 87 87   Resp: 18      Temp: 98 °F (36.7 °C)      TempSrc: Oral      SpO2: 95% 98% 99% 95%   Weight: 104 kg (230 lb)      Height: 180.3 cm (71\")          Active LDAs/IV Access:   Lines, Drains & Airways       Active LDAs       Name Placement date Placement time Site Days    Peripheral IV 07/20/25 2255 20 G Anterior;Right Forearm 07/20/25 2255  Forearm  less than 1                    Labs (abnormal labs have a star):   Labs Reviewed   BASIC METABOLIC PANEL - Abnormal; Notable for the following components:       Result Value    BUN 37.0 (*)     BUN/Creatinine Ratio 33.9 (*)     Anion Gap 16.0 (*)     All other components within normal limits    Narrative:     GFR Categories in Chronic Kidney Disease (CKD)              GFR Category        " "  GFR (mL/min/1.73)    Interpretation  G1                    90 or greater        Normal or high (1)  G2                    60-89                Mild decrease (1)  G3a                   45-59                Mild to moderate decrease  G3b                   30-44                Moderate to severe decrease  G4                    15-29                Severe decrease  G5                    14 or less           Kidney failure    (1)In the absence of evidence of kidney disease, neither GFR category G1 or G2 fulfill the criteria for CKD.    eGFR calculation 2021 CKD-EPI creatinine equation, which does not include race as a factor   PROCALCITONIN - Abnormal; Notable for the following components:    Procalcitonin 0.31 (*)     All other components within normal limits    Narrative:     As a Marker for Sepsis (Non-Neonates):    1. <0.5 ng/mL represents a low risk of severe sepsis and/or septic shock.  2. >2 ng/mL represents a high risk of severe sepsis and/or septic shock.    As a Marker for Lower Respiratory Tract Infections that require antibiotic therapy:    PCT on Admission    Antibiotic Therapy       6-12 Hrs later    >0.5                Strongly Recommended  >0.25 - <0.5        Recommended   0.1 - 0.25          Discouraged              Remeasure/reassess PCT  <0.1                Strongly Discouraged     Remeasure/reassess PCT    As 28 day mortality risk marker: \"Change in Procalcitonin Result\" (>80% or <=80%) if Day 0 (or Day 1) and Day 4 values are available. Refer to http://www.[employer]-pct-calculator.com    Change in PCT <=80%  A decrease of PCT levels below or equal to 80% defines a positive change in PCT test result representing a higher risk for 28-day all-cause mortality of patients diagnosed with severe sepsis for septic shock.    Change in PCT >80%  A decrease of PCT levels of more than 80% defines a negative change in PCT result representing a lower risk for 28-day all-cause mortality of patients diagnosed with " severe sepsis or septic shock.      BNP (IN-HOUSE) - Abnormal; Notable for the following components:    proBNP 14,418.0 (*)     All other components within normal limits    Narrative:     This assay is used as an aid in the diagnosis of individuals suspected of having heart failure. It can be used as an aid in the diagnosis of acute decompensated heart failure (ADHF) in patients presenting with signs and symptoms of ADHF to the emergency department (ED). In addition, NT-proBNP of <300 pg/mL indicates ADHF is not likely.    Age Range Result Interpretation  NT-proBNP Concentration (pg/mL:      <50             Positive            >450                   Gray                 300-450                    Negative             <300    50-75           Positive            >900                  Gray                300-900                  Negative            <300      >75             Positive            >1800                  Gray                300-1800                  Negative            <300   CBC WITH AUTO DIFFERENTIAL - Abnormal; Notable for the following components:    WBC 11.41 (*)     Hemoglobin 12.3 (*)     Neutrophil % 81.6 (*)     Lymphocyte % 5.8 (*)     Eosinophil % 0.0 (*)     Neutrophils, Absolute 9.32 (*)     Lymphocytes, Absolute 0.66 (*)     Monocytes, Absolute 1.37 (*)     All other components within normal limits   TROPONIN - Abnormal; Notable for the following components:    HS Troponin T 840 (*)     All other components within normal limits    Narrative:     High Sensitive Troponin T Reference Range:  <14.0 ng/L- Negative Female for AMI  <22.0 ng/L- Negative Male for AMI  >=14 - Abnormal Female indicating possible myocardial injury.  >=22 - Abnormal Male indicating possible myocardial injury.   Clinicians would have to utilize clinical acumen, EKG, Troponin, and serial changes to determine if it is an Acute Myocardial Infarction or myocardial injury due to an underlying chronic condition.        HIGH  SENSITIVITIY TROPONIN T 1HR - Abnormal; Notable for the following components:    HS Troponin T 771 (*)     All other components within normal limits    Narrative:     High Sensitive Troponin T Reference Range:  <14.0 ng/L- Negative Female for AMI  <22.0 ng/L- Negative Male for AMI  >=14 - Abnormal Female indicating possible myocardial injury.  >=22 - Abnormal Male indicating possible myocardial injury.   Clinicians would have to utilize clinical acumen, EKG, Troponin, and serial changes to determine if it is an Acute Myocardial Infarction or myocardial injury due to an underlying chronic condition.        RESPIRATORY PANEL PCR W/ COVID-19 (SARS-COV-2), NP SWAB IN UTM/VTP, 2 HR TAT - Normal    Narrative:     In the setting of a positive respiratory panel with a viral infection PLUS a negative procalcitonin without other underlying concern for bacterial infection, consider observing off antibiotics or discontinuation of antibiotics and continue supportive care. If the respiratory panel is positive for atypical bacterial infection (Bordetella pertussis, Chlamydophila pneumoniae, or Mycoplasma pneumoniae), consider antibiotic de-escalation to target atypical bacterial infection.   MAGNESIUM - Normal   LACTIC ACID, PLASMA - Normal   BLOOD CULTURE   BLOOD CULTURE   CBC AND DIFFERENTIAL    Narrative:     The following orders were created for panel order CBC & Differential.  Procedure                               Abnormality         Status                     ---------                               -----------         ------                     CBC Auto Differential[[id number]]        Abnormal            Final result                 Please view results for these tests on the individual orders.       EKG:   ECG 12 Lead Dyspnea   Preliminary Result   HEART RATE=74  bpm   RR Ofkxfjpp=052  ms   AL Jwjstkem=509  ms   P Horizontal Axis=20  deg   P Front Axis=18  deg   QRSD Zpedmvft=222  ms   QT Koflkcqn=269  ms   CPjB=745  ms    QRS Axis=-25  deg   T Wave Axis=141  deg   - ABNORMAL ECG -   Sinus rhythm   Multiple premature complexes, vent & supraven   Left bundle branch block   ST elevation secondary to IVCD   Prolonged QT interval   Date and Time of Study:2025-07-21 00:15:42          Meds given in ED:   Medications   guaiFENesin-dextromethorphan (ROBITUSSIN DM) 100-10 MG/5ML syrup 5 mL (5 mL Oral Given 7/21/25 0137)   cefTRIAXone (ROCEPHIN) 2,000 mg in sodium chloride 0.9 % 100 mL MBP (2,000 mg Intravenous New Bag 7/21/25 0158)   azithromycin (ZITHROMAX) 500 mg in sodium chloride 0.9 % 250 mL IVPB-VTB (has no administration in time range)   trimethoprim-polymyxin b (POLYTRIM) 80817-5.1 UNIT/ML-% ophthalmic solution 1 drop (has no administration in time range)   aspirin chewable tablet 324 mg (has no administration in time range)   furosemide (LASIX) injection 80 mg (80 mg Intravenous Given 7/21/25 0029)   ipratropium-albuterol (DUO-NEB) nebulizer solution 3 mL (3 mL Nebulization Given 7/21/25 0056)   fluorescein ophthalmic strip 2 strip (2 strips Both Eyes Given 7/21/25 0139)   tetracaine (ALTACAINE) 0.5 % ophthalmic solution 2 drop (2 drops Both Eyes Given 7/21/25 0139)   iopamidol (ISOVUE-370) 76 % injection 100 mL (95 mL Intravenous Given 7/21/25 0044)       Imaging results:  CT Angiogram Chest Pulmonary Embolism  Result Date: 7/21/2025   1. Cardiomegaly, interlobular septal thickening, and small bilateral effusions, suggesting congestive heart failure. 2. Bilateral pulmonary infiltrates, with most extensive consolidation noted within the right lower lobe. Appearance is favored to reflect multifocal pneumonia. There are prominent mediastinal and hilar lymph nodes. These are likely reactive. However, follow-up of both the infiltrates and adenopathy is suggested.  Radiation dose reduction techniques were utilized, including automated exposure control and exposure modulation based on body size.   This report was finalized on 7/21/2025  1:39 AM by Dr. Julia Wisdom M.D on Workstation: [id number]      XR Chest 1 View  Result Date: 2025   1. Cardiomegaly with vascular congestion. 2. Bibasilar atelectasis.  This report was finalized on 2025 11:52 PM by Dr. Julia Wisdom M.D on Workstation: [id number]        Ambulatory status:   - bedrest    Social issues:   Social History     Socioeconomic History    Marital status:    Tobacco Use    Smoking status: Former     Current packs/day: 0.00     Average packs/day: 0.5 packs/day for 25.0 years (12.5 ttl pk-yrs)     Types: Cigarettes     Start date:      Quit date:      Years since quittin.5     Passive exposure: Past    Smokeless tobacco: Former   Vaping Use    Vaping status: Never Used   Substance and Sexual Activity    Alcohol use: Yes     Comment: one drink monthly    Drug use: No    Sexual activity: Defer       Peripheral Neurovascular  Peripheral Neurovascular (Adult)  Peripheral Neurovascular WDL: WDL    Neuro Cognitive  Neuro Cognitive (Adult)  Cognitive/Neuro/Behavioral WDL: WDL, level of consciousness, mood/behavior, motor response, orientation, speech  Level of Consciousness: Alert  Orientation: oriented x 4  Speech: clear  Mood/Behavior: cooperative, calm    Learning  Learning Assessment  Learning Readiness and Ability: no barriers identified  Education Provided  Person Taught: patient  Teaching Method: verbal instruction  Teaching Focus: diagnostic test, symptom/problem overview  Education Outcome Evaluation: eager to learn, acceptance expressed, able to teach back, verbalizes understanding    Respiratory  Respiratory  Airway WDL: WDL  Respiratory WDL  Respiratory WDL: rhythm/pattern, WDL, cough  Rhythm/Pattern, Respiratory: no shortness of breath reported, depth regular, pattern regular, unlabored  Cough Type: productive  Breath Sounds Post-Respiratory Treatment  Breath Sounds Posttreatment All Fields: All Fields  Breath Sounds Posttreatment All Fields:  Anterior:, aeration increased    Abdominal Pain       Pain Assessments  Pain (Adult)  (0-10) Pain Rating: Rest: 0    NIH Stroke Scale       Julita Richardson RN  07/21/25 02:23 EDT

## 2025-07-21 NOTE — PLAN OF CARE
Problem: Adult Inpatient Plan of Care  Goal: Plan of Care Review  Outcome: Progressing  Flowsheets (Taken 7/21/2025 1903)  Progress: improving  Outcome Evaluation: pt was found down in closet unknown amount of time . ck elevated 12,000 . pt chf recieving iv lasix and voiding per urinal PNA and hypoxia. o2@2l  Plan of Care Reviewed With: patient  Goal: Patient-Specific Goal (Individualized)  Outcome: Progressing  Goal: Absence of Hospital-Acquired Illness or Injury  Outcome: Progressing  Intervention: Identify and Manage Fall Risk  Recent Flowsheet Documentation  Taken 7/21/2025 1800 by Glo De La O RN  Safety Promotion/Fall Prevention:   fall prevention program maintained   room organization consistent   safety round/check completed  Taken 7/21/2025 1600 by Glo De La O RN  Safety Promotion/Fall Prevention:   fall prevention program maintained   clutter free environment maintained   assistive device/personal items within reach   room organization consistent   safety round/check completed  Taken 7/21/2025 1346 by Glo De La O RN  Safety Promotion/Fall Prevention:   fall prevention program maintained   clutter free environment maintained   assistive device/personal items within reach   activity supervised   room organization consistent   safety round/check completed   nonskid shoes/slippers when out of bed  Intervention: Prevent Skin Injury  Recent Flowsheet Documentation  Taken 7/21/2025 1800 by Glo De La O RN  Body Position: position changed independently  Taken 7/21/2025 1600 by Glo De La O RN  Body Position: position changed independently  Taken 7/21/2025 1346 by Glo De La O RN  Body Position: position changed independently  Intervention: Prevent Infection  Recent Flowsheet Documentation  Taken 7/21/2025 1800 by Glo De La O RN  Infection Prevention:   hand hygiene promoted   rest/sleep promoted   single patient room provided  Taken 7/21/2025 1600 by Jaylyn  Glo, RN  Infection Prevention:   hand hygiene promoted   rest/sleep promoted   single patient room provided  Taken 7/21/2025 1346 by Glo De La O, RN  Infection Prevention:   hand hygiene promoted   rest/sleep promoted   single patient room provided  Goal: Optimal Comfort and Wellbeing  Outcome: Progressing  Intervention: Provide Person-Centered Care  Recent Flowsheet Documentation  Taken 7/21/2025 1346 by Glo De La O, RN  Trust Relationship/Rapport:   care explained   choices provided   emotional support provided   empathic listening provided   questions answered   questions encouraged   reassurance provided   thoughts/feelings acknowledged  Goal: Readiness for Transition of Care  Outcome: Progressing   Goal Outcome Evaluation:  Plan of Care Reviewed With: patient        Progress: improving  Outcome Evaluation: pt was found down in closet unknown amount of time . ck elevated 12,000 . pt chf recieving iv lasix and voiding per urinal PNA and hypoxia. o2@2l

## 2025-07-21 NOTE — ED NOTES
Patient to ED via EMS from home c/o cough x 4 days. Per EMS, patient was around 87-88% on room air, placed on 2LNC. Patient denies shortness of breath.

## 2025-07-21 NOTE — ED PROVIDER NOTES
EMERGENCY DEPARTMENT ENCOUNTER    History  Chief Complaint   Patient presents with    Cough       History provided by: Patient and Relative     HPI:  Context: Goran August is a 89 y.o. male with a medical history of hypertension, hyperlipidemia, CKD, cardiomyopathy, DVT who presents to the ED c/o acute cough and eye drainage.  Symptoms have been present for the last several days.  He went to PCP, who thought symptoms were allergic versus viral.  He was placed on cough suppressant and given ciprofloxacin drops for the eyes.  He has developed worsening cough, which is somewhat productive.  He has associated dyspnea.  Has had some associated leg swelling.  No current chest pain.      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    Hiatal hernia with gastroesophageal reflux 05/06/2016    Stage 3 chronic kidney disease 11/02/2018    Environmental allergies 05/01/2019    Benign essential HTN 11/05/2019    Left bundle branch block 11/05/2019    Nonischemic cardiomyopathy 05/24/2013    Exudative age-related macular degeneration, left eye, with active choroidal neovascularization 03/30/2021    Peripheral vascular disease, unspecified 03/30/2021    Bilateral lower extremity edema 05/08/2022    Hypoxemia 05/08/2022    Dyspnea 05/08/2022    COVID-19 virus infection 05/08/2022    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    Type 2 diabetes mellitus     Positive D dimer 05/08/2022    Nocturnal hypoxia 05/09/2022    History of DVT (deep vein thrombosis) 12/19/2023    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/2023    Acute bronchitis 12/19/2023     Resolved Ambulatory Problems     Diagnosis Date Noted    Acute DVT (deep venous  thrombosis) 2022     Past Medical History:   Diagnosis Date    Anemia     Benign hypertensive heart disease     Cardiomyopathy     Cataract 2020    GERD (gastroesophageal reflux disease)     Heart murmur     Hx of colonic polyps     Hypertension     LBBB (left bundle branch block)     Mitral regurgitation     Osteoarthritis        Past Surgical History:  Past Surgical History:   Procedure Laterality Date    CATARACT EXTRACTION, BILATERAL Bilateral 2020    INGUINAL HERNIA REPAIR      X4    REPLACEMENT TOTAL KNEE Right          Family History:  Family History   Problem Relation Age of Onset    Aneurysm Mother         Colonic AVM in 90's    Hypertension Mother     Parkinsonism Father         90's    Aortic aneurysm Father         AAA    Tuberculosis Father     Tuberculosis Paternal Aunt     Cancer Maternal Grandmother     No Known Problems Daughter     No Known Problems Son          Social History:  Social History     Socioeconomic History    Marital status:    Tobacco Use    Smoking status: Former     Current packs/day: 0.00     Average packs/day: 0.5 packs/day for 25.0 years (12.5 ttl pk-yrs)     Types: Cigarettes     Start date:      Quit date:      Years since quittin.5     Passive exposure: Past    Smokeless tobacco: Former   Vaping Use    Vaping status: Never Used   Substance and Sexual Activity    Alcohol use: Yes     Comment: one drink monthly    Drug use: No    Sexual activity: Defer         Allergies:  Hctz [hydrochlorothiazide]        Physical Exam  ED Triage Vitals [25 2235]   Temp Heart Rate Resp BP SpO2   98 °F (36.7 °C) 93 18 132/84 95 %      Temp src Heart Rate Source Patient Position BP Location FiO2 (%)   Oral Monitor Lying Right arm --     Physical Exam  Constitutional:       Appearance: Normal appearance.   HENT:      Head: Atraumatic.   Eyes:      General: Lids are normal.         Right eye: Discharge present.         Left eye: Discharge present.      Extraocular Movements: Extraocular movements intact.      Conjunctiva/sclera:      Right eye: Right conjunctiva is not injected. Exudate present. No chemosis.     Left eye: Left conjunctiva is not injected. Exudate present. No chemosis.     Pupils: Pupils are equal.      Right eye: Pupil is round, reactive and not sluggish. No corneal abrasion or fluorescein uptake. Tomasa exam negative.      Left eye: Pupil is round, reactive and not sluggish. No corneal abrasion or fluorescein uptake. Tomasa exam negative.     Slit lamp exam:     Right eye: Anterior chamber quiet.      Left eye: Anterior chamber quiet.   Cardiovascular:      Rate and Rhythm: Normal rate. Rhythm irregular.      Heart sounds: Murmur heard.   Pulmonary:      Effort: Pulmonary effort is normal.      Breath sounds: Normal breath sounds.      Comments: Rales bilaterally  Abdominal:      Tenderness: There is no abdominal tenderness. There is no guarding or rebound.   Musculoskeletal:      Right lower leg: Edema present.      Left lower leg: Edema present.   Skin:     Capillary Refill: Capillary refill takes less than 2 seconds.   Neurological:      Mental Status: He is alert and oriented to person, place, and time.           Medications Given in ER:   Medications   guaiFENesin-dextromethorphan (ROBITUSSIN DM) 100-10 MG/5ML syrup 5 mL (5 mL Oral Given 7/21/25 0137)   cefTRIAXone (ROCEPHIN) 2,000 mg in sodium chloride 0.9 % 100 mL MBP (2,000 mg Intravenous New Bag 7/21/25 0158)   azithromycin (ZITHROMAX) 500 mg in sodium chloride 0.9 % 250 mL IVPB-VTB (has no administration in time range)   furosemide (LASIX) injection 80 mg (80 mg Intravenous Given 7/21/25 0029)   ipratropium-albuterol (DUO-NEB) nebulizer solution 3 mL (3 mL Nebulization Given 7/21/25 0056)   fluorescein ophthalmic strip 2 strip (2 strips Both Eyes Given 7/21/25 0139)   tetracaine (ALTACAINE) 0.5 % ophthalmic solution 2 drop (2 drops Both Eyes Given 7/21/25 0139)   iopamidol (ISOVUE-370)  76 % injection 100 mL (95 mL Intravenous Given 7/21/25 0044)         Orders Placed:  Orders Placed This Encounter   Procedures    Respiratory Panel PCR w/COVID-19(SARS-CoV-2) [facility] In-House, NP Swab in UTM/VTM, 2 HR TAT - Swab, Nasopharynx    Blood Culture - Blood,    Blood Culture - Blood,    XR Chest 1 View    CT Angiogram Chest Pulmonary Embolism    Basic Metabolic Panel    Procalcitonin    Magnesium    BNP    CBC Auto Differential    High Sensitivity Troponin T    Lactic Acid, Plasma    High Sensitivity Troponin T 1Hr    Visual acuity screening    Supplies To Bedside - Notify MD When Ready- Fluorescein, Woods Lamp, Kel Pen    [facility] (on-call MD unless specified) Details    Cardiology (on-call MD unless specified)    ECG 12 Lead Dyspnea    Inpatient Admission    CBC & Differential         Outpatient Medication Management:   Current Facility-Administered Medications Ordered in Epic   Medication Dose Route Frequency Provider Last Rate Last Admin    azithromycin (ZITHROMAX) 500 mg in sodium chloride 0.9 % 250 mL IVPB-VTB  500 mg Intravenous Once Vesna Singer MD        cefTRIAXone (ROCEPHIN) 2,000 mg in sodium chloride 0.9 % 100 mL MBP  2,000 mg Intravenous Once Vesna Singer  mL/hr at 07/21/25 0158 2,000 mg at 07/21/25 0158    guaiFENesin-dextromethorphan (ROBITUSSIN DM) 100-10 MG/5ML syrup 5 mL  5 mL Oral Q4H PRN Vesna Singer MD   5 mL at 07/21/25 0137     Current Outpatient Medications Ordered in Epic   Medication Sig Dispense Refill    albuterol sulfate  (90 Base) MCG/ACT inhaler Inhale 2 puffs Every 4 (Four) Hours As Needed for Wheezing or Shortness of Air. (Patient not taking: Reported on 7/18/2025) 18 g 6    benzonatate (Tessalon Perles) 100 MG capsule Take 1 capsule by mouth 3 (Three) Times a Day As Needed for Cough. 21 capsule 0    ciprofloxacin (Ciloxan) 0.3 % ophthalmic solution Administer 1 drop into the left eye Every 4 (Four) Hours for 5 days. 2.5 mL 0     esomeprazole (nexIUM) 40 MG capsule TAKE 1 CAPSULE DAILY 90 capsule 3    ferrous sulfate 325 (65 Fe) MG tablet Take 1 tablet by mouth. 3 times week      fluticasone (FLONASE) 50 MCG/ACT nasal spray Administer 2 sprays into the nostril(s) as directed by provider Daily. Administer 2 sprays in each nostril once a day 16 g 0    furosemide (LASIX) 20 MG tablet Take 2 tablets by mouth Daily. 60 tablet 3    Multiple Vitamins-Minerals (PRESERVISION AREDS 2 PO) Take  by mouth.      multivitamin with minerals tablet tablet Take 1 tablet by mouth Daily.      pramipexole (MIRAPEX) 0.25 MG tablet TAKE 1 TABLET EVERY NIGHT 90 tablet 3    pravastatin (PRAVACHOL) 20 MG tablet TAKE 1 TABLET DAILY 90 tablet 3    ramipril (ALTACE) 10 MG capsule TAKE 1 CAPSULE DAILY 90 capsule 3    triamcinolone (KENALOG) 0.1 % cream Apply  topically to the appropriate area as directed 2 (Two) Times a Day. 45 g 1           Medical Decision Making:  All labs have been independently interpreted by me.  All radiology studies have been reviewed by me. All EKGs have been independently viewed and interpreted by me.  Discussion below represents my analysis of pertinent findings related to patient's condition, differential diagnosis, treatment plan and final disposition.    Differential Diagnosis includes but not limited to: Conjunctivitis, pneumonia, pneumothorax, pulmonary edema, pleural effusion, CHF exacerbation, viral syndrome    Independent Historian Required Due To: Provides additional details of history    Review of prior external notes (non-ED) -and- Review of prior external test results outside of this encounter: Reviewed the primary care office visit note from 7/18/2025.  Patient was seen for an acute cough.  He was prescribed Tessalon Perles, Flonase for rhinitis and ciprofloxacin drops for conjunctivitis.    Labs Results:  Recent Results (from the past 24 hours)   Basic Metabolic Panel    Collection Time: 07/20/25 10:54 PM    Specimen: Arm, Right;  Blood   Result Value Ref Range    Glucose 93 65 - 99 mg/dL    BUN 37.0 (H) 8.0 - 23.0 mg/dL    Creatinine 1.09 0.76 - 1.27 mg/dL    Sodium 137 136 - 145 mmol/L    Potassium 4.5 3.5 - 5.2 mmol/L    Chloride 99 98 - 107 mmol/L    CO2 22.0 22.0 - 29.0 mmol/L    Calcium 8.8 8.2 - 9.6 mg/dL    BUN/Creatinine Ratio 33.9 (H) 7.0 - 25.0    Anion Gap 16.0 (H) 5.0 - 15.0 mmol/L    eGFR 64.5 >60.0 mL/min/1.73   Procalcitonin    Collection Time: 07/20/25 10:54 PM    Specimen: Arm, Right; Blood   Result Value Ref Range    Procalcitonin 0.31 (H) 0.00 - 0.25 ng/mL   Magnesium    Collection Time: 07/20/25 10:54 PM    Specimen: Arm, Right; Blood   Result Value Ref Range    Magnesium 1.9 1.6 - 2.4 mg/dL   BNP    Collection Time: 07/20/25 10:54 PM    Specimen: Arm, Right; Blood   Result Value Ref Range    proBNP 14,418.0 (H) 0.0 - 1,800.0 pg/mL   CBC Auto Differential    Collection Time: 07/20/25 10:54 PM    Specimen: Arm, Right; Blood   Result Value Ref Range    WBC 11.41 (H) 3.40 - 10.80 10*3/mm3    RBC 4.57 4.14 - 5.80 10*6/mm3    Hemoglobin 12.3 (L) 13.0 - 17.7 g/dL    Hematocrit 38.9 37.5 - 51.0 %    MCV 85.1 79.0 - 97.0 fL    MCH 26.9 26.6 - 33.0 pg    MCHC 31.6 31.5 - 35.7 g/dL    RDW 13.8 12.3 - 15.4 %    RDW-SD 42.5 37.0 - 54.0 fl    MPV 10.3 6.0 - 12.0 fL    Platelets 216 140 - 450 10*3/mm3    Neutrophil % 81.6 (H) 42.7 - 76.0 %    Lymphocyte % 5.8 (L) 19.6 - 45.3 %    Monocyte % 12.0 5.0 - 12.0 %    Eosinophil % 0.0 (L) 0.3 - 6.2 %    Basophil % 0.2 0.0 - 1.5 %    Immature Grans % 0.4 0.0 - 0.5 %    Neutrophils, Absolute 9.32 (H) 1.70 - 7.00 10*3/mm3    Lymphocytes, Absolute 0.66 (L) 0.70 - 3.10 10*3/mm3    Monocytes, Absolute 1.37 (H) 0.10 - 0.90 10*3/mm3    Eosinophils, Absolute 0.00 0.00 - 0.40 10*3/mm3    Basophils, Absolute 0.02 0.00 - 0.20 10*3/mm3    Immature Grans, Absolute 0.04 0.00 - 0.05 10*3/mm3    nRBC 0.0 0.0 - 0.2 /100 WBC   High Sensitivity Troponin T    Collection Time: 07/20/25 10:54 PM    Specimen: Arm,  Right; Blood   Result Value Ref Range    HS Troponin T 840 (C) <22 ng/L   Respiratory Panel PCR w/COVID-19(SARS-CoV-2) [facility] In-House, NP Swab in UTM/VTM, 2 HR TAT - Swab, Nasopharynx    Collection Time: 07/20/25 10:55 PM    Specimen: Nasopharynx; Swab   Result Value Ref Range    ADENOVIRUS, PCR Not Detected Not Detected    Coronavirus 229E Not Detected Not Detected    Coronavirus HKU1 Not Detected Not Detected    Coronavirus NL63 Not Detected Not Detected    Coronavirus OC43 Not Detected Not Detected    COVID19 Not Detected Not Detected - Ref. Range    Human Metapneumovirus Not Detected Not Detected    Human Rhinovirus/Enterovirus Not Detected Not Detected    Influenza A PCR Not Detected Not Detected    Influenza B PCR Not Detected Not Detected    Parainfluenza Virus 1 Not Detected Not Detected    Parainfluenza Virus 2 Not Detected Not Detected    Parainfluenza Virus 3 Not Detected Not Detected    Parainfluenza Virus 4 Not Detected Not Detected    RSV, PCR Not Detected Not Detected    Bordetella pertussis pcr Not Detected Not Detected    Bordetella parapertussis PCR Not Detected Not Detected    Chlamydophila pneumoniae PCR Not Detected Not Detected    Mycoplasma pneumo by PCR Not Detected Not Detected   ECG 12 Lead Dyspnea    Collection Time: 07/21/25 12:15 AM   Result Value Ref Range    QT Interval 454 ms    QTC Interval 501 ms   Lactic Acid, Plasma    Collection Time: 07/21/25 12:33 AM    Specimen: Arm, Left; Blood   Result Value Ref Range    Lactate 1.8 0.5 - 2.0 mmol/L   High Sensitivity Troponin T 1Hr    Collection Time: 07/21/25  1:09 AM    Specimen: Blood   Result Value Ref Range    HS Troponin T 771 (C) <22 ng/L    Troponin T Numeric Delta -69 ng/L    Troponin T % Delta -8 Abnormal if >/= 20%     Lab Comments:  My independent interpretation of the above labs: Elevated BNP, mild leukocytosis, elevated troponin      Radiology:  CT Angiogram Chest Pulmonary Embolism  Result Date:  7/21/2025  CT ANGIOGRAM OF THE CHEST  HISTORY: Cough  COMPARISON: None available.  TECHNIQUE: Axial CT imaging was obtained through the thorax. IV contrast was administered. Three-D reformatted images were obtained.  FINDINGS: No acute pulmonary thromboembolus is seen. Thin web is noted within the lobar pulmonary arterial branch of the right lower lobe. This was also present on the exam from May 2022, and is secondary to chronic thrombus. Examination was not optimized for assessment of the thoracic aorta. There is some dilatation of the proximal descending thoracic aorta, measuring up to 3.1 cm. The distal descending thoracic aorta measures 2.6 cm. The heart is enlarged. There are coronary artery calcifications. There is a small pericardial effusion. Patient is noted to have reflux of contrast material into the hepatic veins, which can be seen in the setting of right-sided heart failure. Main pulmonary artery is enlarged, which can be associated with pulmonary arterial hypertension. Thyroid gland, trachea, and esophagus appear unremarkable. There is an enlarged right hilar node, which measures up to 2.8 x 2.0 cm. Subcarinal node also appears enlarged, measuring up to 2.2 cm. Both of these are larger than on prior exam. The patient has bilateral pulmonary infiltrates, most significantly within the right lower lobe. There is also some interlobular septal thickening, which may reflect some vascular congestion. There are small bilateral pleural effusions. Images through the upper abdomen demonstrate cholelithiasis. No acute osseous abnormalities are seen.       1. Cardiomegaly, interlobular septal thickening, and small bilateral effusions, suggesting congestive heart failure. 2. Bilateral pulmonary infiltrates, with most extensive consolidation noted within the right lower lobe. Appearance is favored to reflect multifocal pneumonia. There are prominent mediastinal and hilar lymph nodes. These are likely reactive.  However, follow-up of both the infiltrates and adenopathy is suggested.  Radiation dose reduction techniques were utilized, including automated exposure control and exposure modulation based on body size.   This report was finalized on 7/21/2025 1:39 AM by Dr. Julia Wisdom M.D on Workstation: [id number]      XR Chest 1 View  Result Date: 7/20/2025  SINGLE VIEW OF THE CHEST  HISTORY: Cough and hypoxia  COMPARISON: May 8, 2020  FINDINGS: There is cardiomegaly. There is vascular congestion. There is calcification of the aorta. No pneumothorax is seen. No acute infiltrates are identified. Bibasilar atelectasis is suspected. No large effusion is identified.       1. Cardiomegaly with vascular congestion. 2. Bibasilar atelectasis.  This report was finalized on 7/20/2025 11:52 PM by Dr. Julia Wisdom M.D on Workstation: [id number]      Radiology Comments:  I ordered the above imaging and reviewed the results.    My independent interpretation of the cxr: Pulmonary vascular congestion    EKG Interpreted by me: Sinus rhythm, PVCs, left bundle branch block, not significantly changed from previous      Rationale:  This is an ill-appearing 89-year-old male who is presenting with complaints of cough and eye drainage.    In terms of the eye, he was diagnosed with conjunctivitis.  On exam, has some purulent drainage bilaterally.  No history of contacts or glasses use.  No other concerning findings.  Will do Polytrim every 6.    In terms of his cough, he is hypoxic.  He does not typically require oxygen and now he is requiring 2 L by nasal cannula.  He has diffuse Rales.  He also has a productive cough.  He is afebrile here with otherwise benign vital signs.    He is really not complaining of any chest pain.  Has some mild shortness of breath with ambulation, but not present at time of my evaluation.  He was evaluated with an EKG, which shows a chronically bundle branch block.  Troponin is markedly elevated, but  downtrending.  I will plan on consulting cardiology, but I suspect this troponin elevation is multifactorial, possibly with infection/sepsis, cardiomyopathy and pulmonary edema.    Chest x-ray was notable for vascular congestion cardiomegaly.  Otherwise, Chest x-ray did not demonstrate any other acute cardiopulmonary process as emergent cause of symptoms including pneumomediastinum, widened mediastinum, or pneumothorax.  I will give a dose of Lasix given elevated BNP.    Given the persistent hypoxia, the patient was evaluated with a CT pulmonary angiogram.  This is demonstrative of the aforementioned pulmonary edema, but likely with associated multifocal pneumonia.  He meets 1_SIRS criteria secondary to heart rate.  Blood cultures and lactic acid were obtained, lactic acid level was low.  Will treat with ceftriaxone and azithromycin.    He will require admission for further care and management.      Clinical Scores:                                   Progress Notes:  2:07 AM EDT: I spoke with the patient about his ED work up and diagnosis. I informed the patient that he will be admitted for further evaluation/treatment. All questions answered.      Consult:  1:55 AM EDT: Discussed case with RAKAN Milan with [facility].  Reviewed history, exam, results and treatments.  Will admit to Dr. Stevens   2:16 AM EDT: I spoke with Dr. Murphy cardiology.  In the absence of any anginal symptoms, no further treatment at this point outside of any dose of aspirin.           EM_CC: Critical care provider statement:    Critical care time (minutes): 33.   Critical care time was exclusive of:  Separately billable procedures and treating other patients   Critical care was necessary to treat or prevent imminent or life-threatening deterioration of the following conditions:  Multi-Organ Failure   Critical care was time spent personally by me on the following activities:  Development of treatment plan with patient or surrogate, discussions with  consultants, evaluation of patient's response to treatment, examination of patient, obtaining history from patient or surrogate, ordering and performing treatments and interventions, ordering and review of laboratory studies, ordering and review of radiographic studies, pulse oximetry, re-evaluation of patient's condition and review of old charts. Critical Care indicators: Hypoxia / hypoxemia and Sepsis / septicemia Others: aminophylline, diazepam, glucagon, heparin, lovenox, morphine, sodium bicarbonate, et al.    Complexity of Care:  The patient requires admission.    Diagnosis:  Final diagnoses:   Pneumonia of both lower lobes due to infectious organism   Acute respiratory failure with hypoxia   Elevated troponin   Acute pulmonary edema   Acute on chronic congestive heart failure, unspecified heart failure type   Conjunctivitis of both eyes, unspecified conjunctivitis type           Parts of this note may be an electronic transcription/translation of spoken language to printed text using the Dragon dictation system        Provider Note Signed by:     Vesna Singer MD  07/21/25 0250

## 2025-07-21 NOTE — NURSING NOTE
Received pt from ER for hypoxia, pulmonary edema, elevated troponin, pneumonia. He is Alert and oriented x4, he is hard of hearing, no chest pain, no sob. He has no pain. Swelling noted to mitch legs, he has conjunctivitis as well.     VSS, on 2L of oxygen. NSR on the monitor. He soiled his bed upon arrival with large urine output, IV lasix was given at ER.    Son is at bedside.

## 2025-07-21 NOTE — H&P
Patient Name:  Goran August  YOB: 1934  MRN:  [id number]  Admit Date:  7/20/2025  Patient Care Team:  Tanya Juilen MD as PCP - General (Family Medicine)  Ghazal Shelton, CATHY as Ambulatory  ([facility])      Subjective   History Present Illness     Chief Complaint   Patient presents with    Cough       Mr. August is a 89 y.o. male with combined CHF, DM 2, HTN, HLD presenting with cough and lower extremity edema.  Patient states that since last Thursday he has had a productive cough.  In addition he has had yellow crusting on his left eye.  He went to his PCP who thought symptoms were allergic or viral in nature.  Was placed on cough suppressant and given ciprofloxacin drops for the eyes.  Had continued cough and had a mechanical fall in his closet prompting presentation to ED.    Review of Systems   Constitutional:  Negative for chills and fatigue.   Respiratory:  Positive for cough and shortness of breath.    Cardiovascular:  Negative for chest pain and palpitations.   Gastrointestinal:  Negative for constipation, diarrhea, nausea and vomiting.   Genitourinary:  Negative for dysuria and hematuria.   Neurological:  Positive for weakness. Negative for headaches.        Personal History     Past Medical History:   Diagnosis Date    Anemia     Benign hypertensive heart disease     Cardiomyopathy     EF 45-50% by echo 3/11    Cataract 08/01/2020 9/1/20    GERD (gastroesophageal reflux disease)     Heart murmur     Hx of colonic polyps     Hyperlipidemia     Hypertension     LBBB (left bundle branch block)     Mitral regurgitation     Osteoarthritis     Type 2 diabetes mellitus      Past Surgical History:   Procedure Laterality Date    CATARACT EXTRACTION, BILATERAL Bilateral 08/01/2020    INGUINAL HERNIA REPAIR      X4    REPLACEMENT TOTAL KNEE Right      Family History   Problem Relation Age of Onset    Aneurysm Mother         Colonic AVM in 90's    Hypertension Mother      Parkinsonism Father         90's    Aortic aneurysm Father         AAA    Tuberculosis Father     Tuberculosis Paternal Aunt     Cancer Maternal Grandmother     No Known Problems Daughter     No Known Problems Son      Social History     Tobacco Use    Smoking status: Former     Current packs/day: 0.00     Average packs/day: 0.5 packs/day for 25.0 years (12.5 ttl pk-yrs)     Types: Cigarettes     Start date:      Quit date:      Years since quittin.5     Passive exposure: Past    Smokeless tobacco: Former   Vaping Use    Vaping status: Never Used   Substance Use Topics    Alcohol use: Yes     Comment: one drink monthly    Drug use: No     No current facility-administered medications on file prior to encounter.     Current Outpatient Medications on File Prior to Encounter   Medication Sig Dispense Refill    esomeprazole (nexIUM) 40 MG capsule TAKE 1 CAPSULE DAILY 90 capsule 3    ferrous sulfate 325 (65 Fe) MG tablet Take 1 tablet by mouth. 3 times week      Multiple Vitamins-Minerals (PRESERVISION AREDS 2 PO) Take  by mouth.      multivitamin with minerals tablet tablet Take 1 tablet by mouth Daily.      pramipexole (MIRAPEX) 0.25 MG tablet TAKE 1 TABLET EVERY NIGHT 90 tablet 3    pravastatin (PRAVACHOL) 20 MG tablet TAKE 1 TABLET DAILY 90 tablet 3    ramipril (ALTACE) 10 MG capsule TAKE 1 CAPSULE DAILY 90 capsule 3    triamcinolone (KENALOG) 0.1 % cream Apply  topically to the appropriate area as directed 2 (Two) Times a Day. 45 g 1    albuterol sulfate  (90 Base) MCG/ACT inhaler Inhale 2 puffs Every 4 (Four) Hours As Needed for Wheezing or Shortness of Air. (Patient not taking: Reported on 2025) 18 g 6    benzonatate (Tessalon Perles) 100 MG capsule Take 1 capsule by mouth 3 (Three) Times a Day As Needed for Cough. 21 capsule 0    ciprofloxacin (Ciloxan) 0.3 % ophthalmic solution Administer 1 drop into the left eye Every 4 (Four) Hours for 5 days. 2.5 mL 0    fluticasone (FLONASE) 50  MCG/ACT nasal spray Administer 2 sprays into the nostril(s) as directed by provider Daily. Administer 2 sprays in each nostril once a day 16 g 0    furosemide (LASIX) 20 MG tablet Take 2 tablets by mouth Daily. 60 tablet 3     Allergies   Allergen Reactions    Hctz [Hydrochlorothiazide] Hives       Objective    Objective     Vital Signs  Temp:  [98 °F (36.7 °C)-98.7 °F (37.1 °C)] 98.4 °F (36.9 °C)  Heart Rate:  [77-93] 83  Resp:  [18-20] 18  BP: ()/() 92/65  SpO2:  [95 %-99 %] 97 %  on  Flow (L/min) (Oxygen Therapy):  [1-2] 1;   Device (Oxygen Therapy): nasal cannula  Body mass index is 32.92 kg/m².    Physical Exam  Constitutional:       Appearance: He is ill-appearing.   Cardiovascular:      Rate and Rhythm: Normal rate and regular rhythm.   Pulmonary:      Effort: Pulmonary effort is normal. No respiratory distress.      Breath sounds: No stridor. Rhonchi present.   Musculoskeletal:      Right lower leg: Edema present.      Left lower leg: Edema present.      Comments: Chronic venous stasis changes bilaterally   Skin:     Coloration: Skin is not pale.   Neurological:      Mental Status: He is alert and oriented to person, place, and time.         Results Review:    I have personally reviewed:  [x]  Laboratory  [x]  Old records  [x]  Radiology   [x]  EKG/Telemetry   [x]  Microbiology    [x]  Cardiology/Vascular   []  Pathology     Lab Results (last 24 hours)       Procedure Component Value Units Date/Time    CBC & Differential [[id number]]  (Abnormal) Collected: 07/20/25 2254    Specimen: Blood from Arm, Right Updated: 07/20/25 2309    Narrative:      The following orders were created for panel order CBC & Differential.  Procedure                               Abnormality         Status                     ---------                               -----------         ------                     CBC Auto Differential[[id number]]        Abnormal            Final result                 Please view results for  "these tests on the individual orders.    Basic Metabolic Panel [[id number]]  (Abnormal) Collected: 07/20/25 2254    Specimen: Blood from Arm, Right Updated: 07/20/25 2331     Glucose 93 mg/dL      BUN 37.0 mg/dL      Creatinine 1.09 mg/dL      Sodium 137 mmol/L      Potassium 4.5 mmol/L      Chloride 99 mmol/L      CO2 22.0 mmol/L      Calcium 8.8 mg/dL      BUN/Creatinine Ratio 33.9     Anion Gap 16.0 mmol/L      eGFR 64.5 mL/min/1.73     Narrative:      GFR Categories in Chronic Kidney Disease (CKD)              GFR Category          GFR (mL/min/1.73)    Interpretation  G1                    90 or greater        Normal or high (1)  G2                    60-89                Mild decrease (1)  G3a                   45-59                Mild to moderate decrease  G3b                   30-44                Moderate to severe decrease  G4                    15-29                Severe decrease  G5                    14 or less           Kidney failure    (1)In the absence of evidence of kidney disease, neither GFR category G1 or G2 fulfill the criteria for CKD.    eGFR calculation 2021 CKD-EPI creatinine equation, which does not include race as a factor    Procalcitonin [[id number]]  (Abnormal) Collected: 07/20/25 2254    Specimen: Blood from Arm, Right Updated: 07/20/25 2338     Procalcitonin 0.31 ng/mL     Narrative:      As a Marker for Sepsis (Non-Neonates):    1. <0.5 ng/mL represents a low risk of severe sepsis and/or septic shock.  2. >2 ng/mL represents a high risk of severe sepsis and/or septic shock.    As a Marker for Lower Respiratory Tract Infections that require antibiotic therapy:    PCT on Admission    Antibiotic Therapy       6-12 Hrs later    >0.5                Strongly Recommended  >0.25 - <0.5        Recommended   0.1 - 0.25          Discouraged              Remeasure/reassess PCT  <0.1                Strongly Discouraged     Remeasure/reassess PCT    As 28 day mortality risk marker: \"Change in " "Procalcitonin Result\" (>80% or <=80%) if Day 0 (or Day 1) and Day 4 values are available. Refer to http://[email or web address]-pct-calculator.com    Change in PCT <=80%  A decrease of PCT levels below or equal to 80% defines a positive change in PCT test result representing a higher risk for 28-day all-cause mortality of patients diagnosed with severe sepsis for septic shock.    Change in PCT >80%  A decrease of PCT levels of more than 80% defines a negative change in PCT result representing a lower risk for 28-day all-cause mortality of patients diagnosed with severe sepsis or septic shock.       Magnesium [[id number]]  (Normal) Collected: 07/20/25 2254    Specimen: Blood from Arm, Right Updated: 07/20/25 2331     Magnesium 1.9 mg/dL     BNP [[id number]]  (Abnormal) Collected: 07/20/25 2254    Specimen: Blood from Arm, Right Updated: 07/20/25 2338     proBNP 14,418.0 pg/mL     Narrative:      This assay is used as an aid in the diagnosis of individuals suspected of having heart failure. It can be used as an aid in the diagnosis of acute decompensated heart failure (ADHF) in patients presenting with signs and symptoms of ADHF to the emergency department (ED). In addition, NT-proBNP of <300 pg/mL indicates ADHF is not likely.    Age Range Result Interpretation  NT-proBNP Concentration (pg/mL:      <50             Positive            >450                   Gray                 300-450                    Negative             <300    50-75           Positive            >900                  Gray                300-900                  Negative            <300      >75             Positive            >1800                  Gray                300-1800                  Negative            <300    CBC Auto Differential [[id number]]  (Abnormal) Collected: 07/20/25 2254    Specimen: Blood from Arm, Right Updated: 07/20/25 2309     WBC 11.41 10*3/mm3      RBC 4.57 10*6/mm3      Hemoglobin 12.3 g/dL      Hematocrit 38.9 %      MCV 85.1 fL  "     MCH 26.9 pg      MCHC 31.6 g/dL      RDW 13.8 %      RDW-SD 42.5 fl      MPV 10.3 fL      Platelets 216 10*3/mm3      Neutrophil % 81.6 %      Lymphocyte % 5.8 %      Monocyte % 12.0 %      Eosinophil % 0.0 %      Basophil % 0.2 %      Immature Grans % 0.4 %      Neutrophils, Absolute 9.32 10*3/mm3      Lymphocytes, Absolute 0.66 10*3/mm3      Monocytes, Absolute 1.37 10*3/mm3      Eosinophils, Absolute 0.00 10*3/mm3      Basophils, Absolute 0.02 10*3/mm3      Immature Grans, Absolute 0.04 10*3/mm3      nRBC 0.0 /100 WBC     High Sensitivity Troponin T [[id number]]  (Abnormal) Collected: 07/20/25 2254    Specimen: Blood from Arm, Right Updated: 07/21/25 0027     HS Troponin T 840 ng/L     Narrative:      High Sensitive Troponin T Reference Range:  <14.0 ng/L- Negative Female for AMI  <22.0 ng/L- Negative Male for AMI  >=14 - Abnormal Female indicating possible myocardial injury.  >=22 - Abnormal Male indicating possible myocardial injury.   Clinicians would have to utilize clinical acumen, EKG, Troponin, and serial changes to determine if it is an Acute Myocardial Infarction or myocardial injury due to an underlying chronic condition.         Respiratory Panel PCR w/COVID-19(SARS-CoV-2) CHRIS/KIMMIE/URI/PAD/COR/EZ In-House, NP Swab in UTM/VTM, 2 HR TAT - Swab, Nasopharynx [[id number]]  (Normal) Collected: 07/20/25 2255    Specimen: Swab from Nasopharynx Updated: 07/21/25 0002     ADENOVIRUS, PCR Not Detected     Coronavirus 229E Not Detected     Coronavirus HKU1 Not Detected     Coronavirus NL63 Not Detected     Coronavirus OC43 Not Detected     COVID19 Not Detected     Human Metapneumovirus Not Detected     Human Rhinovirus/Enterovirus Not Detected     Influenza A PCR Not Detected     Influenza B PCR Not Detected     Parainfluenza Virus 1 Not Detected     Parainfluenza Virus 2 Not Detected     Parainfluenza Virus 3 Not Detected     Parainfluenza Virus 4 Not Detected     RSV, PCR Not Detected     Bordetella pertussis  pcr Not Detected     Bordetella parapertussis PCR Not Detected     Chlamydophila pneumoniae PCR Not Detected     Mycoplasma pneumo by PCR Not Detected    Narrative:      In the setting of a positive respiratory panel with a viral infection PLUS a negative procalcitonin without other underlying concern for bacterial infection, consider observing off antibiotics or discontinuation of antibiotics and continue supportive care. If the respiratory panel is positive for atypical bacterial infection (Bordetella pertussis, Chlamydophila pneumoniae, or Mycoplasma pneumoniae), consider antibiotic de-escalation to target atypical bacterial infection.    Lactic Acid, Plasma [[id number]]  (Normal) Collected: 07/21/25 0033    Specimen: Blood from Arm, Left Updated: 07/21/25 0101     Lactate 1.8 mmol/L     Blood Culture - Blood, Arm, Right [[id number]] Collected: 07/21/25 0033    Specimen: Blood from Arm, Right Updated: 07/21/25 0039    Blood Culture - Blood, Arm, Left [[id number]] Collected: 07/21/25 0033    Specimen: Blood from Arm, Left Updated: 07/21/25 0039    High Sensitivity Troponin T 1Hr [[id number]]  (Abnormal) Collected: 07/21/25 0109    Specimen: Blood Updated: 07/21/25 0144     HS Troponin T 771 ng/L      Troponin T Numeric Delta -69 ng/L      Troponin T % Delta -8    Narrative:      High Sensitive Troponin T Reference Range:  <14.0 ng/L- Negative Female for AMI  <22.0 ng/L- Negative Male for AMI  >=14 - Abnormal Female indicating possible myocardial injury.  >=22 - Abnormal Male indicating possible myocardial injury.   Clinicians would have to utilize clinical acumen, EKG, Troponin, and serial changes to determine if it is an Acute Myocardial Infarction or myocardial injury due to an underlying chronic condition.         POC Glucose Once [[id number]]  (Abnormal) Collected: 07/21/25 0653    Specimen: Blood Updated: 07/21/25 0657     Glucose 155 mg/dL     S. Pneumo Ag Urine or CSF - Urine, Urine, Clean Catch [[id number]]   (Normal) Collected: 07/21/25 0733    Specimen: Urine, Clean Catch Updated: 07/21/25 0926     Strep Pneumo Ag Negative    Legionella Antigen, Urine - Urine, Urine, Clean Catch [[id number]]  (Normal) Collected: 07/21/25 0733    Specimen: Urine, Clean Catch Updated: 07/21/25 0926     LEGIONELLA ANTIGEN, URINE Negative    Basic Metabolic Panel [[id number]]  (Abnormal) Collected: 07/21/25 0848    Specimen: Blood Updated: 07/21/25 0928     Glucose 146 mg/dL      BUN 37.0 mg/dL      Creatinine 1.18 mg/dL      Sodium 136 mmol/L      Potassium 3.8 mmol/L      Chloride 99 mmol/L      CO2 25.8 mmol/L      Calcium 8.8 mg/dL      BUN/Creatinine Ratio 31.4     Anion Gap 11.2 mmol/L      eGFR 58.6 mL/min/1.73     Narrative:      GFR Categories in Chronic Kidney Disease (CKD)              GFR Category          GFR (mL/min/1.73)    Interpretation  G1                    90 or greater        Normal or high (1)  G2                    60-89                Mild decrease (1)  G3a                   45-59                Mild to moderate decrease  G3b                   30-44                Moderate to severe decrease  G4                    15-29                Severe decrease  G5                    14 or less           Kidney failure    (1)In the absence of evidence of kidney disease, neither GFR category G1 or G2 fulfill the criteria for CKD.    eGFR calculation 2021 CKD-EPI creatinine equation, which does not include race as a factor    CBC (No Diff) [[id number]]  (Abnormal) Collected: 07/21/25 0848    Specimen: Blood Updated: 07/21/25 0906     WBC 10.28 10*3/mm3      RBC 4.57 10*6/mm3      Hemoglobin 12.2 g/dL      Hematocrit 39.2 %      MCV 85.8 fL      MCH 26.7 pg      MCHC 31.1 g/dL      RDW 14.0 %      RDW-SD 43.0 fl      MPV 10.3 fL      Platelets 204 10*3/mm3     High Sensitivity Troponin T [[id number]]  (Abnormal) Collected: 07/21/25 0848    Specimen: Blood Updated: 07/21/25 0930     HS Troponin T 767 ng/L     Narrative:      High  Sensitive Troponin T Reference Range:  <14.0 ng/L- Negative Female for AMI  <22.0 ng/L- Negative Male for AMI  >=14 - Abnormal Female indicating possible myocardial injury.  >=22 - Abnormal Male indicating possible myocardial injury.   Clinicians would have to utilize clinical acumen, EKG, Troponin, and serial changes to determine if it is an Acute Myocardial Infarction or myocardial injury due to an underlying chronic condition.         CK [[id number]]  (Abnormal) Collected: 07/21/25 0848    Specimen: Blood Updated: 07/21/25 0926     Creatine Kinase 1,242 U/L     POC Glucose Once [[id number]]  (Normal) Collected: 07/21/25 1203    Specimen: Blood Updated: 07/21/25 1205     Glucose 107 mg/dL             Imaging Results (Last 24 Hours)       Procedure Component Value Units Date/Time    CT Angiogram Chest Pulmonary Embolism [[id number]] Collected: 07/21/25 0119     Updated: 07/21/25 0142    Narrative:      CT ANGIOGRAM OF THE CHEST     HISTORY: Cough     COMPARISON: None available.     TECHNIQUE: Axial CT imaging was obtained through the thorax. IV contrast  was administered. Three-D reformatted images were obtained.     FINDINGS:  No acute pulmonary thromboembolus is seen. 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 This was also  present on the exam from May 2022, and is secondary to chronic thrombus.  Examination was not optimized for assessment of the thoracic aorta.  There is some dilatation of the proximal descending thoracic aorta,  measuring up to 3.1 cm. The distal descending thoracic aorta measures  2.6 cm. The heart is enlarged. There are coronary artery calcifications.  There is a small pericardial effusion. Patient is noted to have reflux  of contrast material into the hepatic veins, which can be seen in the  setting of right-sided heart failure. Main pulmonary artery is enlarged,  which can be associated with pulmonary arterial hypertension. Thyroid  gland, trachea,  and esophagus appear unremarkable. There is an enlarged  right hilar node, which measures up to 2.8 x 2.0 cm. Subcarinal node  also appears enlarged, measuring up to 2.2 cm. Both of these are larger  than on prior exam. The patient has bilateral pulmonary infiltrates,  most significantly within the right lower lobe. There is also some  interlobular septal thickening, which may reflect some vascular  congestion. There are small bilateral pleural effusions. Images through  the upper abdomen demonstrate cholelithiasis. No acute osseous  abnormalities are seen.       Impression:         1. Cardiomegaly, interlobular septal thickening, and small bilateral  effusions, suggesting congestive heart failure.  2. Bilateral pulmonary infiltrates, with most extensive consolidation  noted within the right lower lobe. Appearance is favored to reflect  multifocal pneumonia. There are prominent mediastinal and hilar lymph  nodes. These are likely reactive. However, follow-up of both the  infiltrates and adenopathy is suggested.     Radiation dose reduction techniques were utilized, including automated  exposure control and exposure modulation based on body size.        This report was finalized on 7/21/2025 1:39 AM by Dr. Julia Wisdom M.D on Workstation: [id number]       XR Chest 1 View [[id number]] Collected: 07/20/25 2351     Updated: 07/20/25 2355    Narrative:      SINGLE VIEW OF THE CHEST     HISTORY: Cough and hypoxia     COMPARISON: May 8, 2020     FINDINGS:  There is cardiomegaly. There is vascular congestion. There is  calcification of the aorta. No pneumothorax is seen. No acute  infiltrates are identified. Bibasilar atelectasis is suspected. No large  effusion is identified.       Impression:         1. Cardiomegaly with vascular congestion.  2. Bibasilar atelectasis.     This report was finalized on 7/20/2025 11:52 PM by Dr. Julia Wisdom M.D on Workstation: [id number]               Results for orders  placed during the hospital encounter of 12/19/23    Adult Transthoracic Echo Complete W/ Cont if Necessary Per Protocol 12/20/2023  5:34 PM    Interpretation Summary    Left ventricular systolic function is normal. Calculated left ventricular EF = 55.4%    Left ventricular diastolic function is consistent with (grade I) impaired relaxation.    The right ventricular cavity is borderline dilated.    The left atrial cavity is moderately dilated.    Moderate tricuspid valve regurgitation is present.    Calculated right ventricular systolic pressure from tricuspid regurgitation is 62 mmHg.    Severe pulmonary hypertension is present.    Moderate pulmonic valve regurgitation is present.    Mild to moderate mitral valve regurgitation is present.      ECG 12 Lead Dyspnea   Preliminary Result   HEART RATE=74  bpm   RR Fvjetevl=728  ms   NH Myjglqlh=900  ms   P Horizontal Axis=20  deg   P Front Axis=18  deg   QRSD Abqglfsc=818  ms   QT Bxfbneqw=773  ms   VOnJ=643  ms   QRS Axis=-25  deg   T Wave Axis=141  deg   - ABNORMAL ECG -   Sinus rhythm   Multiple premature complexes, vent & supraven   Left bundle branch block   ST elevation secondary to IVCD   Prolonged QT interval   Date and Time of Study:2025-07-21 00:15:42           Assessment/Plan     Active Hospital Problems    Diagnosis  POA    **Respiratory failure [J96.90]  Yes    History of DVT (deep vein thrombosis) [Z86.718]  Not Applicable    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       Mr. August is a 89 y.o. male with combined CHF, DM 2, HTN, HLD presenting with cough and lower extremity edema.    PNA  Cough  - WBC 11  - Cultures pending, urine Legionella and strep negative RVP negative  -Given symptoms may have had a viral illness with and subsequent postviral  bacterial pneumonia  - Rocephin    Acute on chronic HFmrEF and diastolic heart failure  -repeat TTE pending  - Cardiology following  - IV Lasix    HTN  -Lisinopril    DM2 with hyperglycemia  - SSI; monitor for hypoglycemia    I discussed the patient's findings and my recommendations with patient.    VTE Prophylaxis - Lovenox.  Code Status - Full code.       Henrik Rowan MD  [facility]  07/21/25  14:54 EDT

## 2025-07-21 NOTE — CONSULTS
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     Patient was seen by  with [place] [facility] on 2024 for annual follow-up.  He denied decreased exercise tolerance, exertional dyspnea or exertional related chest pain, orthopnea, PND, palpitations, dizziness or syncope.  His swelling was controlled with diuretics and compression stockings.  EKG showed frequent PACs.  Patient had a 24-hour Holter which showed sinus rhythm with average heartbeat of 69 bpm, bpm longest pause of 2.0 seconds while sleeping there were rare PVCs/occasional PACs. 1 episode of a 4 beat run of atrial tachycardia at 132 bpm. There were no sustained arrhythmias.     Patient was last seen by [facility] on 2025 for follow-up.  Patient reported doing well from a cardiac standpoint.  He remained active around his house as well as walking 45 minutes daily.  Denied chest discomfort, shortness of breath or decreased activity tolerance.  EKG showed PACs.  He was asymptomatic and denied palpitations, lightheadedness, dizziness or syncope.  Patient no longer on lasix however leg swelling is controlled with compression stocking.His weight was stable. He denied any orthopnea or PND.  Blood pressure was well-controlled.  Patient reported a fall about 3 months prior that resulted from tripping.  Was evaluated  in ER and received staples for his laceration to head.  Imaging was negative.  Denied frequent falls but has compromised balance and uses a cane  for stability.  No medication changes were made.    Patient presented to ER on 7/20/2025 with complaints of cough and eye drainage for the last several days.  Patient was seen by his PCP thought this was allergic versus viral.  He was placed on cough suppressant and given ciprofloxacin drops for this.  Cough has worsened and is somewhat productive now he has associated dyspnea and leg swelling.  He denies chest pain, shortness of breath, orthopnea, PND. In ER, BUN/CR 37/1.09, procal 0.31, proBNP 14,418, WBC 11.41, HGB 12.3, troponin T 840/771, respiratory panel negative, lactate 1.8, CTA of the chest showed . Cardiomegaly, interlobular septal thickening, and small bilateral effusions, suggesting congestive heart failure. 2. Bilateral pulmonary infiltrates, with most extensive consolidation noted within the right lower lobe. Appearance is favored to reflect multifocal pneumonia. There are prominent mediastinal and hilar lymph nodes. These are likely reactive.  Chest x-ray showed cardiomegaly with vascular congestion, bibasilar atelectasis, EKG showed sinus rhythm disease, left bundle branch block.     HPI was reviewed, updated and amended when necessary.      Holter 6/25/24    Findings: The predominant rhythm is normal sinus rhythm at an average rate of 69 bpm (range, 41 to 107 bpm).  The longest pause is 2.0 seconds during the hours of sleep.     There are rare (average, 12/h) isolated ventricular premature complexes.  There is one ventricular couplet.  There is no ventricular tachycardia.     There are rare to occasional (average, 50/h; range, 7 to 136/h) isolated atrial premature complexes.  There are very rare (average, 2/h) atrial couplets.  There is one four beat run and one eight beat run of atrial tachycardia at rates up to 132 bpm.    There is no sustained supraventricular tachycardia.  There is no atrial fibrillation.     There are no significant ST segment changes.     No patient diary was included with  this study.           ECHO 12/20/23      Left ventricular systolic function is normal. Calculated left ventricular EF = 55.4%    Left ventricular diastolic function is consistent with (grade I) impaired relaxation.    The right ventricular cavity is borderline dilated.    The left atrial cavity is moderately dilated.    Moderate tricuspid valve regurgitation is present.    Calculated right ventricular systolic pressure from tricuspid regurgitation is 62 mmHg.    Severe pulmonary hypertension is present.    Moderate pulmonic valve regurgitation is present.    Mild to moderate mitral valve regurgitation is present.        Stress test 6/26/14    IMPRESSION:   1. Abnormal stress Cardiolite.   2. No evidence of ischemia.   3. Small to moderate sized fixed moderate severity defect in the   inferoapical wall, consider non-transmural infarct.   4. Area of mildly reduced tracer uptake in the inferior wall, consider   diaphragmatic attenuation artifact.   5. Borderline low left ventricular systolic function.         Past Medical History:   Diagnosis Date    Anemia     Benign hypertensive heart disease     Cardiomyopathy     EF 45-50% by echo 3/11    Cataract 08/01/2020 9/1/20    GERD (gastroesophageal reflux disease)     Heart murmur     Hx of colonic polyps     Hyperlipidemia     Hypertension     LBBB (left bundle branch block)     Mitral regurgitation     Osteoarthritis     Type 2 diabetes mellitus        Past Surgical History:   Procedure Laterality Date    CATARACT EXTRACTION, BILATERAL Bilateral 08/01/2020    INGUINAL HERNIA REPAIR      X4    REPLACEMENT TOTAL KNEE Right        Medications Prior to Admission   Medication Sig Dispense Refill Last Dose/Taking    esomeprazole (nexIUM) 40 MG capsule TAKE 1 CAPSULE DAILY 90 capsule 3 7/20/2025    ferrous sulfate 325 (65 Fe) MG tablet Take 1 tablet by mouth. 3 times week   7/20/2025    Multiple Vitamins-Minerals (PRESERVISION AREDS 2 PO) Take  by mouth.   7/20/2025     multivitamin with minerals tablet tablet Take 1 tablet by mouth Daily.   7/20/2025    pramipexole (MIRAPEX) 0.25 MG tablet TAKE 1 TABLET EVERY NIGHT 90 tablet 3 7/20/2025    pravastatin (PRAVACHOL) 20 MG tablet TAKE 1 TABLET DAILY 90 tablet 3 7/20/2025    ramipril (ALTACE) 10 MG capsule TAKE 1 CAPSULE DAILY 90 capsule 3 7/20/2025    triamcinolone (KENALOG) 0.1 % cream Apply  topically to the appropriate area as directed 2 (Two) Times a Day. 45 g 1 7/20/2025    albuterol sulfate  (90 Base) MCG/ACT inhaler Inhale 2 puffs Every 4 (Four) Hours As Needed for Wheezing or Shortness of Air. (Patient not taking: Reported on 7/18/2025) 18 g 6     benzonatate (Tessalon Perles) 100 MG capsule Take 1 capsule by mouth 3 (Three) Times a Day As Needed for Cough. 21 capsule 0     ciprofloxacin (Ciloxan) 0.3 % ophthalmic solution Administer 1 drop into the left eye Every 4 (Four) Hours for 5 days. 2.5 mL 0     fluticasone (FLONASE) 50 MCG/ACT nasal spray Administer 2 sprays into the nostril(s) as directed by provider Daily. Administer 2 sprays in each nostril once a day 16 g 0     furosemide (LASIX) 20 MG tablet Take 2 tablets by mouth Daily. 60 tablet 3        Current Meds  Scheduled Meds:cefTRIAXone, 1,000 mg, Intravenous, Q24H  insulin lispro, 2-7 Units, Subcutaneous, 4x Daily AC & at Bedtime  ipratropium-albuterol, 3 mL, Nebulization, Q6H While Awake - RT  sodium chloride, 10 mL, Intravenous, Q12H  trimethoprim-polymyxin b, 1 drop, Both Eyes, Q6H      Continuous Infusions:   PRN Meds:.  acetaminophen **OR** acetaminophen **OR** acetaminophen    senna-docusate sodium **AND** polyethylene glycol **AND** bisacodyl **AND** bisacodyl    calcium carbonate    dextrose    dextrose    glucagon (human recombinant)    guaiFENesin-dextromethorphan    ipratropium-albuterol    nitroglycerin    sodium chloride    sodium chloride    Allergies as of 07/20/2025 - Reviewed 07/20/2025   Allergen Reaction Noted    Hctz [hydrochlorothiazide]  "Hives 2016       Social History     Socioeconomic History    Marital status:    Tobacco Use    Smoking status: Former     Current packs/day: 0.00     Average packs/day: 0.5 packs/day for 25.0 years (12.5 ttl pk-yrs)     Types: Cigarettes     Start date:      Quit date:      Years since quittin.5     Passive exposure: Past    Smokeless tobacco: Former   Vaping Use    Vaping status: Never Used   Substance and Sexual Activity    Alcohol use: Yes     Comment: one drink monthly    Drug use: No    Sexual activity: Defer       Family History   Problem Relation Age of Onset    Aneurysm Mother         Colonic AVM in 90's    Hypertension Mother     Parkinsonism Father         90's    Aortic aneurysm Father         AAA    Tuberculosis Father     Tuberculosis Paternal Aunt     Cancer Maternal Grandmother     No Known Problems Daughter     No Known Problems Son        Review of Systems   Constitutional: Positive for malaise/fatigue. Negative for chills and fever.   HENT:  Negative for hoarse voice and sore throat.    Eyes:  Negative for double vision and photophobia.   Cardiovascular:  Negative for chest pain, leg swelling, near-syncope, orthopnea, palpitations, paroxysmal nocturnal dyspnea and syncope.   Respiratory:  Negative for cough and wheezing.    Skin:  Negative for poor wound healing and rash.   Musculoskeletal:  Negative for arthritis and joint swelling.   Gastrointestinal:  Negative for bloating, abdominal pain, hematemesis and hematochezia.   Neurological:  Negative for dizziness and focal weakness.   Psychiatric/Behavioral:  Negative for depression and suicidal ideas.             Objective:   Temp:  [98 °F (36.7 °C)-98.7 °F (37.1 °C)] 98.7 °F (37.1 °C)  Heart Rate:  [77-93] 78  Resp:  [18] 18  BP: (113-142)/() 115/95  Body mass index is 32.96 kg/m².  Flowsheet Rows      Flowsheet Row First Filed Value   Admission Height 180.3 cm (71\") Documented at 2025 2235   Admission Weight " 104 kg (230 lb) Documented at 07/20/2025 2235          Vitals:    07/21/25 0715   BP: 115/95   Pulse: 78   Resp: 18   Temp: 98.7 °F (37.1 °C)   SpO2: 96%       Vitals reviewed.   Constitutional:       Appearance: Not in distress. Frail. Chronically ill-appearing.   Neck:      Vascular: No JVR. JVD normal.   Pulmonary:      Effort: Pulmonary effort is normal.      Breath sounds: No wheezing. No rhonchi. Rales present.   Chest:      Chest wall: Not tender to palpatation.   Cardiovascular:      PMI at left midclavicular line. Normal rate. Regular rhythm. Normal S1. Normal S2.       Murmurs: There is no murmur.      No gallop.  No click. No rub.   Pulses:     Intact distal pulses.   Edema:     Pretibial: bilateral 1+ edema of the pretibial area.     Ankle: bilateral 1+ edema of the ankle.     Feet: bilateral 1+ edema of the feet.  Abdominal:      General: Bowel sounds are normal.      Palpations: Abdomen is soft.      Tenderness: There is no abdominal tenderness.   Musculoskeletal: Normal range of motion.         General: No tenderness. Skin:     General: Skin is warm and dry.   Neurological:      General: No focal deficit present.      Mental Status: Alert and oriented to person, place and time.                 Lab Review:      Results from last 7 days   Lab Units 07/20/25  2254   SODIUM mmol/L 137   POTASSIUM mmol/L 4.5   CHLORIDE mmol/L 99   CO2 mmol/L 22.0   BUN mg/dL 37.0*   CREATININE mg/dL 1.09   CALCIUM mg/dL 8.8   GLUCOSE mg/dL 93     Results from last 7 days   Lab Units 07/21/25  0109 07/20/25  2254   HSTROP T ng/L 771* 840*     @LABRCNTbnp@  Results from last 7 days   Lab Units 07/20/25  2254   WBC 10*3/mm3 11.41*   HEMOGLOBIN g/dL 12.3*   HEMATOCRIT % 38.9   PLATELETS 10*3/mm3 216         Results from last 7 days   Lab Units 07/20/25  2254   MAGNESIUM mg/dL 1.9     @LABRCNTIP(chol,trig,hdl,ldl)                              I personally viewed and interpreted the patient's EKG/Telemetry data    Respiratory  failure    Assessment and Plan:    Elevated cardiac enzymes -significantly elevated BNP with elevated but flat troponin.  Chronic left bundle branch block on EKG.  Patient does not report any symptoms of heart failure or angina.  He just notes significant fatigue.  Will start with an echocardiogram.  He is moderately hypervolemic we will continue IV Lasix.  Follow diagnostic testing results for further treatment recommendations but I would favor ischemic rule out with stress study unless significant pathology noted on echocardiogram.  Acute on chronic diastolic heart failure -follows with [facility].  Reported history of nonischemic cardiomyopathy with recovered ejection fraction.  He has chronic lower extremity edema that his [place] cardiologist felt was more consistent with chronic venous insufficiency.  Monitor clinical response to medical therapy.  Frequent PACs  GORDON was treated with dental device  History of DVT previously on Eliquis  Essential hypertension  Mixed hyperlipidemia    Clyde Gautam MD  07/21/25  08:37 EDT.  Time spent in reviewing chart, discussion and examination:

## 2025-07-22 ENCOUNTER — APPOINTMENT (OUTPATIENT)
Dept: GENERAL RADIOLOGY | Facility: HOSPITAL | Age: OVER 89
DRG: 280 | End: 2025-07-22
Payer: MEDICARE

## 2025-07-22 LAB
ALBUMIN SERPL-MCNC: 3 G/DL (ref 3.5–5.2)
ALP SERPL-CCNC: 80 U/L (ref 39–117)
ALT SERPL W P-5'-P-CCNC: 29 U/L (ref 1–41)
ANION GAP SERPL CALCULATED.3IONS-SCNC: 14.5 MMOL/L (ref 5–15)
APTT PPP: 32.8 SECONDS (ref 22.7–35.4)
APTT PPP: 39.5 SECONDS (ref 22.7–35.4)
AST SERPL-CCNC: 54 U/L (ref 1–40)
BASOPHILS # BLD AUTO: 0.02 10*3/MM3 (ref 0–0.2)
BASOPHILS NFR BLD AUTO: 0.2 % (ref 0–1.5)
BILIRUB CONJ SERPL-MCNC: 0.6 MG/DL (ref 0–0.3)
BILIRUB INDIRECT SERPL-MCNC: 0.4 MG/DL
BILIRUB SERPL-MCNC: 1 MG/DL (ref 0–1.2)
BUN SERPL-MCNC: 38 MG/DL (ref 8–23)
BUN/CREAT SERPL: 34.5 (ref 7–25)
CALCIUM SPEC-SCNC: 9 MG/DL (ref 8.2–9.6)
CHLORIDE SERPL-SCNC: 99 MMOL/L (ref 98–107)
CO2 SERPL-SCNC: 25.5 MMOL/L (ref 22–29)
CREAT SERPL-MCNC: 1.1 MG/DL (ref 0.76–1.27)
DEPRECATED RDW RBC AUTO: 41.7 FL (ref 37–54)
EGFRCR SERPLBLD CKD-EPI 2021: 63.8 ML/MIN/1.73
EOSINOPHIL # BLD AUTO: 0.01 10*3/MM3 (ref 0–0.4)
EOSINOPHIL NFR BLD AUTO: 0.1 % (ref 0.3–6.2)
ERYTHROCYTE [DISTWIDTH] IN BLOOD BY AUTOMATED COUNT: 13.8 % (ref 12.3–15.4)
GLUCOSE BLDC GLUCOMTR-MCNC: 114 MG/DL (ref 70–130)
GLUCOSE BLDC GLUCOMTR-MCNC: 186 MG/DL (ref 70–130)
GLUCOSE BLDC GLUCOMTR-MCNC: 188 MG/DL (ref 70–130)
GLUCOSE BLDC GLUCOMTR-MCNC: 97 MG/DL (ref 70–130)
GLUCOSE SERPL-MCNC: 133 MG/DL (ref 65–99)
HCT VFR BLD AUTO: 40.9 % (ref 37.5–51)
HGB BLD-MCNC: 13.1 G/DL (ref 13–17.7)
IMM GRANULOCYTES # BLD AUTO: 0.06 10*3/MM3 (ref 0–0.05)
IMM GRANULOCYTES NFR BLD AUTO: 0.5 % (ref 0–0.5)
INR PPP: 1.51 (ref 0.9–1.1)
LYMPHOCYTES # BLD AUTO: 0.83 10*3/MM3 (ref 0.7–3.1)
LYMPHOCYTES NFR BLD AUTO: 7.5 % (ref 19.6–45.3)
MCH RBC QN AUTO: 26.8 PG (ref 26.6–33)
MCHC RBC AUTO-ENTMCNC: 32 G/DL (ref 31.5–35.7)
MCV RBC AUTO: 83.6 FL (ref 79–97)
MONOCYTES # BLD AUTO: 1.2 10*3/MM3 (ref 0.1–0.9)
MONOCYTES NFR BLD AUTO: 10.9 % (ref 5–12)
NEUTROPHILS NFR BLD AUTO: 8.89 10*3/MM3 (ref 1.7–7)
NEUTROPHILS NFR BLD AUTO: 80.8 % (ref 42.7–76)
NRBC BLD AUTO-RTO: 0 /100 WBC (ref 0–0.2)
PLATELET # BLD AUTO: 247 10*3/MM3 (ref 140–450)
PMV BLD AUTO: 10.2 FL (ref 6–12)
POTASSIUM SERPL-SCNC: 3.7 MMOL/L (ref 3.5–5.2)
POTASSIUM SERPL-SCNC: 3.8 MMOL/L (ref 3.5–5.2)
POTASSIUM SERPL-SCNC: 4 MMOL/L (ref 3.5–5.2)
PROT SERPL-MCNC: 6.6 G/DL (ref 6–8.5)
PROTHROMBIN TIME: 18.2 SECONDS (ref 11.7–14.2)
QT INTERVAL: 454 MS
QTC INTERVAL: 501 MS
RBC # BLD AUTO: 4.89 10*6/MM3 (ref 4.14–5.8)
SODIUM SERPL-SCNC: 139 MMOL/L (ref 136–145)
TSH SERPL DL<=0.05 MIU/L-ACNC: 1.79 UIU/ML (ref 0.27–4.2)
URATE SERPL-MCNC: 11.2 MG/DL (ref 3.4–7)
WBC NRBC COR # BLD AUTO: 11.01 10*3/MM3 (ref 3.4–10.8)

## 2025-07-22 PROCEDURE — 73560 X-RAY EXAM OF KNEE 1 OR 2: CPT

## 2025-07-22 PROCEDURE — 85025 COMPLETE CBC W/AUTO DIFF WBC: CPT | Performed by: STUDENT IN AN ORGANIZED HEALTH CARE EDUCATION/TRAINING PROGRAM

## 2025-07-22 PROCEDURE — 94761 N-INVAS EAR/PLS OXIMETRY MLT: CPT

## 2025-07-22 PROCEDURE — 82948 REAGENT STRIP/BLOOD GLUCOSE: CPT

## 2025-07-22 PROCEDURE — 94799 UNLISTED PULMONARY SVC/PX: CPT

## 2025-07-22 PROCEDURE — 85610 PROTHROMBIN TIME: CPT | Performed by: INTERNAL MEDICINE

## 2025-07-22 PROCEDURE — 84550 ASSAY OF BLOOD/URIC ACID: CPT | Performed by: STUDENT IN AN ORGANIZED HEALTH CARE EDUCATION/TRAINING PROGRAM

## 2025-07-22 PROCEDURE — 94664 DEMO&/EVAL PT USE INHALER: CPT

## 2025-07-22 PROCEDURE — 63710000001 INSULIN LISPRO (HUMAN) PER 5 UNITS: Performed by: NURSE PRACTITIONER

## 2025-07-22 PROCEDURE — 80048 BASIC METABOLIC PNL TOTAL CA: CPT | Performed by: STUDENT IN AN ORGANIZED HEALTH CARE EDUCATION/TRAINING PROGRAM

## 2025-07-22 PROCEDURE — 80076 HEPATIC FUNCTION PANEL: CPT | Performed by: INTERNAL MEDICINE

## 2025-07-22 PROCEDURE — 25010000002 HEPARIN (PORCINE) PER 1000 UNITS: Performed by: INTERNAL MEDICINE

## 2025-07-22 PROCEDURE — 84443 ASSAY THYROID STIM HORMONE: CPT | Performed by: INTERNAL MEDICINE

## 2025-07-22 PROCEDURE — 25010000002 HEPARIN (PORCINE) 25000-0.45 UT/250ML-% SOLUTION: Performed by: INTERNAL MEDICINE

## 2025-07-22 PROCEDURE — 99233 SBSQ HOSP IP/OBS HIGH 50: CPT | Performed by: INTERNAL MEDICINE

## 2025-07-22 PROCEDURE — 84132 ASSAY OF SERUM POTASSIUM: CPT

## 2025-07-22 PROCEDURE — 97162 PT EVAL MOD COMPLEX 30 MIN: CPT

## 2025-07-22 PROCEDURE — 25010000002 FUROSEMIDE PER 20 MG: Performed by: INTERNAL MEDICINE

## 2025-07-22 PROCEDURE — 84132 ASSAY OF SERUM POTASSIUM: CPT | Performed by: STUDENT IN AN ORGANIZED HEALTH CARE EDUCATION/TRAINING PROGRAM

## 2025-07-22 PROCEDURE — 85730 THROMBOPLASTIN TIME PARTIAL: CPT | Performed by: INTERNAL MEDICINE

## 2025-07-22 PROCEDURE — 97530 THERAPEUTIC ACTIVITIES: CPT

## 2025-07-22 PROCEDURE — 85730 THROMBOPLASTIN TIME PARTIAL: CPT | Performed by: STUDENT IN AN ORGANIZED HEALTH CARE EDUCATION/TRAINING PROGRAM

## 2025-07-22 PROCEDURE — 25010000002 CEFTRIAXONE PER 250 MG: Performed by: STUDENT IN AN ORGANIZED HEALTH CARE EDUCATION/TRAINING PROGRAM

## 2025-07-22 RX ORDER — TAMSULOSIN HYDROCHLORIDE 0.4 MG/1
0.4 CAPSULE ORAL DAILY
Status: DISCONTINUED | OUTPATIENT
Start: 2025-07-22 | End: 2025-07-26 | Stop reason: HOSPADM

## 2025-07-22 RX ORDER — ATORVASTATIN CALCIUM 20 MG/1
40 TABLET, FILM COATED ORAL NIGHTLY
Status: DISCONTINUED | OUTPATIENT
Start: 2025-07-22 | End: 2025-07-24

## 2025-07-22 RX ORDER — POTASSIUM CHLORIDE 1500 MG/1
20 TABLET, EXTENDED RELEASE ORAL ONCE
Status: COMPLETED | OUTPATIENT
Start: 2025-07-22 | End: 2025-07-22

## 2025-07-22 RX ORDER — METOPROLOL SUCCINATE 25 MG/1
12.5 TABLET, EXTENDED RELEASE ORAL
Status: DISCONTINUED | OUTPATIENT
Start: 2025-07-22 | End: 2025-07-26 | Stop reason: HOSPADM

## 2025-07-22 RX ORDER — HEPARIN SODIUM 10000 [USP'U]/100ML
9.35 INJECTION, SOLUTION INTRAVENOUS
Status: DISCONTINUED | OUTPATIENT
Start: 2025-07-22 | End: 2025-07-24

## 2025-07-22 RX ORDER — HEPARIN SODIUM 5000 [USP'U]/ML
30-37.4 INJECTION, SOLUTION INTRAVENOUS; SUBCUTANEOUS EVERY 6 HOURS PRN
Status: DISCONTINUED | OUTPATIENT
Start: 2025-07-22 | End: 2025-07-24

## 2025-07-22 RX ORDER — ASPIRIN 81 MG/1
81 TABLET ORAL DAILY
Status: DISCONTINUED | OUTPATIENT
Start: 2025-07-22 | End: 2025-07-24

## 2025-07-22 RX ADMIN — PRAMIPEXOLE DIHYDROCHLORIDE 0.25 MG: 0.5 TABLET ORAL at 20:14

## 2025-07-22 RX ADMIN — CEFTRIAXONE SODIUM 1000 MG: 1 INJECTION, POWDER, FOR SOLUTION INTRAMUSCULAR; INTRAVENOUS at 20:14

## 2025-07-22 RX ADMIN — CIPROFLOXACIN HYDROCHLORIDE 1 DROP: 3 SOLUTION/ DROPS OPHTHALMIC at 17:10

## 2025-07-22 RX ADMIN — GUAIFENESIN SYRUP AND DEXTROMETHORPHAN 5 ML: 100; 10 SYRUP ORAL at 22:54

## 2025-07-22 RX ADMIN — Medication 10 ML: at 08:33

## 2025-07-22 RX ADMIN — POLYMYXIN B SULFATE AND TRIMETHOPRIM 1 DROP: 10000; 1 SOLUTION OPHTHALMIC at 17:09

## 2025-07-22 RX ADMIN — FLUTICASONE PROPIONATE 2 SPRAY: 50 SPRAY, METERED NASAL at 08:33

## 2025-07-22 RX ADMIN — INSULIN LISPRO 2 UNITS: 100 INJECTION, SOLUTION INTRAVENOUS; SUBCUTANEOUS at 12:46

## 2025-07-22 RX ADMIN — CIPROFLOXACIN HYDROCHLORIDE 1 DROP: 3 SOLUTION/ DROPS OPHTHALMIC at 14:05

## 2025-07-22 RX ADMIN — FUROSEMIDE 40 MG: 10 INJECTION, SOLUTION INTRAMUSCULAR; INTRAVENOUS at 12:46

## 2025-07-22 RX ADMIN — METOPROLOL SUCCINATE 12.5 MG: 25 TABLET, EXTENDED RELEASE ORAL at 12:47

## 2025-07-22 RX ADMIN — GUAIFENESIN SYRUP AND DEXTROMETHORPHAN 5 ML: 100; 10 SYRUP ORAL at 14:04

## 2025-07-22 RX ADMIN — IPRATROPIUM BROMIDE AND ALBUTEROL SULFATE 3 ML: .5; 3 SOLUTION RESPIRATORY (INHALATION) at 07:50

## 2025-07-22 RX ADMIN — FERROUS SULFATE TAB 325 MG (65 MG ELEMENTAL FE) 325 MG: 325 (65 FE) TAB at 08:33

## 2025-07-22 RX ADMIN — HEPARIN SODIUM 12.35 UNITS/KG/HR: 10000 INJECTION, SOLUTION INTRAVENOUS at 20:02

## 2025-07-22 RX ADMIN — ATORVASTATIN CALCIUM 40 MG: 20 TABLET, FILM COATED ORAL at 20:14

## 2025-07-22 RX ADMIN — POTASSIUM CHLORIDE 20 MEQ: 1500 TABLET, EXTENDED RELEASE ORAL at 14:03

## 2025-07-22 RX ADMIN — ACETAMINOPHEN 650 MG: 325 TABLET, FILM COATED ORAL at 06:57

## 2025-07-22 RX ADMIN — CIPROFLOXACIN HYDROCHLORIDE 1 DROP: 3 SOLUTION/ DROPS OPHTHALMIC at 20:17

## 2025-07-22 RX ADMIN — POLYMYXIN B SULFATE AND TRIMETHOPRIM 1 DROP: 10000; 1 SOLUTION OPHTHALMIC at 06:58

## 2025-07-22 RX ADMIN — CIPROFLOXACIN HYDROCHLORIDE 1 DROP: 3 SOLUTION/ DROPS OPHTHALMIC at 03:06

## 2025-07-22 RX ADMIN — Medication 12.5 MG: at 12:47

## 2025-07-22 RX ADMIN — FUROSEMIDE 40 MG: 10 INJECTION, SOLUTION INTRAMUSCULAR; INTRAVENOUS at 22:54

## 2025-07-22 RX ADMIN — POTASSIUM CHLORIDE 20 MEQ: 1500 TABLET, EXTENDED RELEASE ORAL at 06:57

## 2025-07-22 RX ADMIN — TAMSULOSIN HYDROCHLORIDE 0.4 MG: 0.4 CAPSULE ORAL at 15:20

## 2025-07-22 RX ADMIN — POLYMYXIN B SULFATE AND TRIMETHOPRIM 1 DROP: 10000; 1 SOLUTION OPHTHALMIC at 20:16

## 2025-07-22 RX ADMIN — HEPARIN SODIUM 9.35 UNITS/KG/HR: 10000 INJECTION, SOLUTION INTRAVENOUS at 13:08

## 2025-07-22 RX ADMIN — IPRATROPIUM BROMIDE AND ALBUTEROL SULFATE 3 ML: .5; 3 SOLUTION RESPIRATORY (INHALATION) at 20:26

## 2025-07-22 RX ADMIN — ASPIRIN 81 MG: 81 TABLET, COATED ORAL at 12:47

## 2025-07-22 RX ADMIN — PRAVASTATIN SODIUM 20 MG: 20 TABLET ORAL at 08:33

## 2025-07-22 RX ADMIN — POLYMYXIN B SULFATE AND TRIMETHOPRIM 1 DROP: 10000; 1 SOLUTION OPHTHALMIC at 12:49

## 2025-07-22 RX ADMIN — LISINOPRIL 40 MG: 20 TABLET ORAL at 08:32

## 2025-07-22 RX ADMIN — Medication 1 TABLET: at 08:33

## 2025-07-22 RX ADMIN — POLYMYXIN B SULFATE AND TRIMETHOPRIM 1 DROP: 10000; 1 SOLUTION OPHTHALMIC at 22:55

## 2025-07-22 RX ADMIN — PANTOPRAZOLE SODIUM 40 MG: 40 TABLET, DELAYED RELEASE ORAL at 07:04

## 2025-07-22 RX ADMIN — HEPARIN SODIUM 4000 UNITS: 5000 INJECTION INTRAVENOUS; SUBCUTANEOUS at 20:03

## 2025-07-22 RX ADMIN — CIPROFLOXACIN HYDROCHLORIDE 1 DROP: 3 SOLUTION/ DROPS OPHTHALMIC at 06:58

## 2025-07-22 RX ADMIN — CIPROFLOXACIN HYDROCHLORIDE 1 DROP: 3 SOLUTION/ DROPS OPHTHALMIC at 11:00

## 2025-07-22 RX ADMIN — INSULIN LISPRO 2 UNITS: 100 INJECTION, SOLUTION INTRAVENOUS; SUBCUTANEOUS at 17:08

## 2025-07-22 NOTE — CASE MANAGEMENT/SOCIAL WORK
Discharge Planning Assessment  [facility]     Patient Name: Goran August  MRN: [id number]  Today's Date: 7/22/2025    Admit Date: 7/20/2025    Plan: SNF eval pending for [facility]   Discharge Needs Assessment       Row Name 07/22/25 1222       Living Environment    People in Home spouse    Name(s) of People in Home Melinda August/spouse- has alzheimers    Unique Family Situation Daughter and son assist when needed    Current Living Arrangements home    Potentially Unsafe Housing Conditions none    In the past 12 months has the electric, gas, oil, or water company threatened to shut off services in your home? No    Primary Care Provided by self    Provides Primary Care For spouse    Family Caregiver if Needed child(ben), adult    Family Caregiver Names Anel Pelayo/daughter    Quality of Family Relationships helpful;involved;supportive    Able to Return to Prior Arrangements yes       Resource/Environmental Concerns    Resource/Environmental Concerns none    Transportation Concerns none       Transportation Needs    In the past 12 months, has lack of transportation kept you from medical appointments or from getting medications? no    In the past 12 months, has lack of transportation kept you from meetings, work, or from getting things needed for daily living? No       Food Insecurity    Within the past 12 months, you worried that your food would run out before you got the money to buy more. Never true    Within the past 12 months, the food you bought just didn't last and you didn't have money to get more. Never true       Transition Planning    Patient/Family Anticipates Transition to home with family    Patient/Family Anticipated Services at Transition skilled nursing    Transportation Anticipated family or friend will provide       Discharge Needs Assessment    Readmission Within the Last 30 Days no previous admission in last 30 days    Equipment Currently Used at Home cane, juan manuel;walker,  standard;pulse ox;bp cuff;scales;grab bar;shower chair    Concerns to be Addressed discharge planning    Do you want help finding or keeping work or a job? I do not need or want help    Do you want help with school or training? For example, starting or completing job training or getting a high school diploma, GED or equivalent No    Anticipated Changes Related to Illness none    Equipment Needed After Discharge none    Discharge Facility/Level of Care Needs nursing facility, skilled    Provided Post Acute Provider Quality & Resource List? N/A    Patient's Choice of Community Agency(s) Patient [facility]                   Discharge Plan       Row Name 07/22/25 1238       Plan    Plan SNF eval pending for [facility]    Plan Comments CCP spoke with patient and his brother-in-law/Prosper Metzger at bedside.  Received permission from patient prior to speaking in front of family member.  CCP explained role, introduced self and confirmed patients information on his face sheet.  Pt is IADL's, retired and drives.  Patient lives with spouse/Melinda August and he is her caregiver as she has Alzheimers.  Patient lives in a single level home with two steps to enter.  Patient has the following DME: straight cane, walker, pulse oximeter, BP cuff, scale, many grab bars and bath bench.  Patient reports his daugther/Anel Keenan does the shopping and laundry.  Pt has a  that comes every other week.  Pt does his own medications and can cook.  Patient has been to [facility] in the past and reports he has not used home health.  P.T. is recommending SNF at discharge and request referral to [facility].  CCP called the referral to jeevan Floodison for [facility].  Pt reports he would also like information on their personal care assisted living apartments for  him and his spouse.  CCP relayed this to Aleena.  Pt also requested a list of other Assisted Livings in the area in case [facility] does not have one.   CCP provided patient with a Road to Recovery book.  CCP following......Leyla RATLIFF/CHAR                  Continued Care and Services - Admitted Since 7/20/2025       Destination       Service Provider Request Status Services Address Phone Fax Patient Preferred    [facility] Pending - Request Sent -- [address], [facility] [id number] [phone] --                  Selected Continued Care - Episodes Includes continued care and service providers with selected services from the active episodes listed below      High Risk Care Management Episode start date: 7/21/2025   There are no active outsourced providers for this episode.                    Demographic Summary       Row Name 07/22/25 1215       General Information    Admission Type inpatient    Arrived From emergency department    Required Notices Provided Important Message from Medicare    Referral Source admission list    Reason for Consult discharge planning    Preferred Language English       Contact Information    Permission Granted to Share Info With                    Functional Status       Row Name 07/22/25 1216       Functional Status    Usual Activity Tolerance moderate    Current Activity Tolerance moderate       Physical Activity    On average, how many days per week do you engage in moderate to strenuous exercise (like a brisk walk)? 0 days    On average, how many minutes do you engage in exercise at this level? 0 min    Number of minutes of exercise per week 0       Assessment of Health Literacy    How often do you have someone help you read hospital materials? Never    How often do you have problems learning about your medical condition because of difficulty understanding written information? Never    How often do you have a problem understanding what is told to you about your medical condition? Never    How confident are you filling out medical forms by yourself? Quite a bit    Health Literacy  Good       Functional Status, IADL    Medications independent    Meal Preparation assistive person    Housekeeping other (see comments)  Has a     Laundry assistive person  quang Tam does laundry and shopping    Shopping assistive person    If for any reason you need help with day-to-day activities such as bathing, preparing meals, shopping, managing finances, etc., do you get the help you need? I get all the help I need       Mental Status    General Appearance WDL WDL       Mental Status Summary    Recent Changes in Mental Status/Cognitive Functioning no changes       Employment/    Employment Status retired    Current or Previous Occupation professional    Employment/ Comments [employer]                   Psychosocial    No documentation.                  Abuse/Neglect    No documentation.                  Legal    No documentation.                  Substance Abuse    No documentation.                  Patient Forms    No documentation.                     Leyla Jackson RN

## 2025-07-22 NOTE — PROGRESS NOTES
Name: Goran August ADMIT: 2025   : 1934  PCP: Tanya Julien MD    MRN: [id number] LOS: 1 days   AGE/SEX: 89 y.o. male  ROOM:      Subjective   Subjective   Resting in bed, has some new pain in his left knee today.  Breathing is okay.    Objective   Objective   Vital Signs  Temp:  [98.4 °F (36.9 °C)-99.8 °F (37.7 °C)] 99.8 °F (37.7 °C)  Heart Rate:  [] 82  Resp:  [18-20] 20  BP: ()/(65-95) 132/73  SpO2:  [94 %-100 %] 97 %  on  Flow (L/min) (Oxygen Therapy):  [1-2] 1;   Device (Oxygen Therapy): nasal cannula  Body mass index is 32.81 kg/m².  Physical Exam  Constitutional:       Appearance: He is ill-appearing.   Pulmonary:      Effort: Pulmonary effort is normal. No respiratory distress.      Breath sounds: No stridor.   Musculoskeletal:      Right lower leg: Edema present.      Left lower leg: Edema present.      Comments: Swelling to left knee, mild warmth, no erythema; chronic venous stasis changes bilaterally   Skin:     Coloration: Skin is not pale.   Neurological:      Mental Status: He is alert and oriented to person, place, and time.         Results Review     I reviewed the patient's new clinical results.  Results from last 7 days   Lab Units 25  0255 25  0848 25  2254   WBC 10*3/mm3 11.01* 10.28 11.41*   HEMOGLOBIN g/dL 13.1 12.2* 12.3*   PLATELETS 10*3/mm3 247 204 216     Results from last 7 days   Lab Units 25  0255 25  0848 25  2254   SODIUM mmol/L 139 136 137   POTASSIUM mmol/L 3.7 3.8 4.5   CHLORIDE mmol/L 99 99 99   CO2 mmol/L 25.5 25.8 22.0   BUN mg/dL 38.0* 37.0* 37.0*   CREATININE mg/dL 1.10 1.18 1.09   GLUCOSE mg/dL 133* 146* 93   EGFR mL/min/1.73 63.8 58.6* 64.5     Results from last 7 days   Lab Units 25  0255   ALBUMIN g/dL 3.0*   BILIRUBIN mg/dL 1.0   ALK PHOS U/L 80   AST (SGOT) U/L 54*   ALT (SGPT) U/L 29     Results from last 7 days   Lab Units 25  0255 25  0848 25  2254   CALCIUM mg/dL 9.0  8.8 8.8   ALBUMIN g/dL 3.0*  --   --    MAGNESIUM mg/dL  --   --  1.9     Results from last 7 days   Lab Units 07/21/25  0033 07/20/25  2254   PROCALCITONIN ng/mL  --  0.31*   LACTATE mmol/L 1.8  --      Glucose   Date/Time Value Ref Range Status   07/22/2025 0616 114 70 - 130 mg/dL Final   07/21/2025 1943 149 (H) 70 - 130 mg/dL Final   07/21/2025 1629 114 70 - 130 mg/dL Final   07/21/2025 1203 107 70 - 130 mg/dL Final   07/21/2025 0653 155 (H) 70 - 130 mg/dL Final       CT Angiogram Chest Pulmonary Embolism  Result Date: 7/21/2025   1. Cardiomegaly, interlobular septal thickening, and small bilateral effusions, suggesting congestive heart failure. 2. Bilateral pulmonary infiltrates, with most extensive consolidation noted within the right lower lobe. Appearance is favored to reflect multifocal pneumonia. There are prominent mediastinal and hilar lymph nodes. These are likely reactive. However, follow-up of both the infiltrates and adenopathy is suggested.  Radiation dose reduction techniques were utilized, including automated exposure control and exposure modulation based on body size.   This report was finalized on 7/21/2025 1:39 AM by Dr. Julia Wisdom M.D on Workstation: [id number]      XR Chest 1 View  Result Date: 7/20/2025   1. Cardiomegaly with vascular congestion. 2. Bibasilar atelectasis.  This report was finalized on 7/20/2025 11:52 PM by Dr. Julia Wisdom M.D on Workstation: [id number]      Scheduled Medications  cefTRIAXone, 1,000 mg, Intravenous, Q24H  ciprofloxacin, 1 drop, Left Eye, Q4H  enoxaparin sodium, 40 mg, Subcutaneous, Q24H  ferrous sulfate, 325 mg, Oral, Daily With Breakfast  fluticasone, 2 spray, Nasal, Daily  furosemide, 40 mg, Intravenous, Q12H  insulin lispro, 2-7 Units, Subcutaneous, 4x Daily AC & at Bedtime  ipratropium-albuterol, 3 mL, Nebulization, Q6H While Awake - RT  lisinopril, 40 mg, Oral, Q24H  multivitamin with minerals, 1 tablet, Oral, Daily  pantoprazole, 40 mg,  Oral, Q AM  pramipexole, 0.25 mg, Oral, Nightly  pravastatin, 20 mg, Oral, Daily  sodium chloride, 10 mL, Intravenous, Q12H  trimethoprim-polymyxin b, 1 drop, Both Eyes, Q6H    Infusions   Diet  Diet: Cardiac, Diabetic; Healthy Heart (2-3 Na+); Consistent Carbohydrate; Fluid Consistency: Thin (IDDSI 0)       Assessment/Plan     Active Hospital Problems    Diagnosis  POA    **Respiratory failure [J96.90]  Yes    History of DVT (deep vein thrombosis) [Z86.718]  Not Applicable    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       89 y.o. male admitted with Respiratory failure.      07/22/25  Significant drop in EF on TTE, cardiology plan ischemic workup.      With swelling in left knee suspect this is either osteoarthritis or gout.  Limited to just his left knee and may be a good candidate for injection.  He gets these as an outpatient.  In setting of worsening heart failure would like to avoid systemic therapies if possible.    Acute on chronic systolic and diastolic heart failure  - TTE (7/21/25): 21-25%; LV mild-mod dilated; G3 DD; mod reduced RV systolic function; RV mod dilated; LAC mildly dilated; RA mild-mod dilated; mod MR; RVSP 55 mmHg  - Cardiology following  - IV Lasix    PNA  Cough  - WBC 11 OA  - Cultures NGTD, urine Legionella and strep negative RVP negative  - course of Rocephin     HTN  -Lisinopril     DM2 with hyperglycemia  - SSI; monitor for hypoglycemia    L knee swelling  -gout vs OA  -consult Ortho for possible arthrocentesis/injection    Flow (L/min) (Oxygen Therapy):  [1-2] 1    DVT prophylaxis: Lovenox  Discussed with patient and family.  Anticipated discharge SNF, when cleared by consultants            Henrik Rowan MD  [facility]  07/22/25  08:08 EDT

## 2025-07-22 NOTE — THERAPY EVALUATION
Patient Name: Goran August  : 1934    MRN: [id number]                              Today's Date: 2025       Admit Date: 2025    Visit Dx:     ICD-10-CM ICD-9-CM   1. Pneumonia of both lower lobes due to infectious organism  J18.9 486   2. Acute respiratory failure with hypoxia  J96.01 518.81   3. Elevated troponin  R79.89 790.6   4. Acute pulmonary edema  J81.0 518.4   5. Acute on chronic congestive heart failure, unspecified heart failure type  I50.9 428.0   6. Conjunctivitis of both eyes, unspecified conjunctivitis type  H10.9 372.30     Patient Active Problem List   Diagnosis    Insulin resistance    Hypertensive heart disease without heart failure    Hyperlipidemia    Iron-deficiency anemia    Idiopathic chronic gout without tophus    Hiatal hernia with gastroesophageal reflux    Stage 3 chronic kidney disease    Environmental allergies    Benign essential HTN    Left bundle branch block    Nonischemic cardiomyopathy    Exudative age-related macular degeneration, left eye, with active choroidal neovascularization    Peripheral vascular disease, unspecified    Bilateral lower extremity edema    Hypoxemia    Dyspnea    COVID-19 virus infection    Combined systolic and diastolic congestive heart failure    Type 2 diabetes mellitus    Positive D dimer    Nocturnal hypoxia    History of DVT (deep vein thrombosis)    Heart failure with mildly reduced ejection fraction (HFmrEF)    Acute bronchitis    Respiratory failure     Past Medical History:   Diagnosis Date    Anemia     Benign hypertensive heart disease     Cardiomyopathy     EF 45-50% by echo 3/11    Cataract 2020    GERD (gastroesophageal reflux disease)     Heart murmur     Hx of colonic polyps     Hyperlipidemia     Hypertension     LBBB (left bundle branch block)     Mitral regurgitation     Osteoarthritis     Type 2 diabetes mellitus      Past Surgical History:   Procedure Laterality Date    CATARACT EXTRACTION,  BILATERAL Bilateral 08/01/2020    INGUINAL HERNIA REPAIR      X4    REPLACEMENT TOTAL KNEE Right       General Information       Row Name 07/22/25 1023          Physical Therapy Time and Intention    Document Type evaluation  Pt. admitted with respiratory failure; c/o cough;  Pt. also reports recent fall.  -MS     Mode of Treatment physical therapy;individual therapy  -MS       Row Name 07/22/25 1023          General Information    Patient Profile Reviewed yes  -MS     Prior Level of Function independent:  Use of Cane but also has Rwx for ambulation if needed  -MS     Existing Precautions/Restrictions fall   Exit alarm  -MS     Barriers to Rehab none identified  -MS       Row Name 07/22/25 1023          Cognition    Orientation Status (Cognition) oriented x 3  -MS       Row Name 07/22/25 1023          Safety Issues/Impairments Affecting Functional Mobility    Comment, Safety Issues/Impairments (Mobility) Gait belt used for safety.  -MS               User Key  (r) = Recorded By, (t) = Taken By, (c) = Cosigned By      Initials Name Provider Type    MS Palma [place] BARRERA, PT Physical Therapist                   Mobility       Row Name 07/22/25 1028          Bed Mobility    Bed Mobility supine-sit;sit-supine  -MS     Supine-Sit [place] (Bed Mobility) moderate assist (50% patient effort);2 person assist  -MS     Sit-Supine [place] (Bed Mobility) moderate assist (50% patient effort);2 person assist  -MS     Assistive Device (Bed Mobility) repositioning sheet  -MS       Row Name 07/22/25 1028          Sit-Stand Transfer    Sit-Stand [place] (Transfers) moderate assist (50% patient effort);maximum assist (25% patient effort);2 person assist  -MS     Assistive Device (Sit-Stand Transfers) walker, front-wheeled  -MS     Comment, (Sit-Stand Transfer) Heavy posterior lean throughout.  Pt. has difficulty bearing weight on his LLE.  -MS       Row Name 07/22/25 1028          Gait/Stairs (Locomotion)    [place]  Level (Gait) moderate assist (50% patient effort);2 person assist  -MS     Assistive Device (Gait) walker, front-wheeled  -MS     Distance in Feet (Gait) --  Pt. able to take 1 shuffled sidesteps on his RLE only  -MS     Deviations/Abnormal Patterns (Gait) shannen decreased  -MS     Bilateral Gait Deviations forward flexed posture  -MS     Comment, (Gait/Stairs) Pt. barely able to bear weight on his Left L.E. due to increased Left knee pain (recent fall), limiting his ability to perform sidesteps or forward ambulation. Verbal/tactile cues given for posture correction.  pt. also attemped to use urinal once standing but is unable to maintain a safe standing position to do so.  Nursing notified.  -MS               User Key  (r) = Recorded By, (t) = Taken By, (c) = Cosigned By      Initials Name Provider Type    MS AndersenerLondon, PT Physical Therapist                   Obj/Interventions       Row Name 07/22/25 1031          Range of Motion Comprehensive    Comment, General Range of Motion B Shld (Imp. 25%);  RLE (WFL's);  LLE (Imp. 50%) due to c/o pain and weakness.  -MS       Row Name 07/22/25 1031          Strength Comprehensive (MMT)    Comment, General Manual Muscle Testing (MMT) Assessment B Shld (3-/5);  RLE (3/5);  LLE (3-/5)  -MS               User Key  (r) = Recorded By, (t) = Taken By, (c) = Cosigned By      Initials Name Provider Type    MS Waldrop [place] RUST, PT Physical Therapist                   Goals/Plan       Row Name 07/22/25 1032          Bed Mobility Goal 1 (PT)    Activity/Assistive Device (Bed Mobility Goal 1, PT) bed mobility activities, all  -MS     [place] Level/Cues Needed (Bed Mobility Goal 1, PT) minimum assist (75% or more patient effort)  -MS     Time Frame (Bed Mobility Goal 1, PT) long term goal (LTG);1 week  -MS       Row Name 07/22/25 1032          Transfer Goal 1 (PT)    Activity/Assistive Device (Transfer Goal 1, PT) transfers, all;walker, rolling  -MS     [place]  Level/Cues Needed (Transfer Goal 1, PT) minimum assist (75% or more patient effort)  -MS     Time Frame (Transfer Goal 1, PT) long term goal (LTG);1 week  -MS       Row Name 07/22/25 1032          Gait Training Goal 1 (PT)    Activity/Assistive Device (Gait Training Goal 1, PT) gait (walking locomotion);walker, rolling  -MS     [place] Level (Gait Training Goal 1, PT) minimum assist (75% or more patient effort)  -MS     Distance (Gait Training Goal 1, PT) 20 feet  -MS     Time Frame (Gait Training Goal 1, PT) long term goal (LTG);1 week  -MS       Row Name 07/22/25 1032          Therapy Assessment/Plan (PT)    Planned Therapy Interventions (PT) balance training;bed mobility training;gait training;home exercise program;patient/family education;postural re-education;transfer training;strengthening;ROM (range of motion)  -MS               User Key  (r) = Recorded By, (t) = Taken By, (c) = Cosigned By      Initials Name Provider Type    [place] Rowan, PT Physical Therapist                   Clinical Impression       Row Name 07/22/25 1032          Pain    Pretreatment Pain Rating 6/10  -MS     Posttreatment Pain Rating 6/10  -MS     Pain Location knee  -MS     Pain Side/Orientation left  -MS     Pain Management Interventions nursing notified;positioning techniques utilized  -MS       Row Name 07/22/25 1032          Plan of Care Review    Plan of Care Reviewed With patient  -MS       [facility] Name 07/22/25 1032          Therapy Assessment/Plan (PT)    Rehab Potential (PT) fair  -MS     Criteria for Skilled Interventions Met (PT) skilled treatment is necessary  -MS     Therapy Frequency (PT) 6 times/wk  -MS       Row Name 07/22/25 1032          Positioning and Restraints    Pre-Treatment Position in bed  -MS     Post Treatment Position bed  -MS     In Bed notified nsg;supine;call light within reach;encouraged to call for assist;exit alarm on  All lines intact. V.S.S.  -MS               User Key  (r) = Recorded By,  (t) = Taken By, (c) = Cosigned By      Initials Name Provider Type    MS Waldrop, [place] RUST, PT Physical Therapist                   Outcome Measures       Row Name 07/22/25 1033          How much help from another person do you currently need...    Turning from your back to your side while in flat bed without using bedrails? 2  -MS     Moving from lying on back to sitting on the side of a flat bed without bedrails? 2  -MS     Moving to and from a bed to a chair (including a wheelchair)? 2  -MS     Standing up from a chair using your arms (e.g., wheelchair, bedside chair)? 2  -MS     Climbing 3-5 steps with a railing? 1  -MS     To walk in hospital room? 1  -MS     AM-PAC 6 Clicks Score (PT) 10  -MS     Highest Level of Mobility Goal Move to Chair/Commode-4  -MS       Row Name 07/22/25 1033          Functional Assessment    Outcome Measure Options AM-PAC 6 Clicks Basic Mobility (PT)  -MS               User Key  (r) = Recorded By, (t) = Taken By, (c) = Cosigned By      Initials Name Provider Type    MS WaldropLondon, PT Physical Therapist                                 Physical Therapy Education       Title: PT OT SLP Therapies (In Progress)       Topic: Physical Therapy (In Progress)       Point: Mobility training (Done)       Learning Progress Summary            Patient Acceptance, E,D, VU,NR by MS at 7/22/2025 1033                      Point: Home exercise program (Not Started)       Learner Progress:  Not documented in this visit.              Point: Body mechanics (Done)       Learning Progress Summary            Patient Acceptance, E,D, VU,NR by MS at 7/22/2025 1033                      Point: Precautions (Done)       Learning Progress Summary            Patient Acceptance, E,D, VU,NR by MS at 7/22/2025 1033                                      User Key       Initials Effective Dates Name Provider Type Discipline    MS 06/16/21 -  [place] Waldrop PT Physical Therapist PT                  PT  Recommendation and Plan  Planned Therapy Interventions (PT): balance training, bed mobility training, gait training, home exercise program, patient/family education, postural re-education, transfer training, strengthening, ROM (range of motion)  Outcome Evaluation: Pt. is a 89 year old Male admitted to the hospital with Respiratory failure, c/o cough, and per his report, a recent fall.  Pt. reports that prior to admitting symptoms he was able to ambulate on his own with use of a cane (has Rwx for use if needed).  Pt. currently presents with decreased strength, decreased ROM, decreased balance, and decreased tolerance to functional activity. This AM, pt. able to take 1 shuffled sidesteps (with RLE), Mod. assist x 2, with use of Rwx.  Pt. requires Mod. assist x 2 for bed mobility and Mod-Max. assist x 2 for sit <-> stand transfers (Heavy posterior lean).  Pt. limited in upright mobility due to increased Left knee pain and weakness.  Pt. unable to properly bear weight on his LLE in order to advance with forward ambulation.  Given pt.'s report of a recent fall, I reached out to MD (Dr. Rowan) and pt.'s nurse to recommend a Left knee X-ray to rule out a possible underyling issue.  Pt. will benefit from skilled inpt. P.T. to address his functional deficits and to assist pt in regaining his maximum level of independence with functional mobility.     Time Calculation:         PT Charges       Row Name 07/22/25 1038             Time Calculation    Start Time 0940  -MS      Stop Time 1000  -MS      Time Calculation (min) 20 min  -MS      PT Received On 07/22/25  -MS      PT - Next Appointment 07/23/25  -MS      PT Goal Re-Cert Due Date 07/29/25  -MS         Time Calculation- PT    Total Timed Code Minutes- PT 19 minute(s)  -MS                User Key  (r) = Recorded By, (t) = Taken By, (c) = Cosigned By      Initials Name Provider Type    [place] Rowan, PT Physical Therapist                  Therapy Charges for Today        Code Description Service Date Service Provider Modifiers Qty    64854851968 HC PT EVAL MOD COMPLEXITY 3 7/22/2025 [place] Waldrop, PT GP 1    44756385257 HC PT THERAPEUTIC ACT EA 15 MIN 7/22/2025 London Waldrop, PT GP 1    49939683822 HC PT THER SUPP EA 15 MIN 7/22/2025 [place] Waldrop, PT GP 1            PT G-Codes  Outcome Measure Options: AM-PAC 6 Clicks Basic Mobility (PT)  AM-PAC 6 Clicks Score (PT): 10  PT Discharge Summary  Anticipated Discharge Disposition (PT): skilled nursing facility    London Waldrop, PT  7/22/2025

## 2025-07-22 NOTE — PLAN OF CARE
Goal Outcome Evaluation:  Plan of Care Reviewed With: patient           Outcome Evaluation: Pt. is a 89 year old Male admitted to the hospital with Respiratory failure, c/o cough, and per his report, a recent fall.  Pt. reports that prior to admitting symptoms he was able to ambulate on his own with use of a cane (has Rwx for use if needed).  Pt. currently presents with decreased strength, decreased ROM, decreased balance, and decreased tolerance to functional activity. This AM, pt. able to take 1 shuffled sidesteps (with RLE), Mod. assist x 2, with use of Rwx.  Pt. requires Mod. assist x 2 for bed mobility and Mod-Max. assist x 2 for sit <-> stand transfers (Heavy posterior lean).  Pt. limited in upright mobility due to increased Left knee pain and weakness.  Pt. unable to properly bear weight on his LLE in order to advance with forward ambulation.  Given pt.'s report of a recent fall, I reached out to MD (Dr. Rowan) and pt.'s nurse to recommend a Left knee X-ray to rule out a possible underyling issue.  Pt. will benefit from skilled inpt. P.T. to address his functional deficits and to assist pt in regaining his maximum level of independence with functional mobility.    Anticipated Discharge Disposition (PT): skilled nursing facility

## 2025-07-22 NOTE — PLAN OF CARE
Goal Outcome Evaluation: PT worked w/ pt at bedside, mod x2 assist w/ turns, mod to max x2 sit to stand., recommend skilled w/ inpatient PT upon d/c.  Pt states he was independent w/ cane at home, however he has been unable to ambulate w/ staff.  L knee x-ray shows joint effusion w/ degenerative changes.  Ortho Sx consulted.  Heparin drip started per MAR, repeat aPTT ordered for 19:08 along w/ potassium redraw.  K 3.7 this morning, replaced w/ 20mEq PO per protocol.  Redraw was 3.8, additional 20mEq PO administered per protocol.  Lasix IV & Spironolactone administered to continue diuresing.  Pt has cough, administered PRN Robitussin.  Pt attempted BM on bed pan, passing gas but no stool.  Difficulty voiding, incontinent void in brief, post void bladder scan residual of 293, notified attending.  Flomax ordered and straight cath ordered for PVR of >300.  Pt placed on Pro+ bed, transferred to CVI.  Family at bedside, aware of new room number.  Belongings sent w/ pt.  A&O4, 1LNC, NSR w/ known runs of NSVT.  Pt code status corrected to DNR to correspond w/ Living Will on file.

## 2025-07-22 NOTE — CASE MANAGEMENT/SOCIAL WORK
Continued Stay Note  [facility]     Patient Name: Goran August  MRN: [id number]  Today's Date: 7/22/2025    Admit Date: 7/20/2025    Plan: [facility] when medically appropriate.   Discharge Plan       Row Name 07/22/25 1757       Plan    Plan [facility] when medically appropriate.    Plan Comments Aleena/[facility] has accepted.  Pharmacy updated for rehab ([facility] [place]  Needs packet.  Son Will is contact since daughter is out of country.  CCP following.........Leyla RATLIFF/CHAR                   Discharge Codes    No documentation.                 Expected Discharge Date and Time       Expected Discharge Date Expected Discharge Time    Jul 25, 2025               Leyla Jackson RN

## 2025-07-22 NOTE — PROGRESS NOTES
LOS: 1 day   Patient Care Team:  Tanya Julien MD as PCP - General (Family Medicine)  Ghazal Shelton, CATHY as Ambulatory  ([facility] [place])    Chief Complaint: Follow-up acute on chronic combined CHF, new dilated cardiomyopathy, acute on chronic right-sided CHF, type I versus type II NSTEMI.    Interval History: He is still requiring 1 to 2 L of oxygen.  Cough is improved.  No chest pain.  He actually denied any shortness of breath.    Vital Signs:  Temp:  [98.4 °F (36.9 °C)-99.8 °F (37.7 °C)] 99.4 °F (37.4 °C)  Heart Rate:  [] 82  Resp:  [18-20] 20  BP: ()/(65-95) 122/72    Intake/Output Summary (Last 24 hours) at 7/22/2025 1048  Last data filed at 7/22/2025 0853  Gross per 24 hour   Intake 250 ml   Output 1535 ml   Net -1285 ml       Physical Exam:   General Appearance:    No acute distress, alert and oriented x4   Lungs:     Scattered rhonchi bilaterally.    Heart:    Regular rhythm and normal rate.  II/VI SM LLSB.   Abdomen:     Soft, nontender, nondistended.    Extremities:   Trace edema of the lower extremities with chronic venous stasis changes.     Results Review:    Results from last 7 days   Lab Units 07/22/25  0255   SODIUM mmol/L 139   POTASSIUM mmol/L 3.7   CHLORIDE mmol/L 99   CO2 mmol/L 25.5   BUN mg/dL 38.0*   CREATININE mg/dL 1.10   GLUCOSE mg/dL 133*   CALCIUM mg/dL 9.0     Results from last 7 days   Lab Units 07/21/25  0848 07/21/25  0109 07/20/25  2254   CK TOTAL U/L 1,242*  --   --    HSTROP T ng/L 767* 771* 840*     Results from last 7 days   Lab Units 07/22/25  0255   WBC 10*3/mm3 11.01*   HEMOGLOBIN g/dL 13.1   HEMATOCRIT % 40.9   PLATELETS 10*3/mm3 247             Results from last 7 days   Lab Units 07/20/25  2254   MAGNESIUM mg/dL 1.9           I reviewed the patient's new clinical results.        Assessment:  1.  Acute on chronic combined CHF secondary to #2  2.  New dilated cardiomyopathy (with history of NICM and recovered EF in the past).  Current EF 20 to  25% by echo on 7/21/2025 (previously 55% on 12/20/2023).  3.  Acute on chronic right-sided CHF (moderately enlarged right ventricle with moderately reduced function by echo on 7/21/2025)  4.  Significantly elevated troponin, type I versus type II NSTEMI  5.  Moderate mitral regurgitation by echo on 7/21/2025  6.  Mild to moderate tricuspid regurgitation by echo on 7/21/2025  7.  Suspected pulmonary hypertension (RVSP greater than 55 mmHg by echo on 7/21/2025)  8.  Baseline left bundle branch block  9.  Possible pneumonia  10.  Frequent premature atrial contractions  11.  Venous insufficiency with chronic lower extremity edema  12.  History of DVT, previously on anticoagulation with Eliquis  13.  Obstructive sleep apnea, treated with a dental device  14.  Hypertension, largely controlled  15.  Type 2 diabetes    Plan:  -I had a long discussion with the patient today regarding his echocardiogram results and his goals of care.  He states that he has been fairly active and functional at home despite his age, and he desires aggressive treatment for his cardiomyopathy.    -He still needs further diuresis, and I would keep him on Lasix 40 mg IV twice daily for now.    -Continue lisinopril 40 mg/day.  I am going to add Toprol-XL 12.5 mg/day and spironolactone 12.5 mg/day.  I would like to make sure that his blood pressure and renal function tolerate these changes before titrating these upwards.  I will hold on switching him on Entresto as I would have to interrupt ACE inhibitor therapy, although this could be considered in the future.    -His troponin was significantly elevated, peaking at 840.  He has not had any chest pain or anginal symptoms.  He has a baseline left bundle branch block, which makes interpretation of the EKG very difficult for evaluation of ischemia.  In light of his ejection fraction and his significantly elevated troponin, I am going to switch his DVT dose Lovenox to a cardiac protocol heparin  drip.    -Again, he desires full care and aggressive treatment.  He is fairly functional.  I spoke to him about potentially proceeding with a heart catheterization later this week given his significant decrease in EF.  He does wish to proceed with this.  Once he is improved from a congestive heart failure standpoint, potentially proceed with left and right heart catheterization later this week (if his renal function remains stable).    -His blood pressure has been controlled, and the mitral regurgitation appears to be moderate and functional.  I do not feel that either of these contributed to the cardiomyopathy and CHF.    -Start aspirin 81 mg/day and switch pravastatin to Lipitor 40 mg/day.  Again, I cannot tell whether this is a type I or type II NSTEMI at this point.    -Multiple cardiac issues and guarded prognosis.    Noel Farrell MD  07/22/25  10:48 EDT

## 2025-07-22 NOTE — DISCHARGE PLACEMENT REQUEST
"Goran August \"Bill\" (89 y.o. Male)       Date of Birth   1934    Social Security Number       Address   [address] [place] [address]    Home Phone       MRN   [id number]       [facility]    Marital Status                               Admission Date   7/20/2025    Admission Type   Emergency    Admitting Provider   Henrik Rowan MD    Attending Provider   Henrik Rowan MD    Department, Room/Bed   [facility] CARDIOVASC UNIT, 2212/1       Discharge Date       Discharge Disposition       Discharge Destination                                 Attending Provider: Henrik Rowan MD    Allergies: Hctz [Hydrochlorothiazide]    Isolation: None   Infection: None   Code Status: No CPR    Ht: 180.3 cm (71\")   Wt: 107 kg (235 lb 3.7 oz)    Admission Cmt: None   Principal Problem: Respiratory failure [J96.90]                   Active Insurance as of 7/20/2025       Primary Coverage       Payor Plan Insurance Group Employer/Plan Group    MEDICARE MEDICARE A & B        Payor Plan Address Payor Plan Phone Number Payor Plan Fax Number Effective Dates    [address] [phone]  8/1/1999 - None Entered    [facility] [id number]         Subscriber Name Subscriber Birth Date Member ID       GORAN AUGUST 1934 [id number]               Secondary Coverage       Payor Plan Insurance Group Employer/Plan Group    ANTHEM BLUE CROSS ANTHEM BLUE CROSS BLUE SHIELD PPO [id number]       Payor Plan Address Payor Plan Phone Number Payor Plan Fax Number Effective Dates    [address] [phone]  1/1/2017 - None Entered    [facility] [id number]         Subscriber Name Subscriber Birth Date Member ID       GORAN AUGUST 1934 [id number]                     Emergency Contacts        (Rel.) Home Phone Work Phone Mobile Phone    Jonny August (Son) -- -- [phone]    Melinda August (Spouse) [phone] -- --    Anel Pelayo (Daughter) " [phone] -- [phone]

## 2025-07-22 NOTE — PLAN OF CARE
Goal Outcome Evaluation:         Patient A&O x 4. [place]. Patient reports feeling better throughout the shift. Patient denies pain. Patient has a productive cough with sputum production. Eyes appear to be improving. Patient taken 40mg IVP Lasik. Patient has good output.  Morning serum potassium 3.7. Cardiology contacted about this as well as a 7 beat run of vtach. Patient asymptomatic. Electrolyte protocol ordered. Care planning ongoing.

## 2025-07-23 LAB
ANION GAP SERPL CALCULATED.3IONS-SCNC: 11.8 MMOL/L (ref 5–15)
APTT PPP: 53.8 SECONDS (ref 22.7–35.4)
APTT PPP: 62.7 SECONDS (ref 22.7–35.4)
APTT PPP: 87.6 SECONDS (ref 22.7–35.4)
BASOPHILS # BLD AUTO: 0.01 10*3/MM3 (ref 0–0.2)
BASOPHILS NFR BLD AUTO: 0.1 % (ref 0–1.5)
BUN SERPL-MCNC: 41 MG/DL (ref 8–23)
BUN/CREAT SERPL: 38 (ref 7–25)
CALCIUM SPEC-SCNC: 8.8 MG/DL (ref 8.2–9.6)
CHLORIDE SERPL-SCNC: 99 MMOL/L (ref 98–107)
CO2 SERPL-SCNC: 28.2 MMOL/L (ref 22–29)
CREAT SERPL-MCNC: 1.08 MG/DL (ref 0.76–1.27)
DEPRECATED RDW RBC AUTO: 42.1 FL (ref 37–54)
EGFRCR SERPLBLD CKD-EPI 2021: 65.2 ML/MIN/1.73
EOSINOPHIL # BLD AUTO: 0.09 10*3/MM3 (ref 0–0.4)
EOSINOPHIL NFR BLD AUTO: 0.8 % (ref 0.3–6.2)
ERYTHROCYTE [DISTWIDTH] IN BLOOD BY AUTOMATED COUNT: 13.8 % (ref 12.3–15.4)
GLUCOSE BLDC GLUCOMTR-MCNC: 112 MG/DL (ref 70–130)
GLUCOSE BLDC GLUCOMTR-MCNC: 128 MG/DL (ref 70–130)
GLUCOSE BLDC GLUCOMTR-MCNC: 150 MG/DL (ref 70–130)
GLUCOSE BLDC GLUCOMTR-MCNC: 223 MG/DL (ref 70–130)
GLUCOSE SERPL-MCNC: 104 MG/DL (ref 65–99)
HCT VFR BLD AUTO: 39.2 % (ref 37.5–51)
HGB BLD-MCNC: 12.3 G/DL (ref 13–17.7)
IMM GRANULOCYTES # BLD AUTO: 0.05 10*3/MM3 (ref 0–0.05)
IMM GRANULOCYTES NFR BLD AUTO: 0.4 % (ref 0–0.5)
LYMPHOCYTES # BLD AUTO: 1.56 10*3/MM3 (ref 0.7–3.1)
LYMPHOCYTES NFR BLD AUTO: 13.3 % (ref 19.6–45.3)
MCH RBC QN AUTO: 26.6 PG (ref 26.6–33)
MCHC RBC AUTO-ENTMCNC: 31.4 G/DL (ref 31.5–35.7)
MCV RBC AUTO: 84.8 FL (ref 79–97)
MONOCYTES # BLD AUTO: 1.2 10*3/MM3 (ref 0.1–0.9)
MONOCYTES NFR BLD AUTO: 10.2 % (ref 5–12)
NEUTROPHILS NFR BLD AUTO: 75.2 % (ref 42.7–76)
NEUTROPHILS NFR BLD AUTO: 8.85 10*3/MM3 (ref 1.7–7)
NRBC BLD AUTO-RTO: 0 /100 WBC (ref 0–0.2)
PLATELET # BLD AUTO: 233 10*3/MM3 (ref 140–450)
PMV BLD AUTO: 10.8 FL (ref 6–12)
POTASSIUM SERPL-SCNC: 3.4 MMOL/L (ref 3.5–5.2)
POTASSIUM SERPL-SCNC: 4.3 MMOL/L (ref 3.5–5.2)
RBC # BLD AUTO: 4.62 10*6/MM3 (ref 4.14–5.8)
SODIUM SERPL-SCNC: 139 MMOL/L (ref 136–145)
WBC NRBC COR # BLD AUTO: 11.76 10*3/MM3 (ref 3.4–10.8)

## 2025-07-23 PROCEDURE — 80048 BASIC METABOLIC PNL TOTAL CA: CPT | Performed by: STUDENT IN AN ORGANIZED HEALTH CARE EDUCATION/TRAINING PROGRAM

## 2025-07-23 PROCEDURE — 85025 COMPLETE CBC W/AUTO DIFF WBC: CPT | Performed by: INTERNAL MEDICINE

## 2025-07-23 PROCEDURE — 25010000002 HEPARIN (PORCINE) 25000-0.45 UT/250ML-% SOLUTION: Performed by: INTERNAL MEDICINE

## 2025-07-23 PROCEDURE — 94664 DEMO&/EVAL PT USE INHALER: CPT

## 2025-07-23 PROCEDURE — 82948 REAGENT STRIP/BLOOD GLUCOSE: CPT

## 2025-07-23 PROCEDURE — 94799 UNLISTED PULMONARY SVC/PX: CPT

## 2025-07-23 PROCEDURE — 25010000002 HEPARIN (PORCINE) PER 1000 UNITS: Performed by: INTERNAL MEDICINE

## 2025-07-23 PROCEDURE — 84132 ASSAY OF SERUM POTASSIUM: CPT | Performed by: STUDENT IN AN ORGANIZED HEALTH CARE EDUCATION/TRAINING PROGRAM

## 2025-07-23 PROCEDURE — 85730 THROMBOPLASTIN TIME PARTIAL: CPT | Performed by: STUDENT IN AN ORGANIZED HEALTH CARE EDUCATION/TRAINING PROGRAM

## 2025-07-23 PROCEDURE — 25010000002 FUROSEMIDE PER 20 MG: Performed by: INTERNAL MEDICINE

## 2025-07-23 PROCEDURE — 85007 BL SMEAR W/DIFF WBC COUNT: CPT | Performed by: INTERNAL MEDICINE

## 2025-07-23 PROCEDURE — 99233 SBSQ HOSP IP/OBS HIGH 50: CPT | Performed by: INTERNAL MEDICINE

## 2025-07-23 PROCEDURE — 63710000001 INSULIN LISPRO (HUMAN) PER 5 UNITS: Performed by: NURSE PRACTITIONER

## 2025-07-23 PROCEDURE — 94761 N-INVAS EAR/PLS OXIMETRY MLT: CPT

## 2025-07-23 PROCEDURE — 25010000002 CEFTRIAXONE PER 250 MG: Performed by: STUDENT IN AN ORGANIZED HEALTH CARE EDUCATION/TRAINING PROGRAM

## 2025-07-23 RX ORDER — POTASSIUM CHLORIDE 1500 MG/1
40 TABLET, EXTENDED RELEASE ORAL EVERY 4 HOURS
Status: COMPLETED | OUTPATIENT
Start: 2025-07-23 | End: 2025-07-23

## 2025-07-23 RX ORDER — FUROSEMIDE 10 MG/ML
40 INJECTION INTRAMUSCULAR; INTRAVENOUS EVERY 12 HOURS
Status: COMPLETED | OUTPATIENT
Start: 2025-07-23 | End: 2025-07-23

## 2025-07-23 RX ADMIN — CEFTRIAXONE SODIUM 1000 MG: 1 INJECTION, POWDER, FOR SOLUTION INTRAMUSCULAR; INTRAVENOUS at 22:27

## 2025-07-23 RX ADMIN — POLYMYXIN B SULFATE AND TRIMETHOPRIM 1 DROP: 10000; 1 SOLUTION OPHTHALMIC at 05:05

## 2025-07-23 RX ADMIN — HEPARIN SODIUM 3210 UNITS: 5000 INJECTION INTRAVENOUS; SUBCUTANEOUS at 10:49

## 2025-07-23 RX ADMIN — FUROSEMIDE 40 MG: 10 INJECTION, SOLUTION INTRAMUSCULAR; INTRAVENOUS at 22:26

## 2025-07-23 RX ADMIN — CIPROFLOXACIN HYDROCHLORIDE 1 DROP: 3 SOLUTION/ DROPS OPHTHALMIC at 05:04

## 2025-07-23 RX ADMIN — HEPARIN SODIUM 14.35 UNITS/KG/HR: 10000 INJECTION, SOLUTION INTRAVENOUS at 19:43

## 2025-07-23 RX ADMIN — Medication 12.5 MG: at 09:07

## 2025-07-23 RX ADMIN — SENNOSIDES, DOCUSATE SODIUM 2 TABLET: 50; 8.6 TABLET, FILM COATED ORAL at 09:37

## 2025-07-23 RX ADMIN — POTASSIUM CHLORIDE 40 MEQ: 1500 TABLET, EXTENDED RELEASE ORAL at 08:48

## 2025-07-23 RX ADMIN — IPRATROPIUM BROMIDE AND ALBUTEROL SULFATE 3 ML: .5; 3 SOLUTION RESPIRATORY (INHALATION) at 15:28

## 2025-07-23 RX ADMIN — METOPROLOL SUCCINATE 12.5 MG: 25 TABLET, EXTENDED RELEASE ORAL at 08:47

## 2025-07-23 RX ADMIN — FERROUS SULFATE TAB 325 MG (65 MG ELEMENTAL FE) 325 MG: 325 (65 FE) TAB at 08:48

## 2025-07-23 RX ADMIN — LISINOPRIL 40 MG: 20 TABLET ORAL at 08:48

## 2025-07-23 RX ADMIN — IPRATROPIUM BROMIDE AND ALBUTEROL SULFATE 3 ML: .5; 3 SOLUTION RESPIRATORY (INHALATION) at 08:38

## 2025-07-23 RX ADMIN — POTASSIUM CHLORIDE 40 MEQ: 1500 TABLET, EXTENDED RELEASE ORAL at 05:04

## 2025-07-23 RX ADMIN — Medication 1 TABLET: at 08:48

## 2025-07-23 RX ADMIN — FUROSEMIDE 40 MG: 10 INJECTION, SOLUTION INTRAMUSCULAR; INTRAVENOUS at 12:27

## 2025-07-23 RX ADMIN — PANTOPRAZOLE SODIUM 40 MG: 40 TABLET, DELAYED RELEASE ORAL at 05:04

## 2025-07-23 RX ADMIN — HEPARIN SODIUM 15.35 UNITS/KG/HR: 10000 INJECTION, SOLUTION INTRAVENOUS at 05:01

## 2025-07-23 RX ADMIN — Medication 10 ML: at 21:32

## 2025-07-23 RX ADMIN — ASPIRIN 81 MG: 81 TABLET, COATED ORAL at 08:46

## 2025-07-23 RX ADMIN — INSULIN LISPRO 3 UNITS: 100 INJECTION, SOLUTION INTRAVENOUS; SUBCUTANEOUS at 21:31

## 2025-07-23 RX ADMIN — POLYMYXIN B SULFATE AND TRIMETHOPRIM 1 DROP: 10000; 1 SOLUTION OPHTHALMIC at 12:28

## 2025-07-23 RX ADMIN — IPRATROPIUM BROMIDE AND ALBUTEROL SULFATE 3 ML: .5; 3 SOLUTION RESPIRATORY (INHALATION) at 21:07

## 2025-07-23 RX ADMIN — Medication 10 ML: at 08:50

## 2025-07-23 RX ADMIN — FLUTICASONE PROPIONATE 2 SPRAY: 50 SPRAY, METERED NASAL at 08:49

## 2025-07-23 RX ADMIN — ATORVASTATIN CALCIUM 40 MG: 20 TABLET, FILM COATED ORAL at 21:31

## 2025-07-23 RX ADMIN — TAMSULOSIN HYDROCHLORIDE 0.4 MG: 0.4 CAPSULE ORAL at 08:48

## 2025-07-23 RX ADMIN — PRAMIPEXOLE DIHYDROCHLORIDE 0.25 MG: 0.5 TABLET ORAL at 21:31

## 2025-07-23 RX ADMIN — GUAIFENESIN SYRUP AND DEXTROMETHORPHAN 5 ML: 100; 10 SYRUP ORAL at 05:18

## 2025-07-23 RX ADMIN — POLYMYXIN B SULFATE AND TRIMETHOPRIM 1 DROP: 10000; 1 SOLUTION OPHTHALMIC at 17:19

## 2025-07-23 NOTE — PLAN OF CARE
Goal Outcome Evaluation:  Plan of Care Reviewed With: patient        Progress: no change  Outcome Evaluation: Straight cath x 2 overnight. Unable to void at all. Denies pain. Turned in bed.

## 2025-07-23 NOTE — CONSULTS
FIRST UROLOGY CONSULT      Patient Identification:  NAME:  Goran August  Age:  89 y.o.   Sex:  male   :  1934   MRN:  [id number]     Chief complaint: cough    History of present illness:      Goran August is a 89 y.o. male with a history of CHF who presented to the ER on  with acute cough and lower extremity edema. Patient diagnosed with acute on chronic systolic and diastolic heart failure and pneumonia. Yesterday patient was having difficulty voiding; bladder scan results yielded 293 mL. Overnight patient unable to void and straight cathed x 2. Slater catheter inserted today at 0700. Patient seen in room. Patient reports he was unable to void yesterday and was having suprapubic pressure. Patient denies history of retention and has never seen a urologist. Patient denies flank pain, fever, dysuria, and hematuria. Patient reports he is expected to be discharged to rehab facility soon.    In hospital:  -AVSS, good UOP  -WBC - 11.76  -Creat - 1.08        Past medical history:  Past Medical History:   Diagnosis Date    Anemia     Benign hypertensive heart disease     Cardiomyopathy     EF 45-50% by echo 3/11    Cataract 2020    GERD (gastroesophageal reflux disease)     Heart murmur     Hx of colonic polyps     Hyperlipidemia     Hypertension     LBBB (left bundle branch block)     Mitral regurgitation     Osteoarthritis     Sleep apnea     Type 2 diabetes mellitus        Past surgical history:  Past Surgical History:   Procedure Laterality Date    CATARACT EXTRACTION, BILATERAL Bilateral 2020    INGUINAL HERNIA REPAIR      X4    REPLACEMENT TOTAL KNEE Right        Allergies:  Hctz [hydrochlorothiazide]    Home medications:  Medications Prior to Admission   Medication Sig Dispense Refill Last Dose/Taking    esomeprazole (nexIUM) 40 MG capsule TAKE 1 CAPSULE DAILY 90 capsule 3 2025    ferrous sulfate 325 (65 Fe) MG tablet Take 1 tablet by mouth. 3 times week   2025     Multiple Vitamins-Minerals (PRESERVISION AREDS 2 PO) Take  by mouth.   7/20/2025    multivitamin with minerals tablet tablet Take 1 tablet by mouth Daily.   7/20/2025    pramipexole (MIRAPEX) 0.25 MG tablet TAKE 1 TABLET EVERY NIGHT 90 tablet 3 7/20/2025    pravastatin (PRAVACHOL) 20 MG tablet TAKE 1 TABLET DAILY 90 tablet 3 7/20/2025    ramipril (ALTACE) 10 MG capsule TAKE 1 CAPSULE DAILY 90 capsule 3 7/20/2025    triamcinolone (KENALOG) 0.1 % cream Apply  topically to the appropriate area as directed 2 (Two) Times a Day. 45 g 1 7/20/2025    albuterol sulfate  (90 Base) MCG/ACT inhaler Inhale 2 puffs Every 4 (Four) Hours As Needed for Wheezing or Shortness of Air. (Patient not taking: Reported on 7/18/2025) 18 g 6     benzonatate (Tessalon Perles) 100 MG capsule Take 1 capsule by mouth 3 (Three) Times a Day As Needed for Cough. 21 capsule 0     ciprofloxacin (Ciloxan) 0.3 % ophthalmic solution Administer 1 drop into the left eye Every 4 (Four) Hours for 5 days. 2.5 mL 0     fluticasone (FLONASE) 50 MCG/ACT nasal spray Administer 2 sprays into the nostril(s) as directed by provider Daily. Administer 2 sprays in each nostril once a day 16 g 0     furosemide (LASIX) 20 MG tablet Take 2 tablets by mouth Daily. 60 tablet 3         Hospital medications:  aspirin, 81 mg, Oral, Daily  atorvastatin, 40 mg, Oral, Nightly  cefTRIAXone, 1,000 mg, Intravenous, Q24H  ferrous sulfate, 325 mg, Oral, Daily With Breakfast  fluticasone, 2 spray, Nasal, Daily  furosemide, 40 mg, Intravenous, Q12H  insulin lispro, 2-7 Units, Subcutaneous, 4x Daily AC & at Bedtime  ipratropium-albuterol, 3 mL, Nebulization, Q6H While Awake - RT  lisinopril, 40 mg, Oral, Q24H  metoprolol succinate XL, 12.5 mg, Oral, Q24H  multivitamin with minerals, 1 tablet, Oral, Daily  pantoprazole, 40 mg, Oral, Q AM  pramipexole, 0.25 mg, Oral, Nightly  sodium chloride, 10 mL, Intravenous, Q12H  spironolactone, 12.5 mg, Oral, Daily  tamsulosin, 0.4 mg,  Oral, Daily  trimethoprim-polymyxin b, 1 drop, Both Eyes, Q6H      heparin, 9.35 Units/kg/hr, Last Rate: 16.35 Units/kg/hr (25 1051)        acetaminophen **OR** acetaminophen **OR** acetaminophen    senna-docusate sodium **AND** polyethylene glycol **AND** bisacodyl **AND** bisacodyl    Calcium Replacement - Follow Nurse / BPA Driven Protocol    dextrose    dextrose    glucagon (human recombinant)    guaiFENesin-dextromethorphan    heparin (porcine)    ipratropium-albuterol    Magnesium Cardiology Dose Replacement - Follow Nurse / BPA Driven Protocol    nitroglycerin    Phosphorus Replacement - Follow Nurse / BPA Driven Protocol    Potassium Replacement - Follow Nurse / BPA Driven Protocol    sodium chloride    sodium chloride    Family history:  Family History   Problem Relation Age of Onset    Aneurysm Mother         Colonic AVM in 90's    Hypertension Mother     Parkinsonism Father         90's    Aortic aneurysm Father         AAA    Tuberculosis Father     Tuberculosis Paternal Aunt     Cancer Maternal Grandmother     No Known Problems Daughter     No Known Problems Son        Social history:  Social History     Tobacco Use    Smoking status: Former     Current packs/day: 0.00     Average packs/day: 0.5 packs/day for 25.0 years (12.5 ttl pk-yrs)     Types: Cigarettes     Start date:      Quit date:      Years since quittin.5     Passive exposure: Past    Smokeless tobacco: Former   Vaping Use    Vaping status: Never Used   Substance Use Topics    Alcohol use: Yes     Comment: one drink monthly    Drug use: No       Review of systems:      12 point negative except as in HPI    Objective:  TMax 24 hours:   Temp (24hrs), Av.7 °F (36.5 °C), Min:97.4 °F (36.3 °C), Max:98.1 °F (36.7 °C)      Vitals Ranges:   Temp:  [97.4 °F (36.3 °C)-98.1 °F (36.7 °C)] 97.8 °F (36.6 °C)  Heart Rate:  [71-80] 78  Resp:  [16-20] 20  BP: (108-118)/(60-83) 108/60    Intake/Output Last 3 shifts:  I/O last 3  completed shifts:  In: 490 [P.O.:490]  Out: 4885 [Urine:4885]     Physical Exam:    General Appearance:    Alert, cooperative, NAD   Back:     No CVA tenderness   Abdomen:     Soft, NDNT   :    Slater catheter in place draining clear  yellow urine   Neuro/Psych:   Orientation intact, mood/affect pleasant       Results review:   I reviewed the patient's new clinical results.    Data review:  Lab Results (last 24 hours)       Procedure Component Value Units Date/Time    POC Glucose Once [[id number]]  (Abnormal) Collected: 07/23/25 1540    Specimen: Blood Updated: 07/23/25 1541     Glucose 150 mg/dL     Potassium [[id number]]  (Normal) Collected: 07/23/25 1249    Specimen: Blood Updated: 07/23/25 1341     Potassium 4.3 mmol/L     POC Glucose Once [[id number]]  (Normal) Collected: 07/23/25 1041    Specimen: Blood Updated: 07/23/25 1042     Glucose 128 mg/dL     aPTT [[id number]]  (Abnormal) Collected: 07/23/25 0930    Specimen: Blood from Arm, Left Updated: 07/23/25 1026     PTT 62.7 seconds     POC Glucose Once [[id number]]  (Normal) Collected: 07/23/25 0607    Specimen: Blood Updated: 07/23/25 0610     Glucose 112 mg/dL     Basic Metabolic Panel [[id number]]  (Abnormal) Collected: 07/23/25 0237    Specimen: Blood Updated: 07/23/25 0358     Glucose 104 mg/dL      BUN 41.0 mg/dL      Creatinine 1.08 mg/dL      Sodium 139 mmol/L      Potassium 3.4 mmol/L      Chloride 99 mmol/L      CO2 28.2 mmol/L      Calcium 8.8 mg/dL      BUN/Creatinine Ratio 38.0     Anion Gap 11.8 mmol/L      eGFR 65.2 mL/min/1.73     Narrative:      GFR Categories in Chronic Kidney Disease (CKD)              GFR Category          GFR (mL/min/1.73)    Interpretation  G1                    90 or greater        Normal or high (1)  G2                    60-89                Mild decrease (1)  G3a                   45-59                Mild to moderate decrease  G3b                   30-44                Moderate to severe decrease  G4                     15-29                Severe decrease  G5                    14 or less           Kidney failure    (1)In the absence of evidence of kidney disease, neither GFR category G1 or G2 fulfill the criteria for CKD.    eGFR calculation 2021 CKD-EPI creatinine equation, which does not include race as a factor    aPTT [[id number]]  (Abnormal) Collected: 07/23/25 0237    Specimen: Blood from Arm, Left Updated: 07/23/25 0338     PTT 53.8 seconds     CBC & Differential [[id number]]  (Abnormal) Collected: 07/23/25 0237    Specimen: Blood Updated: 07/23/25 0325    Narrative:      The following orders were created for panel order CBC & Differential.  Procedure                               Abnormality         Status                     ---------                               -----------         ------                     CBC Auto Differential[[id number]]        Abnormal            Final result                 Please view results for these tests on the individual orders.    CBC Auto Differential [[id number]]  (Abnormal) Collected: 07/23/25 0237    Specimen: Blood Updated: 07/23/25 0325     WBC 11.76 10*3/mm3      RBC 4.62 10*6/mm3      Hemoglobin 12.3 g/dL      Hematocrit 39.2 %      MCV 84.8 fL      MCH 26.6 pg      MCHC 31.4 g/dL      RDW 13.8 %      RDW-SD 42.1 fl      MPV 10.8 fL      Platelets 233 10*3/mm3      Neutrophil % 75.2 %      Lymphocyte % 13.3 %      Monocyte % 10.2 %      Eosinophil % 0.8 %      Basophil % 0.1 %      Immature Grans % 0.4 %      Neutrophils, Absolute 8.85 10*3/mm3      Lymphocytes, Absolute 1.56 10*3/mm3      Monocytes, Absolute 1.20 10*3/mm3      Eosinophils, Absolute 0.09 10*3/mm3      Basophils, Absolute 0.01 10*3/mm3      Immature Grans, Absolute 0.05 10*3/mm3      nRBC 0.0 /100 WBC     Blood Culture - Blood, Arm, Left [[id number]]  (Normal) Collected: 07/21/25 0033    Specimen: Blood from Arm, Left Updated: 07/23/25 0045     Blood Culture No growth at 2 days    Blood Culture - Blood, Arm, Right  [[id number]]  (Normal) Collected: 07/21/25 0033    Specimen: Blood from Arm, Right Updated: 07/23/25 0045     Blood Culture No growth at 2 days    POC Glucose Once [[id number]]  (Normal) Collected: 07/22/25 2045    Specimen: Blood Updated: 07/22/25 2046     Glucose 97 mg/dL     Potassium [[id number]]  (Normal) Collected: 07/22/25 1857    Specimen: Blood Updated: 07/22/25 2006     Potassium 4.0 mmol/L     aPTT [[id number]]  (Abnormal) Collected: 07/22/25 1857    Specimen: Blood Updated: 07/22/25 1943     PTT 39.5 seconds     POC Glucose Once [[id number]]  (Abnormal) Collected: 07/22/25 1627    Specimen: Blood Updated: 07/22/25 1629     Glucose 188 mg/dL              Imaging:  Imaging Results (Last 24 Hours)       ** No results found for the last 24 hours. **             Assessment:     Urinary retention    Plan:    -Recommend continue valera catheter; patient can be discharged to rehab with valera   - Continue Flomax  - Recommend voiding trial early AM of day of discharge or once patient is ambulatory.   - If unable to void and/or PVR >250 mL, replace Valera  - We will call patient directly to set up voiding trial outpatient  - Urology will sign off; please call with any questions/concerns or clinical change     YONI Levy  07/23/25  15:48 EDT

## 2025-07-23 NOTE — PROGRESS NOTES
LOS: 2 days   Patient Care Team:  Tanya Julien MD as PCP - General (Family Medicine)  Ghazal Shelton, CATHY as Ambulatory  ([facility] [place])    Chief Complaint: Follow-up acute on chronic combined CHF, new dilated cardiomyopathy, acute on chronic right-sided CHF, type I versus type II NSTEMI.    Interval History: Shortness of breath is better.  Cough is improved.  He feels better in general.  No chest pain.  He is having urinary retention, but still producing urine.  He is having significant pain from his left knee effusion.    Vital Signs:  Temp:  [97.4 °F (36.3 °C)-98.1 °F (36.7 °C)] 97.8 °F (36.6 °C)  Heart Rate:  [71-80] 78  Resp:  [16-18] 18  BP: (108-118)/(60-83) 108/60    Intake/Output Summary (Last 24 hours) at 7/23/2025 1508  Last data filed at 7/23/2025 1500  Gross per 24 hour   Intake 210 ml   Output 5775 ml   Net -5565 ml       Physical Exam:   General Appearance:    No acute distress, alert and oriented x4   Lungs:     Scattered rhonchi bilaterally.    Heart:    Regular rhythm and normal rate.  II/VI SM LLSB.   Abdomen:     Soft, nontender, nondistended.    Extremities:   Trace edema of the lower extremities with chronic venous stasis changes.  Left knee effusion noted     Results Review:    Results from last 7 days   Lab Units 07/23/25  1249 07/23/25  0237   SODIUM mmol/L  --  139   POTASSIUM mmol/L 4.3 3.4*   CHLORIDE mmol/L  --  99   CO2 mmol/L  --  28.2   BUN mg/dL  --  41.0*   CREATININE mg/dL  --  1.08   GLUCOSE mg/dL  --  104*   CALCIUM mg/dL  --  8.8     Results from last 7 days   Lab Units 07/21/25  0848 07/21/25  0109 07/20/25  2254   CK TOTAL U/L 1,242*  --   --    HSTROP T ng/L 767* 771* 840*     Results from last 7 days   Lab Units 07/23/25  0237   WBC 10*3/mm3 11.76*   HEMOGLOBIN g/dL 12.3*   HEMATOCRIT % 39.2   PLATELETS 10*3/mm3 233     Results from last 7 days   Lab Units 07/23/25  0930 07/23/25  0237 07/22/25  1857 07/22/25  1112   INR   --   --   --  1.51*   APTT  seconds 62.7* 53.8* 39.5* 32.8         Results from last 7 days   Lab Units 07/20/25  2254   MAGNESIUM mg/dL 1.9           I reviewed the patient's new clinical results.        Assessment:  1.  Acute on chronic combined CHF secondary to #2  2.  New dilated cardiomyopathy (with history of NICM and recovered EF in the past).  Current EF 20 to 25% by echo on 7/21/2025 (previously 55% on 12/20/2023).  3.  Acute on chronic right-sided CHF (moderately enlarged right ventricle with moderately reduced function by echo on 7/21/2025)  4.  Significantly elevated troponin, type I versus type II NSTEMI  5.  Moderate mitral regurgitation by echo on 7/21/2025  6.  Mild to moderate tricuspid regurgitation by echo on 7/21/2025  7.  Suspected pulmonary hypertension (RVSP greater than 55 mmHg by echo on 7/21/2025)  8.  Baseline left bundle branch block  9.  Possible pneumonia  10.  Frequent premature atrial contractions  11.  Venous insufficiency with chronic lower extremity edema  12.  History of DVT, previously on anticoagulation with Eliquis  13.  Obstructive sleep apnea, treated with a dental device  14.  Hypertension, largely controlled  15.  Type 2 diabetes  16.  Urinary retention  17.  Left knee effusion    Plan:  -I had a long discussion with the patient yesterday regarding his echocardiogram results and his goals of care.  He stated that he has been fairly active and functional at home despite his age, and he desires aggressive treatment for his cardiomyopathy.    -Continue 1 more day of IV diuretics.  I stopped the Lasix after the dose this evening.  He is currently on 40 mg IV twice daily.    -Continue lisinopril 40 mg/day.  Toprol-XL 12.5 mg/day and spironolactone 12.5 mg/day.  I do not think this blood pressure can tolerate further increases for now.    -His troponin was significantly elevated, peaking at 840.  He has not had any chest pain or anginal symptoms.  He has a baseline left bundle branch block, which makes  interpretation of the EKG very difficult for evaluation of ischemia.  Continue cardiac protocol heparin drip for now.    -Plan for right and left heart catheterization tomorrow as long as his renal function and clinical status remain stable.  Again, I clarified this with him, and he does wish to proceed.  He is aware that there are slightly higher risks at his age.    -His blood pressure has been controlled, and the mitral regurgitation appears to be moderate and functional.  I do not feel that either of these contributed to the cardiomyopathy and CHF.    -Continue aspirin 81 mg/day.  I switched him to Lipitor 40 mg/day.  Again, I cannot discern whether this is a type I or type II NSTEMI at this point.    -If he needs a left knee arthrocentesis, the heparin can be held at any point from my perspective.  Orthopedics has been consulted.    -Urology has been consulted for urinary retention as well.      Noel Farrell MD  07/23/25  15:08 EDT

## 2025-07-23 NOTE — PROGRESS NOTES
Discharge Planning Assessment  [facility]     Patient Name: Goran August  MRN: [id number]  Today's Date: 7/23/2025    Admit Date: 7/20/2025    [facility] bed availability   Discharge Needs Assessment    No documentation.                  Discharge Plan       Row Name 07/23/25 1612       Plan    Plan [facility] bed availability    Plan Comments Spoke with [place] has accepted and is following. Packet in [facility] and pharmacy updated. Sebastian MORGAN                  Continued Care and Services - Admitted Since 7/20/2025       Destination Coordination complete.      Service Provider Request Status Services Address Phone Fax Patient Preferred    [facility]  Selected Skilled Nursing [address] [id number] [phone] --                  Selected Continued Care - Episodes Includes continued care and service providers with selected services from the active episodes listed below      High Risk Care Management Episode start date: 7/21/2025   There are no active outsourced providers for this episode.                 Expected Discharge Date and Time       Expected Discharge Date Expected Discharge Time    Jul 25, 2025            Demographic Summary    No documentation.                  Functional Status    No documentation.                  Psychosocial    No documentation.                  Abuse/Neglect    No documentation.                  Legal    No documentation.                  Substance Abuse    No documentation.                  Patient Forms    No documentation.                     Ana Juan, RN

## 2025-07-23 NOTE — PROGRESS NOTES
Name: Goran August ADMIT: 2025   : 1934  PCP: Tanya Julien MD    MRN: [id number] LOS: 2 days   AGE/SEX: 89 y.o. male  ROOM: [facility]     Subjective   Subjective   Resting in bed, has not been able to urinate.  Still has pain in his knee.    Objective   Objective   Vital Signs  Temp:  [97.4 °F (36.3 °C)-98.1 °F (36.7 °C)] 97.8 °F (36.6 °C)  Heart Rate:  [71-80] 78  Resp:  [16-20] 20  BP: (108-118)/(60-83) 108/60  SpO2:  [92 %-98 %] 98 %  on  Flow (L/min) (Oxygen Therapy):  [1-2] 2;   Device (Oxygen Therapy): nasal cannula  Body mass index is 32.53 kg/m².  Physical Exam  Constitutional:       Appearance: He is ill-appearing.   Pulmonary:      Effort: Pulmonary effort is normal. No respiratory distress.      Breath sounds: No stridor.   Musculoskeletal:      Right lower leg: Edema present.      Left lower leg: Edema present.      Comments: Swelling to left knee, mild warmth, no erythema; chronic venous stasis changes bilaterally   Skin:     Coloration: Skin is not pale.   Neurological:      Mental Status: He is alert and oriented to person, place, and time.         Results Review     I reviewed the patient's new clinical results.  Results from last 7 days   Lab Units 25  0237 25  0255 25  0848 25  2254   WBC 10*3/mm3 11.76* 11.01* 10.28 11.41*   HEMOGLOBIN g/dL 12.3* 13.1 12.2* 12.3*   PLATELETS 10*3/mm3 233 247 204 216     Results from last 7 days   Lab Units 25  1249 25  0237 25  1857 25  1112 25  0255 25  0848 25  2254   SODIUM mmol/L  --  139  --   --  139 136 137   POTASSIUM mmol/L 4.3 3.4* 4.0 3.8 3.7 3.8 4.5   CHLORIDE mmol/L  --  99  --   --  99 99 99   CO2 mmol/L  --  28.2  --   --  25.5 25.8 22.0   BUN mg/dL  --  41.0*  --   --  38.0* 37.0* 37.0*   CREATININE mg/dL  --  1.08  --   --  1.10 1.18 1.09   GLUCOSE mg/dL  --  104*  --   --  133* 146* 93   EGFR mL/min/1.73  --  65.2  --   --  63.8 58.6* 64.5     Results from last  7 days   Lab Units 07/22/25  0255   ALBUMIN g/dL 3.0*   BILIRUBIN mg/dL 1.0   ALK PHOS U/L 80   AST (SGOT) U/L 54*   ALT (SGPT) U/L 29     Results from last 7 days   Lab Units 07/23/25  0237 07/22/25  0255 07/21/25  0848 07/20/25  2254   CALCIUM mg/dL 8.8 9.0 8.8 8.8   ALBUMIN g/dL  --  3.0*  --   --    MAGNESIUM mg/dL  --   --   --  1.9     Results from last 7 days   Lab Units 07/21/25  0033 07/20/25  2254   PROCALCITONIN ng/mL  --  0.31*   LACTATE mmol/L 1.8  --      Glucose   Date/Time Value Ref Range Status   07/23/2025 1540 150 (H) 70 - 130 mg/dL Final   07/23/2025 1041 128 70 - 130 mg/dL Final   07/23/2025 0607 112 70 - 130 mg/dL Final   07/22/2025 2045 97 70 - 130 mg/dL Final   07/22/2025 1627 188 (H) 70 - 130 mg/dL Final   07/22/2025 1153 186 (H) 70 - 130 mg/dL Final   07/22/2025 0616 114 70 - 130 mg/dL Final       XR Knee 1 or 2 View Left  Result Date: 7/22/2025   Joint effusion with degenerative change, no acute bony abnormality.  This report was finalized on 7/22/2025 12:59 PM by Dr. Manny Johnson M.D on Workstation: [id number]      Scheduled Medications  aspirin, 81 mg, Oral, Daily  atorvastatin, 40 mg, Oral, Nightly  cefTRIAXone, 1,000 mg, Intravenous, Q24H  ferrous sulfate, 325 mg, Oral, Daily With Breakfast  fluticasone, 2 spray, Nasal, Daily  furosemide, 40 mg, Intravenous, Q12H  insulin lispro, 2-7 Units, Subcutaneous, 4x Daily AC & at Bedtime  ipratropium-albuterol, 3 mL, Nebulization, Q6H While Awake - RT  lisinopril, 40 mg, Oral, Q24H  metoprolol succinate XL, 12.5 mg, Oral, Q24H  multivitamin with minerals, 1 tablet, Oral, Daily  pantoprazole, 40 mg, Oral, Q AM  pramipexole, 0.25 mg, Oral, Nightly  sodium chloride, 10 mL, Intravenous, Q12H  spironolactone, 12.5 mg, Oral, Daily  tamsulosin, 0.4 mg, Oral, Daily  trimethoprim-polymyxin b, 1 drop, Both Eyes, Q6H    Infusions  heparin, 9.35 Units/kg/hr, Last Rate: 16.35 Units/kg/hr (07/23/25 1051)    Diet  Diet: Cardiac, Diabetic; Healthy Heart  (2-3 Na+); Consistent Carbohydrate; Fluid Consistency: Thin (IDDSI 0)  NPO Diet NPO Type: Strict NPO       Assessment/Plan     Active Hospital Problems    Diagnosis  POA    **Respiratory failure [J96.90]  Yes    History of DVT (deep vein thrombosis) [Z86.718]  Not Applicable    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       89 y.o. male admitted with Respiratory failure.      07/23/25  Slater catheter placed for retention, will have urology evaluate to ensure follow-up in office.  N.p.o. at midnight for cath tomorrow.    Acute on chronic systolic and diastolic heart failure  - TTE (7/21/25): 21-25%; LV mild-mod dilated; G3 DD; mod reduced RV systolic function; RV mod dilated; LAC mildly dilated; RA mild-mod dilated; mod MR; RVSP 55 mmHg  - Cardiology following  -cath tentatively 7/24  - IV Lasix  -heparin gtt, ASA, statin, metoprolol    Multifocal PNA  - CTA w/ b/l infiltrates, consolidation RLL  - Cultures NGTD, urine Legionella and strep negative RVP negative  - course of Rocephin     HTN  -Lisinopril     DM2 with hyperglycemia  - SSI; monitor for hypoglycemia    L knee swelling  -gout vs OA  -consult Ortho for possible arthrocentesis/injection    Acute urinary retention  -add Flomax    Flow (L/min) (Oxygen Therapy):  [1-2] 2    DVT prophylaxis: Heparin gtt  Discussed with patient, family, and nursing staff.  Anticipated discharge SNF, when cleared by consultants            Henrik Rowan MD  [facility]  07/23/25  15:49 EDT

## 2025-07-24 PROBLEM — M10.9 GOUT OF LEFT KNEE: Status: ACTIVE | Noted: 2025-07-24

## 2025-07-24 PROBLEM — M11.862: Status: ACTIVE | Noted: 2025-07-24

## 2025-07-24 LAB
ANION GAP SERPL CALCULATED.3IONS-SCNC: 10.2 MMOL/L (ref 5–15)
ANION GAP SERPL CALCULATED.3IONS-SCNC: 10.6 MMOL/L (ref 5–15)
APPEARANCE FLD: ABNORMAL
APTT PPP: 57.9 SECONDS (ref 22.7–35.4)
APTT PPP: 61.8 SECONDS (ref 22.7–35.4)
BASOPHILS # BLD MANUAL: 0 10*3/MM3 (ref 0–0.2)
BASOPHILS NFR BLD MANUAL: 0 % (ref 0–1.5)
BUN SERPL-MCNC: 29 MG/DL (ref 8–23)
BUN SERPL-MCNC: 34 MG/DL (ref 8–23)
BUN/CREAT SERPL: 32.2 (ref 7–25)
BUN/CREAT SERPL: 34 (ref 7–25)
CALCIUM SPEC-SCNC: 8.9 MG/DL (ref 8.2–9.6)
CALCIUM SPEC-SCNC: 8.9 MG/DL (ref 8.2–9.6)
CHLORIDE SERPL-SCNC: 101 MMOL/L (ref 98–107)
CHLORIDE SERPL-SCNC: 99 MMOL/L (ref 98–107)
CO2 SERPL-SCNC: 29.8 MMOL/L (ref 22–29)
CO2 SERPL-SCNC: 31.4 MMOL/L (ref 22–29)
COLOR FLD: YELLOW
CREAT SERPL-MCNC: 0.9 MG/DL (ref 0.76–1.27)
CREAT SERPL-MCNC: 1 MG/DL (ref 0.76–1.27)
CRYSTALS FLD MICRO: NORMAL
DEPRECATED RDW RBC AUTO: 44.8 FL (ref 37–54)
EGFRCR SERPLBLD CKD-EPI 2021: 71.5 ML/MIN/1.73
EGFRCR SERPLBLD CKD-EPI 2021: 81.1 ML/MIN/1.73
EOSINOPHIL # BLD MANUAL: 0.27 10*3/MM3 (ref 0–0.4)
EOSINOPHIL NFR BLD MANUAL: 2 % (ref 0.3–6.2)
ERYTHROCYTE [DISTWIDTH] IN BLOOD BY AUTOMATED COUNT: 14.2 % (ref 12.3–15.4)
GLUCOSE BLDC GLUCOMTR-MCNC: 117 MG/DL (ref 70–130)
GLUCOSE BLDC GLUCOMTR-MCNC: 146 MG/DL (ref 70–130)
GLUCOSE BLDC GLUCOMTR-MCNC: 155 MG/DL (ref 70–130)
GLUCOSE BLDC GLUCOMTR-MCNC: 99 MG/DL (ref 70–130)
GLUCOSE SERPL-MCNC: 115 MG/DL (ref 65–99)
GLUCOSE SERPL-MCNC: 127 MG/DL (ref 65–99)
HCO3 BLDA-SCNC: 33.7 MMOL/L (ref 21–28)
HCO3 BLDA-SCNC: 35.8 MMOL/L (ref 21–28)
HCT VFR BLD AUTO: 41.2 % (ref 37.5–51)
HCT VFR BLDA CALC: 38 % (ref 38–51)
HCT VFR BLDA CALC: 38 % (ref 38–51)
HGB BLD-MCNC: 12.3 G/DL (ref 13–17.7)
HGB BLDA-MCNC: 12.9 G/DL (ref 12–17)
HGB BLDA-MCNC: 12.9 G/DL (ref 12–17)
LYMPHOCYTES # BLD MANUAL: 0.53 10*3/MM3 (ref 0.7–3.1)
LYMPHOCYTES NFR BLD MANUAL: 7 % (ref 5–12)
MCH RBC QN AUTO: 26.3 PG (ref 26.6–33)
MCHC RBC AUTO-ENTMCNC: 29.9 G/DL (ref 31.5–35.7)
MCV RBC AUTO: 88 FL (ref 79–97)
METHOD: ABNORMAL
MONOCYTES # BLD: 0.93 10*3/MM3 (ref 0.1–0.9)
MONOCYTES NFR FLD: 14 %
NEUTROPHILS # BLD AUTO: 11.56 10*3/MM3 (ref 1.7–7)
NEUTROPHILS NFR BLD MANUAL: 87 % (ref 42.7–76)
NEUTROPHILS NFR FLD MANUAL: 86 %
NRBC BLD AUTO-RTO: 0 /100 WBC (ref 0–0.2)
NUC CELL # FLD: ABNORMAL /MM3
PCO2 BLDA: 51 MM HG (ref 35–45)
PCO2 BLDA: 55.5 MM HG (ref 35–45)
PH BLDA: 7.42 PH UNITS (ref 7.35–7.45)
PH BLDA: 7.43 PH UNITS (ref 7.35–7.45)
PLAT MORPH BLD: NORMAL
PLATELET # BLD AUTO: 249 10*3/MM3 (ref 140–450)
PMV BLD AUTO: 10.4 FL (ref 6–12)
PO2 BLDA: 31 MMHG (ref 80–105)
PO2 BLDA: 78 MMHG (ref 80–105)
POIKILOCYTOSIS BLD QL SMEAR: ABNORMAL
POTASSIUM BLDA-SCNC: 3.7 MMOL/L (ref 3.5–4.9)
POTASSIUM BLDA-SCNC: 3.8 MMOL/L (ref 3.5–4.9)
POTASSIUM SERPL-SCNC: 3.8 MMOL/L (ref 3.5–5.2)
POTASSIUM SERPL-SCNC: 4.2 MMOL/L (ref 3.5–5.2)
POTASSIUM SERPL-SCNC: 4.8 MMOL/L (ref 3.5–5.2)
QT INTERVAL: 445 MS
QTC INTERVAL: 526 MS
RBC # BLD AUTO: 4.68 10*6/MM3 (ref 4.14–5.8)
RBC # FLD AUTO: 4245 /MM3
SAO2 % BLDA: 58 % (ref 95–98)
SAO2 % BLDA: 95 % (ref 95–98)
SODIUM SERPL-SCNC: 141 MMOL/L (ref 136–145)
SODIUM SERPL-SCNC: 141 MMOL/L (ref 136–145)
SPHEROCYTES BLD QL SMEAR: ABNORMAL
VARIANT LYMPHS NFR BLD MANUAL: 4 % (ref 19.6–45.3)
WBC MORPH BLD: NORMAL
WBC NRBC COR # BLD AUTO: 13.29 10*3/MM3 (ref 3.4–10.8)

## 2025-07-24 PROCEDURE — 84132 ASSAY OF SERUM POTASSIUM: CPT | Performed by: STUDENT IN AN ORGANIZED HEALTH CARE EDUCATION/TRAINING PROGRAM

## 2025-07-24 PROCEDURE — 89051 BODY FLUID CELL COUNT: CPT | Performed by: ORTHOPAEDIC SURGERY

## 2025-07-24 PROCEDURE — 82948 REAGENT STRIP/BLOOD GLUCOSE: CPT

## 2025-07-24 PROCEDURE — 85730 THROMBOPLASTIN TIME PARTIAL: CPT | Performed by: STUDENT IN AN ORGANIZED HEALTH CARE EDUCATION/TRAINING PROGRAM

## 2025-07-24 PROCEDURE — 4A023N8 MEASUREMENT OF CARDIAC SAMPLING AND PRESSURE, BILATERAL, PERCUTANEOUS APPROACH: ICD-10-PCS | Performed by: INTERNAL MEDICINE

## 2025-07-24 PROCEDURE — 25010000002 HEPARIN (PORCINE) PER 1000 UNITS: Performed by: INTERNAL MEDICINE

## 2025-07-24 PROCEDURE — 93460 R&L HRT ART/VENTRICLE ANGIO: CPT | Performed by: INTERNAL MEDICINE

## 2025-07-24 PROCEDURE — 63710000001 INSULIN LISPRO (HUMAN) PER 5 UNITS: Performed by: INTERNAL MEDICINE

## 2025-07-24 PROCEDURE — C1894 INTRO/SHEATH, NON-LASER: HCPCS | Performed by: INTERNAL MEDICINE

## 2025-07-24 PROCEDURE — 93010 ELECTROCARDIOGRAM REPORT: CPT | Performed by: INTERNAL MEDICINE

## 2025-07-24 PROCEDURE — B2151ZZ FLUOROSCOPY OF LEFT HEART USING LOW OSMOLAR CONTRAST: ICD-10-PCS | Performed by: INTERNAL MEDICINE

## 2025-07-24 PROCEDURE — 82810 BLOOD GASES O2 SAT ONLY: CPT

## 2025-07-24 PROCEDURE — 80048 BASIC METABOLIC PNL TOTAL CA: CPT | Performed by: STUDENT IN AN ORGANIZED HEALTH CARE EDUCATION/TRAINING PROGRAM

## 2025-07-24 PROCEDURE — 25010000002 HEPARIN (PORCINE) 25000-0.45 UT/250ML-% SOLUTION: Performed by: INTERNAL MEDICINE

## 2025-07-24 PROCEDURE — 25010000002 CEFTRIAXONE PER 250 MG: Performed by: STUDENT IN AN ORGANIZED HEALTH CARE EDUCATION/TRAINING PROGRAM

## 2025-07-24 PROCEDURE — B2111ZZ FLUOROSCOPY OF MULTIPLE CORONARY ARTERIES USING LOW OSMOLAR CONTRAST: ICD-10-PCS | Performed by: INTERNAL MEDICINE

## 2025-07-24 PROCEDURE — 94799 UNLISTED PULMONARY SVC/PX: CPT

## 2025-07-24 PROCEDURE — 85018 HEMOGLOBIN: CPT

## 2025-07-24 PROCEDURE — 85014 HEMATOCRIT: CPT

## 2025-07-24 PROCEDURE — 94761 N-INVAS EAR/PLS OXIMETRY MLT: CPT

## 2025-07-24 PROCEDURE — 87205 SMEAR GRAM STAIN: CPT | Performed by: ORTHOPAEDIC SURGERY

## 2025-07-24 PROCEDURE — C1769 GUIDE WIRE: HCPCS | Performed by: INTERNAL MEDICINE

## 2025-07-24 PROCEDURE — 99233 SBSQ HOSP IP/OBS HIGH 50: CPT | Performed by: INTERNAL MEDICINE

## 2025-07-24 PROCEDURE — 87070 CULTURE OTHR SPECIMN AEROBIC: CPT | Performed by: ORTHOPAEDIC SURGERY

## 2025-07-24 PROCEDURE — 82803 BLOOD GASES ANY COMBINATION: CPT

## 2025-07-24 PROCEDURE — 89060 EXAM SYNOVIAL FLUID CRYSTALS: CPT | Performed by: ORTHOPAEDIC SURGERY

## 2025-07-24 PROCEDURE — 93005 ELECTROCARDIOGRAM TRACING: CPT | Performed by: INTERNAL MEDICINE

## 2025-07-24 PROCEDURE — 25010000002 LIDOCAINE 2% SOLUTION: Performed by: INTERNAL MEDICINE

## 2025-07-24 PROCEDURE — 25510000001 IOPAMIDOL PER 1 ML: Performed by: INTERNAL MEDICINE

## 2025-07-24 RX ORDER — COLCHICINE 0.6 MG/1
0.6 TABLET ORAL DAILY
Status: DISCONTINUED | OUTPATIENT
Start: 2025-07-25 | End: 2025-07-26 | Stop reason: HOSPADM

## 2025-07-24 RX ORDER — COLCHICINE 0.6 MG/1
1.2 TABLET ORAL ONCE
Status: COMPLETED | OUTPATIENT
Start: 2025-07-24 | End: 2025-07-24

## 2025-07-24 RX ORDER — VERAPAMIL HYDROCHLORIDE 2.5 MG/ML
INJECTION INTRAVENOUS
Status: DISCONTINUED | OUTPATIENT
Start: 2025-07-24 | End: 2025-07-24 | Stop reason: HOSPADM

## 2025-07-24 RX ORDER — POTASSIUM CHLORIDE 1500 MG/1
20 TABLET, EXTENDED RELEASE ORAL ONCE
Status: COMPLETED | OUTPATIENT
Start: 2025-07-24 | End: 2025-07-24

## 2025-07-24 RX ORDER — IOPAMIDOL 755 MG/ML
INJECTION, SOLUTION INTRAVASCULAR
Status: DISCONTINUED | OUTPATIENT
Start: 2025-07-24 | End: 2025-07-24 | Stop reason: HOSPADM

## 2025-07-24 RX ORDER — HEPARIN SODIUM 1000 [USP'U]/ML
INJECTION, SOLUTION INTRAVENOUS; SUBCUTANEOUS
Status: DISCONTINUED | OUTPATIENT
Start: 2025-07-24 | End: 2025-07-24 | Stop reason: HOSPADM

## 2025-07-24 RX ORDER — ALLOPURINOL 100 MG/1
100 TABLET ORAL DAILY
Status: DISCONTINUED | OUTPATIENT
Start: 2025-07-24 | End: 2025-07-26 | Stop reason: HOSPADM

## 2025-07-24 RX ORDER — PRAVASTATIN SODIUM 40 MG
20 TABLET ORAL NIGHTLY
Status: DISCONTINUED | OUTPATIENT
Start: 2025-07-24 | End: 2025-07-26 | Stop reason: HOSPADM

## 2025-07-24 RX ORDER — TORSEMIDE 20 MG/1
20 TABLET ORAL DAILY
Status: DISCONTINUED | OUTPATIENT
Start: 2025-07-25 | End: 2025-07-26 | Stop reason: HOSPADM

## 2025-07-24 RX ORDER — LIDOCAINE HYDROCHLORIDE 20 MG/ML
INJECTION, SOLUTION INFILTRATION; PERINEURAL
Status: DISCONTINUED | OUTPATIENT
Start: 2025-07-24 | End: 2025-07-24 | Stop reason: HOSPADM

## 2025-07-24 RX ADMIN — IPRATROPIUM BROMIDE AND ALBUTEROL SULFATE 3 ML: .5; 3 SOLUTION RESPIRATORY (INHALATION) at 12:40

## 2025-07-24 RX ADMIN — POLYETHYLENE GLYCOL 3350 17 G: 17 POWDER, FOR SOLUTION ORAL at 12:28

## 2025-07-24 RX ADMIN — FLUTICASONE PROPIONATE 2 SPRAY: 50 SPRAY, METERED NASAL at 12:31

## 2025-07-24 RX ADMIN — ASPIRIN 81 MG: 81 TABLET, COATED ORAL at 12:29

## 2025-07-24 RX ADMIN — COLCHICINE 1.2 MG: 0.6 TABLET ORAL at 15:36

## 2025-07-24 RX ADMIN — BISACODYL 5 MG: 5 TABLET, COATED ORAL at 18:27

## 2025-07-24 RX ADMIN — ALLOPURINOL 100 MG: 100 TABLET ORAL at 13:20

## 2025-07-24 RX ADMIN — POLYMYXIN B SULFATE AND TRIMETHOPRIM 1 DROP: 10000; 1 SOLUTION OPHTHALMIC at 00:54

## 2025-07-24 RX ADMIN — Medication 12.5 MG: at 12:29

## 2025-07-24 RX ADMIN — HEPARIN SODIUM 15.35 UNITS/KG/HR: 10000 INJECTION, SOLUTION INTRAVENOUS at 01:12

## 2025-07-24 RX ADMIN — INSULIN LISPRO 2 UNITS: 100 INJECTION, SOLUTION INTRAVENOUS; SUBCUTANEOUS at 20:46

## 2025-07-24 RX ADMIN — POLYMYXIN B SULFATE AND TRIMETHOPRIM 1 DROP: 10000; 1 SOLUTION OPHTHALMIC at 23:57

## 2025-07-24 RX ADMIN — LISINOPRIL 40 MG: 20 TABLET ORAL at 12:29

## 2025-07-24 RX ADMIN — Medication 1 TABLET: at 12:29

## 2025-07-24 RX ADMIN — TAMSULOSIN HYDROCHLORIDE 0.4 MG: 0.4 CAPSULE ORAL at 12:29

## 2025-07-24 RX ADMIN — POLYMYXIN B SULFATE AND TRIMETHOPRIM 1 DROP: 10000; 1 SOLUTION OPHTHALMIC at 12:36

## 2025-07-24 RX ADMIN — PANTOPRAZOLE SODIUM 40 MG: 40 TABLET, DELAYED RELEASE ORAL at 12:29

## 2025-07-24 RX ADMIN — CEFTRIAXONE SODIUM 1000 MG: 1 INJECTION, POWDER, FOR SOLUTION INTRAMUSCULAR; INTRAVENOUS at 20:47

## 2025-07-24 RX ADMIN — METOPROLOL SUCCINATE 12.5 MG: 25 TABLET, EXTENDED RELEASE ORAL at 12:29

## 2025-07-24 RX ADMIN — PRAVASTATIN SODIUM 20 MG: 40 TABLET ORAL at 20:47

## 2025-07-24 RX ADMIN — Medication 10 ML: at 12:30

## 2025-07-24 RX ADMIN — POLYMYXIN B SULFATE AND TRIMETHOPRIM 1 DROP: 10000; 1 SOLUTION OPHTHALMIC at 06:57

## 2025-07-24 RX ADMIN — POLYMYXIN B SULFATE AND TRIMETHOPRIM 1 DROP: 10000; 1 SOLUTION OPHTHALMIC at 16:24

## 2025-07-24 RX ADMIN — FERROUS SULFATE TAB 325 MG (65 MG ELEMENTAL FE) 325 MG: 325 (65 FE) TAB at 12:29

## 2025-07-24 RX ADMIN — Medication 10 ML: at 20:48

## 2025-07-24 RX ADMIN — POTASSIUM CHLORIDE 20 MEQ: 1500 TABLET, EXTENDED RELEASE ORAL at 13:20

## 2025-07-24 RX ADMIN — PRAMIPEXOLE DIHYDROCHLORIDE 0.25 MG: 0.5 TABLET ORAL at 20:48

## 2025-07-24 RX ADMIN — HEPARIN SODIUM 3200 UNITS: 5000 INJECTION INTRAVENOUS; SUBCUTANEOUS at 01:11

## 2025-07-24 RX ADMIN — IPRATROPIUM BROMIDE AND ALBUTEROL SULFATE 3 ML: .5; 3 SOLUTION RESPIRATORY (INHALATION) at 19:25

## 2025-07-24 RX ADMIN — POTASSIUM CHLORIDE 20 MEQ: 1500 TABLET, EXTENDED RELEASE ORAL at 18:27

## 2025-07-24 NOTE — PROGRESS NOTES
LOS: 3 days   Patient Care Team:  Tanya Julien MD as PCP - General (Family Medicine)  Ghazal Shelton, CATHY as Ambulatory  ([facility])    Chief Complaint: Follow-up acute on chronic combined CHF, new dilated cardiomyopathy, acute on chronic right-sided CHF, type I versus type II NSTEMI.    Interval History: He underwent his left and right heart catheterization earlier.  He had minimal coronary disease and moderate pulmonary hypertension.  He did not have any chest discomfort, and his shortness of breath is better.  Interestingly, he went into rate controlled atrial fibrillation after the heart catheterization, which is new (he was asymptomatic).    Vital Signs:  Temp:  [97.6 °F (36.4 °C)-99.4 °F (37.4 °C)] 98 °F (36.7 °C)  Heart Rate:  [73-85] 75  Resp:  [13-20] 18  BP: (105-148)/(63-98) 128/92    Intake/Output Summary (Last 24 hours) at 7/24/2025 1831  Last data filed at 7/24/2025 1828  Gross per 24 hour   Intake 0 ml   Output 1400 ml   Net -1400 ml       Physical Exam:   General Appearance:    No acute distress, alert and oriented x4   Lungs:     Scattered rhonchi bilaterally.    Heart:    Irregularly irregular rhythm and normal rate.  II/VI SM LLSB.   Abdomen:     Soft, nontender, nondistended.    Extremities:   Trace edema of the lower extremities with chronic venous stasis changes.  No vascular access complications.     Results Review:    Results from last 7 days   Lab Units 07/24/25  1346   SODIUM mmol/L 141   POTASSIUM mmol/L 3.8   CHLORIDE mmol/L 99   CO2 mmol/L 31.4*   BUN mg/dL 29.0*   CREATININE mg/dL 0.90   GLUCOSE mg/dL 115*   CALCIUM mg/dL 8.9     Results from last 7 days   Lab Units 07/21/25  0848 07/21/25  0109 07/20/25  2254   CK TOTAL U/L 1,242*  --   --    HSTROP T ng/L 767* 771* 840*     Results from last 7 days   Lab Units 07/24/25  0931 07/24/25  0927 07/23/25  2352   WBC 10*3/mm3  --   --  13.29*   HEMOGLOBIN g/dL  --   --  12.3*   HEMOGLOBIN, POC g/dL 12.9   < >  --     HEMATOCRIT %  --   --  41.2   HEMATOCRIT POC % 38   < >  --    PLATELETS 10*3/mm3  --   --  249    < > = values in this interval not displayed.     Results from last 7 days   Lab Units 07/24/25  0710 07/23/25  2352 07/23/25  1642 07/22/25  1857 07/22/25  1112   INR   --   --   --   --  1.51*   APTT seconds 57.9* 61.8* 87.6*   < > 32.8    < > = values in this interval not displayed.         Results from last 7 days   Lab Units 07/20/25  2254   MAGNESIUM mg/dL 1.9           I reviewed the patient's new clinical results.        Assessment:  1.  Acute on chronic combined CHF secondary to #2  2.  New dilated nonischemic cardiomyopathy (with history of NICM and recovered EF in the past).  Current EF 20 to 25% by echo on 7/21/2025 (previously 55% on 12/20/2023).  3.  Acute on chronic right-sided CHF (moderately enlarged right ventricle with moderately reduced function by echo on 7/21/2025)  4.  Elevated troponin, type II NSTEMI secondary to #1 and #2 (cath on 7/24/2025 with minimal coronary artery disease only).  5.  Moderate mitral regurgitation by echo on 7/21/2025  6.  Mild to moderate tricuspid regurgitation by echo on 7/21/2025  7.  Moderate pulmonary hypertension, mean PA 37 by RHC on 7/24/2025 (PCWP 19 at that time)  8.  Baseline left bundle branch block  9.  Multifocal pneumonia  10.  Frequent premature atrial contractions  11.  Venous insufficiency with chronic lower extremity edema  12.  History of DVT, previously on anticoagulation with Eliquis  13.  Obstructive sleep apnea, treated with a dental device  14.  Hypertension, largely controlled  15.  Type 2 diabetes  16.  Urinary retention  17.  Left knee effusion, status post arthrocentesis on 7/24/2025 (uric acid and CPPD crystals)  18.  Paroxysmal atrial fibrillation, new and first diagnosed after heart cath on 7/24/2025    Plan:  -The patient underwent right and left heart catheterization earlier.  He had minimal coronary artery disease, and therefore, this  is a nonischemic cardiomyopathy.  He did have moderate pulmonary hypertension with an elevated wedge pressure of 19.  His mean PA was 37.    -He developed new atrial fibrillation after the heart catheterization.  He was still in atrial fibrillation when I saw him.  He is asymptomatic from this, and the rate was controlled.    -He does not have contraindications to anticoagulation.  I will likely start him on Eliquis tomorrow morning (I will hold today in order for his vascular access sites to heal from the heart cath).    -Hold on further diuresis today.  Start torsemide 20 mg tomorrow morning.    -Continue lisinopril 40 mg/day.  Toprol-XL 12.5 mg/day and spironolactone 12.5 mg/day.     -He is going to be discharged with a Slater catheter.  I am not going to start an SGLT2 inhibitor given infection risk in this setting and at his age.    -His blood pressure has been controlled, and the mitral regurgitation appears to be moderate and functional.  I do not feel that either of these contributed to the cardiomyopathy and CHF.    -Stop aspirin in preparation for anticoagulation tomorrow.  Given that he has minimal coronary artery disease, switch the Lipitor back to his home dose of pravastatin 20 mg/day.    -Dr. Rowan started colchicine for gout.  His arthrocentesis showed uric acid and CPPD crystals.    -Started on Flomax and he will be discharged with a Slater catheter will follow-up with urology as an outpatient for urinary retention.    -Potentially discharge to rehab tomorrow.      Noel Farrell MD  07/24/25  18:31 EDT

## 2025-07-24 NOTE — PROGRESS NOTES
Aspiration results from this morning consistent with gouty attack.  Recommend anti-inflammatory medications as deemed appropriate for the patient along with physical therapy and ice.  Not a candidate for intra-articular injection while hospitalized for his acute event.  He may follow-up in the office for a cortisone shot

## 2025-07-24 NOTE — PROGRESS NOTES
I received a call from the on-call service this morning from patient's nurse.  She did not stop patient's heparin drip in preparation for cardiac catheterization this morning.  I okay to stop heparin drip in preparation for patient going heart cath this morning.  I discussed this with Dr. Saavedra who was scheduled to perform cardiac catheterization.

## 2025-07-24 NOTE — PLAN OF CARE
Goal Outcome Evaluation:      A/O x4, remains on 2L NC. New a fib noted today- cardiology aware. Potassium replaced per protocol. L&R heart cath completed. Gout medication started. Possible discharge to [facility] tomorrow. VSS.

## 2025-07-24 NOTE — PROGRESS NOTES
Name: Goran August ADMIT: 2025   : 1934  PCP: Tanya Julien MD    MRN: [id number] LOS: 3 days   AGE/SEX: 89 y.o. male  ROOM: [facility]     Subjective   Subjective   Feels pretty good today.  No new concerns.    Objective   Objective   Vital Signs  Temp:  [97.4 °F (36.3 °C)-99.4 °F (37.4 °C)] 99.3 °F (37.4 °C)  Heart Rate:  [72-80] 80  Resp:  [16-20] 16  BP: (105-124)/(60-83) 124/65  SpO2:  [94 %-98 %] 98 %  on  Flow (L/min) (Oxygen Therapy):  [1-2] 2;   Device (Oxygen Therapy): nasal cannula  Body mass index is 32.53 kg/m².  Physical Exam  Constitutional:       Appearance: He is ill-appearing.   Pulmonary:      Effort: Pulmonary effort is normal. No respiratory distress.      Breath sounds: No stridor.   Musculoskeletal:      Right lower leg: Edema present.      Left lower leg: Edema present.      Comments: Swelling to left knee, mild warmth, no erythema; chronic venous stasis changes bilaterally   Skin:     Coloration: Skin is not pale.      Comments: RUE cath site no hematoma   Neurological:      Mental Status: He is alert and oriented to person, place, and time.         Results Review     I reviewed the patient's new clinical results.  Results from last 7 days   Lab Units 25  2352 25  0237 25  0255 25  0848   WBC 10*3/mm3 13.29* 11.76* 11.01* 10.28   HEMOGLOBIN g/dL 12.3* 12.3* 13.1 12.2*   PLATELETS 10*3/mm3 249 233 247 204     Results from last 7 days   Lab Units 25  2352 25  1249 25  0237 25  1857 25  1112 25  0255 25  0848   SODIUM mmol/L 141  --  139  --   --  139 136   POTASSIUM mmol/L 4.2 4.3 3.4* 4.0   < > 3.7 3.8   CHLORIDE mmol/L 101  --  99  --   --  99 99   CO2 mmol/L 29.8*  --  28.2  --   --  25.5 25.8   BUN mg/dL 34.0*  --  41.0*  --   --  38.0* 37.0*   CREATININE mg/dL 1.00  --  1.08  --   --  1.10 1.18   GLUCOSE mg/dL 127*  --  104*  --   --  133* 146*   EGFR mL/min/1.73 71.5  --  65.2  --   --  63.8 58.6*    < >  = values in this interval not displayed.     Results from last 7 days   Lab Units 07/22/25  0255   ALBUMIN g/dL 3.0*   BILIRUBIN mg/dL 1.0   ALK PHOS U/L 80   AST (SGOT) U/L 54*   ALT (SGPT) U/L 29     Results from last 7 days   Lab Units 07/23/25  2352 07/23/25  0237 07/22/25  0255 07/21/25  0848 07/20/25  2254   CALCIUM mg/dL 8.9 8.8 9.0 8.8 8.8   ALBUMIN g/dL  --   --  3.0*  --   --    MAGNESIUM mg/dL  --   --   --   --  1.9     Results from last 7 days   Lab Units 07/21/25  0033 07/20/25  2254   PROCALCITONIN ng/mL  --  0.31*   LACTATE mmol/L 1.8  --      Glucose   Date/Time Value Ref Range Status   07/23/2025 2007 223 (H) 70 - 130 mg/dL Final   07/23/2025 1540 150 (H) 70 - 130 mg/dL Final   07/23/2025 1041 128 70 - 130 mg/dL Final   07/23/2025 0607 112 70 - 130 mg/dL Final   07/22/2025 2045 97 70 - 130 mg/dL Final   07/22/2025 1627 188 (H) 70 - 130 mg/dL Final   07/22/2025 1153 186 (H) 70 - 130 mg/dL Final       XR Knee 1 or 2 View Left  Result Date: 7/22/2025   Joint effusion with degenerative change, no acute bony abnormality.  This report was finalized on 7/22/2025 12:59 PM by Dr. Manny Johnson M.D on Workstation: [id number]      Scheduled Medications  aspirin, 81 mg, Oral, Daily  atorvastatin, 40 mg, Oral, Nightly  cefTRIAXone, 1,000 mg, Intravenous, Q24H  ferrous sulfate, 325 mg, Oral, Daily With Breakfast  fluticasone, 2 spray, Nasal, Daily  insulin lispro, 2-7 Units, Subcutaneous, 4x Daily AC & at Bedtime  ipratropium-albuterol, 3 mL, Nebulization, Q6H While Awake - RT  lisinopril, 40 mg, Oral, Q24H  metoprolol succinate XL, 12.5 mg, Oral, Q24H  multivitamin with minerals, 1 tablet, Oral, Daily  pantoprazole, 40 mg, Oral, Q AM  pramipexole, 0.25 mg, Oral, Nightly  sodium chloride, 10 mL, Intravenous, Q12H  spironolactone, 12.5 mg, Oral, Daily  tamsulosin, 0.4 mg, Oral, Daily  trimethoprim-polymyxin b, 1 drop, Both Eyes, Q6H    Infusions  [Held by provider] heparin, 9.35 Units/kg/hr, Last Rate:  Stopped (07/24/25 0630)    Diet  NPO Diet NPO Type: Strict NPO       Assessment/Plan     Active Hospital Problems    Diagnosis  POA    **Respiratory failure [J96.90]  Yes    History of DVT (deep vein thrombosis) [Z86.718]  Not Applicable    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       89 y.o. male admitted with Respiratory failure.      07/24/25  Start colchicine for gout/CPP flare.     Acute on chronic systolic and diastolic heart failure  - TTE (7/21/25): 21-25%; LV mild-mod dilated; G3 DD; mod reduced RV systolic function; RV mod dilated; LAC mildly dilated; RA mild-mod dilated; mod MR; RVSP 55 mmHg  - Cardiology following  -RHC/LHC 7/24 no significant CAD; moderate pulmonary hypertension; mildly reduced cardiac output  -ASA, statin, metoprolol    L knee gout CPPD  -Ortho consulted  -arthrocentesis 7/24 inflammatory, uric acid and CPP crystals  -start colchicine  - Will also start allopurinol with uric acid of 11 and high risk of recurrence, feel he would benefit from urate lowering therapy    New onset atrial fibrillation  -Cardiology as above    Multifocal PNA  - CTA w/ b/l infiltrates, consolidation RLL  - Cultures NGTD, urine Legionella and strep negative RVP negative  - course of Rocephin     HTN  -Lisinopril     DM2 with hyperglycemia  - SSI; monitor for hypoglycemia    Acute urinary retention  -add Flomax    Flow (L/min) (Oxygen Therapy):  [1-2] 2    DVT prophylaxis: Heparin gtt  Discussed with patient, family, nursing staff, and Dr Farrell.  Anticipated discharge SNF, when cleared by consultants            Henrik Rowan MD  [facility]  07/24/25  06:41 EDT

## 2025-07-24 NOTE — PLAN OF CARE
Goal Outcome Evaluation:  Plan of Care Reviewed With: patient        Progress: no change  Outcome Evaluation: VSS, no complaints of pain this shift.  Continue valera catheter.  Pt is alert and oriented x4.  Pt to have a right and left heart cath today.  Continue heparin drip, timed PTT for 0715.

## 2025-07-24 NOTE — CONSULTS
Orthopaedic Surgery  Consult Note  Dr. KENTON Cowan” Eun II  [phone]    HPI:  Patient is a 89 y.o. Not  or  male who presents with multiple significant medical comorbidities including cardiac disease.  He was admitted for nonorthopedic reasons but has been complaining about left knee pain.  An x-ray was taken that demonstrated an effusion.  Orthopedics was then consulted.  He is afebrile and has a normal white count but he has been on IV antibiotics.    MEDICAL HISTORY  Past Medical History:   Diagnosis Date    Anemia     Benign hypertensive heart disease     Cardiomyopathy     EF 45-50% by echo 3/11    Cataract 08/01/2020 9/1/20    GERD (gastroesophageal reflux disease)     Heart murmur     Hx of colonic polyps     Hyperlipidemia     Hypertension     LBBB (left bundle branch block)     Mitral regurgitation     Osteoarthritis     Sleep apnea     Type 2 diabetes mellitus      Past Surgical History:   Procedure Laterality Date    CATARACT EXTRACTION, BILATERAL Bilateral 08/01/2020    INGUINAL HERNIA REPAIR      X4    REPLACEMENT TOTAL KNEE Right      Prior to Admission medications    Medication Sig Start Date End Date Taking? Authorizing Provider   esomeprazole (nexIUM) 40 MG capsule TAKE 1 CAPSULE DAILY 11/29/24  Yes Tanya Julien MD   ferrous sulfate 325 (65 Fe) MG tablet Take 1 tablet by mouth. 3 times week   Yes Rashid Elaine MD   Multiple Vitamins-Minerals (PRESERVISION AREDS 2 PO) Take  by mouth.   Yes Rashid Elaine MD   multivitamin with minerals tablet tablet Take 1 tablet by mouth Daily.   Yes Rashid Elaine MD   pramipexole (MIRAPEX) 0.25 MG tablet TAKE 1 TABLET EVERY NIGHT 1/7/25  Yes Tanya Julien MD   pravastatin (PRAVACHOL) 20 MG tablet TAKE 1 TABLET DAILY 1/7/25  Yes Tanya Julien MD   ramipril (ALTACE) 10 MG capsule TAKE 1 CAPSULE DAILY 8/1/24  Yes Tanya Julien MD   triamcinolone (KENALOG) 0.1 % cream Apply  topically to the appropriate area as  "directed 2 (Two) Times a Day. 21  Yes Meka Tamayo MD   albuterol sulfate  (90 Base) MCG/ACT inhaler Inhale 2 puffs Every 4 (Four) Hours As Needed for Wheezing or Shortness of Air.  Patient not taking: Reported on 23   Tanya Julien MD   benzonatate (Tessalon Perles) 100 MG capsule Take 1 capsule by mouth 3 (Three) Times a Day As Needed for Cough. 25   Lashell Mares APRN   ciprofloxacin (Ciloxan) 0.3 % ophthalmic solution Administer 1 drop into the left eye Every 4 (Four) Hours for 5 days. 25  Lashell Mares APRN   fluticasone (FLONASE) 50 MCG/ACT nasal spray Administer 2 sprays into the nostril(s) as directed by provider Daily. Administer 2 sprays in each nostril once a day 25   Lashell Mares APRN   furosemide (LASIX) 20 MG tablet Take 2 tablets by mouth Daily. 24   Tanya Julien MD     Allergies   Allergen Reactions    Hctz [Hydrochlorothiazide] Hives     Most Recent Immunizations   Administered Date(s) Administered    COVID-19 (PFIZER) Purple Cap Monovalent 2021    Fluad Quad 65+ 2021    Fluzone High-Dose 65+YRS 10/01/2024    Fluzone High-Dose 65+yrs 2022    Pneumococcal Conjugate 20-Valent (PCV20) 2023    Pneumococcal Polysaccharide (PPSV23) 2009    Shingrix 2020    Tdap 10/22/2024     Social History     Tobacco Use    Smoking status: Former     Current packs/day: 0.00     Average packs/day: 0.5 packs/day for 25.0 years (12.5 ttl pk-yrs)     Types: Cigarettes     Start date:      Quit date:      Years since quittin.5     Passive exposure: Past    Smokeless tobacco: Former   Substance Use Topics    Alcohol use: Yes     Comment: one drink monthly      Social History     Substance and Sexual Activity   Drug Use No       VITALS: /65   Pulse 80   Temp 99.3 °F (37.4 °C) (Oral)   Resp 16   Ht 180.3 cm (71\")   Wt 106 kg (233 lb 4 oz)   SpO2 98%   BMI 32.53 kg/m²  Body mass index is " 32.53 kg/m².    PHYSICAL EXAM:   CONSTITUTIONAL: No acute distress  LUNGS: Equal chest rise, no shortness of air  CARDIOVASCULAR: palpable peripheral pulses  SKIN: no skin lesions in the area examined  LYMPH: no lymphadenopathy in the area examined  Musculoskeletal: Left knee with 2+ effusion.  He has tenderness.  He is able to range the knee and there is no erythema.  There is warmth about the knee.    RADIOLOGY REVIEW:   XR Knee 1 or 2 View Left  Result Date: 7/22/2025   Joint effusion with degenerative change, no acute bony abnormality.  This report was finalized on 7/22/2025 12:59 PM by Dr. Manny Johnson M.D on Workstation: [id number]      CT Angiogram Chest Pulmonary Embolism  Result Date: 7/21/2025   1. Cardiomegaly, interlobular septal thickening, and small bilateral effusions, suggesting congestive heart failure. 2. Bilateral pulmonary infiltrates, with most extensive consolidation noted within the right lower lobe. Appearance is favored to reflect multifocal pneumonia. There are prominent mediastinal and hilar lymph nodes. These are likely reactive. However, follow-up of both the infiltrates and adenopathy is suggested.  Radiation dose reduction techniques were utilized, including automated exposure control and exposure modulation based on body size.   This report was finalized on 7/21/2025 1:39 AM by Dr. Julia Wisdom M.D on Workstation: [id number]      XR Chest 1 View  Result Date: 7/20/2025   1. Cardiomegaly with vascular congestion. 2. Bibasilar atelectasis.  This report was finalized on 7/20/2025 11:52 PM by Dr. Julia Wisdom M.D on Workstation: [id number]        LABS:   Results for the past 24 hours:   Recent Results (from the past 24 hours)   aPTT    Collection Time: 07/23/25  9:30 AM    Specimen: Arm, Left; Blood   Result Value Ref Range    PTT 62.7 (H) 22.7 - 35.4 seconds   POC Glucose Once    Collection Time: 07/23/25 10:41 AM    Specimen: Blood   Result Value Ref Range    Glucose  128 70 - 130 mg/dL   Potassium    Collection Time: 07/23/25 12:49 PM    Specimen: Blood   Result Value Ref Range    Potassium 4.3 3.5 - 5.2 mmol/L   POC Glucose Once    Collection Time: 07/23/25  3:40 PM    Specimen: Blood   Result Value Ref Range    Glucose 150 (H) 70 - 130 mg/dL   aPTT    Collection Time: 07/23/25  4:42 PM    Specimen: Blood   Result Value Ref Range    PTT 87.6 (H) 22.7 - 35.4 seconds   POC Glucose Once    Collection Time: 07/23/25  8:07 PM    Specimen: Blood   Result Value Ref Range    Glucose 223 (H) 70 - 130 mg/dL   aPTT    Collection Time: 07/23/25 11:52 PM    Specimen: Blood   Result Value Ref Range    PTT 61.8 (H) 22.7 - 35.4 seconds   Basic Metabolic Panel    Collection Time: 07/23/25 11:52 PM    Specimen: Blood   Result Value Ref Range    Glucose 127 (H) 65 - 99 mg/dL    BUN 34.0 (H) 8.0 - 23.0 mg/dL    Creatinine 1.00 0.76 - 1.27 mg/dL    Sodium 141 136 - 145 mmol/L    Potassium 4.2 3.5 - 5.2 mmol/L    Chloride 101 98 - 107 mmol/L    CO2 29.8 (H) 22.0 - 29.0 mmol/L    Calcium 8.9 8.2 - 9.6 mg/dL    BUN/Creatinine Ratio 34.0 (H) 7.0 - 25.0    Anion Gap 10.2 5.0 - 15.0 mmol/L    eGFR 71.5 >60.0 mL/min/1.73   CBC Auto Differential    Collection Time: 07/23/25 11:52 PM    Specimen: Blood   Result Value Ref Range    WBC 13.29 (H) 3.40 - 10.80 10*3/mm3    RBC 4.68 4.14 - 5.80 10*6/mm3    Hemoglobin 12.3 (L) 13.0 - 17.7 g/dL    Hematocrit 41.2 37.5 - 51.0 %    MCV 88.0 79.0 - 97.0 fL    MCH 26.3 (L) 26.6 - 33.0 pg    MCHC 29.9 (L) 31.5 - 35.7 g/dL    RDW 14.2 12.3 - 15.4 %    RDW-SD 44.8 37.0 - 54.0 fl    MPV 10.4 6.0 - 12.0 fL    Platelets 249 140 - 450 10*3/mm3    nRBC 0.0 0.0 - 0.2 /100 WBC   Manual Differential    Collection Time: 07/23/25 11:52 PM    Specimen: Blood   Result Value Ref Range    Neutrophil % 87.0 (H) 42.7 - 76.0 %    Lymphocyte % 4.0 (L) 19.6 - 45.3 %    Monocyte % 7.0 5.0 - 12.0 %    Eosinophil % 2.0 0.3 - 6.2 %    Basophil % 0.0 0.0 - 1.5 %    Neutrophils Absolute 11.56 (H)  "1.70 - 7.00 10*3/mm3    Lymphocytes Absolute 0.53 (L) 0.70 - 3.10 10*3/mm3    Monocytes Absolute 0.93 (H) 0.10 - 0.90 10*3/mm3    Eosinophils Absolute 0.27 0.00 - 0.40 10*3/mm3    Basophils Absolute 0.00 0.00 - 0.20 10*3/mm3    Poikilocytes Slight/1+ None Seen    Spherocytes Slight/1+ None Seen    WBC Morphology Normal Normal    Platelet Morphology Normal Normal     Left knee aspiration: The left knee was aspirated of about 30 cc of murky synovial fluid.  This was sent for analysis    IMPRESSION:  Patient is a 89 y.o. Not  or  male with left knee effusion    PLAN:   Admited to: Henrik Rowan MD  I do have concerns about the appearance of the fluid.  Hopefully this is not infection, and he certainly does not present that way.  Will determine next Epson treatment based on the results of the aspiration.  If this is gout or arthritis flareup, we will treat this conservatively with medication and physical therapy.  Only surgical intervention will be if there is determination that infection has gotten into the knee.  I did discuss this with the patient and he is agreeable to the plan.    R \"Rogers\" Eun ROSARIO MD  Orthopaedic Surgery  [facility]  [phone] - [place] Office  [phone] - [place] Office            "

## 2025-07-25 LAB
ANION GAP SERPL CALCULATED.3IONS-SCNC: 9.5 MMOL/L (ref 5–15)
BUN SERPL-MCNC: 28 MG/DL (ref 8–23)
BUN/CREAT SERPL: 32.6 (ref 7–25)
CALCIUM SPEC-SCNC: 9 MG/DL (ref 8.2–9.6)
CHLORIDE SERPL-SCNC: 102 MMOL/L (ref 98–107)
CO2 SERPL-SCNC: 28.5 MMOL/L (ref 22–29)
CREAT SERPL-MCNC: 0.86 MG/DL (ref 0.76–1.27)
DEPRECATED RDW RBC AUTO: 41.8 FL (ref 37–54)
EGFRCR SERPLBLD CKD-EPI 2021: 82.3 ML/MIN/1.73
ERYTHROCYTE [DISTWIDTH] IN BLOOD BY AUTOMATED COUNT: 13.7 % (ref 12.3–15.4)
GLUCOSE BLDC GLUCOMTR-MCNC: 116 MG/DL (ref 70–130)
GLUCOSE BLDC GLUCOMTR-MCNC: 139 MG/DL (ref 70–130)
GLUCOSE BLDC GLUCOMTR-MCNC: 147 MG/DL (ref 70–130)
GLUCOSE BLDC GLUCOMTR-MCNC: 180 MG/DL (ref 70–130)
GLUCOSE SERPL-MCNC: 118 MG/DL (ref 65–99)
HCT VFR BLD AUTO: 39 % (ref 37.5–51)
HGB BLD-MCNC: 12.1 G/DL (ref 13–17.7)
MCH RBC QN AUTO: 26.4 PG (ref 26.6–33)
MCHC RBC AUTO-ENTMCNC: 31 G/DL (ref 31.5–35.7)
MCV RBC AUTO: 85 FL (ref 79–97)
PLATELET # BLD AUTO: 246 10*3/MM3 (ref 140–450)
PMV BLD AUTO: 10.2 FL (ref 6–12)
POTASSIUM SERPL-SCNC: 4.8 MMOL/L (ref 3.5–5.2)
POTASSIUM SERPL-SCNC: 5.2 MMOL/L (ref 3.5–5.2)
RBC # BLD AUTO: 4.59 10*6/MM3 (ref 4.14–5.8)
SODIUM SERPL-SCNC: 140 MMOL/L (ref 136–145)
WBC NRBC COR # BLD AUTO: 13.36 10*3/MM3 (ref 3.4–10.8)

## 2025-07-25 PROCEDURE — 94761 N-INVAS EAR/PLS OXIMETRY MLT: CPT

## 2025-07-25 PROCEDURE — 80048 BASIC METABOLIC PNL TOTAL CA: CPT | Performed by: INTERNAL MEDICINE

## 2025-07-25 PROCEDURE — 94799 UNLISTED PULMONARY SVC/PX: CPT

## 2025-07-25 PROCEDURE — 99232 SBSQ HOSP IP/OBS MODERATE 35: CPT | Performed by: INTERNAL MEDICINE

## 2025-07-25 PROCEDURE — 82948 REAGENT STRIP/BLOOD GLUCOSE: CPT

## 2025-07-25 PROCEDURE — 63710000001 INSULIN LISPRO (HUMAN) PER 5 UNITS: Performed by: INTERNAL MEDICINE

## 2025-07-25 PROCEDURE — 85027 COMPLETE CBC AUTOMATED: CPT | Performed by: INTERNAL MEDICINE

## 2025-07-25 PROCEDURE — 94664 DEMO&/EVAL PT USE INHALER: CPT

## 2025-07-25 RX ORDER — TAMSULOSIN HYDROCHLORIDE 0.4 MG/1
0.4 CAPSULE ORAL DAILY
Qty: 30 CAPSULE | Refills: 0 | Status: SHIPPED | OUTPATIENT
Start: 2025-07-25

## 2025-07-25 RX ORDER — SPIRONOLACTONE 25 MG/1
12.5 TABLET ORAL DAILY
Qty: 30 TABLET | Refills: 0 | Status: SHIPPED | OUTPATIENT
Start: 2025-07-25

## 2025-07-25 RX ORDER — ALLOPURINOL 100 MG/1
100 TABLET ORAL DAILY
Qty: 30 TABLET | Refills: 1 | Status: SHIPPED | OUTPATIENT
Start: 2025-07-25

## 2025-07-25 RX ORDER — COLCHICINE 0.6 MG/1
0.6 TABLET ORAL DAILY
Qty: 30 TABLET | Refills: 1 | Status: SHIPPED | OUTPATIENT
Start: 2025-07-25

## 2025-07-25 RX ORDER — METOPROLOL SUCCINATE 25 MG/1
12.5 TABLET, EXTENDED RELEASE ORAL
Qty: 30 TABLET | Refills: 0 | Status: SHIPPED | OUTPATIENT
Start: 2025-07-25

## 2025-07-25 RX ORDER — TORSEMIDE 20 MG/1
20 TABLET ORAL DAILY
Qty: 30 TABLET | Refills: 1 | Status: SHIPPED | OUTPATIENT
Start: 2025-07-25

## 2025-07-25 RX ADMIN — POLYMYXIN B SULFATE AND TRIMETHOPRIM 1 DROP: 10000; 1 SOLUTION OPHTHALMIC at 18:37

## 2025-07-25 RX ADMIN — IPRATROPIUM BROMIDE AND ALBUTEROL SULFATE 3 ML: .5; 3 SOLUTION RESPIRATORY (INHALATION) at 08:30

## 2025-07-25 RX ADMIN — PRAVASTATIN SODIUM 20 MG: 40 TABLET ORAL at 20:40

## 2025-07-25 RX ADMIN — TORSEMIDE 20 MG: 20 TABLET ORAL at 08:51

## 2025-07-25 RX ADMIN — FLUTICASONE PROPIONATE 2 SPRAY: 50 SPRAY, METERED NASAL at 08:52

## 2025-07-25 RX ADMIN — LISINOPRIL 40 MG: 20 TABLET ORAL at 08:51

## 2025-07-25 RX ADMIN — PANTOPRAZOLE SODIUM 40 MG: 40 TABLET, DELAYED RELEASE ORAL at 06:11

## 2025-07-25 RX ADMIN — FERROUS SULFATE TAB 325 MG (65 MG ELEMENTAL FE) 325 MG: 325 (65 FE) TAB at 08:51

## 2025-07-25 RX ADMIN — POLYMYXIN B SULFATE AND TRIMETHOPRIM 1 DROP: 10000; 1 SOLUTION OPHTHALMIC at 11:28

## 2025-07-25 RX ADMIN — ALLOPURINOL 100 MG: 100 TABLET ORAL at 08:51

## 2025-07-25 RX ADMIN — APIXABAN 5 MG: 5 TABLET, FILM COATED ORAL at 10:37

## 2025-07-25 RX ADMIN — POLYMYXIN B SULFATE AND TRIMETHOPRIM 1 DROP: 10000; 1 SOLUTION OPHTHALMIC at 06:12

## 2025-07-25 RX ADMIN — COLCHICINE 0.6 MG: 0.6 TABLET ORAL at 08:52

## 2025-07-25 RX ADMIN — IPRATROPIUM BROMIDE AND ALBUTEROL SULFATE 3 ML: .5; 3 SOLUTION RESPIRATORY (INHALATION) at 20:08

## 2025-07-25 RX ADMIN — INSULIN LISPRO 2 UNITS: 100 INJECTION, SOLUTION INTRAVENOUS; SUBCUTANEOUS at 11:26

## 2025-07-25 RX ADMIN — Medication 10 ML: at 20:40

## 2025-07-25 RX ADMIN — Medication 12.5 MG: at 08:51

## 2025-07-25 RX ADMIN — Medication 10 ML: at 08:53

## 2025-07-25 RX ADMIN — APIXABAN 5 MG: 5 TABLET, FILM COATED ORAL at 20:40

## 2025-07-25 RX ADMIN — Medication 1 TABLET: at 08:51

## 2025-07-25 RX ADMIN — METOPROLOL SUCCINATE 12.5 MG: 25 TABLET, EXTENDED RELEASE ORAL at 08:52

## 2025-07-25 RX ADMIN — TAMSULOSIN HYDROCHLORIDE 0.4 MG: 0.4 CAPSULE ORAL at 08:51

## 2025-07-25 RX ADMIN — PRAMIPEXOLE DIHYDROCHLORIDE 0.25 MG: 0.5 TABLET ORAL at 20:40

## 2025-07-25 NOTE — PROGRESS NOTES
Enter Query Response Below      Query Response: Acute respiratory failure was not supported, hypoxia only              If applicable, please update the problem list.

## 2025-07-25 NOTE — PROGRESS NOTES
LOS: 4 days   Patient Care Team:  Tanya Julien MD as PCP - General (Family Medicine)  Ghazal Shelton, CATHY as Ambulatory  ([facility] [place][note 3])    Chief Complaint: Follow-up acute on chronic combined CHF, new dilated cardiomyopathy, acute on chronic right-sided CHF, type I versus type II NSTEMI.    Interval History: He was still in rate controlled atrial fibrillation this morning.  He was asymptomatic.  He had no chest pain or shortness of breath.  No complications from his cath yesterday.  Access sites looked good.    Vital Signs:  Temp:  [97.7 °F (36.5 °C)-98.4 °F (36.9 °C)] 98.3 °F (36.8 °C)  Heart Rate:  [75-85] 85  Resp:  [18-20] 20  BP: (122-149)/(48-92) 122/65    Intake/Output Summary (Last 24 hours) at 7/25/2025 1450  Last data filed at 7/25/2025 0900  Gross per 24 hour   Intake 120 ml   Output 1800 ml   Net -1680 ml       Physical Exam:   General Appearance:    No acute distress, alert and oriented x4   Lungs:     Clear to auscultation bilaterally.    Heart:    Irregularly irregular rhythm and normal rate.  II/VI SM LLSB.   Abdomen:     Soft, nontender, nondistended.    Extremities:   Trace edema of the lower extremities with chronic venous stasis changes.  No vascular access complications.     Results Review:    Results from last 7 days   Lab Units 07/25/25  0428   SODIUM mmol/L 140   POTASSIUM mmol/L 4.8   CHLORIDE mmol/L 102   CO2 mmol/L 28.5   BUN mg/dL 28.0*   CREATININE mg/dL 0.86   GLUCOSE mg/dL 118*   CALCIUM mg/dL 9.0     Results from last 7 days   Lab Units 07/21/25  0848 07/21/25  0109 07/20/25  2254   CK TOTAL U/L 1,242*  --   --    HSTROP T ng/L 767* 771* 840*     Results from last 7 days   Lab Units 07/25/25  0428   WBC 10*3/mm3 13.36*   HEMOGLOBIN g/dL 12.1*   HEMATOCRIT % 39.0   PLATELETS 10*3/mm3 246     Results from last 7 days   Lab Units 07/24/25  0710 07/23/25  2352 07/23/25  1642 07/22/25  1857 07/22/25  1112   INR   --   --   --   --  1.51*   APTT seconds 57.9* 61.8*  87.6*   < > 32.8    < > = values in this interval not displayed.         Results from last 7 days   Lab Units 07/20/25  2254   MAGNESIUM mg/dL 1.9           I reviewed the patient's new clinical results.        Assessment:  1.  Acute on chronic combined CHF secondary to #2  2.  New dilated nonischemic cardiomyopathy (with history of NICM and recovered EF in the past).  Current EF 20 to 25% by echo on 7/21/2025 (previously 55% on 12/20/2023).  3.  Acute on chronic right-sided CHF (moderately enlarged right ventricle with moderately reduced function by echo on 7/21/2025)  4.  Elevated troponin, type II NSTEMI secondary to #1 and #2 (cath on 7/24/2025 with minimal coronary artery disease only).  5.  Moderate mitral regurgitation by echo on 7/21/2025  6.  Mild to moderate tricuspid regurgitation by echo on 7/21/2025  7.  Moderate pulmonary hypertension, mean PA 37 by RHC on 7/24/2025 (PCWP 19 at that time)  8.  Baseline left bundle branch block  9.  Multifocal pneumonia  10.  Frequent premature atrial contractions  11.  Venous insufficiency with chronic lower extremity edema  12.  History of DVT, previously on anticoagulation with Eliquis  13.  Obstructive sleep apnea, treated with a dental device  14.  Hypertension, largely controlled  15.  Type 2 diabetes  16.  Urinary retention  17.  Left knee effusion, status post arthrocentesis on 7/24/2025 (uric acid and CPPD crystals)  18.  Paroxysmal atrial fibrillation, new and first diagnosed after heart cath on 7/24/2025    Plan:  -The patient underwent right and left heart catheterization on 7/24/2025.  He had minimal coronary artery disease, and therefore, this is a nonischemic cardiomyopathy.  He did have moderate pulmonary hypertension with an elevated wedge pressure of 19.  His mean PA was 37.    -He developed new atrial fibrillation after the heart catheterization.  He continues in asymptomatic rate controlled atrial fibrillation.    -He has no contraindications to  anticoagulation despite his age.  He does not fall, and is active.  I am going to start him on Eliquis 5 mg twice daily.  I did advise him to watch for any blood in his urine, dark tarry stools, or other issues.      -Start torsemide 20 mg/day today.    -Continue lisinopril 40 mg/day.  Toprol-XL 12.5 mg/day and spironolactone 12.5 mg/day.     -He is going to be discharged with a Slater catheter.  I am not going to start an SGLT2 inhibitor given infection risk in this setting and at his age.    -His blood pressure has been controlled, and the mitral regurgitation appears to be moderate and functional.  I do not feel that either of these contributed to the cardiomyopathy and CHF.    -Continue home pravastatin 20 mg/day.    -Dr. Rowan started colchicine for gout.  His arthrocentesis showed uric acid and CPPD crystals.    -Started on Flomax and he will be discharged with a Slater catheter will follow-up with urology as an outpatient for urinary retention.    -Okay to discharge to rehab today.  Discussed with Dr. Rowan.  I will arrange for follow-up in our office.    Noel Farrell MD  07/25/25  14:50 EDT

## 2025-07-25 NOTE — PROGRESS NOTES
Name: Goran August ADMIT: 2025   : 1934  PCP: Tanya Julien MD    MRN: [id number] LOS: 4 days   AGE/SEX: 89 y.o. male  ROOM: [facility]     Subjective   Subjective   Feeling better, knee pain improving.     Objective   Objective   Vital Signs  Temp:  [97.7 °F (36.5 °C)-98.3 °F (36.8 °C)] 98.3 °F (36.8 °C)  Heart Rate:  [80-85] 85  Resp:  [20] 20  BP: (122-149)/(48-65) 122/65  SpO2:  [94 %-99 %] 94 %  on  Flow (L/min) (Oxygen Therapy):  [1-3] 1;   Device (Oxygen Therapy): nasal cannula  Body mass index is 31.89 kg/m².  Physical Exam  Constitutional:       Appearance: He is ill-appearing.   Pulmonary:      Effort: Pulmonary effort is normal. No respiratory distress.      Breath sounds: No stridor.   Musculoskeletal:      Right lower leg: Edema present.      Left lower leg: Edema present.      Comments: Swelling to left knee, mild warmth, no erythema; chronic venous stasis changes bilaterally   Skin:     Coloration: Skin is not pale.      Comments: RUE cath site no hematoma   Neurological:      Mental Status: He is alert and oriented to person, place, and time.         Results Review     I reviewed the patient's new clinical results.  Results from last 7 days   Lab Units 25  0428 25  0931 25  0927 25  2352 25  0237 25  0255   WBC 10*3/mm3 13.36*  --   --  13.29* 11.76* 11.01*   HEMOGLOBIN g/dL 12.1*  --   --  12.3* 12.3* 13.1   HEMOGLOBIN, POC g/dL  --  12.9 12.9  --   --   --    PLATELETS 10*3/mm3 246  --   --  249 233 247     Results from last 7 days   Lab Units 25  0428 25  2331 25  1810 25  1346 25  2352 25  1249 25  0237   SODIUM mmol/L 140  --   --  141 141  --  139   POTASSIUM mmol/L 4.8 5.2 4.8 3.8 4.2   < > 3.4*   CHLORIDE mmol/L 102  --   --  99 101  --  99   CO2 mmol/L 28.5  --   --  31.4* 29.8*  --  28.2   BUN mg/dL 28.0*  --   --  29.0* 34.0*  --  41.0*   CREATININE mg/dL 0.86  --   --  0.90 1.00  --  1.08    GLUCOSE mg/dL 118*  --   --  115* 127*  --  104*   EGFR mL/min/1.73 82.3  --   --  81.1 71.5  --  65.2    < > = values in this interval not displayed.     Results from last 7 days   Lab Units 07/22/25  0255   ALBUMIN g/dL 3.0*   BILIRUBIN mg/dL 1.0   ALK PHOS U/L 80   AST (SGOT) U/L 54*   ALT (SGPT) U/L 29     Results from last 7 days   Lab Units 07/25/25  0428 07/24/25  1346 07/23/25  2352 07/23/25  0237 07/22/25  0255 07/21/25  0848 07/20/25  2254   CALCIUM mg/dL 9.0 8.9 8.9 8.8 9.0   < > 8.8   ALBUMIN g/dL  --   --   --   --  3.0*  --   --    MAGNESIUM mg/dL  --   --   --   --   --   --  1.9    < > = values in this interval not displayed.     Results from last 7 days   Lab Units 07/21/25  0033 07/20/25  2254   PROCALCITONIN ng/mL  --  0.31*   LACTATE mmol/L 1.8  --      Glucose   Date/Time Value Ref Range Status   07/25/2025 1558 116 70 - 130 mg/dL Final   07/25/2025 1102 180 (H) 70 - 130 mg/dL Final   07/25/2025 0607 139 (H) 70 - 130 mg/dL Final   07/24/2025 1949 155 (H) 70 - 130 mg/dL Final   07/24/2025 1539 146 (H) 70 - 130 mg/dL Final   07/24/2025 1229 99 70 - 130 mg/dL Final   07/24/2025 0707 117 70 - 130 mg/dL Final       No radiology results for the last day    Scheduled Medications  allopurinol, 100 mg, Oral, Daily  apixaban, 5 mg, Oral, Q12H  colchicine, 0.6 mg, Oral, Daily  ferrous sulfate, 325 mg, Oral, Daily With Breakfast  fluticasone, 2 spray, Nasal, Daily  insulin lispro, 2-7 Units, Subcutaneous, 4x Daily AC & at Bedtime  ipratropium-albuterol, 3 mL, Nebulization, Q6H While Awake - RT  lisinopril, 40 mg, Oral, Q24H  metoprolol succinate XL, 12.5 mg, Oral, Q24H  multivitamin with minerals, 1 tablet, Oral, Daily  pantoprazole, 40 mg, Oral, Q AM  pramipexole, 0.25 mg, Oral, Nightly  pravastatin, 20 mg, Oral, Nightly  sodium chloride, 10 mL, Intravenous, Q12H  spironolactone, 12.5 mg, Oral, Daily  tamsulosin, 0.4 mg, Oral, Daily  torsemide, 20 mg, Oral, Daily  trimethoprim-polymyxin b, 1 drop, Both  Eyes, Q6H    Infusions     Diet  Diet: Cardiac, Diabetic; Healthy Heart (2-3 Na+); Consistent Carbohydrate; Fluid Consistency: Thin (IDDSI 0)       Assessment/Plan     Active Hospital Problems    Diagnosis  POA    **Respiratory failure [J96.90]  Yes    Gout of left knee [M10.9]  Yes    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       89 y.o. male admitted with Respiratory failure.      07/25/25  Medically ready for dc, bed not available until tmrw.     Acute on chronic systolic and diastolic heart failure  Nonischemic cardiomyopathy  - TTE (7/21/25): 21-25%; LV mild-mod dilated; G3 DD; mod reduced RV systolic function; RV mod dilated; LAC mildly dilated; RA mild-mod dilated; mod MR; RVSP 55 mmHg  - Cardiology follow followed  -RHC/LHC 7/24 no significant CAD; moderate pulmonary hypertension; mildly reduced cardiac output  - Lisinopril, metoprolol, spironolactone, torsemide    -No SGLT2i at this time, can reassess as OP  -Statin     L knee gout CPPD  -Ortho evaluated    -arthrocentesis 7/24 inflammatory, uric acid and CPP crystals   -uric acid 11    -start colchicine, allopurinol initiated; continue colchicine for several months with initiation of urate lowering therapy, PCP to manage     New onset atrial fibrillation  - RC: Metoprolol  AC: Eliquis     Multifocal PNA  - CTA w/ b/l infiltrates, consolidation RLL  - Cultures NGTD, urine Legionella and strep negative RVP negative  - course of Rocephin completed     DM2 with hyperglycemia  - resume home meds     Acute urinary retention  -Slater placed  -Uro evaluated- f/u in office for void trial  -Flomax    Flow (L/min) (Oxygen  Therapy):  [1-3] 1    DVT prophylaxis: Heparin gtt  Discussed with patient, family, nursing staff, and Dr Farrell.  Anticipated discharge SNF, once arrangements made            Henrik Rowan MD  [facility]  07/25/25  19:42 EDT

## 2025-07-25 NOTE — DISCHARGE SUMMARY
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        Admitting HPI     \"Mr. August is a 89 y.o. male with combined CHF, DM 2, HTN, HLD presenting with cough and lower extremity edema.  Patient states that since last Thursday he has had a productive cough.  In addition he has had yellow crusting on his left eye.  He went to his PCP who thought symptoms were allergic or viral in nature.  Was placed on cough suppressant and given ciprofloxacin drops for the eyes.  Had continued cough and had a mechanical fall in his closet prompting presentation to ED. \"    Hospital Course     Pt admitted for dyspnea, seen in consultation with cardiology.  He was treated with Rocephin for course of pneumonia.  TTE revealed significantly decreased EF and he subsequently underwent right and left heart cath which showed no significant CAD, consistent with nonischemic cardiomyopathy.  Was found to have moderate pulmonary hypertension.  In addition he developed new onset A-fib following heart catheterization.  He has been started on GDMT as well as Eliquis.  He had urinary retention during hospitalization for which Slater catheter was placed, voiding trial was attempted prior to discharge Slater catheter had to be reinserted, patient will follow-up with urology in " office for voiding trial.  Discharged on Flomax.  In addition had effusion in left knee which was tapped by orthopedics and found to have gout and CPPD crystals, he has been started on allopurinol as well as colchicine and will need follow-up with PCP for ongoing management.  He was evaluated by PT with recommendations for SNF and is stable for discharge.      At the time of discharge patient was told to take all medications as prescribed, keep all follow-up appointments, and call their doctor or return to the hospital with any worsening or concerning symptoms.         Discharge Plan     #Acute on chronic combined CHF secondary to #2   #New dilated nonischemic cardiomyopathy (with history of NICM and recovered EF in the past).  Current EF 20 to 25% by echo on 7/21/2025 (previously 55% on 12/20/2023).  # Acute on chronic right-sided CHF (moderately enlarged right ventricle with moderately reduced function by echo on 7/21/2025)  #Elevated troponin, type II NSTEMI secondary to #1 and #2 (cath on 7/24/2025 with minimal coronary artery disease only).  - TTE (7/21/25): 21-25%; LV mild-mod dilated; G3 DD; mod reduced RV systolic function; RV mod dilated; LAC mildly dilated; RA mild-mod dilated; mod MR; RVSP 55 mmHg  - Cardiology follow followed  -RHC/LHC 7/24 no significant CAD; moderate pulmonary hypertension; mildly reduced cardiac output  -  ramipril, metoprolol, spironolactone, torsemide   -No SGLT2i at this time, can reassess as OP  -Statin  -Cardiology to arrange Outpatient follow-up  -Recommend BMP in 5 days to monitor renal electrolytes       L knee gout CPPD  -Ortho evaluated    -arthrocentesis 7/24 inflammatory, uric acid and CPP crystals   -uric acid 11    -start colchicine, allopurinol initiated; continue colchicine for several months with initiation of urate lowering therapy, PCP to manage     New onset atrial fibrillation  - RC: Metoprolol  AC: Eliquis     Multifocal PNA  - CTA w/ b/l infiltrates,  consolidation RLL  - Cultures NGTD, urine Legionella and strep negative RVP negative  - course of Rocephin completed     DM2 with hyperglycemia  - resume home meds     Acute urinary retention  -Slater placed  -Uro evaluated- f/u in office for void trial  -Flomax    Day of Discharge     Physical Exam:  Temp:  [98.2 °F (36.8 °C)-98.6 °F (37 °C)] 98.6 °F (37 °C)  Heart Rate:  [77-85] 79  Resp:  [18-20] 18  BP: (113-149)/(48-73) 132/65  Body mass index is 31.67 kg/m².  Physical Exam  Constitutional:    Lying in bed, not in distress, chronically ill-appearing  Pulmonary:      Effort: Pulmonary effort is normal. No respiratory distress.      Breath sounds: No stridor.   Musculoskeletal:      No significant lower extremity edema     Comments: Swelling to left knee vastly improved, no erythema; chronic venous stasis changes bilaterally   Skin:     Coloration: Skin is not pale.      Comments: RUE cath site no hematoma   Neurological:      Mental Status: He is alert and oriented to person, place, and time.     Consultants     Consult Orders (all) (From admission, onward)       Start     Ordered    07/23/25 1452  Inpatient Urology Consult  Once        Specialty:  Urology  Provider:  Clyde Desai Jr., MD    07/23/25 1451    07/22/25 1306  Inpatient Orthopedic Surgery Consult  Once        Specialty:  Orthopedic Surgery  Provider:  Daniel Lua MD    07/22/25 1305    07/22/25 1304  Inpatient Orthopedic Surgery Consult  Once,   Status:  Canceled        Specialty:  Orthopedic Surgery  Provider:  Daniel Lua MD    07/22/25 1303    07/21/25 0809  Inpatient Case Management  Consult  Once        Provider:  (Not yet assigned)    07/21/25 0809    07/21/25 0349  Inpatient Cardiology Consult  Once        Specialty:  Cardiology  Provider:  Ximena Jimenez MD    07/21/25 0351    07/21/25 0150  Cardiology (on-call MD unless specified)  Once        Specialty:  Cardiology  Provider:  (Not yet assigned)     07/21/25 0149    07/21/25 0148  [facility] (on-call MD unless specified) Details  Once        Specialty:  Hospitalist  Provider:  (Not yet assigned)    07/21/25 0147                  Procedures     Right and Left Heart Cath      Imaging Results (All)       Procedure Component Value Units Date/Time    XR Knee 1 or 2 View Left [[id number]] Collected: 07/22/25 1258     Updated: 07/22/25 1302    Narrative:      XR KNEE 1 OR 2 VW LEFT-       HISTORY:   Knee pain and swelling, unable to bear weight     TECHNIQUE: 3 views of the left knee.     FINDINGS:     No convincing evidence of acute fracture or dislocation, or lytic or  blastic bone lesion.     There are radiographically mild degenerative changes, and there is a  large joint effusion.       Impression:         Joint effusion with degenerative change, no acute bony abnormality.     This report was finalized on 7/22/2025 12:59 PM by Dr. Manny Johnson M.D on Workstation: [id number]       CT Angiogram Chest Pulmonary Embolism [[id number]] Collected: 07/21/25 0119     Updated: 07/21/25 0142    Narrative:      CT ANGIOGRAM OF THE CHEST     HISTORY: Cough     COMPARISON: None available.     TECHNIQUE: Axial CT imaging was obtained through the thorax. IV contrast  was administered. Three-D reformatted images were obtained.     FINDINGS:  No acute pulmonary thromboembolus is seen. 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 This was also  present on the exam from May 2022, and is secondary to chronic thrombus.  Examination was not optimized for assessment of the thoracic aorta.  There is some dilatation of the proximal descending thoracic aorta,  measuring up to 3.1 cm. The distal descending thoracic aorta measures  2.6 cm. The heart is enlarged. There are coronary artery calcifications.  There is a small pericardial effusion. Patient is noted to have reflux  of contrast material into the hepatic veins, which can be seen in the  setting  of right-sided heart failure. Main pulmonary artery is enlarged,  which can be associated with pulmonary arterial hypertension. Thyroid  gland, trachea, and esophagus appear unremarkable. There is an enlarged  right hilar node, which measures up to 2.8 x 2.0 cm. Subcarinal node  also appears enlarged, measuring up to 2.2 cm. Both of these are larger  than on prior exam. The patient has bilateral pulmonary infiltrates,  most significantly within the right lower lobe. There is also some  interlobular septal thickening, which may reflect some vascular  congestion. There are small bilateral pleural effusions. Images through  the upper abdomen demonstrate cholelithiasis. No acute osseous  abnormalities are seen.       Impression:         1. Cardiomegaly, interlobular septal thickening, and small bilateral  effusions, suggesting congestive heart failure.  2. Bilateral pulmonary infiltrates, with most extensive consolidation  noted within the right lower lobe. Appearance is favored to reflect  multifocal pneumonia. There are prominent mediastinal and hilar lymph  nodes. These are likely reactive. However, follow-up of both the  infiltrates and adenopathy is suggested.     Radiation dose reduction techniques were utilized, including automated  exposure control and exposure modulation based on body size.        This report was finalized on 7/21/2025 1:39 AM by Dr. Julia Wisdom M.D on Workstation: [id number]       XR Chest 1 View [[id number]] Collected: 07/20/25 2351     Updated: 07/20/25 2355    Narrative:      SINGLE VIEW OF THE CHEST     HISTORY: Cough and hypoxia     COMPARISON: May 8, 2020     FINDINGS:  There is cardiomegaly. There is vascular congestion. There is  calcification of the aorta. No pneumothorax is seen. No acute  infiltrates are identified. Bibasilar atelectasis is suspected. No large  effusion is identified.       Impression:         1. Cardiomegaly with vascular congestion.  2. Bibasilar  atelectasis.     This report was finalized on 7/20/2025 11:52 PM by Dr. Julia Wisdom M.D on Workstation: [id number]             Results for orders placed during the hospital encounter of 01/04/24    Duplex Venous Lower Extremity - Bilateral 01/04/2024  3:22 PM    Interpretation Summary    Normal bilateral lower extremity venous duplex scan.    Results for orders placed during the hospital encounter of 07/20/25    Adult Transthoracic Echo Complete W/ Cont if Necessary Per Protocol 07/21/2025  2:57 PM    Interpretation Summary    Left ventricular systolic function is severely decreased. Left ventricular ejection fraction appears to be 21 - 25%.    The left ventricular cavity is mild to moderately dilated.    The following left ventricular wall segments are hypokinetic: mid anterior, apical anterior, basal anterolateral, mid anterolateral, apical lateral, basal inferolateral, mid inferolateral, apical inferior, mid inferior, basal inferoseptal, basal anterior, basal inferior and basal inferoseptal. The following left ventricular wall segments are akinetic: apical septal, mid inferoseptal, apex, basal anteroseptal and mid anteroseptal.    Left ventricular diastolic function is consistent with (grade III w/high LAP) fixed restrictive pattern.    Moderately reduced right ventricular systolic function noted.    The right ventricular cavity is moderately dilated.    The left atrial cavity is mildly dilated.    The right atrial cavity is mild to moderately dilated.    Abnormal mitral valve structure consistent with dilated annulus.    Moderate mitral valve regurgitation is present.    Estimated right ventricular systolic pressure from tricuspid regurgitation is markedly elevated (>55 mmHg).    Pertinent Labs     Results from last 7 days   Lab Units 07/25/25  0428 07/24/25  0931 07/24/25  0927 07/23/25  2352 07/23/25  0237 07/22/25  0255   WBC 10*3/mm3 13.36*  --   --  13.29* 11.76* 11.01*   HEMOGLOBIN g/dL 12.1*  --   " --  12.3* 12.3* 13.1   HEMOGLOBIN, POC g/dL  --  12.9 12.9  --   --   --    PLATELETS 10*3/mm3 246  --   --  249 233 247     Results from last 7 days   Lab Units 07/26/25  0346 07/25/25  0428 07/24/25  2331 07/24/25  1810 07/24/25  1346 07/23/25  2352   SODIUM mmol/L 141 140  --   --  141 141   POTASSIUM mmol/L 4.2 4.8 5.2 4.8 3.8 4.2   CHLORIDE mmol/L 99 102  --   --  99 101   CO2 mmol/L 30.6* 28.5  --   --  31.4* 29.8*   BUN mg/dL 28.0* 28.0*  --   --  29.0* 34.0*   CREATININE mg/dL 1.12 0.86  --   --  0.90 1.00   GLUCOSE mg/dL 120* 118*  --   --  115* 127*   EGFR mL/min/1.73 62.4 82.3  --   --  81.1 71.5     Results from last 7 days   Lab Units 07/22/25  0255   ALBUMIN g/dL 3.0*   BILIRUBIN mg/dL 1.0   ALK PHOS U/L 80   AST (SGOT) U/L 54*   ALT (SGPT) U/L 29     Results from last 7 days   Lab Units 07/26/25  0346 07/25/25  0428 07/24/25  1346 07/23/25  2352 07/23/25  0237 07/22/25  0255 07/21/25  0848 07/20/25  2254   CALCIUM mg/dL 8.8 9.0 8.9 8.9   < > 9.0   < > 8.8   ALBUMIN g/dL  --   --   --   --   --  3.0*  --   --    MAGNESIUM mg/dL  --   --   --   --   --   --   --  1.9    < > = values in this interval not displayed.       Results from last 7 days   Lab Units 07/21/25  0848 07/21/25  0109 07/20/25  2254   CK TOTAL U/L 1,242*  --   --    HSTROP T ng/L 767* 771* 840*   PROBNP pg/mL  --   --  14,418.0*     Results from last 7 days   Lab Units 07/22/25  1112   URIC ACID mg/dL 11.2*         Invalid input(s): \"LDLCALC\"  Results from last 7 days   Lab Units 07/24/25  0657 07/21/25  0033   BLOODCX   --  No growth at 5 days  No growth at 5 days   BODYFLDCX  No growth  --      Results from last 7 days   Lab Units 07/20/25  2255   COVID19  Not Detected       Test Results Pending at Discharge     Pending Labs       Order Current Status    Body Fluid Culture - Body Fluid, Knee, Left Preliminary result            Discharge Details        Discharge Medications        New Medications        Instructions Start Date "   allopurinol 100 MG tablet  Commonly known as: ZYLOPRIM   100 mg, Oral, Daily      apixaban 5 MG tablet tablet  Commonly known as: ELIQUIS   5 mg, Oral, 2 Times Daily      colchicine 0.6 MG tablet   0.6 mg, Oral, Daily      metoprolol succinate XL 25 MG 24 hr tablet  Commonly known as: TOPROL-XL   12.5 mg, Oral, Every 24 Hours Scheduled      spironolactone 25 MG tablet  Commonly known as: ALDACTONE   12.5 mg, Oral, Daily      tamsulosin 0.4 MG capsule 24 hr capsule  Commonly known as: FLOMAX   0.4 mg, Oral, Daily      torsemide 20 MG tablet  Commonly known as: DEMADEX   20 mg, Oral, Daily             Continue These Medications        Instructions Start Date   albuterol sulfate  (90 Base) MCG/ACT inhaler  Commonly known as: PROVENTIL HFA;VENTOLIN HFA;PROAIR HFA   2 puffs, Inhalation, Every 4 Hours PRN      benzonatate 100 MG capsule  Commonly known as: Tessalon Perles   100 mg, Oral, 3 Times Daily PRN      esomeprazole 40 MG capsule  Commonly known as: nexIUM   TAKE 1 CAPSULE DAILY      ferrous sulfate 325 (65 Fe) MG tablet   1 tablet      fluticasone 50 MCG/ACT nasal spray  Commonly known as: FLONASE   2 sprays, Nasal, Daily, Administer 2 sprays in each nostril once a day      multivitamin with minerals tablet tablet   1 each, Daily      PRESERVISION AREDS 2 PO   Take  by mouth.      pramipexole 0.25 MG tablet  Commonly known as: MIRAPEX   0.25 mg, Nightly      pravastatin 20 MG tablet  Commonly known as: PRAVACHOL   TAKE 1 TABLET DAILY      ramipril 10 MG capsule  Commonly known as: ALTACE   TAKE 1 CAPSULE DAILY      triamcinolone 0.1 % cream  Commonly known as: KENALOG   Topical, 2 Times Daily             Stop These Medications      ciprofloxacin 0.3 % ophthalmic solution  Commonly known as: Ciloxan     furosemide 20 MG tablet  Commonly known as: LASIX              Allergies   Allergen Reactions    Hctz [Hydrochlorothiazide] Hives       Discharge Disposition:  Skilled Nursing Facility ([place] -  External)      Discharge Diet:  Diet Order   Procedures    Diet: Cardiac, Diabetic; Healthy Heart (2-3 Na+); Consistent Carbohydrate; Fluid Consistency: Thin (IDDSI 0)       Discharge Activity:   Activity Instructions       Activity as Tolerated              CODE STATUS:    Code Status and Medical Interventions: No CPR (Do Not Attempt to Resuscitate); Limited Support; NO intubation (DNI)   Ordered at: 07/22/25 0807     Code Status (Patient has no pulse and is not breathing):    No CPR (Do Not Attempt to Resuscitate)     Medical Interventions (Patient has pulse or is breathing):    Limited Support     Medical Intervention Limits:    NO intubation (DNI)       No future appointments.     Contact information for follow-up providers       Tanya Julien MD .    Specialty: Family Medicine  Contact information:  [address] [facility] [id number] [phone]               [facility] Follow up in 1 week(s).    Contact information:  [address]  [facility] [address]  [phone]             Traci Santiago APRN Follow up in 2 week(s).    Specialties: Nurse Practitioner, Cardiology  Contact information:  [address] [id number] [phone]                       Contact information for after-discharge care       Destination       [facility] .    Service: Skilled Nursing  Contact information:  [address]  [facility] [address] [phone]                                   Time Spent on Discharge:  Greater than 30 minutes spent on discharge management including final examination, discussion of hospital stay and patient education, preparation of records, medication reconciliation, follow up planning      Henrik Rowan MD  [facility]  07/26/25  08:40 EDT

## 2025-07-25 NOTE — NURSING NOTE
Pt Aox4, VSS, voiding trial complete and valera reinserted, BM this shift, meds per MAR, pt to d/c tomorrow when bed becomes available, EMS to transport

## 2025-07-26 VITALS
HEART RATE: 71 BPM | RESPIRATION RATE: 20 BRPM | WEIGHT: 227.07 LBS | HEIGHT: 71 IN | OXYGEN SATURATION: 92 % | BODY MASS INDEX: 31.79 KG/M2 | SYSTOLIC BLOOD PRESSURE: 136 MMHG | TEMPERATURE: 99.1 F | DIASTOLIC BLOOD PRESSURE: 77 MMHG

## 2025-07-26 PROBLEM — I50.21 ACUTE HFREF (HEART FAILURE WITH REDUCED EJECTION FRACTION): Status: ACTIVE | Noted: 2025-07-26

## 2025-07-26 LAB
ANION GAP SERPL CALCULATED.3IONS-SCNC: 11.4 MMOL/L (ref 5–15)
BACTERIA SPEC AEROBE CULT: NORMAL
BACTERIA SPEC AEROBE CULT: NORMAL
BUN SERPL-MCNC: 28 MG/DL (ref 8–23)
BUN/CREAT SERPL: 25 (ref 7–25)
CALCIUM SPEC-SCNC: 8.8 MG/DL (ref 8.2–9.6)
CHLORIDE SERPL-SCNC: 99 MMOL/L (ref 98–107)
CO2 SERPL-SCNC: 30.6 MMOL/L (ref 22–29)
CREAT SERPL-MCNC: 1.12 MG/DL (ref 0.76–1.27)
EGFRCR SERPLBLD CKD-EPI 2021: 62.4 ML/MIN/1.73
GLUCOSE BLDC GLUCOMTR-MCNC: 137 MG/DL (ref 70–130)
GLUCOSE BLDC GLUCOMTR-MCNC: 137 MG/DL (ref 70–130)
GLUCOSE SERPL-MCNC: 120 MG/DL (ref 65–99)
POTASSIUM SERPL-SCNC: 4.2 MMOL/L (ref 3.5–5.2)
SODIUM SERPL-SCNC: 141 MMOL/L (ref 136–145)

## 2025-07-26 PROCEDURE — 99232 SBSQ HOSP IP/OBS MODERATE 35: CPT | Performed by: PHYSICIAN ASSISTANT

## 2025-07-26 PROCEDURE — 94799 UNLISTED PULMONARY SVC/PX: CPT

## 2025-07-26 PROCEDURE — 94761 N-INVAS EAR/PLS OXIMETRY MLT: CPT

## 2025-07-26 PROCEDURE — 94664 DEMO&/EVAL PT USE INHALER: CPT

## 2025-07-26 PROCEDURE — 80048 BASIC METABOLIC PNL TOTAL CA: CPT | Performed by: INTERNAL MEDICINE

## 2025-07-26 PROCEDURE — 82948 REAGENT STRIP/BLOOD GLUCOSE: CPT

## 2025-07-26 RX ORDER — LISINOPRIL 20 MG/1
20 TABLET ORAL
Status: DISCONTINUED | OUTPATIENT
Start: 2025-07-26 | End: 2025-07-26 | Stop reason: HOSPADM

## 2025-07-26 RX ADMIN — ALLOPURINOL 100 MG: 100 TABLET ORAL at 08:27

## 2025-07-26 RX ADMIN — Medication 12.5 MG: at 08:26

## 2025-07-26 RX ADMIN — TAMSULOSIN HYDROCHLORIDE 0.4 MG: 0.4 CAPSULE ORAL at 08:28

## 2025-07-26 RX ADMIN — FERROUS SULFATE TAB 325 MG (65 MG ELEMENTAL FE) 325 MG: 325 (65 FE) TAB at 08:27

## 2025-07-26 RX ADMIN — METOPROLOL SUCCINATE 12.5 MG: 25 TABLET, EXTENDED RELEASE ORAL at 08:27

## 2025-07-26 RX ADMIN — APIXABAN 5 MG: 5 TABLET, FILM COATED ORAL at 08:27

## 2025-07-26 RX ADMIN — FLUTICASONE PROPIONATE 2 SPRAY: 50 SPRAY, METERED NASAL at 08:28

## 2025-07-26 RX ADMIN — Medication 1 TABLET: at 08:28

## 2025-07-26 RX ADMIN — POLYMYXIN B SULFATE AND TRIMETHOPRIM 1 DROP: 10000; 1 SOLUTION OPHTHALMIC at 06:31

## 2025-07-26 RX ADMIN — POLYMYXIN B SULFATE AND TRIMETHOPRIM 1 DROP: 10000; 1 SOLUTION OPHTHALMIC at 00:23

## 2025-07-26 RX ADMIN — PANTOPRAZOLE SODIUM 40 MG: 40 TABLET, DELAYED RELEASE ORAL at 06:31

## 2025-07-26 RX ADMIN — Medication 10 ML: at 08:28

## 2025-07-26 RX ADMIN — IPRATROPIUM BROMIDE AND ALBUTEROL SULFATE 3 ML: .5; 3 SOLUTION RESPIRATORY (INHALATION) at 08:55

## 2025-07-26 RX ADMIN — IPRATROPIUM BROMIDE AND ALBUTEROL SULFATE 3 ML: .5; 3 SOLUTION RESPIRATORY (INHALATION) at 12:14

## 2025-07-26 RX ADMIN — COLCHICINE 0.6 MG: 0.6 TABLET ORAL at 08:27

## 2025-07-26 RX ADMIN — LISINOPRIL 20 MG: 20 TABLET ORAL at 08:27

## 2025-07-26 NOTE — PROGRESS NOTES
Patient Name: Goran August  Age/Sex: 89 y.o. male  : 1934  MRN: [id number]    Date of Admission: 2025  Date of Encounter Visit: 25  Encounter Provider: Rafy Rod PA-C  Place of Service: [facility] CARDIOLOGY      Subjective:       Chief Complaint: Shortness of breath       Follow up:   25 he is doing well this morning.  He is starting to feel much better.  His heart rates have been very well-controlled overnight in the 60s and 70s.  He is on 1 L of oxygen.  He is a bit sad that he thinks at this point he is can have to move out of his longtime home that he is had with his wife.  She has severe dementia and he is her primary caretaker although he does get a lot of help from his family.  He is waiting on a bed at rehab this afternoon.      Home Medications:   Medications Prior to Admission   Medication Sig Dispense Refill Last Dose/Taking    albuterol sulfate  (90 Base) MCG/ACT inhaler Inhale 2 puffs Every 4 (Four) Hours As Needed for Wheezing or Shortness of Air. 18 g 6 Taking As Needed    esomeprazole (nexIUM) 40 MG capsule TAKE 1 CAPSULE DAILY 90 capsule 3 2025    ferrous sulfate 325 (65 Fe) MG tablet Take 1 tablet by mouth. 3 times week   2025    Multiple Vitamins-Minerals (PRESERVISION AREDS 2 PO) Take  by mouth.   2025    multivitamin with minerals tablet tablet Take 1 tablet by mouth Daily.   2025    pramipexole (MIRAPEX) 0.25 MG tablet TAKE 1 TABLET EVERY NIGHT 90 tablet 3 2025    pravastatin (PRAVACHOL) 20 MG tablet TAKE 1 TABLET DAILY 90 tablet 3 2025    ramipril (ALTACE) 10 MG capsule TAKE 1 CAPSULE DAILY 90 capsule 3 2025    triamcinolone (KENALOG) 0.1 % cream Apply  topically to the appropriate area as directed 2 (Two) Times a Day. 45 g 1 2025    benzonatate (Tessalon Perles) 100 MG capsule Take 1 capsule by mouth 3 (Three) Times a Day As Needed for Cough. 21 capsule 0     []  ciprofloxacin (Ciloxan) 0.3 % ophthalmic solution Administer 1 drop into the left eye Every 4 (Four) Hours for 5 days. 2.5 mL 0     fluticasone (FLONASE) 50 MCG/ACT nasal spray Administer 2 sprays into the nostril(s) as directed by provider Daily. Administer 2 sprays in each nostril once a day 16 g 0     furosemide (LASIX) 20 MG tablet Take 2 tablets by mouth Daily. 60 tablet 3        Allergies:  Allergies   Allergen Reactions    Hctz [Hydrochlorothiazide] Hives         Review of Systems:  All systems reviewed. Pertinent positives identified in HPI. All other systems are negative.       Objective:     Objective:  Temp:  [98.2 °F (36.8 °C)-99.1 °F (37.3 °C)] 99.1 °F (37.3 °C)  Heart Rate:  [71-81] 74  Resp:  [16-20] 18  BP: (113-136)/(65-77) 136/77    Intake/Output Summary (Last 24 hours) at 7/26/2025 1007  Last data filed at 7/26/2025 0959  Gross per 24 hour   Intake 330 ml   Output 1800 ml   Net -1470 ml     Body mass index is 31.67 kg/m².      07/24/25  0736 07/25/25  0500 07/26/25  0300   Weight: 106 kg (233 lb 0.4 oz) 104 kg (228 lb 9.9 oz) 103 kg (227 lb 1.2 oz)         Physical Exam:   General: 89 y.o. male elderly man laying in bed wearing oxygen  Neck: No JVD or carotid bruit  Lungs: Clear to ausculation bilaterally, symmetric  Heart: Irregular rhythm with normal rate with no overt murmurs, rubs or gallops. Normal S1 and S2.   Abdomen: soft, non-tender  Extremities: No lower extremity edema or cyanosis.   Neuo: no lateralizing defects.     Lab Review:     Results from last 7 days   Lab Units 07/26/25  0346 07/25/25  0428 07/24/25  2331 07/24/25  1810 07/24/25  1346   SODIUM mmol/L 141 140  --   --  141   POTASSIUM mmol/L 4.2 4.8 5.2   < > 3.8   CHLORIDE mmol/L 99 102  --   --  99   CO2 mmol/L 30.6* 28.5  --   --  31.4*   BUN mg/dL 28.0* 28.0*  --   --  29.0*   CREATININE mg/dL 1.12 0.86  --   --  0.90   GLUCOSE mg/dL 120* 118*  --   --  115*   CALCIUM mg/dL 8.8 9.0  --   --  8.9    < > = values in this  interval not displayed.       Results from last 7 days   Lab Units 07/21/25  0848 07/21/25  0109 07/20/25  2254   CK TOTAL U/L 1,242*  --   --    HSTROP T ng/L 767* 771* 840*     Results from last 7 days   Lab Units 07/25/25  0428   WBC 10*3/mm3 13.36*   HEMOGLOBIN g/dL 12.1*   HEMATOCRIT % 39.0   PLATELETS 10*3/mm3 246     Results from last 7 days   Lab Units 07/24/25  0710 07/23/25  2352 07/23/25  1642 07/22/25  1857 07/22/25  1112   INR   --   --   --   --  1.51*   APTT seconds 57.9* 61.8* 87.6*   < > 32.8    < > = values in this interval not displayed.         Results from last 7 days   Lab Units 07/20/25  2254   MAGNESIUM mg/dL 1.9         Results from last 7 days   Lab Units 07/20/25  2254   PROBNP pg/mL 14,418.0*         Results from last 7 days   Lab Units 07/22/25  0255   TSH uIU/mL 1.790       Imaging:    chest X-ray    Results for orders placed during the hospital encounter of 07/20/25    Adult Transthoracic Echo Complete W/ Cont if Necessary Per Protocol    Interpretation Summary    Left ventricular systolic function is severely decreased. Left ventricular ejection fraction appears to be 21 - 25%.    The left ventricular cavity is mild to moderately dilated.    The following left ventricular wall segments are hypokinetic: mid anterior, apical anterior, basal anterolateral, mid anterolateral, apical lateral, basal inferolateral, mid inferolateral, apical inferior, mid inferior, basal inferoseptal, basal anterior, basal inferior and basal inferoseptal. The following left ventricular wall segments are akinetic: apical septal, mid inferoseptal, apex, basal anteroseptal and mid anteroseptal.    Left ventricular diastolic function is consistent with (grade III w/high LAP) fixed restrictive pattern.    Moderately reduced right ventricular systolic function noted.    The right ventricular cavity is moderately dilated.    The left atrial cavity is mildly dilated.    The right atrial cavity is mild to moderately  dilated.    Abnormal mitral valve structure consistent with dilated annulus.    Moderate mitral valve regurgitation is present.    Estimated right ventricular systolic pressure from tricuspid regurgitation is markedly elevated (>55 mmHg).      Telemetry/EK25: Rate controlled atrial fibrillation        I personally viewed and interpreted the patient's EKG/Telemetry data.    Assessment/ Plan:       1.  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  Most recent ejection fraction was 20 to 25% where previously was 55% back in   NYHA class II  Continue Toprol 12.5 mg daily, spironolactone 12.5 mg daily and lisinopril.  Assessment started on torsemide 20 mg daily.  Would recommend checking electrolytes and renal function at follow-up visit in a few weeks.  Dr. Farrell already evaluated if he could be a candidate for SGLT2 inhibitor but given his age and risk for infection we are going to hold off    2.  Paroxysmal atrial fibrillation-rates have been well-controlled.  He he has been resumed on low-dose Toprol.  Asymptomatic.  Occurred after heart catheterization on 2025  He has been started on apixaban 5 mg twice daily for anticoagulation.    3.  Pneumonia-multifocal.  He is going to rehab for ongoing rehabilitation.  He is going home with the Slater catheter for ongoing urinary retention.  Completed course of IV antibiotics      He has plans for discharge to rehab today.  He will need to follow-up in our office in 3 to 4 weeks after he is out of rehab.    Thank you for allowing me to participate in the care of Goran August. Feel free to contact me directly with any further questions or concerns.    Rafy Rod PA-C  [facility]  07/26/25  10:07 EDT

## 2025-07-26 NOTE — CASE MANAGEMENT/SOCIAL WORK
Continued Stay Note  [facility]     Patient Name: Goran August  MRN: [id number]  Today's Date: 7/26/2025    Admit Date: 7/20/2025    [facility] bed availability   Discharge Plan       Row Name 07/26/25 1019       Plan    Plan Comments CCP confirmed with Chan, patient accepted and bed available today ([facility] room 118). RN updated and to arrange EMS. Marielena MORGAN CCP    Final Discharge Disposition Code --    Final Note --                   Discharge Codes    No documentation.                 Expected Discharge Date and Time       Expected Discharge Date Expected Discharge Time    Jul 26, 2025               Marielnea Fuller RN

## 2025-07-28 ENCOUNTER — TELEPHONE (OUTPATIENT)
Dept: CARDIOLOGY | Age: OVER 89
End: 2025-07-28
Payer: MEDICARE

## 2025-07-28 NOTE — TELEPHONE ENCOUNTER
Kika Greco RN  [facility] Referral Coordinator  Please schedule for hospital follow up with Mari in 2 weeks and Dr. Gautam in 4 weeks.    Left message for patient to call back to schedule hospital follow up appointment.

## 2025-07-28 NOTE — PROGRESS NOTES
Case Management Discharge Note      Final Note: DC'd to skilled bed at [facility] via [facility] 7/26    Provided Post Acute Provider Quality & Resource List?: N/A    Selected Continued Care - Discharged on 7/26/2025 Admission date: 7/20/2025 - Discharge disposition: Skilled Nursing Facility (DC - External)      Destination Coordination complete.      Service Provider Services Address Phone Fax Patient Preferred    [address] [address] [phone] --                          Selected Continued Care - Episodes Includes continued care and service providers with selected services from the active episodes listed below      High Risk Care Management Episode start date: 7/21/2025   There are no active outsourced providers for this episode.                 Transportation Services  Transportation: Ambulance  Ambulance: [facility]  [facility] Ambulance Status: Accepted    Final Discharge Disposition Code: 03 - skilled nursing facility (SNF)

## 2025-07-29 LAB
BACTERIA FLD CULT: NORMAL
GRAM STN SPEC: NORMAL
GRAM STN SPEC: NORMAL

## 2025-08-19 ENCOUNTER — OFFICE VISIT (OUTPATIENT)
Age: OVER 89
End: 2025-08-19
Payer: MEDICARE

## 2025-08-19 VITALS
HEART RATE: 84 BPM | BODY MASS INDEX: 44.57 KG/M2 | HEIGHT: 60 IN | DIASTOLIC BLOOD PRESSURE: 76 MMHG | WEIGHT: 227 LBS | SYSTOLIC BLOOD PRESSURE: 128 MMHG

## 2025-08-19 DIAGNOSIS — I73.9 PERIPHERAL VASCULAR DISEASE, UNSPECIFIED: Primary | ICD-10-CM

## 2025-08-19 DIAGNOSIS — I42.8 NONISCHEMIC CARDIOMYOPATHY: ICD-10-CM

## 2025-08-19 DIAGNOSIS — I50.22 HEART FAILURE WITH MILDLY REDUCED EJECTION FRACTION (HFMREF): ICD-10-CM

## 2025-08-19 DIAGNOSIS — I10 BENIGN ESSENTIAL HTN: ICD-10-CM

## 2025-08-19 DIAGNOSIS — I11.9 HYPERTENSIVE HEART DISEASE WITHOUT HEART FAILURE: ICD-10-CM

## 2025-08-19 DIAGNOSIS — I44.7 LEFT BUNDLE BRANCH BLOCK: ICD-10-CM

## 2025-08-19 DIAGNOSIS — E78.2 MIXED HYPERLIPIDEMIA: ICD-10-CM

## (undated) DEVICE — RADIFOCUS OPTITORQUE ANGIOGRAPHIC CATHETER: Brand: OPTITORQUE

## (undated) DEVICE — KT MANIFLD CARDIAC

## (undated) DEVICE — GLIDESHEATH SLENDER STAINLESS STEEL KIT: Brand: GLIDESHEATH SLENDER

## (undated) DEVICE — DGW .035 FC J3MM 260CM TEF: Brand: EMERALD

## (undated) DEVICE — BALN PRESS WEDGE 5F 110CM

## (undated) DEVICE — TR BAND RADIAL ARTERY COMPRESSION DEVICE: Brand: TR BAND

## (undated) DEVICE — GLIDESHEATH BASIC HYDROPHILIC COATED INTRODUCER SHEATH: Brand: GLIDESHEATH

## (undated) DEVICE — PK CATH CARD 40